# Patient Record
Sex: FEMALE | Race: OTHER | Employment: UNEMPLOYED | URBAN - METROPOLITAN AREA
[De-identification: names, ages, dates, MRNs, and addresses within clinical notes are randomized per-mention and may not be internally consistent; named-entity substitution may affect disease eponyms.]

---

## 2017-01-19 ENCOUNTER — HOSPITAL ENCOUNTER (EMERGENCY)
Facility: HOSPITAL | Age: 52
Discharge: HOME | End: 2017-01-19
Attending: EMERGENCY MEDICINE
Payer: MEDICAID

## 2017-01-19 ENCOUNTER — APPOINTMENT (OUTPATIENT)
Dept: CT IMAGING | Facility: HOSPITAL | Age: 52
End: 2017-01-19
Attending: EMERGENCY MEDICINE
Payer: MEDICAID

## 2017-01-19 VITALS
HEART RATE: 85 BPM | TEMPERATURE: 98 F | RESPIRATION RATE: 16 BRPM | WEIGHT: 196.19 LBS | SYSTOLIC BLOOD PRESSURE: 155 MMHG | OXYGEN SATURATION: 95 % | DIASTOLIC BLOOD PRESSURE: 82 MMHG

## 2017-01-19 DIAGNOSIS — N10 ACUTE PYELONEPHRITIS: ICD-10-CM

## 2017-01-19 DIAGNOSIS — R11.2 NON-INTRACTABLE VOMITING WITH NAUSEA, UNSPECIFIED VOMITING TYPE: Primary | ICD-10-CM

## 2017-01-19 LAB
ALBUMIN SERPL BCP-MCNC: 4 G/DL (ref 3.5–4.8)
ALP SERPL-CCNC: 78 U/L (ref 32–100)
ALT SERPL-CCNC: 35 U/L (ref 14–54)
ANION GAP SERPL CALC-SCNC: 13 MMOL/L (ref 0–18)
AST SERPL-CCNC: 32 U/L (ref 15–41)
B-HCG UR QL: NEGATIVE
BASOPHILS # BLD: 0.1 K/UL (ref 0–0.2)
BASOPHILS NFR BLD: 1 %
BILIRUB DIRECT SERPL-MCNC: 0.1 MG/DL (ref 0–0.2)
BILIRUB SERPL-MCNC: 0.8 MG/DL (ref 0.3–1.2)
BILIRUB UR QL: NEGATIVE
BUN SERPL-MCNC: 13 MG/DL (ref 8–20)
BUN/CREAT SERPL: 14.1 (ref 10–20)
CALCIUM SERPL-MCNC: 9.3 MG/DL (ref 8.5–10.5)
CHLORIDE SERPL-SCNC: 97 MMOL/L (ref 95–110)
CO2 SERPL-SCNC: 23 MMOL/L (ref 22–32)
COLOR UR: YELLOW
CREAT SERPL-MCNC: 0.92 MG/DL (ref 0.5–1.5)
EOSINOPHIL # BLD: 0.2 K/UL (ref 0–0.7)
EOSINOPHIL NFR BLD: 1 %
ERYTHROCYTE [DISTWIDTH] IN BLOOD BY AUTOMATED COUNT: 13.6 % (ref 11–15)
GLUCOSE SERPL-MCNC: 196 MG/DL (ref 70–99)
HCT VFR BLD AUTO: 40.7 % (ref 35–48)
HGB BLD-MCNC: 13.8 G/DL (ref 12–16)
HGB UR QL STRIP.AUTO: NEGATIVE
HYALINE CASTS #/AREA URNS AUTO: 5 /LPF
LIPASE SERPL-CCNC: 28 U/L (ref 22–51)
LYMPHOCYTES # BLD: 2.6 K/UL (ref 1–4)
LYMPHOCYTES NFR BLD: 20 %
MCH RBC QN AUTO: 26.8 PG (ref 27–32)
MCHC RBC AUTO-ENTMCNC: 33.8 G/DL (ref 32–37)
MCV RBC AUTO: 79.4 FL (ref 80–100)
MONOCYTES # BLD: 0.6 K/UL (ref 0–1)
MONOCYTES NFR BLD: 5 %
NEUTROPHILS # BLD AUTO: 9.4 K/UL (ref 1.8–7.7)
NEUTROPHILS NFR BLD: 73 %
NITRITE UR QL STRIP.AUTO: NEGATIVE
OSMOLALITY UR CALC.SUM OF ELEC: 282 MOSM/KG (ref 275–295)
PH UR: 5 [PH] (ref 5–8)
PLATELET # BLD AUTO: 284 K/UL (ref 140–400)
PMV BLD AUTO: 8.4 FL (ref 7.4–10.3)
POTASSIUM SERPL-SCNC: 4.1 MMOL/L (ref 3.3–5.1)
PROT SERPL-MCNC: 7.1 G/DL (ref 5.9–8.4)
PROT UR-MCNC: NEGATIVE MG/DL
RBC # BLD AUTO: 5.13 M/UL (ref 3.7–5.4)
RBC #/AREA URNS AUTO: 2 /HPF
SODIUM SERPL-SCNC: 133 MMOL/L (ref 136–144)
SP GR UR STRIP: 1.02 (ref 1–1.03)
UROBILINOGEN UR STRIP-ACNC: <2
VIT C UR-MCNC: NEGATIVE MG/DL
WBC # BLD AUTO: 12.9 K/UL (ref 4–11)
WBC #/AREA URNS AUTO: 3 /HPF

## 2017-01-19 PROCEDURE — 81025 URINE PREGNANCY TEST: CPT

## 2017-01-19 PROCEDURE — 74177 CT ABD & PELVIS W/CONTRAST: CPT

## 2017-01-19 PROCEDURE — 99285 EMERGENCY DEPT VISIT HI MDM: CPT

## 2017-01-19 PROCEDURE — 80076 HEPATIC FUNCTION PANEL: CPT | Performed by: EMERGENCY MEDICINE

## 2017-01-19 PROCEDURE — 96374 THER/PROPH/DIAG INJ IV PUSH: CPT

## 2017-01-19 PROCEDURE — 93010 ELECTROCARDIOGRAM REPORT: CPT | Performed by: EMERGENCY MEDICINE

## 2017-01-19 PROCEDURE — 85025 COMPLETE CBC W/AUTO DIFF WBC: CPT | Performed by: EMERGENCY MEDICINE

## 2017-01-19 PROCEDURE — 93005 ELECTROCARDIOGRAM TRACING: CPT

## 2017-01-19 PROCEDURE — 80048 BASIC METABOLIC PNL TOTAL CA: CPT | Performed by: EMERGENCY MEDICINE

## 2017-01-19 PROCEDURE — 81001 URINALYSIS AUTO W/SCOPE: CPT | Performed by: EMERGENCY MEDICINE

## 2017-01-19 PROCEDURE — 83690 ASSAY OF LIPASE: CPT | Performed by: EMERGENCY MEDICINE

## 2017-01-19 PROCEDURE — 96361 HYDRATE IV INFUSION ADD-ON: CPT

## 2017-01-19 RX ORDER — CIPROFLOXACIN 500 MG/1
500 TABLET, FILM COATED ORAL 2 TIMES DAILY
Qty: 14 TABLET | Refills: 0 | Status: SHIPPED | OUTPATIENT
Start: 2017-01-19 | End: 2017-01-26

## 2017-01-19 RX ORDER — ONDANSETRON 4 MG/1
4 TABLET, FILM COATED ORAL EVERY 8 HOURS PRN
Qty: 20 TABLET | Refills: 0 | Status: SHIPPED | OUTPATIENT
Start: 2017-01-19 | End: 2020-04-27

## 2017-01-19 RX ORDER — ONDANSETRON 2 MG/ML
4 INJECTION INTRAMUSCULAR; INTRAVENOUS ONCE
Status: COMPLETED | OUTPATIENT
Start: 2017-01-19 | End: 2017-01-19

## 2017-01-19 NOTE — ED NOTES
Pt presents to ER with c/o of epigastric pain and that radiates to her back and left lower abdomen. Left abdomen is tender to palpate. Pt  states she has not had a fever at home. Denies blood in stool or emesis.  Pt has had nausea and vomiting since

## 2017-01-19 NOTE — ED PROVIDER NOTES
Patient Seen in: [facility] Emergency Department    History   Patient presents with:  Abdomen/Flank Pain (GI/)    Stated Complaint: abdominal pain     HPI    49year old female with a history of chronically recurring left upper quadrant abdominal Head is without raccoon's eyes, without right periorbital erythema and without left periorbital erythema. Nose: Nose normal.   Mouth/Throat: Mucous membranes are normal. Mucous membranes are not pale and not cyanotic.    Eyes: Conjunctivae and lids are no WITH DIFFERENTIAL WITH PLATELET    Narrative: The following orders were created for panel order CBC WITH DIFFERENTIAL WITH PLATELET.   Procedure                               Abnormality         Status                     --------- Normal appendix. Sigmoid and     descending colon diverticulosis. Moderate amount of stool in colon. No     obstruction,  bowel wall thickening,or mesenteric mass. ABDOMINAL WALL: Small fat containing umbilical hernia.     URINARY BLADDER: Normal. abdominal aortic aneurysm or dissection    Limitations of history:   unable to obtain history from patient  Factors limiting our ability to obtain a history: Does not speak [place], family translates, does not request hospital     Medical Record pyelonephritis    Disposition:  Discharge    Follow-up:  Jt Mae, [address] [address] [phone]    Schedule an appointment as soon as possible for a visit        Medications Prescribed:  Current Discharge Me

## 2017-03-29 ENCOUNTER — ANESTHESIA (OUTPATIENT)
Dept: ENDOSCOPY | Facility: HOSPITAL | Age: 52
End: 2017-03-29
Payer: MEDICAID

## 2017-03-29 ENCOUNTER — ANESTHESIA EVENT (OUTPATIENT)
Dept: ENDOSCOPY | Facility: HOSPITAL | Age: 52
End: 2017-03-29
Payer: MEDICAID

## 2017-03-29 ENCOUNTER — HOSPITAL ENCOUNTER (OUTPATIENT)
Facility: HOSPITAL | Age: 52
Setting detail: HOSPITAL OUTPATIENT SURGERY
Discharge: HOME | End: 2017-03-29
Attending: INTERNAL MEDICINE | Admitting: INTERNAL MEDICINE
Payer: MEDICAID

## 2017-03-29 ENCOUNTER — SURGERY (OUTPATIENT)
Age: 52
End: 2017-03-29

## 2017-03-29 DIAGNOSIS — Z12.11 SCREENING FOR COLON CANCER: ICD-10-CM

## 2017-03-29 DIAGNOSIS — R10.9 ABDOMINAL PAIN: Primary | ICD-10-CM

## 2017-03-29 LAB
B-HCG UR QL: NEGATIVE
GLUCOSE BLDC GLUCOMTR-MCNC: 210 MG/DL (ref 70–99)

## 2017-03-29 PROCEDURE — 0DB68ZX EXCISION OF STOMACH, VIA NATURAL OR ARTIFICIAL OPENING ENDOSCOPIC, DIAGNOSTIC: ICD-10-PCS | Performed by: INTERNAL MEDICINE

## 2017-03-29 PROCEDURE — 81025 URINE PREGNANCY TEST: CPT

## 2017-03-29 PROCEDURE — 0DBP8ZX EXCISION OF RECTUM, VIA NATURAL OR ARTIFICIAL OPENING ENDOSCOPIC, DIAGNOSTIC: ICD-10-PCS | Performed by: INTERNAL MEDICINE

## 2017-03-29 PROCEDURE — 88312 SPECIAL STAINS GROUP 1: CPT | Performed by: INTERNAL MEDICINE

## 2017-03-29 PROCEDURE — 82962 GLUCOSE BLOOD TEST: CPT

## 2017-03-29 PROCEDURE — 88305 TISSUE EXAM BY PATHOLOGIST: CPT | Performed by: INTERNAL MEDICINE

## 2017-03-29 PROCEDURE — 88342 IMHCHEM/IMCYTCHM 1ST ANTB: CPT | Performed by: INTERNAL MEDICINE

## 2017-03-29 RX ORDER — SODIUM CHLORIDE, SODIUM LACTATE, POTASSIUM CHLORIDE, CALCIUM CHLORIDE 600; 310; 30; 20 MG/100ML; MG/100ML; MG/100ML; MG/100ML
INJECTION, SOLUTION INTRAVENOUS CONTINUOUS
Status: CANCELLED | OUTPATIENT
Start: 2017-03-29

## 2017-03-29 RX ORDER — LIDOCAINE HYDROCHLORIDE 10 MG/ML
INJECTION, SOLUTION EPIDURAL; INFILTRATION; INTRACAUDAL; PERINEURAL AS NEEDED
Status: DISCONTINUED | OUTPATIENT
Start: 2017-03-29 | End: 2017-03-29 | Stop reason: SURG

## 2017-03-29 RX ORDER — SODIUM CHLORIDE, SODIUM LACTATE, POTASSIUM CHLORIDE, CALCIUM CHLORIDE 600; 310; 30; 20 MG/100ML; MG/100ML; MG/100ML; MG/100ML
INJECTION, SOLUTION INTRAVENOUS CONTINUOUS PRN
Status: DISCONTINUED | OUTPATIENT
Start: 2017-03-29 | End: 2017-03-29 | Stop reason: SURG

## 2017-03-29 RX ORDER — NALOXONE HYDROCHLORIDE 0.4 MG/ML
80 INJECTION, SOLUTION INTRAMUSCULAR; INTRAVENOUS; SUBCUTANEOUS AS NEEDED
Status: CANCELLED | OUTPATIENT
Start: 2017-03-29 | End: 2017-03-29

## 2017-03-29 RX ADMIN — LIDOCAINE HYDROCHLORIDE 50 MG: 10 INJECTION, SOLUTION EPIDURAL; INFILTRATION; INTRACAUDAL; PERINEURAL at 10:13:00

## 2017-03-29 RX ADMIN — SODIUM CHLORIDE, SODIUM LACTATE, POTASSIUM CHLORIDE, CALCIUM CHLORIDE: 600; 310; 30; 20 INJECTION, SOLUTION INTRAVENOUS at 10:13:00

## 2017-03-29 NOTE — ANESTHESIA POSTPROCEDURE EVALUATION
Patient: Eliud Landon    Procedure Summary     Date Anesthesia Start Anesthesia Stop Room / Location    03/29/17 1012  300 [facility] ENDOSCOPY 01 / 300 [facility] ENDOSCOPY       Procedure Diagnosis Surgeon Responsible Provider    ESOPHAGOGASTRODUODENOSCOPY (EGD) (N/A

## 2017-03-29 NOTE — ANESTHESIA PREPROCEDURE EVALUATION
Anesthesia PreOp Note    HPI:     Abimbola Meyers is a 49year old female who presents for preoperative consultation requested by: Skyler Doty MD    Date of Surgery: 3/29/2017    Procedure(s):  ESOPHAGOGASTRODUODENOSCOPY (EGD)  COLONOSCOPY  In every 8 (eight) hours as needed for Nausea. Disp: 20 tablet Rfl: 0 Taking       No current Epic-ordered facility-administered medications on file. No current [facility]-ordered outpatient prescriptions on file. No Known Allergies    History reviewed.  No pert GI/Hepatic/Renal - negative ROS   (+) liver disease,     Endo/Other - negative ROS   (+) diabetes mellitus,   Abdominal  - normal exam  (+) obese,              Anesthesia Plan:   ASA:  3  Plan:   MAC  Informed Consent Plan and Risks Discussed With:  Сергей

## 2017-03-29 NOTE — OPERATIVE REPORT
ESOPHAGOGASTRODUODENOSCOPY AND COLONOSCOPY REPORT    Patient Name:  Sosa Linder Record #: [id number]  YOB: 1965  Date of Procedure: 3/29/2017    Referring physician: Mateus Carlson MD     EGD with biopsy  Colonoscopy to cecum There was no evidence of ulcerations, colitis, vascular anomalies, diverticulosis,  or mass lesions. Retroflexed view of the anus revealed small hemorrhoids. Digital rectal exam was normal. 3 mm polyp removed from rectum with cold biopsy.     Plan:   High

## 2017-03-29 NOTE — H&P
PRE-PROCEDURE UPDATE    HPI: Shelli Chao is a 49year old female. 12/25/1965. Patient presents for a colonoscopy and gastroscopy. ALLERGIES: No Known Allergies      No current outpatient prescriptions on file.   Past Medical History   Diagn

## 2017-03-30 VITALS
WEIGHT: 198.44 LBS | BODY MASS INDEX: 36.52 KG/M2 | HEIGHT: 62 IN | SYSTOLIC BLOOD PRESSURE: 128 MMHG | HEART RATE: 78 BPM | OXYGEN SATURATION: 97 % | RESPIRATION RATE: 18 BRPM | DIASTOLIC BLOOD PRESSURE: 73 MMHG

## 2017-04-03 NOTE — PROGRESS NOTES
Quick Note:    4/3/2017  Shelli [address]    Dear Angie Salazar,       Here are the biopsy/pathology findings from your recent Colonoscopy :    a hyperplastic polyp(s) which is a benign non-cancerous growth that was remov

## 2017-04-04 ENCOUNTER — HOSPITAL ENCOUNTER (OUTPATIENT)
Dept: GENERAL RADIOLOGY | Age: 52
Discharge: HOME | End: 2017-04-04
Attending: FAMILY MEDICINE
Payer: MEDICAID

## 2017-04-04 DIAGNOSIS — M54.50 LOWER BACK PAIN: ICD-10-CM

## 2017-04-04 PROCEDURE — 72110 X-RAY EXAM L-2 SPINE 4/>VWS: CPT

## 2017-04-12 ENCOUNTER — HOSPITAL ENCOUNTER (OUTPATIENT)
Dept: ULTRASOUND IMAGING | Age: 52
Discharge: HOME | End: 2017-04-12
Attending: FAMILY MEDICINE
Payer: MEDICAID

## 2017-04-12 DIAGNOSIS — R10.9 ABDOMINAL PAIN, UNSPECIFIED LOCATION: ICD-10-CM

## 2017-04-12 PROCEDURE — 76700 US EXAM ABDOM COMPLETE: CPT

## 2017-04-16 ENCOUNTER — HOSPITAL ENCOUNTER (EMERGENCY)
Facility: HOSPITAL | Age: 52
Discharge: HOME | End: 2017-04-16
Attending: PHYSICIAN ASSISTANT
Payer: MEDICAID

## 2017-04-16 VITALS
TEMPERATURE: 97 F | DIASTOLIC BLOOD PRESSURE: 79 MMHG | SYSTOLIC BLOOD PRESSURE: 127 MMHG | HEART RATE: 84 BPM | BODY MASS INDEX: 35.55 KG/M2 | RESPIRATION RATE: 18 BRPM | WEIGHT: 200.63 LBS | OXYGEN SATURATION: 98 % | HEIGHT: 63 IN

## 2017-04-16 DIAGNOSIS — S09.90XA CLOSED HEAD INJURY WITHOUT LOSS OF CONSCIOUSNESS, INITIAL ENCOUNTER: ICD-10-CM

## 2017-04-16 DIAGNOSIS — S01.81XA LACERATION OF FACE, INITIAL ENCOUNTER: Primary | ICD-10-CM

## 2017-04-16 PROCEDURE — 99283 EMERGENCY DEPT VISIT LOW MDM: CPT

## 2017-04-16 RX ORDER — ACETAMINOPHEN 500 MG
1000 TABLET ORAL ONCE
Status: COMPLETED | OUTPATIENT
Start: 2017-04-16 | End: 2017-04-16

## 2017-04-16 RX ORDER — TRAMADOL HYDROCHLORIDE 50 MG/1
50 TABLET ORAL EVERY 6 HOURS PRN
Qty: 12 TABLET | Refills: 0 | Status: SHIPPED | OUTPATIENT
Start: 2017-04-16 | End: 2017-04-16

## 2017-04-16 NOTE — ED PROVIDER NOTES
Patient Seen in: [facility] AND [facility] Emergency Department    History   Patient presents with:  Laceration Abrasion (integumentary)    Stated Complaint: patient hit herself with a plate    HPI    46-YCIX-ZZV female presents with chief complaint of forehead Tab,  TK 1 T PO D   simvastatin 20 MG Oral Tab,  TAKE 1 TABLET PO DAILY IN THE EVENING   TraZODone HCl 100 MG Oral Tab,  TK 1 T PO D HS PRN   PEG 3350-KCl-NaBcb-NaCl-NaSulf 236 g Oral Recon Soln,  Take as directed   Ondansetron HCl (ZOFRAN) 4 mg tablet,  T Funduscopic exam within normal limits bilaterally. ENT: Oropharynx is clear. Neck: The neck is supple. There is no evidence of JVD. No meningeal signs. Trachea normal. Nontender to palpation. No contusions. No abrasions. No penetrating injury.   Chest: Th appointment as soon as possible for a visit in 1 day  For follow-up      Medications Prescribed:  Discharge Medication List as of 4/16/2017  5:37 PM

## 2017-07-17 ENCOUNTER — HOSPITAL (OUTPATIENT)
Dept: OTHER | Age: 52
End: 2017-07-17
Attending: INTERNAL MEDICINE

## 2017-07-17 LAB
ALBUMIN SERPL-MCNC: 4 GM/DL (ref 3.6–5.1)
ALP SERPL-CCNC: 104 UNIT/L (ref 45–117)
ALT SERPL-CCNC: 45 UNIT/L
AMORPH SED URNS QL MICRO: ABNORMAL
ANALYZER ANC (IANC): ABNORMAL
ANION GAP SERPL CALC-SCNC: 17 MMOL/L (ref 10–20)
APPEARANCE UR: CLEAR
AST SERPL-CCNC: 27 UNIT/L
BACTERIA #/AREA URNS HPF: ABNORMAL /HPF
BASOPHILS # BLD: 0 THOUSAND/MCL (ref 0–0.3)
BASOPHILS NFR BLD: 0 %
BILIRUB CONJ SERPL-MCNC: 0.1 MG/DL (ref 0–0.2)
BILIRUB SERPL-MCNC: 0.7 MG/DL (ref 0.2–1)
BILIRUB UR QL: NEGATIVE
BNP SERPL-MCNC: 13 PG/ML
BUN SERPL-MCNC: 23 MG/DL (ref 6–20)
BUN/CREAT SERPL: 27 (ref 7–25)
CALCIUM SERPL-MCNC: 9.8 MG/DL (ref 8.4–10.2)
CAOX CRY URNS QL MICRO: ABNORMAL
CHLORIDE: 101 MMOL/L (ref 98–107)
CO2 SERPL-SCNC: 24 MMOL/L (ref 21–32)
COLOR UR: ABNORMAL
CREAT SERPL-MCNC: 0.84 MG/DL (ref 0.51–0.95)
D DIMER PPP FEU-MCNC: 0.21 MG/L FEU
DIFFERENTIAL METHOD BLD: ABNORMAL
EOSINOPHIL # BLD: 0.2 THOUSAND/MCL (ref 0.1–0.5)
EOSINOPHIL NFR BLD: 1 %
EPITH CASTS #/AREA URNS LPF: ABNORMAL /[LPF]
ERYTHROCYTE [DISTWIDTH] IN BLOOD: 14.2 % (ref 11–15)
FATTY CASTS #/AREA URNS LPF: ABNORMAL /[LPF]
GLUCOSE SERPL-MCNC: 127 MG/DL (ref 65–99)
GLUCOSE UR-MCNC: NEGATIVE MG/DL
GRAN CASTS #/AREA URNS LPF: ABNORMAL /[LPF]
HEMATOCRIT: 39.7 % (ref 36–46.5)
HGB BLD-MCNC: 13.6 GM/DL (ref 12–15.5)
HGB UR QL: NEGATIVE
HYALINE CASTS #/AREA URNS LPF: ABNORMAL /LPF (ref 0–5)
KETONES UR-MCNC: NEGATIVE MG/DL
LEUKOCYTE ESTERASE UR QL STRIP: ABNORMAL
LIPASE SERPL-CCNC: 143 UNIT/L (ref 73–393)
LYMPHOCYTES # BLD: 3.5 THOUSAND/MCL (ref 1–4.8)
LYMPHOCYTES NFR BLD: 18 %
MAGNESIUM SERPL-MCNC: 1.8 MG/DL (ref 1.7–2.4)
MCH RBC QN AUTO: 26.4 PG (ref 26–34)
MCHC RBC AUTO-ENTMCNC: 34.3 GM/DL (ref 32–36.5)
MCV RBC AUTO: 76.9 FL (ref 78–100)
MIXED CELL CASTS #/AREA URNS LPF: ABNORMAL /[LPF]
MONOCYTES # BLD: 1.5 THOUSAND/MCL (ref 0.3–0.9)
MONOCYTES NFR BLD: 8 %
MUCOUS THREADS URNS QL MICRO: PRESENT
NEUTROPHILS # BLD: 14 THOUSAND/MCL (ref 1.8–7.7)
NEUTROPHILS NFR BLD: 73 %
NEUTS SEG NFR BLD: ABNORMAL %
NITRITE UR QL: NEGATIVE
PERCENT NRBC: ABNORMAL
PH UR: 5 UNIT (ref 5–7)
PLATELET # BLD: 395 THOUSAND/MCL (ref 140–450)
POTASSIUM SERPL-SCNC: 3.9 MMOL/L (ref 3.4–5.1)
PROT SERPL-MCNC: 8.1 GM/DL (ref 6.4–8.2)
PROT UR QL: NEGATIVE MG/DL
RBC # BLD: 5.16 MILLION/MCL (ref 4–5.2)
RBC #/AREA URNS HPF: ABNORMAL /HPF (ref 0–3)
RBC CASTS #/AREA URNS LPF: ABNORMAL /[LPF]
RENAL EPI CELLS #/AREA URNS HPF: ABNORMAL /[HPF]
SODIUM SERPL-SCNC: 138 MMOL/L (ref 135–145)
SP GR UR: 1.03 (ref 1–1.03)
SPECIMEN SOURCE: ABNORMAL
SPERM URNS QL MICRO: ABNORMAL
SQUAMOUS #/AREA URNS HPF: ABNORMAL /HPF (ref 0–5)
T VAGINALIS URNS QL MICRO: ABNORMAL
TRI-PHOS CRY URNS QL MICRO: ABNORMAL
TROPONIN I SERPL HS-MCNC: <0.02 NG/ML
TROPONIN I SERPL HS-MCNC: <0.02 NG/ML
URATE CRY URNS QL MICRO: ABNORMAL
URINE REFLEX: ABNORMAL
URNS CMNT MICRO: ABNORMAL
UROBILINOGEN UR QL: 0.2 MG/DL (ref 0–1)
WAXY CASTS #/AREA URNS LPF: ABNORMAL /[LPF]
WBC # BLD: 19.2 THOUSAND/MCL (ref 4.2–11)
WBC #/AREA URNS HPF: ABNORMAL /HPF (ref 0–5)
WBC CASTS #/AREA URNS LPF: ABNORMAL /[LPF]
YEAST HYPHAE URNS QL MICRO: ABNORMAL
YEAST URNS QL MICRO: ABNORMAL

## 2017-07-18 ENCOUNTER — IMAGING SERVICES (OUTPATIENT)
Dept: OTHER | Age: 52
End: 2017-07-18

## 2017-07-18 ENCOUNTER — DIAGNOSTIC TRANS (OUTPATIENT)
Dept: OTHER | Age: 52
End: 2017-07-18

## 2017-07-18 ENCOUNTER — CHARTING TRANS (OUTPATIENT)
Dept: OTHER | Age: 52
End: 2017-07-18

## 2017-07-18 LAB
ALBUMIN SERPL-MCNC: 3.4 GM/DL (ref 3.6–5.1)
ALBUMIN/GLOB SERPL: 0.9 {RATIO} (ref 1–2.4)
ALP SERPL-CCNC: 87 UNIT/L (ref 45–117)
ALT SERPL-CCNC: 37 UNIT/L
ANALYZER ANC (IANC): ABNORMAL
ANION GAP SERPL CALC-SCNC: 16 MMOL/L (ref 10–20)
AST SERPL-CCNC: 20 UNIT/L
BILIRUB SERPL-MCNC: 0.7 MG/DL (ref 0.2–1)
BUN SERPL-MCNC: 25 MG/DL (ref 6–20)
BUN/CREAT SERPL: 29 (ref 7–25)
CALCIUM SERPL-MCNC: 9.3 MG/DL (ref 8.4–10.2)
CHLORIDE: 101 MMOL/L (ref 98–107)
CHOLEST SERPL-MCNC: 155 MG/DL
CHOLEST/HDLC SERPL: 5.7 {RATIO}
CO2 SERPL-SCNC: 25 MMOL/L (ref 21–32)
CREAT SERPL-MCNC: 0.87 MG/DL (ref 0.51–0.95)
ERYTHROCYTE [DISTWIDTH] IN BLOOD: 14.3 % (ref 11–15)
GLOBULIN SER-MCNC: 3.7 GM/DL (ref 2–4)
GLUCOSE BLDC GLUCOMTR-MCNC: 147 MG/DL (ref 65–99)
GLUCOSE BLDC GLUCOMTR-MCNC: 216 MG/DL (ref 65–99)
GLUCOSE SERPL-MCNC: 216 MG/DL (ref 65–99)
HDLC SERPL-MCNC: 27 MG/DL
HEMATOCRIT: 36.7 % (ref 36–46.5)
HGB BLD-MCNC: 12.5 GM/DL (ref 12–15.5)
LDLC SERPL CALC-MCNC: 86 MG/DL
MCH RBC QN AUTO: 26.3 PG (ref 26–34)
MCHC RBC AUTO-ENTMCNC: 34.1 GM/DL (ref 32–36.5)
MCV RBC AUTO: 77.1 FL (ref 78–100)
NONHDLC SERPL-MCNC: 128 MG/DL
PLATELET # BLD: 322 THOUSAND/MCL (ref 140–450)
POTASSIUM SERPL-SCNC: 3.6 MMOL/L (ref 3.4–5.1)
PROT SERPL-MCNC: 7.1 GM/DL (ref 6.4–8.2)
RBC # BLD: 4.76 MILLION/MCL (ref 4–5.2)
SODIUM SERPL-SCNC: 138 MMOL/L (ref 135–145)
TRIGLYCERIDE (TRIGP): 208 MG/DL
TSH SERPL-ACNC: 0.23 MCUNIT/ML (ref 0.35–5)
WBC # BLD: 9.1 THOUSAND/MCL (ref 4.2–11)

## 2017-07-19 ENCOUNTER — CHARTING TRANS (OUTPATIENT)
Dept: OTHER | Age: 52
End: 2017-07-19

## 2017-11-08 ENCOUNTER — HOSPITAL ENCOUNTER (OUTPATIENT)
Dept: GENERAL RADIOLOGY | Age: 52
Discharge: HOME | End: 2017-11-08
Attending: NURSE PRACTITIONER
Payer: MEDICAID

## 2017-11-08 DIAGNOSIS — R06.02 SOB (SHORTNESS OF BREATH): ICD-10-CM

## 2017-11-08 PROCEDURE — 71020 XR CHEST PA + LAT CHEST (CPT=71020): CPT | Performed by: NURSE PRACTITIONER

## 2017-12-04 ENCOUNTER — HOSPITAL ENCOUNTER (OUTPATIENT)
Facility: HOSPITAL | Age: 52
Setting detail: OBSERVATION
Discharge: HOME | End: 2017-12-08
Attending: EMERGENCY MEDICINE | Admitting: HOSPITALIST
Payer: MEDICAID

## 2017-12-04 ENCOUNTER — APPOINTMENT (OUTPATIENT)
Dept: GENERAL RADIOLOGY | Facility: HOSPITAL | Age: 52
End: 2017-12-04
Attending: EMERGENCY MEDICINE
Payer: MEDICAID

## 2017-12-04 DIAGNOSIS — R07.9 CHEST PAIN, UNSPECIFIED TYPE: Primary | ICD-10-CM

## 2017-12-04 DIAGNOSIS — R06.00 DYSPNEA, UNSPECIFIED TYPE: ICD-10-CM

## 2017-12-04 PROCEDURE — 84484 ASSAY OF TROPONIN QUANT: CPT | Performed by: EMERGENCY MEDICINE

## 2017-12-04 PROCEDURE — 83880 ASSAY OF NATRIURETIC PEPTIDE: CPT | Performed by: EMERGENCY MEDICINE

## 2017-12-04 PROCEDURE — 99285 EMERGENCY DEPT VISIT HI MDM: CPT

## 2017-12-04 PROCEDURE — 71010 XR CHEST AP PORTABLE  (CPT=71010): CPT | Performed by: EMERGENCY MEDICINE

## 2017-12-04 PROCEDURE — 80048 BASIC METABOLIC PNL TOTAL CA: CPT | Performed by: EMERGENCY MEDICINE

## 2017-12-04 PROCEDURE — 85379 FIBRIN DEGRADATION QUANT: CPT | Performed by: EMERGENCY MEDICINE

## 2017-12-04 PROCEDURE — 36415 COLL VENOUS BLD VENIPUNCTURE: CPT

## 2017-12-04 PROCEDURE — 93005 ELECTROCARDIOGRAM TRACING: CPT

## 2017-12-04 PROCEDURE — 85025 COMPLETE CBC W/AUTO DIFF WBC: CPT | Performed by: EMERGENCY MEDICINE

## 2017-12-04 PROCEDURE — 93010 ELECTROCARDIOGRAM REPORT: CPT | Performed by: EMERGENCY MEDICINE

## 2017-12-04 RX ORDER — ACETAMINOPHEN 500 MG
TABLET ORAL
Status: DISPENSED
Start: 2017-12-04 | End: 2017-12-05

## 2017-12-04 RX ORDER — ACETAMINOPHEN 500 MG
1000 TABLET ORAL ONCE
Status: COMPLETED | OUTPATIENT
Start: 2017-12-04 | End: 2017-12-04

## 2017-12-05 ENCOUNTER — APPOINTMENT (OUTPATIENT)
Dept: CV DIAGNOSTICS | Facility: HOSPITAL | Age: 52
End: 2017-12-05
Attending: HOSPITALIST
Payer: MEDICAID

## 2017-12-05 ENCOUNTER — APPOINTMENT (OUTPATIENT)
Dept: NUCLEAR MEDICINE | Facility: HOSPITAL | Age: 52
End: 2017-12-05
Attending: HOSPITALIST
Payer: MEDICAID

## 2017-12-05 ENCOUNTER — APPOINTMENT (OUTPATIENT)
Dept: CT IMAGING | Facility: HOSPITAL | Age: 52
End: 2017-12-05
Attending: INTERNAL MEDICINE
Payer: MEDICAID

## 2017-12-05 PROBLEM — R06.00 DYSPNEA, UNSPECIFIED TYPE: Status: ACTIVE | Noted: 2017-12-05

## 2017-12-05 PROBLEM — R07.9 CHEST PAIN, UNSPECIFIED TYPE: Status: ACTIVE | Noted: 2017-12-05

## 2017-12-05 PROCEDURE — 71260 CT THORAX DX C+: CPT | Performed by: INTERNAL MEDICINE

## 2017-12-05 PROCEDURE — 85060 BLOOD SMEAR INTERPRETATION: CPT | Performed by: HOSPITALIST

## 2017-12-05 PROCEDURE — 78452 HT MUSCLE IMAGE SPECT MULT: CPT | Performed by: HOSPITALIST

## 2017-12-05 PROCEDURE — 82962 GLUCOSE BLOOD TEST: CPT

## 2017-12-05 PROCEDURE — 87116 MYCOBACTERIA CULTURE: CPT | Performed by: INTERNAL MEDICINE

## 2017-12-05 PROCEDURE — 86480 TB TEST CELL IMMUN MEASURE: CPT | Performed by: INTERNAL MEDICINE

## 2017-12-05 PROCEDURE — 87206 SMEAR FLUORESCENT/ACID STAI: CPT | Performed by: INTERNAL MEDICINE

## 2017-12-05 PROCEDURE — 80048 BASIC METABOLIC PNL TOTAL CA: CPT | Performed by: HOSPITALIST

## 2017-12-05 PROCEDURE — 84484 ASSAY OF TROPONIN QUANT: CPT | Performed by: HOSPITALIST

## 2017-12-05 PROCEDURE — 93016 CV STRESS TEST SUPVJ ONLY: CPT | Performed by: HOSPITALIST

## 2017-12-05 PROCEDURE — 80076 HEPATIC FUNCTION PANEL: CPT | Performed by: HOSPITALIST

## 2017-12-05 PROCEDURE — 83036 HEMOGLOBIN GLYCOSYLATED A1C: CPT | Performed by: HOSPITALIST

## 2017-12-05 PROCEDURE — 93017 CV STRESS TEST TRACING ONLY: CPT | Performed by: HOSPITALIST

## 2017-12-05 PROCEDURE — 93018 CV STRESS TEST I&R ONLY: CPT | Performed by: HOSPITALIST

## 2017-12-05 PROCEDURE — 85025 COMPLETE CBC W/AUTO DIFF WBC: CPT | Performed by: HOSPITALIST

## 2017-12-05 RX ORDER — LISINOPRIL AND HYDROCHLOROTHIAZIDE 25; 20 MG/1; MG/1
1 TABLET ORAL DAILY
Status: DISCONTINUED | OUTPATIENT
Start: 2017-12-05 | End: 2017-12-05

## 2017-12-05 RX ORDER — ACETAMINOPHEN 325 MG/1
650 TABLET ORAL EVERY 6 HOURS PRN
Status: DISCONTINUED | OUTPATIENT
Start: 2017-12-05 | End: 2017-12-08

## 2017-12-05 RX ORDER — ALPRAZOLAM 0.5 MG/1
0.5 TABLET ORAL
Status: DISCONTINUED | OUTPATIENT
Start: 2017-12-05 | End: 2017-12-08

## 2017-12-05 RX ORDER — RANITIDINE 300 MG/1
300 CAPSULE ORAL EVERY EVENING
COMMUNITY
End: 2020-04-27

## 2017-12-05 RX ORDER — DOXEPIN HYDROCHLORIDE 50 MG/1
1 CAPSULE ORAL DAILY
Status: DISCONTINUED | OUTPATIENT
Start: 2017-12-05 | End: 2017-12-08

## 2017-12-05 RX ORDER — ASPIRIN 81 MG/1
81 TABLET ORAL DAILY
Status: DISCONTINUED | OUTPATIENT
Start: 2017-12-05 | End: 2017-12-08

## 2017-12-05 RX ORDER — SODIUM CHLORIDE 0.9 % (FLUSH) 0.9 %
3 SYRINGE (ML) INJECTION AS NEEDED
Status: DISCONTINUED | OUTPATIENT
Start: 2017-12-05 | End: 2017-12-08

## 2017-12-05 RX ORDER — DEXTROSE MONOHYDRATE 25 G/50ML
50 INJECTION, SOLUTION INTRAVENOUS AS NEEDED
Status: DISCONTINUED | OUTPATIENT
Start: 2017-12-05 | End: 2017-12-08

## 2017-12-05 RX ORDER — GLIPIZIDE 5 MG/1
5 TABLET, FILM COATED, EXTENDED RELEASE ORAL DAILY
COMMUNITY
End: 2020-04-27

## 2017-12-05 RX ORDER — 0.9 % SODIUM CHLORIDE 0.9 %
VIAL (ML) INJECTION
Status: DISCONTINUED
Start: 2017-12-05 | End: 2017-12-05 | Stop reason: WASHOUT

## 2017-12-05 RX ORDER — ATORVASTATIN CALCIUM 10 MG/1
10 TABLET, FILM COATED ORAL NIGHTLY
Status: DISCONTINUED | OUTPATIENT
Start: 2017-12-05 | End: 2017-12-08

## 2017-12-05 RX ORDER — PANTOPRAZOLE SODIUM 40 MG/1
40 TABLET, DELAYED RELEASE ORAL
Status: DISCONTINUED | OUTPATIENT
Start: 2017-12-05 | End: 2017-12-08

## 2017-12-05 RX ORDER — CITALOPRAM 20 MG/1
20 TABLET ORAL NIGHTLY
Status: DISCONTINUED | OUTPATIENT
Start: 2017-12-05 | End: 2017-12-08

## 2017-12-05 RX ORDER — DEXTROSE MONOHYDRATE 25 G/50ML
50 INJECTION, SOLUTION INTRAVENOUS AS NEEDED
Status: DISCONTINUED | OUTPATIENT
Start: 2017-12-05 | End: 2017-12-05

## 2017-12-05 RX ORDER — ALLOPURINOL 100 MG/1
100 TABLET ORAL NIGHTLY
COMMUNITY
End: 2020-10-06

## 2017-12-05 RX ORDER — ONDANSETRON 2 MG/ML
4 INJECTION INTRAMUSCULAR; INTRAVENOUS EVERY 6 HOURS PRN
Status: DISCONTINUED | OUTPATIENT
Start: 2017-12-05 | End: 2017-12-08

## 2017-12-05 RX ORDER — MONTELUKAST SODIUM 10 MG/1
10 TABLET ORAL NIGHTLY
COMMUNITY
End: 2020-04-27

## 2017-12-05 RX ORDER — LEVOTHYROXINE SODIUM 112 UG/1
112 TABLET ORAL
Status: DISCONTINUED | OUTPATIENT
Start: 2017-12-05 | End: 2017-12-08

## 2017-12-05 RX ORDER — METOPROLOL SUCCINATE 100 MG/1
100 TABLET, EXTENDED RELEASE ORAL
Status: DISCONTINUED | OUTPATIENT
Start: 2017-12-05 | End: 2017-12-08

## 2017-12-05 RX ORDER — CITALOPRAM 20 MG/1
20 TABLET ORAL NIGHTLY
COMMUNITY
End: 2020-04-28

## 2017-12-05 RX ORDER — ALPRAZOLAM 0.5 MG/1
0.5 TABLET ORAL
COMMUNITY
End: 2020-10-23

## 2017-12-05 RX ORDER — ALLOPURINOL 100 MG/1
100 TABLET ORAL NIGHTLY
Status: DISCONTINUED | OUTPATIENT
Start: 2017-12-05 | End: 2017-12-08

## 2017-12-05 RX ORDER — METOPROLOL SUCCINATE 100 MG/1
100 TABLET, EXTENDED RELEASE ORAL 2 TIMES DAILY
COMMUNITY
End: 2020-10-06

## 2017-12-05 RX ORDER — 0.9 % SODIUM CHLORIDE 0.9 %
VIAL (ML) INJECTION
Status: COMPLETED
Start: 2017-12-05 | End: 2017-12-05

## 2017-12-05 RX ORDER — LEVOTHYROXINE SODIUM 112 UG/1
112 TABLET ORAL
COMMUNITY
End: 2020-04-27

## 2017-12-05 RX ORDER — TRAZODONE HYDROCHLORIDE 100 MG/1
100 TABLET ORAL NIGHTLY
Status: ON HOLD | COMMUNITY
End: 2017-12-05

## 2017-12-05 RX ORDER — HEPARIN SODIUM 5000 [USP'U]/ML
5000 INJECTION, SOLUTION INTRAVENOUS; SUBCUTANEOUS EVERY 12 HOURS SCHEDULED
Status: DISCONTINUED | OUTPATIENT
Start: 2017-12-05 | End: 2017-12-06

## 2017-12-05 RX ORDER — MONTELUKAST SODIUM 10 MG/1
10 TABLET ORAL NIGHTLY
Status: DISCONTINUED | OUTPATIENT
Start: 2017-12-05 | End: 2017-12-08

## 2017-12-05 RX ORDER — TRAZODONE HYDROCHLORIDE 100 MG/1
100 TABLET ORAL NIGHTLY PRN
Status: DISCONTINUED | OUTPATIENT
Start: 2017-12-05 | End: 2017-12-08

## 2017-12-05 RX ORDER — MULTIVITAMIN
1 TABLET ORAL DAILY
COMMUNITY
End: 2020-10-23

## 2017-12-05 RX ORDER — CHOLECALCIFEROL (VITAMIN D3) 125 MCG
1 CAPSULE ORAL EVERY MORNING
COMMUNITY

## 2017-12-05 RX ORDER — ASPIRIN 81 MG/1
81 TABLET ORAL DAILY
COMMUNITY

## 2017-12-05 NOTE — PAYOR COMM NOTE
OBSV STATUS    --------------  ADMISSION REVIEW     Payor: [address] [place] [address]  Subscriber #:  [id number]  Authorization Number: N/A    Admit date: N/A  Admit time: N/A       Admitting Physician: Romina Whitfield MD  Attending Physi Location: [facility] ENDOSCOPY        Smoking status: Never Smoker                                                              Smokeless tobacco: Never Used                      Alcohol use: No[DB. 2]                Review of Systems    Positive for stated compla 4.9 (*)     All other components within normal limits   TROPONIN I, 0 HOUR - Normal   BNP (B TYPE NATRIUERTIC PEPTIDE) - Normal   D-DIMER - Normal   CBC WITH DIFFERENTIAL WITH PLATELET    Narrative:      The following orders were created for panel order CBC PM                    H&P Note     No notes of this type exist for this encounter.           MEDICATIONS ADMINISTERED IN LAST 1 DAY:  acetaminophen (TYLENOL EXTRA STRENGTH) tab 1,000 mg     Date Action Dose Route User    12/4/2017 9659 Given 1000 mg Oral Ba

## 2017-12-05 NOTE — H&P
[facility] Hospitalist H&P     CC: Patient presents with:  Chest Pain Angina (cardiovascular)       PCP: LETA MONIQUE      Assessment and Plan     Autumn Barnard is a 49year old female with PMH sig for DM on oral meds, HTN, HL, hypothyroidism, fatty liver who p had several rounds of abx without improvement. Currently no Cp. No fevers but does reports fatigue and nightsweats. Denies TB contact.       Review of Systems  Discussed per family translating, 12 point systems reviewed, see above for pertinent positives TK 1 T PO BID WITH MEALS.  Disp:  Rfl: 1   Omeprazole 40 MG Oral Capsule Delayed Release TK ONE C PO  BID 30 MIN BEFORE MEALS Disp:  Rfl: 2   simvastatin 20 MG Oral Tab TAKE 1 TABLET PO DAILY IN THE EVENING Disp:  Rfl: 2   TraZODone HCl 100 MG Oral Tab TK 1 15:04. [facility], XR LUMBAR SPINE (MIN 4 VIEWS) (CPT=72110), 4/04/2017, 17:22. [facility], XR CHEST AP PORTABLE (CPT=71010), 12/08/2016, 11:17.   INDICATIONS: Productive cough, shortness of breath, sore th bronchovascular structures.

## 2017-12-05 NOTE — ED NOTES
Patient here for substernal chest pain that radiates to the left arm. Patient states she has been short of breath for the last two months, but has been using inhalers with no relief. Patient was given 4 baby ASA and nitro spray, which gave her some relief.

## 2017-12-05 NOTE — CONSULTS
Pulmonary H&P/Consult     NAME: Nettie Bush - ROOM: [address] [place] [address] - MRN: [id number] - Age: 49year old - :  1965    Date of Admission: 2017  7:41 PM  Admission Diagnosis: Dyspnea, unspecified type [R06.00]  Chest pain, unspecified type [R07. 9 Kaitlynn Peraza MD;  Location: [address] [place] [address] ENDOSCOPYHistory reviewed. No pertinent family history.    Smoking status: Never Smoker    Smokeless tobacco: Never Used    Alcohol use No     Medications:  • Heparin Sodium (Porcine)  5,000 Units Subcutaneous 2 times per day 9.6   NA  133*  136   K  3.6  4.5   CL  100  100   CO2  22  27     No results for input(s): BNP in the last 72 hours.   Recent Labs   Lab  12/04/17 2001 12/04/17 2206 12/05/17   0537   TROP  0.00  0.00  0.00     Imaging: I independently visualized all

## 2017-12-05 NOTE — ED PROVIDER NOTES
Patient Seen in: [facility] AND [facility] Emergency Department    History   Patient presents with:  Chest Pain Angina (cardiovascular)    Stated Complaint: Chest pain    HPI    41-year-old female with history of hypertension, diabetes, hypercholesterolemia, and Physical Exam    General Appearance: alert, no distress, coughing throughout examination  Eyes: pupils equal and round no pallor or injection  ENT, Mouth: mucous membranes moist  Respiratory: there are no retractions, lungs are clear to auscultatio RAINBOW DRAW LIGHT GREEN   RAINBOW DRAW GOLD   RAINBOW DRAW LAVENDER TALL (BNP)     EKG    Rate, intervals and axes as noted on EKG Report.   Rate: 80  Rhythm: Sinus Rhythm  Axis: Normal  Reading: Nonspecific EKG           ED Course as of Dec 04 2154  ---

## 2017-12-05 NOTE — ED INITIAL ASSESSMENT (HPI)
Patient complaining of substernal chest pain that began at 8 this morning that radiates down her left arm. States she has been short of breath as well, and tried her inhaler several times today with no relief.  Patient points more to the epigastric region,

## 2017-12-06 ENCOUNTER — APPOINTMENT (OUTPATIENT)
Dept: CV DIAGNOSTICS | Facility: HOSPITAL | Age: 52
End: 2017-12-06
Attending: HOSPITALIST
Payer: MEDICAID

## 2017-12-06 PROCEDURE — 87798 DETECT AGENT NOS DNA AMP: CPT | Performed by: INTERNAL MEDICINE

## 2017-12-06 PROCEDURE — 80048 BASIC METABOLIC PNL TOTAL CA: CPT | Performed by: HOSPITALIST

## 2017-12-06 PROCEDURE — 93306 TTE W/DOPPLER COMPLETE: CPT | Performed by: HOSPITALIST

## 2017-12-06 PROCEDURE — 87116 MYCOBACTERIA CULTURE: CPT | Performed by: INTERNAL MEDICINE

## 2017-12-06 PROCEDURE — 87206 SMEAR FLUORESCENT/ACID STAI: CPT | Performed by: INTERNAL MEDICINE

## 2017-12-06 PROCEDURE — 85025 COMPLETE CBC W/AUTO DIFF WBC: CPT | Performed by: HOSPITALIST

## 2017-12-06 PROCEDURE — 82962 GLUCOSE BLOOD TEST: CPT

## 2017-12-06 PROCEDURE — 87556 M.TUBERCULO DNA AMP PROBE: CPT | Performed by: INTERNAL MEDICINE

## 2017-12-06 RX ORDER — FUROSEMIDE 10 MG/ML
20 INJECTION INTRAMUSCULAR; INTRAVENOUS ONCE
Status: COMPLETED | OUTPATIENT
Start: 2017-12-06 | End: 2017-12-06

## 2017-12-06 NOTE — PLAN OF CARE
CARDIOVASCULAR - ADULT    • Maintains optimal cardiac output and hemodynamic stability Not Progressing    • Absence of cardiac arrhythmias or at baseline Not Progressing        Patient/Family Goals    • Patient/Family Long Term Goal Not Progressing    • Pa

## 2017-12-06 NOTE — PROGRESS NOTES
[facility] Hospitalist Progress Note     PCP: LETA MONIQUE    CC: Follow up       Assessment/Plan:     Gina Benoit is a 49year old female with PMH sig for DM on oral meds, HTN, HL, hypothyroidism, fatty liver who presents with 2 days of chest pain and 2 jackeline 147/93    Intake/Output:    Intake/Output Summary (Last 24 hours) at 12/06/17 1737  Last data filed at 12/06/17 1514   Gross per 24 hour   Intake              427 ml   Output              700 ml   Net             -273 ml       Last 3 Weights  04/16/17 1522 112*  152*  182*       Recent Labs   Lab  12/05/17   0537   ALT  33   AST  32   ALB  4.0       Recent Labs   Lab  12/05/17   1759  12/05/17 2010 12/06/17   0831  12/06/17   1217  12/06/17   1655   PGLU  132*  179*  181*  242*  304*       Recent Labs   L None.   HEART/VASC: Cardiomediastinal silhouette is mildly enlarged. Mildly prominent interstitial markings. MAXIME/MEDIAST:   No visible mass or adenopathy. LUNGS: Hypoinflated. Patchy perihilar opacities. PLEURA: No effusion. No pneumothorax.  BONES: No vi interventricular septum to suggest right ventricular strain. THORACIC AORTA: Unremarkable configuration without aneurysm or dissection. Trace atherosclerosis is noted about the arch. LUNGS/PLEURA: There is dependent subsegmental atelectasis bilaterally.  Pa

## 2017-12-06 NOTE — PROGRESS NOTES
Pulmonary Progress Note     Assessment / Plan:  1. Dyspnea, chest pain - CTA chest with no PE. Lexiscan with no e/o ischemia   - TTE pending  - lasix x1  2. Hemoptysis - TB risk factor of emigration and recent visit to [place].  Suspect this is bland hemop

## 2017-12-07 ENCOUNTER — APPOINTMENT (OUTPATIENT)
Dept: ULTRASOUND IMAGING | Facility: HOSPITAL | Age: 52
End: 2017-12-07
Attending: HOSPITALIST
Payer: MEDICAID

## 2017-12-07 PROCEDURE — 76705 ECHO EXAM OF ABDOMEN: CPT | Performed by: HOSPITALIST

## 2017-12-07 PROCEDURE — 87116 MYCOBACTERIA CULTURE: CPT | Performed by: INTERNAL MEDICINE

## 2017-12-07 PROCEDURE — 87206 SMEAR FLUORESCENT/ACID STAI: CPT | Performed by: INTERNAL MEDICINE

## 2017-12-07 PROCEDURE — 82962 GLUCOSE BLOOD TEST: CPT

## 2017-12-07 PROCEDURE — 84145 PROCALCITONIN (PCT): CPT | Performed by: INTERNAL MEDICINE

## 2017-12-07 RX ORDER — MAGNESIUM HYDROXIDE/ALUMINUM HYDROXICE/SIMETHICONE 120; 1200; 1200 MG/30ML; MG/30ML; MG/30ML
30 SUSPENSION ORAL 4 TIMES DAILY PRN
Status: DISCONTINUED | OUTPATIENT
Start: 2017-12-07 | End: 2017-12-08

## 2017-12-07 RX ORDER — SODIUM CHLORIDE 9 MG/ML
INJECTION, SOLUTION INTRAVENOUS
Status: COMPLETED
Start: 2017-12-07 | End: 2017-12-07

## 2017-12-07 NOTE — CONSULTS
[facility] CL[facility]  [facility] Infectious Disease Consult    Sharadelizabeth Sheridan Patient Status:  Observation    1965 MRN [id number]   Location [facility] Attending Penelope Santoyo MD   Hosp Day # 0 PCP LETA MONIQUE     Reason for Consultation:   To h drugs.     Allergies:  No Known Allergies    Medications:    Current Facility-Administered Medications:   •  Normal Saline Flush 0.9 % injection 3 mL, 3 mL, Intravenous, PRN  •  acetaminophen (TYLENOL) tab 650 mg, 650 mg, Oral, Q6H PRN  •  ondansetron HCl ( stridor. Cardiovascular: Negative for chest pain, palpitations, irregular heart beats, syncope, fatigue, orthopnea, paroxysmal nocturnal dyspnea, lower extremity edema.   Gastrointestinal: Negative for dysphagia, odynophagia, reflux symptoms, nausea, vomit rashes or lesions.    Neurological: Alert, interactive, no focal deficits    Labs:    Lab Results  Component Value Date   WBC 10.2 12/06/2017   HGB 13.5 12/06/2017   HCT 39.5 12/06/2017    12/06/2017   CREATSERUM 0.92 12/06/2017   BUN 15 12/06/2017

## 2017-12-07 NOTE — PROGRESS NOTES
[facility] AND [facility] Infectious Disease Progress Note    Franklyn Manuel Patient Status:  Observation    1965 MRN [id number]   Location [address] [place] [address] Attending Steward Kussmaul, MD   Hosp Day # 0 PCP LETA MONIQUE     Subjective:  Pt denies S No apparent distress. Vital Signs:  Blood pressure 102/63, pulse 91, temperature 98.4 °F (36.9 °C), temperature source Oral, resp. rate 16, SpO2 92 %.    Temp (24hrs), Av.5 °F (36.9 °C), Min:98.4 °F (36.9 °C), Max:98.6 °F (37 °C)      HEENT: Exam is un

## 2017-12-07 NOTE — PLAN OF CARE
Spoke with patient's daughter today regarding compliance with family and visitors wearing the surgical mask while visiting the patient. Daughter explained, Katrina Valadez have been exposed, we have been with her. \"  Verbalized to patient and family present the impo

## 2017-12-07 NOTE — PROGRESS NOTES
Pulmonary Progress Note      NAME:  Ards - ROOM: 502/502-A - MRN: [id number] - Age: 49year old - : 1965    Assessment/Plan:  1. Dyspnea, chest pain - CTA chest with no PE.  [facility] with no e/o ischemia   - TTE with diastolic dyfunction wall: No tenderness or deformity. Heart: Regular rate and rhythm, normal S1S2, no murmur. Abdomen: soft, non-tender, non-distended, positive BS. Extremity: no edema    Recent Labs   Lab  12/06/17   0540   RBC  5.12   HGB  13.5   HCT  39.5   MCV  77. 3

## 2017-12-07 NOTE — PROGRESS NOTES
12/07/17 0535   Vital Signs   Temp 98.6 °F (37 °C)   Temp src Oral   Pulse 88   Heart Rate Source Monitor   Resp 14   Respiratory Quality Normal   BP 94/57   BP Location Right arm   BP Method Automatic   Patient Position Lying   Oxygen Therapy   SpO2 94

## 2017-12-07 NOTE — PROGRESS NOTES
[facility] Hospitalist Progress Note     PCP: LETA MONIQUE    CC: Follow up       Assessment/Plan:     Paty Bess is a 49year old female with PMH sig for DM on oral meds, HTN, HL, hypothyroidism, fatty liver who presents with 2 days of chest pain and 2 jackeline filed at 12/07/17 1504   Gross per 24 hour   Intake              496 ml   Output              200 ml   Net              296 ml       Last 3 Weights  04/16/17 1522 : 200 lb 9.9 oz (91 kg)  03/29/17 0935 : 198 lb 6.6 oz (90 kg)  03/15/17 1619 : 201 lb (91.2 Recent Labs   Lab  12/05/17   0537   ALT  33   AST  32   ALB  4.0       Recent Labs   Lab  12/05/17 2010 12/06/17   0831  12/06/17   1217  12/06/17   1655  12/06/17   2042   PGLU  179*  181*  242*  304*  143*       Recent Labs   Lab  12/04/17   20 HEART/VASC: Cardiomediastinal silhouette is mildly enlarged. Mildly prominent interstitial markings. MAXIME/MEDIAST:   No visible mass or adenopathy. LUNGS: Hypoinflated. Patchy perihilar opacities. PLEURA: No effusion. No pneumothorax.  BONES: No visible acu interventricular septum to suggest right ventricular strain. THORACIC AORTA: Unremarkable configuration without aneurysm or dissection. Trace atherosclerosis is noted about the arch. LUNGS/PLEURA: There is dependent subsegmental atelectasis bilaterally.  Pa

## 2017-12-08 VITALS
SYSTOLIC BLOOD PRESSURE: 109 MMHG | TEMPERATURE: 98 F | WEIGHT: 189.31 LBS | HEART RATE: 92 BPM | OXYGEN SATURATION: 92 % | DIASTOLIC BLOOD PRESSURE: 64 MMHG | RESPIRATION RATE: 12 BRPM | BODY MASS INDEX: 34 KG/M2

## 2017-12-08 PROCEDURE — 82962 GLUCOSE BLOOD TEST: CPT

## 2017-12-08 RX ORDER — FLUTICASONE PROPIONATE 50 MCG
2 SPRAY, SUSPENSION (ML) NASAL DAILY
Qty: 1 BOTTLE | Refills: 0 | Status: SHIPPED | OUTPATIENT
Start: 2017-12-08 | End: 2020-10-23

## 2017-12-08 RX ORDER — AZITHROMYCIN 500 MG/1
500 TABLET, FILM COATED ORAL DAILY
Qty: 3 TABLET | Refills: 0 | Status: SHIPPED | OUTPATIENT
Start: 2017-12-08 | End: 2017-12-18

## 2017-12-08 NOTE — DISCHARGE SUMMARY
[facility] Internal Medicine Discharge Summary   Patient ID:  Vincenzo Luciano  [id number]  49year old  12/25/1965    Admit date: 12/4/2017    Discharge date and time:  12/8/17    Attending Physician: Nicole Sinclair MD     Primary Care Physician: Gloria sen Caps  Commonly known as:  ZANTAC     simvastatin 20 MG Tabs  Commonly known as:  ZOCOR     TraZODone HCl 100 MG Tabs  Commonly known as:  DESYREL     Vitamin D3 2000 units Tabs           Where to Get Your Medications      These medications were sent to [facility] will take 6 weeks  -no cavitary lesion on CT  -d/w pulm, No hemoptysis noted here, afebrile.    -DDIMER neg, CT chest neg for PE  -consider acei but cough is productive and should not cause hemoptysis  -on PPI  -trial of flonase  -per ID treat with azithro visualized focal mass/lesion. Nodular hepatic contour, nonspecific but findings are suggestive of possible cirrhosis. Correlate clinically. 2. No visualized focal hepatic mass or intrahepatic ductal dilatation. 3. Unremarkable gallbladder.  Pancreas not wel for dose reduction was employed. Multiplanar reformats and maximum intensity projection images were created. FINDINGS: COMMENT: Overall examination quality is substantially compromised by patient motion artifact.  VASCULATURE: There is adequate opacificat Gagan Durán MD on 12/05/2017 at 23:06          CONCLUSION:  1. No evidence of acute pulmonary embolism to the level of the first order subsegmental pulmonary artery branches. 2. Scattered atelectasis without further acute intrathoracic process.   3. Profound

## 2017-12-08 NOTE — PROGRESS NOTES
Pulmonary Progress Note      NAME: Franklyn Manuel - ROOM: 502/502-A - MRN: [id number] - Age: 49year old - : 1965    Assessment/Plan:  1. Dyspnea, chest pain - CTA chest with no PE.  Manjula Ndiaye with no e/o ischemia   - TTE with diastolic dyfunction 92%   BMI 33.53 kg/m²   Physical Exam:   General: alert, cooperative, no respiratory distress. HEENT: Normocephalic atraumatic. Lips, mucosa, and tongue normal.  No thrush noted.    Lungs: Clear to auscultation bilaterally, no focal wheezes or crackles

## 2017-12-08 NOTE — PROGRESS NOTES
[facility] AND [facility] Infectious Disease Progress Note    Ness Mcclain Patient Status:  Observation    1965 MRN [id number]   Location [address] [place] [address] Attending Ofe Forte MD   Hosp Day # 0 PCP LETA MONIQUE     Subjective:  Patient see (NOVOLOG) 100 UNIT/ML flexpen 1-5 Units, 1-5 Units, Subcutaneous, TID CC    Physical Exam:  General: Alert, orientated x3. Cooperative. No apparent distress.   Vital Signs:  Blood pressure 109/64, pulse 92, temperature 97.7 °F (36.5 °C), temperature sourc is depressed which can cause weight loss,   - No abd tenderness or lack of absorption of food,      2. DM:  Per PCP,      3.   Leukocytosis: resolved,      Clinically doing well, will treat with Azithromycin 500mg PO QD for five days for Bronchitis.  Can

## 2017-12-09 ENCOUNTER — TELEPHONE (OUTPATIENT)
Dept: MEDSURG UNIT | Facility: HOSPITAL | Age: 52
End: 2017-12-09

## 2018-07-26 ENCOUNTER — HOSPITAL ENCOUNTER (OUTPATIENT)
Age: 53
Discharge: HOME | End: 2018-07-26
Attending: FAMILY MEDICINE
Payer: MEDICAID

## 2018-07-26 VITALS
DIASTOLIC BLOOD PRESSURE: 78 MMHG | TEMPERATURE: 98 F | SYSTOLIC BLOOD PRESSURE: 112 MMHG | RESPIRATION RATE: 20 BRPM | HEIGHT: 64 IN | OXYGEN SATURATION: 98 % | BODY MASS INDEX: 32.74 KG/M2 | WEIGHT: 191.81 LBS | HEART RATE: 88 BPM

## 2018-07-26 DIAGNOSIS — R68.89 FLU-LIKE SYMPTOMS: Primary | ICD-10-CM

## 2018-07-26 LAB
GLUCOSE BLDC GLUCOMTR-MCNC: 234 MG/DL (ref 70–99)
S PYO AG THROAT QL: NEGATIVE

## 2018-07-26 PROCEDURE — 99214 OFFICE O/P EST MOD 30 MIN: CPT

## 2018-07-26 PROCEDURE — 87430 STREP A AG IA: CPT

## 2018-07-26 PROCEDURE — 99213 OFFICE O/P EST LOW 20 MIN: CPT

## 2018-07-26 PROCEDURE — 82962 GLUCOSE BLOOD TEST: CPT

## 2018-07-26 RX ORDER — AZITHROMYCIN 250 MG/1
TABLET, FILM COATED ORAL
Qty: 1 PACKAGE | Refills: 0 | Status: SHIPPED | OUTPATIENT
Start: 2018-07-26 | End: 2018-07-31

## 2018-07-26 RX ORDER — OSELTAMIVIR PHOSPHATE 75 MG/1
75 CAPSULE ORAL 2 TIMES DAILY
Qty: 10 CAPSULE | Refills: 0 | Status: SHIPPED | OUTPATIENT
Start: 2018-07-26 | End: 2018-07-31

## 2018-07-26 NOTE — ED PROVIDER NOTES
Patient presents with:  Cough/URI    HPI:     Matty Powell is a 47year old female who presents with for chief complaint of fevers, chills, chest congestion, cough, headaches and body aches.   Onset of symptoms was 1 day  The patient denies complaints of Cholecalciferol (VITAMIN D3) 2000 units Oral Tab,  Take 1 tablet by mouth Noon. Metoprolol Succinate  MG Oral Tablet 24 Hr,  Take 100 mg by mouth 2 (two) times daily. Multiple Vitamin (DAILY CURT) Oral Tab,  Take 1 tablet by mouth daily.    aspiri age and cooperative  Head: Normocephalic, without obvious abnormality, atraumatic  Eyes: conjunctivae/corneas clear. PERRL, EOM's intact. Ears: normal TM's and external ear canals both ears  Nose: Nares normal. Septum midline.  Mucosa normal. No drainage Elizabeth  [address] [place] [address]  [phone]    Schedule an appointment as soon as possible for a visit

## 2018-08-05 ENCOUNTER — HOSPITAL ENCOUNTER (OUTPATIENT)
Facility: HOSPITAL | Age: 53
Setting detail: OBSERVATION
Discharge: HOME | End: 2018-08-06
Attending: EMERGENCY MEDICINE | Admitting: INTERNAL MEDICINE
Payer: MEDICAID

## 2018-08-05 ENCOUNTER — APPOINTMENT (OUTPATIENT)
Dept: CT IMAGING | Facility: HOSPITAL | Age: 53
End: 2018-08-05
Attending: EMERGENCY MEDICINE
Payer: MEDICAID

## 2018-08-05 ENCOUNTER — APPOINTMENT (OUTPATIENT)
Dept: ULTRASOUND IMAGING | Facility: HOSPITAL | Age: 53
End: 2018-08-05
Attending: EMERGENCY MEDICINE
Payer: MEDICAID

## 2018-08-05 DIAGNOSIS — K52.9 GASTROENTERITIS: ICD-10-CM

## 2018-08-05 DIAGNOSIS — R10.13 DYSPEPSIA: Primary | ICD-10-CM

## 2018-08-05 LAB
ADENOVIRUS F 40/41 PCR: NEGATIVE
ALBUMIN SERPL BCP-MCNC: 3.9 G/DL (ref 3.5–4.8)
ALBUMIN/GLOB SERPL: 1.1 {RATIO} (ref 1–2)
ALP SERPL-CCNC: 85 U/L (ref 32–100)
ALT SERPL-CCNC: 54 U/L (ref 14–54)
ANION GAP SERPL CALC-SCNC: 12 MMOL/L (ref 0–18)
AST SERPL-CCNC: 67 U/L (ref 15–41)
ASTROVIRUS PCR: NEGATIVE
BASOPHILS # BLD: 0.2 K/UL (ref 0–0.2)
BASOPHILS NFR BLD: 1 %
BILIRUB SERPL-MCNC: 0.5 MG/DL (ref 0.3–1.2)
BILIRUB UR QL: NEGATIVE
BUN SERPL-MCNC: 20 MG/DL (ref 8–20)
BUN/CREAT SERPL: 15.7 (ref 10–20)
C CAYETANENSIS DNA SPEC QL NAA+PROBE: NEGATIVE
C. DIFFICILE TOXIN A/B PCR: NEGATIVE
CALCIUM SERPL-MCNC: 9.6 MG/DL (ref 8.5–10.5)
CAMPY SP DNA.DIARRHEA STL QL NAA+PROBE: NEGATIVE
CHLORIDE SERPL-SCNC: 98 MMOL/L (ref 95–110)
CO2 SERPL-SCNC: 23 MMOL/L (ref 22–32)
COLOR UR: YELLOW
CREAT SERPL-MCNC: 1.27 MG/DL (ref 0.5–1.5)
CRYPTOSP DNA SPEC QL NAA+PROBE: NEGATIVE
EAEC PAA PLAS AGGR+AATA ST NAA+NON-PRB: NEGATIVE
EC STX1+STX2 + H7 FLIC SPEC NAA+PROBE: NEGATIVE
ENTAMOEBA HISTOLYTICA PCR: NEGATIVE
EOSINOPHIL # BLD: 0.2 K/UL (ref 0–0.7)
EOSINOPHIL NFR BLD: 1 %
EPEC EAE GENE STL QL NAA+NON-PROBE: NEGATIVE
ERYTHROCYTE [DISTWIDTH] IN BLOOD BY AUTOMATED COUNT: 14.7 % (ref 11–15)
ETEC LTA+ST1A+ST1B TOX ST NAA+NON-PROBE: NEGATIVE
GIARDIA LAMBLIA PCR: NEGATIVE
GLOBULIN PLAS-MCNC: 3.5 G/DL (ref 2.5–3.7)
GLUCOSE BLDC GLUCOMTR-MCNC: 170 MG/DL (ref 70–99)
GLUCOSE BLDC GLUCOMTR-MCNC: 209 MG/DL (ref 70–99)
GLUCOSE SERPL-MCNC: 209 MG/DL (ref 70–99)
GLUCOSE UR-MCNC: NEGATIVE MG/DL
HCT VFR BLD AUTO: 40.2 % (ref 35–48)
HGB BLD-MCNC: 13.5 G/DL (ref 12–16)
HGB UR QL STRIP.AUTO: NEGATIVE
HYALINE CASTS #/AREA URNS AUTO: 104 /LPF
KETONES UR-MCNC: NEGATIVE MG/DL
LIPASE SERPL-CCNC: 36 U/L (ref 22–51)
LYMPHOCYTES # BLD: 2.7 K/UL (ref 1–4)
LYMPHOCYTES NFR BLD: 16 %
MCH RBC QN AUTO: 25.2 PG (ref 27–32)
MCHC RBC AUTO-ENTMCNC: 33.4 G/DL (ref 32–37)
MCV RBC AUTO: 75.4 FL (ref 80–100)
MONOCYTES # BLD: 0.4 K/UL (ref 0–1)
MONOCYTES NFR BLD: 3 %
NEUTROPHILS # BLD AUTO: 13.8 K/UL (ref 1.8–7.7)
NEUTROPHILS NFR BLD: 80 %
NITRITE UR QL STRIP.AUTO: NEGATIVE
NOROVIRUS GI/GII PCR: POSITIVE
OSMOLALITY UR CALC.SUM OF ELEC: 285 MOSM/KG (ref 275–295)
P SHIGELLOIDES DNA STL QL NAA+PROBE: NEGATIVE
PH UR: 5 [PH] (ref 5–8)
PLATELET # BLD AUTO: 452 K/UL (ref 140–400)
PMV BLD AUTO: 8.1 FL (ref 7.4–10.3)
POTASSIUM SERPL-SCNC: 3.7 MMOL/L (ref 3.3–5.1)
PROT SERPL-MCNC: 7.4 G/DL (ref 5.9–8.4)
PROT UR-MCNC: 30 MG/DL
RBC # BLD AUTO: 5.34 M/UL (ref 3.7–5.4)
RBC #/AREA URNS AUTO: 1 /HPF
ROTAVIRUS A PCR: NEGATIVE
SALMONELLA DNA SPEC QL NAA+PROBE: NEGATIVE
SAPOVIRUS PCR: NEGATIVE
SHIGELLA SP+EIEC IPAH ST NAA+NON-PROBE: NEGATIVE
SODIUM SERPL-SCNC: 133 MMOL/L (ref 136–144)
SP GR UR STRIP: 1.02 (ref 1–1.03)
TROPONIN I SERPL-MCNC: 0 NG/ML (ref ?–0.03)
UROBILINOGEN UR STRIP-ACNC: <2
V CHOLERAE DNA SPEC QL NAA+PROBE: NEGATIVE
VIBRIO DNA SPEC NAA+PROBE: NEGATIVE
VIT C UR-MCNC: NEGATIVE MG/DL
WBC # BLD AUTO: 17.4 K/UL (ref 4–11)
WBC #/AREA URNS AUTO: 6 /HPF
YERSINIA DNA SPEC NAA+PROBE: NEGATIVE

## 2018-08-05 PROCEDURE — 82962 GLUCOSE BLOOD TEST: CPT

## 2018-08-05 PROCEDURE — 84484 ASSAY OF TROPONIN QUANT: CPT | Performed by: EMERGENCY MEDICINE

## 2018-08-05 PROCEDURE — 96361 HYDRATE IV INFUSION ADD-ON: CPT

## 2018-08-05 PROCEDURE — 87086 URINE CULTURE/COLONY COUNT: CPT | Performed by: EMERGENCY MEDICINE

## 2018-08-05 PROCEDURE — 83036 HEMOGLOBIN GLYCOSYLATED A1C: CPT | Performed by: INTERNAL MEDICINE

## 2018-08-05 PROCEDURE — 96375 TX/PRO/DX INJ NEW DRUG ADDON: CPT

## 2018-08-05 PROCEDURE — 96374 THER/PROPH/DIAG INJ IV PUSH: CPT

## 2018-08-05 PROCEDURE — 99285 EMERGENCY DEPT VISIT HI MDM: CPT

## 2018-08-05 PROCEDURE — 76705 ECHO EXAM OF ABDOMEN: CPT | Performed by: EMERGENCY MEDICINE

## 2018-08-05 PROCEDURE — 81001 URINALYSIS AUTO W/SCOPE: CPT | Performed by: EMERGENCY MEDICINE

## 2018-08-05 PROCEDURE — 93010 ELECTROCARDIOGRAM REPORT: CPT | Performed by: EMERGENCY MEDICINE

## 2018-08-05 PROCEDURE — 83690 ASSAY OF LIPASE: CPT | Performed by: EMERGENCY MEDICINE

## 2018-08-05 PROCEDURE — A4216 STERILE WATER/SALINE, 10 ML: HCPCS | Performed by: EMERGENCY MEDICINE

## 2018-08-05 PROCEDURE — C9113 INJ PANTOPRAZOLE SODIUM, VIA: HCPCS | Performed by: EMERGENCY MEDICINE

## 2018-08-05 PROCEDURE — 85025 COMPLETE CBC W/AUTO DIFF WBC: CPT | Performed by: EMERGENCY MEDICINE

## 2018-08-05 PROCEDURE — 74177 CT ABD & PELVIS W/CONTRAST: CPT | Performed by: EMERGENCY MEDICINE

## 2018-08-05 PROCEDURE — 80053 COMPREHEN METABOLIC PANEL: CPT | Performed by: EMERGENCY MEDICINE

## 2018-08-05 PROCEDURE — 87507 IADNA-DNA/RNA PROBE TQ 12-25: CPT | Performed by: EMERGENCY MEDICINE

## 2018-08-05 PROCEDURE — 93005 ELECTROCARDIOGRAM TRACING: CPT

## 2018-08-05 RX ORDER — TRAZODONE HYDROCHLORIDE 100 MG/1
100 TABLET ORAL NIGHTLY
Status: DISCONTINUED | OUTPATIENT
Start: 2018-08-05 | End: 2018-08-06

## 2018-08-05 RX ORDER — MAGNESIUM HYDROXIDE/ALUMINUM HYDROXICE/SIMETHICONE 120; 1200; 1200 MG/30ML; MG/30ML; MG/30ML
30 SUSPENSION ORAL ONCE
Status: COMPLETED | OUTPATIENT
Start: 2018-08-05 | End: 2018-08-05

## 2018-08-05 RX ORDER — LEVOTHYROXINE SODIUM 0.1 MG/1
100 TABLET ORAL
Status: DISCONTINUED | OUTPATIENT
Start: 2018-08-05 | End: 2018-08-06

## 2018-08-05 RX ORDER — LISINOPRIL AND HYDROCHLOROTHIAZIDE 25; 20 MG/1; MG/1
1 TABLET ORAL 2 TIMES DAILY
COMMUNITY
End: 2020-10-23

## 2018-08-05 RX ORDER — GLIPIZIDE 5 MG/1
5 TABLET ORAL
COMMUNITY
End: 2020-04-27

## 2018-08-05 RX ORDER — MONTELUKAST SODIUM 10 MG/1
10 TABLET ORAL NIGHTLY
Status: DISCONTINUED | OUTPATIENT
Start: 2018-08-05 | End: 2018-08-06

## 2018-08-05 RX ORDER — PANTOPRAZOLE SODIUM 40 MG/1
40 TABLET, DELAYED RELEASE ORAL EVERY MORNING
Status: DISCONTINUED | OUTPATIENT
Start: 2018-08-05 | End: 2018-08-06

## 2018-08-05 RX ORDER — ALOGLIPTIN 6.25 MG/1
1 TABLET, FILM COATED ORAL NIGHTLY
COMMUNITY
End: 2020-04-27

## 2018-08-05 RX ORDER — ALLOPURINOL 100 MG/1
100 TABLET ORAL DAILY
COMMUNITY
End: 2019-03-16

## 2018-08-05 RX ORDER — 0.9 % SODIUM CHLORIDE 0.9 %
3 VIAL (ML) INJECTION AS NEEDED
Status: DISCONTINUED | OUTPATIENT
Start: 2018-08-05 | End: 2018-08-06

## 2018-08-05 RX ORDER — POTASSIUM CHLORIDE 20 MEQ/1
40 TABLET, EXTENDED RELEASE ORAL ONCE
Status: COMPLETED | OUTPATIENT
Start: 2018-08-05 | End: 2018-08-05

## 2018-08-05 RX ORDER — GABAPENTIN 300 MG/1
300 CAPSULE ORAL 3 TIMES DAILY
Status: DISCONTINUED | OUTPATIENT
Start: 2018-08-05 | End: 2018-08-06

## 2018-08-05 RX ORDER — SODIUM CHLORIDE, SODIUM LACTATE, POTASSIUM CHLORIDE, CALCIUM CHLORIDE 600; 310; 30; 20 MG/100ML; MG/100ML; MG/100ML; MG/100ML
INJECTION, SOLUTION INTRAVENOUS ONCE
Status: COMPLETED | OUTPATIENT
Start: 2018-08-05 | End: 2018-08-05

## 2018-08-05 RX ORDER — MONTELUKAST SODIUM 10 MG/1
10 TABLET ORAL NIGHTLY
COMMUNITY
End: 2020-10-23

## 2018-08-05 RX ORDER — LEVOTHYROXINE SODIUM 0.1 MG/1
100 TABLET ORAL
COMMUNITY
End: 2020-04-27

## 2018-08-05 RX ORDER — METOPROLOL SUCCINATE 200 MG/1
200 TABLET, EXTENDED RELEASE ORAL DAILY
COMMUNITY
End: 2020-04-27

## 2018-08-05 RX ORDER — RANITIDINE 300 MG/1
300 TABLET ORAL NIGHTLY
COMMUNITY
End: 2020-04-27

## 2018-08-05 RX ORDER — OMEPRAZOLE 20 MG/1
20 CAPSULE, DELAYED RELEASE ORAL EVERY MORNING
COMMUNITY

## 2018-08-05 RX ORDER — ENOXAPARIN SODIUM 100 MG/ML
40 INJECTION SUBCUTANEOUS DAILY
Status: DISCONTINUED | OUTPATIENT
Start: 2018-08-05 | End: 2018-08-06

## 2018-08-05 RX ORDER — TRAZODONE HYDROCHLORIDE 100 MG/1
100 TABLET ORAL NIGHTLY
COMMUNITY
End: 2020-06-03

## 2018-08-05 RX ORDER — GABAPENTIN 300 MG/1
300 CAPSULE ORAL 3 TIMES DAILY
COMMUNITY
End: 2020-10-23

## 2018-08-05 RX ORDER — DEXTROSE MONOHYDRATE 25 G/50ML
50 INJECTION, SOLUTION INTRAVENOUS AS NEEDED
Status: DISCONTINUED | OUTPATIENT
Start: 2018-08-05 | End: 2018-08-06

## 2018-08-05 RX ORDER — SODIUM CHLORIDE 9 MG/ML
INJECTION, SOLUTION INTRAVENOUS CONTINUOUS
Status: DISCONTINUED | OUTPATIENT
Start: 2018-08-05 | End: 2018-08-06

## 2018-08-05 RX ORDER — ACETAMINOPHEN 325 MG/1
650 TABLET ORAL EVERY 4 HOURS PRN
Status: DISCONTINUED | OUTPATIENT
Start: 2018-08-05 | End: 2018-08-06

## 2018-08-05 RX ORDER — ALLOPURINOL 100 MG/1
100 TABLET ORAL DAILY
Status: DISCONTINUED | OUTPATIENT
Start: 2018-08-05 | End: 2018-08-06

## 2018-08-05 RX ORDER — SIMVASTATIN 20 MG
20 TABLET ORAL NIGHTLY
COMMUNITY
End: 2021-01-07

## 2018-08-05 NOTE — ED INITIAL ASSESSMENT (HPI)
Pt came in with c/o luq pain via ems. Pt does not speak english but is audibly moaning in pain. EMS provided medications. 22Lhand started in field.

## 2018-08-05 NOTE — ED PROVIDER NOTES
Patient Seen in: [facility] AND [facility] Emergency Department    History   Patient presents with:  Abdomen/Flank Pain (GI/)    Stated Complaint: n/v/abd pain    HPI    46 y/o female with no abd surgeries who has had 1 wk of upper abd pain now w/ vomiting las reviewed. Differential diagnosis includes dyspepsia, gastritis, PUD, pancreatitis, biliary colic.       ED Course     Labs Reviewed   URINALYSIS WITH CULTURE REFLEX - Abnormal; Notable for the following:        Result Value    Clarity Urine Hazy (*) (YFG=03844)  COMPARISON: None. INDICATIONS: Generalized abdominal pain with vomiting. Diabetes, leukocytosis and urinary tract infection  TECHNIQUE:   The gallbladder was evaluated with grayscale ultrasound. FINDINGS:  GALLBLADDER:   Normal size.   No ca enlargement or focal lesion. STOMACH: Partially fluid-filled and not abnormally distended. No gross gastric mass, obstruction or focal abnormality. Duodenum unremarkable. PANCREAS: No lesion, fluid collection, ductal dilatation, or atrophy.   ADRENALS: No uncomplicated diverticulosis. Appendix normal. Bowel findings suggests possible gastroenterocolitis and/or proximal small bowel ileus. No mass or obstruction. No diverticulitis. 3. Unremarkable kidneys.  No renal calcifications, hydronephrosis or apparent p

## 2018-08-05 NOTE — PLAN OF CARE
Problem: Patient Centered Care  Goal: Patient preferences are identified and integrated in the patient's plan of care  Interventions:  - What would you like us to know as we care for you?  \"I live with my family\"  - Provide timely, complete, and accurate

## 2018-08-06 VITALS
WEIGHT: 192 LBS | OXYGEN SATURATION: 94 % | TEMPERATURE: 98 F | RESPIRATION RATE: 18 BRPM | SYSTOLIC BLOOD PRESSURE: 144 MMHG | DIASTOLIC BLOOD PRESSURE: 87 MMHG | HEART RATE: 73 BPM | BODY MASS INDEX: 32.78 KG/M2 | HEIGHT: 64 IN

## 2018-08-06 LAB
ANION GAP SERPL CALC-SCNC: 8 MMOL/L (ref 0–18)
BASOPHILS # BLD: 0 K/UL (ref 0–0.2)
BASOPHILS NFR BLD: 1 %
BUN SERPL-MCNC: 11 MG/DL (ref 8–20)
BUN/CREAT SERPL: 12.8 (ref 10–20)
CALCIUM SERPL-MCNC: 8.7 MG/DL (ref 8.5–10.5)
CHLORIDE SERPL-SCNC: 103 MMOL/L (ref 95–110)
CO2 SERPL-SCNC: 24 MMOL/L (ref 22–32)
CREAT SERPL-MCNC: 0.86 MG/DL (ref 0.5–1.5)
EOSINOPHIL # BLD: 0.2 K/UL (ref 0–0.7)
EOSINOPHIL NFR BLD: 2 %
ERYTHROCYTE [DISTWIDTH] IN BLOOD BY AUTOMATED COUNT: 15 % (ref 11–15)
GLUCOSE BLDC GLUCOMTR-MCNC: 177 MG/DL (ref 70–99)
GLUCOSE BLDC GLUCOMTR-MCNC: 209 MG/DL (ref 70–99)
GLUCOSE SERPL-MCNC: 176 MG/DL (ref 70–99)
HBA1C MFR BLD: 9.6 % (ref 4–6)
HCT VFR BLD AUTO: 35.1 % (ref 35–48)
HGB BLD-MCNC: 11.9 G/DL (ref 12–16)
LYMPHOCYTES # BLD: 3 K/UL (ref 1–4)
LYMPHOCYTES NFR BLD: 42 %
MCH RBC QN AUTO: 25.7 PG (ref 27–32)
MCHC RBC AUTO-ENTMCNC: 33.9 G/DL (ref 32–37)
MCV RBC AUTO: 75.8 FL (ref 80–100)
MONOCYTES # BLD: 0.6 K/UL (ref 0–1)
MONOCYTES NFR BLD: 8 %
NEUTROPHILS # BLD AUTO: 3.4 K/UL (ref 1.8–7.7)
NEUTROPHILS NFR BLD: 47 %
OSMOLALITY UR CALC.SUM OF ELEC: 284 MOSM/KG (ref 275–295)
PLATELET # BLD AUTO: 350 K/UL (ref 140–400)
PMV BLD AUTO: 8.2 FL (ref 7.4–10.3)
POTASSIUM SERPL-SCNC: 4 MMOL/L (ref 3.3–5.1)
POTASSIUM SERPL-SCNC: 4 MMOL/L (ref 3.3–5.1)
RBC # BLD AUTO: 4.63 M/UL (ref 3.7–5.4)
SODIUM SERPL-SCNC: 135 MMOL/L (ref 136–144)
WBC # BLD AUTO: 7.2 K/UL (ref 4–11)

## 2018-08-06 PROCEDURE — 82962 GLUCOSE BLOOD TEST: CPT

## 2018-08-06 PROCEDURE — 84132 ASSAY OF SERUM POTASSIUM: CPT | Performed by: INTERNAL MEDICINE

## 2018-08-06 PROCEDURE — 85025 COMPLETE CBC W/AUTO DIFF WBC: CPT | Performed by: INTERNAL MEDICINE

## 2018-08-06 PROCEDURE — 80048 BASIC METABOLIC PNL TOTAL CA: CPT | Performed by: INTERNAL MEDICINE

## 2018-08-06 NOTE — PLAN OF CARE
Diabetes/Glucose Control    • Glucose maintained within prescribed range Adequate for Discharge        Patient Centered Care    • Patient preferences are identified and integrated in the patient's plan of care Adequate for Discharge        Patient/Family G

## 2018-08-06 NOTE — H&P
[address] Patient Status:  Observation    1965 MRN [id number]   Location [facility] 5SW/SE Attending Jason Harper, *   Hosp Day # 0 PCP No primary care provider on file. MCG Oral Tab Take 100 mcg by mouth before breakfast.   allopurinol 100 MG Oral Tab Take 100 mg by mouth daily. TraZODone HCl 100 MG Oral Tab Take 100 mg by mouth nightly. simvastatin 20 MG Oral Tab Take 20 mg by mouth nightly.    Lisinopril-Hydrochlorot (H) 08/05/2018   ALT 54 08/05/2018   LIP 36 08/05/2018   TROP 0.00 08/05/2018       Us Gallbladder (cpt=76705)    Result Date: 8/5/2018  CONCLUSION:  Negative gallbladder ultrasound     Dictated by (CST): Dot Bro MD on 8/05/2018 at 9:35     Approv Improving    DM 2  Hypertension  Gout  Dyslipidemia  GERD  Hypothyroidism    DVT prophylaxis  Tomi Johnson MD  8/6/2018

## 2018-08-06 NOTE — DISCHARGE SUMMARY
[facility] - [facility]    Discharge Summary    Primo Walter Patient Status:  Observation    1965 MRN [id number]   Location [facility] 5SW/SE Attending No att. providers found   Hosp Day # 0 PCP LETA MONIQUE     Date of Admission:  2  Hypertension  Gout  Dyslipidemia  GERD  Hypothyroidism     DVT prophylaxis  –Lovenox     Consultations: None    Procedures: None    Complications: None    Discharge Condition: Stable         Discharge Medications:      Discharge Medications      CONTINU

## 2019-02-19 ENCOUNTER — HOSPITAL ENCOUNTER (OUTPATIENT)
Dept: GENERAL RADIOLOGY | Age: 54
Discharge: HOME | End: 2019-02-19
Attending: INTERNAL MEDICINE
Payer: MEDICAID

## 2019-02-19 DIAGNOSIS — M54.50 LUMBAGO: ICD-10-CM

## 2019-02-19 PROCEDURE — 72110 X-RAY EXAM L-2 SPINE 4/>VWS: CPT | Performed by: INTERNAL MEDICINE

## 2019-03-16 ENCOUNTER — HOSPITAL ENCOUNTER (OUTPATIENT)
Age: 54
Discharge: HOME | End: 2019-03-16
Payer: MEDICAID

## 2019-03-16 VITALS
HEIGHT: 64 IN | BODY MASS INDEX: 33.12 KG/M2 | WEIGHT: 194 LBS | HEART RATE: 88 BPM | SYSTOLIC BLOOD PRESSURE: 143 MMHG | DIASTOLIC BLOOD PRESSURE: 82 MMHG | TEMPERATURE: 98 F | OXYGEN SATURATION: 97 % | RESPIRATION RATE: 18 BRPM

## 2019-03-16 DIAGNOSIS — J02.0 STREPTOCOCCAL SORE THROAT: Primary | ICD-10-CM

## 2019-03-16 LAB — S PYO AG THROAT QL: POSITIVE

## 2019-03-16 PROCEDURE — 99214 OFFICE O/P EST MOD 30 MIN: CPT

## 2019-03-16 PROCEDURE — 99213 OFFICE O/P EST LOW 20 MIN: CPT

## 2019-03-16 PROCEDURE — 87430 STREP A AG IA: CPT

## 2019-03-16 RX ORDER — AMOXICILLIN 875 MG/1
875 TABLET, COATED ORAL 2 TIMES DAILY
Qty: 20 TABLET | Refills: 0 | Status: SHIPPED | OUTPATIENT
Start: 2019-03-16 | End: 2019-03-26

## 2019-03-16 NOTE — ED PROVIDER NOTES
Patient presents with:  Sore Throat      HPI:     Nettie Bush is a 47year old female who presents for evaluation of a chief complaint of sore throat, body aches, and chills for the past 3 days. No difficulty swallowing. Speech is clear.   She does ha current or ex partner: Not on file        Emotionally abused: Not on file        Physically abused: Not on file        Forced sexual activity: Not on file    Other Topics      Concerns:        Not on file    Social History Narrative      ** Merged History patient. See AVS for detailed discharge instructions for your condition today. Follow Up with:   Cleo Rush  [facility] [employer]  [phone]    Schedule an appointment as soon as possible for a visit in 2 days

## 2019-03-16 NOTE — ED INITIAL ASSESSMENT (HPI)
PATIENT ARRIVED AMBULATORY TO ROOM WITH FAMILY C/O SYMPTOMS X3 DAYS. +SORE THROAT. +NON PRODUCTIVE COUGH. +NASAL CONGESTION. PATIENT DENIES TAKING TEMPERATURES. NO N/V/D. EASY NON LABORED RESPIRATIONS.

## 2019-04-03 ENCOUNTER — APPOINTMENT (OUTPATIENT)
Dept: CT IMAGING | Facility: HOSPITAL | Age: 54
End: 2019-04-03
Attending: EMERGENCY MEDICINE
Payer: MEDICAID

## 2019-04-03 ENCOUNTER — HOSPITAL ENCOUNTER (EMERGENCY)
Facility: HOSPITAL | Age: 54
Discharge: HOME | End: 2019-04-03
Attending: EMERGENCY MEDICINE
Payer: MEDICAID

## 2019-04-03 ENCOUNTER — APPOINTMENT (OUTPATIENT)
Dept: ULTRASOUND IMAGING | Facility: HOSPITAL | Age: 54
End: 2019-04-03
Attending: EMERGENCY MEDICINE
Payer: MEDICAID

## 2019-04-03 VITALS
BODY MASS INDEX: 33.8 KG/M2 | SYSTOLIC BLOOD PRESSURE: 122 MMHG | OXYGEN SATURATION: 97 % | HEIGHT: 64 IN | DIASTOLIC BLOOD PRESSURE: 74 MMHG | TEMPERATURE: 98 F | HEART RATE: 93 BPM | WEIGHT: 198 LBS | RESPIRATION RATE: 18 BRPM

## 2019-04-03 DIAGNOSIS — R07.89 CHEST WALL PAIN: Primary | ICD-10-CM

## 2019-04-03 PROCEDURE — 93010 ELECTROCARDIOGRAM REPORT: CPT | Performed by: EMERGENCY MEDICINE

## 2019-04-03 PROCEDURE — 84443 ASSAY THYROID STIM HORMONE: CPT | Performed by: EMERGENCY MEDICINE

## 2019-04-03 PROCEDURE — 93971 EXTREMITY STUDY: CPT | Performed by: EMERGENCY MEDICINE

## 2019-04-03 PROCEDURE — 93005 ELECTROCARDIOGRAM TRACING: CPT

## 2019-04-03 PROCEDURE — 82550 ASSAY OF CK (CPK): CPT | Performed by: EMERGENCY MEDICINE

## 2019-04-03 PROCEDURE — 96374 THER/PROPH/DIAG INJ IV PUSH: CPT

## 2019-04-03 PROCEDURE — 85025 COMPLETE CBC W/AUTO DIFF WBC: CPT | Performed by: EMERGENCY MEDICINE

## 2019-04-03 PROCEDURE — 84484 ASSAY OF TROPONIN QUANT: CPT | Performed by: EMERGENCY MEDICINE

## 2019-04-03 PROCEDURE — 99285 EMERGENCY DEPT VISIT HI MDM: CPT

## 2019-04-03 PROCEDURE — 80048 BASIC METABOLIC PNL TOTAL CA: CPT | Performed by: EMERGENCY MEDICINE

## 2019-04-03 PROCEDURE — 71260 CT THORAX DX C+: CPT | Performed by: EMERGENCY MEDICINE

## 2019-04-03 RX ORDER — ORPHENADRINE CITRATE 30 MG/ML
60 INJECTION INTRAMUSCULAR; INTRAVENOUS ONCE
Status: COMPLETED | OUTPATIENT
Start: 2019-04-03 | End: 2019-04-03

## 2019-04-03 RX ORDER — ORPHENADRINE CITRATE 100 MG/1
100 TABLET, EXTENDED RELEASE ORAL 2 TIMES DAILY
Qty: 20 TABLET | Refills: 0 | Status: SHIPPED | OUTPATIENT
Start: 2019-04-03 | End: 2019-04-13

## 2019-04-03 NOTE — ED INITIAL ASSESSMENT (HPI)
Pt arrived with chest pain for 10 days, pain is described as sharp. Pt pain travels to left arm, shoulder and leg, no relief. States \"feels better up lift left breast.\" States SOB, deep breath hurts.

## 2019-04-03 NOTE — ED PROVIDER NOTES
Patient Seen in: [facility] AND [facility] Emergency Department    History   Patient presents with:  Chest Pain Angina (cardiovascular)    Stated Complaint: chest pain     HPI    48 yo F with PMH asthma, DM, HTN, HL, hypothyroid presenting for evaluation of ten da mouth every morning before breakfast.   gabapentin 300 MG Oral Cap,  Take 300 mg by mouth 3 (three) times daily. Metoprolol Succinate  MG Oral Tablet 24 Hr,  Take 200 mg by mouth daily.    Alogliptin Benzoate 6.25 MG Oral Tab,  Take 1 tablet by mout PO D   MetFORMIN HCl 1000 MG Oral Tab,  TK 1 T PO BID WITH MEALS.    Omeprazole 40 MG Oral Capsule Delayed Release,  TK ONE C PO  BID 30 MIN BEFORE MEALS   simvastatin 20 MG Oral Tab,  TAKE 1 TABLET PO DAILY IN THE EVENING   TraZODone HCl 100 MG Oral Tab, Nontender. Musculoskeletal: No gross deformity. BLE without calf tenderness/edema. LUE with pain to left chest wall and upper back/rhomboids on movement of same. Neurological: Alert. BUE/BLE proximally and distally with 5/5 strength. Skin: Skin is warm. performed in the usual manner. FINDINGS: The femoral and popliteal veins appear normal.  Normal flow was demonstrated with color and pulsed Doppler.   Visualized portions of the great and small saphenous, posterior tibial, and peroneal veins appear normal. dissection. 3.  Hepatic steatosis. Dictated by (CST): Freddy Oakley MD on 4/03/2019 at 19:47     Approved by (CST): Freddy Oakley MD on 4/03/2019 at 19:49         EKG (2hr)    Rate, intervals and axes as noted on EKG Report.   Rate: 99   Rhythm: NSR   Readin

## 2019-04-03 NOTE — ED NOTES
Pt arrived c/o increasing chest pain toward the left side that radiates to left arm and left leg. States was at PCP and they gave a stress test and ekg. At bedside pt is burping and appears to have very little relief with that. MD menjivar bedside.

## 2019-10-15 ENCOUNTER — APPOINTMENT (OUTPATIENT)
Dept: CT IMAGING | Facility: HOSPITAL | Age: 54
End: 2019-10-15
Attending: EMERGENCY MEDICINE
Payer: MEDICAID

## 2019-10-15 ENCOUNTER — HOSPITAL ENCOUNTER (EMERGENCY)
Facility: HOSPITAL | Age: 54
Discharge: HOME | End: 2019-10-16
Attending: EMERGENCY MEDICINE
Payer: MEDICAID

## 2019-10-15 DIAGNOSIS — R52 PAIN OF LEFT SIDE OF BODY: Primary | ICD-10-CM

## 2019-10-15 PROCEDURE — 96375 TX/PRO/DX INJ NEW DRUG ADDON: CPT

## 2019-10-15 PROCEDURE — 85025 COMPLETE CBC W/AUTO DIFF WBC: CPT | Performed by: EMERGENCY MEDICINE

## 2019-10-15 PROCEDURE — 80048 BASIC METABOLIC PNL TOTAL CA: CPT | Performed by: EMERGENCY MEDICINE

## 2019-10-15 PROCEDURE — 70450 CT HEAD/BRAIN W/O DYE: CPT | Performed by: EMERGENCY MEDICINE

## 2019-10-15 PROCEDURE — 96374 THER/PROPH/DIAG INJ IV PUSH: CPT

## 2019-10-15 PROCEDURE — 99285 EMERGENCY DEPT VISIT HI MDM: CPT

## 2019-10-15 PROCEDURE — 93010 ELECTROCARDIOGRAM REPORT: CPT | Performed by: EMERGENCY MEDICINE

## 2019-10-15 PROCEDURE — 93005 ELECTROCARDIOGRAM TRACING: CPT

## 2019-10-15 RX ORDER — LORAZEPAM 2 MG/ML
0.5 INJECTION INTRAMUSCULAR ONCE
Status: COMPLETED | OUTPATIENT
Start: 2019-10-15 | End: 2019-10-15

## 2019-10-15 RX ORDER — KETOROLAC TROMETHAMINE 30 MG/ML
30 INJECTION, SOLUTION INTRAMUSCULAR; INTRAVENOUS ONCE
Status: COMPLETED | OUTPATIENT
Start: 2019-10-15 | End: 2019-10-15

## 2019-10-16 VITALS
DIASTOLIC BLOOD PRESSURE: 70 MMHG | HEIGHT: 63 IN | TEMPERATURE: 98 F | BODY MASS INDEX: 33.2 KG/M2 | OXYGEN SATURATION: 93 % | HEART RATE: 82 BPM | SYSTOLIC BLOOD PRESSURE: 116 MMHG | WEIGHT: 187.38 LBS | RESPIRATION RATE: 14 BRPM

## 2019-10-16 RX ORDER — DIAZEPAM 2 MG/1
2 TABLET ORAL EVERY 12 HOURS PRN
Qty: 14 TABLET | Refills: 0 | Status: SHIPPED | OUTPATIENT
Start: 2019-10-16 | End: 2019-10-23

## 2019-10-16 NOTE — ED INITIAL ASSESSMENT (HPI)
Patient arrives by EMS for complaints of L arm and L leg pain. Patient just came back from [facility] today.

## 2019-10-16 NOTE — ED PROVIDER NOTES
Patient Seen in: [facility] AND [facility] Emergency Department    History   Patient presents with:  Pain (neurologic)    Stated Complaint: L arm and L leg    HPI    Patient presents with ongoing pain with her entire left side of her body.   It extends from the e Oral Tab,  Take 100 mg by mouth nightly. simvastatin 20 MG Oral Tab,  Take 20 mg by mouth nightly. Lisinopril-Hydrochlorothiazide 20-25 MG Oral Tab,  Take 1 tablet by mouth daily.    omeprazole 20 MG Oral Capsule Delayed Release,  Take 40 mg by mouth ev Take 81 mg by mouth daily. allopurinol 100 MG Oral Tab,  Take 100 mg by mouth nightly. Montelukast Sodium 10 MG Oral Tab,  Take 10 mg by mouth nightly. GlipiZIDE ER 5 MG Oral Tablet 24 Hr,  Take 5 mg by mouth daily.    ALPRAZolam 0.5 MG Oral Tab,  Hattie Roche hepato-splenomegaly. Musculoskeletal:  Good muscle tone. She has normal strength no redness no swelling there is no abnormalities noted of the legs and arms  Skin:  Warm, dry, well perfused. Good skin turgor. No rashes seen.   Neurology:  Moving all e ---------                               -----------         ------                     CBC W/ DIFFERENTIAL[[id number]]          Abnormal            Final result                 Please view results for these tests on the individual orders.    RAINBOW DRAW B

## 2019-10-16 NOTE — ED NOTES
Patient states that she has been having left arm and left leg pain x 1 month, stating that she also feels \"cold\" and experiences sweating. Patient has not followed up with pcp for symptoms. Patient states she took [employer] today, unknown name.  Lizzy

## 2019-11-02 ENCOUNTER — HOSPITAL ENCOUNTER (OUTPATIENT)
Dept: ULTRASOUND IMAGING | Age: 54
Discharge: HOME | End: 2019-11-02
Attending: INTERNAL MEDICINE
Payer: MEDICAID

## 2019-11-02 DIAGNOSIS — R10.13 EPIGASTRIC PAIN: ICD-10-CM

## 2019-11-02 PROCEDURE — 76700 US EXAM ABDOM COMPLETE: CPT | Performed by: INTERNAL MEDICINE

## 2020-02-03 ENCOUNTER — HOSPITAL ENCOUNTER (OUTPATIENT)
Age: 55
Discharge: HOME | End: 2020-02-03
Attending: EMERGENCY MEDICINE
Payer: MEDICAID

## 2020-02-03 VITALS
DIASTOLIC BLOOD PRESSURE: 73 MMHG | WEIGHT: 187.38 LBS | RESPIRATION RATE: 20 BRPM | HEART RATE: 96 BPM | SYSTOLIC BLOOD PRESSURE: 135 MMHG | TEMPERATURE: 98 F | OXYGEN SATURATION: 95 % | HEIGHT: 64 IN | BODY MASS INDEX: 31.99 KG/M2

## 2020-02-03 DIAGNOSIS — J02.0 STREP PHARYNGITIS: Primary | ICD-10-CM

## 2020-02-03 LAB — S PYO AG THROAT QL: POSITIVE

## 2020-02-03 PROCEDURE — 99214 OFFICE O/P EST MOD 30 MIN: CPT

## 2020-02-03 PROCEDURE — 87430 STREP A AG IA: CPT

## 2020-02-03 PROCEDURE — 99213 OFFICE O/P EST LOW 20 MIN: CPT

## 2020-02-03 RX ORDER — AMOXICILLIN 875 MG/1
875 TABLET, COATED ORAL 2 TIMES DAILY
Qty: 20 TABLET | Refills: 0 | Status: SHIPPED | OUTPATIENT
Start: 2020-02-03 | End: 2020-02-13

## 2020-02-03 NOTE — ED INITIAL ASSESSMENT (HPI)
PATIENT ARRIVED AMBULATORY TO ROOM C/O SYMPTOMS THAT STARTED YESTERDAY. +BODY ACHES. +CHILLS. PT DENIES TAKING TEMPERATURES AT HOME. +SORE THROAT. SLIGHT COUGH. SLIGHT NASAL CONGESTION. NO N/V/D. EASY NON LABORED RESPIRATIONS.

## 2020-02-03 NOTE — ED PROVIDER NOTES
Patient Seen in: [facility]      History   Patient presents with:  Sore Throat    Stated Complaint: sore throat/fever/body pain    HPI    Patient complains of fever and cough for the last 2 days.   Patient denies cough vom midline  Neck positive tenderness on palpation of the submandibular area at the angle of the mandibles bilaterally  Lungs are clear to auscultation  Neurologic there are no gross cranial nerve deficits patient moves all 4 extremities without impairment

## 2020-02-28 ENCOUNTER — APPOINTMENT (OUTPATIENT)
Dept: GENERAL RADIOLOGY | Age: 55
End: 2020-02-28
Attending: NURSE PRACTITIONER
Payer: MEDICAID

## 2020-02-28 ENCOUNTER — HOSPITAL ENCOUNTER (OUTPATIENT)
Age: 55
Discharge: HOME | End: 2020-02-28
Payer: MEDICAID

## 2020-02-28 VITALS
TEMPERATURE: 98 F | BODY MASS INDEX: 31.92 KG/M2 | DIASTOLIC BLOOD PRESSURE: 83 MMHG | OXYGEN SATURATION: 98 % | WEIGHT: 187 LBS | HEIGHT: 64 IN | RESPIRATION RATE: 20 BRPM | SYSTOLIC BLOOD PRESSURE: 134 MMHG | HEART RATE: 86 BPM

## 2020-02-28 DIAGNOSIS — M79.642 PAIN OF LEFT HAND: Primary | ICD-10-CM

## 2020-02-28 PROCEDURE — 99213 OFFICE O/P EST LOW 20 MIN: CPT

## 2020-02-28 PROCEDURE — 73130 X-RAY EXAM OF HAND: CPT | Performed by: NURSE PRACTITIONER

## 2020-02-28 NOTE — ED PROVIDER NOTES
Patient presents with:  Hand Pain      HPI:     Nettie Rachelle is a 49year old female with history of thyroid disease, hypertension, hyperlipidemia, type 2 diabetes, asthma presents with a chief complaint of atraumatic left fourth finger pain over the co on 2/28/2020 at 15:43              Orders Placed This Encounter      XR HAND (MIN 3 VIEWS), LEFT (CPT=73130) Once          Order Specific Question: What is the Relevant Clinical Indication / Reason for Exam?          Answer: Left hand pain       Labs perfo

## 2020-04-14 ENCOUNTER — NURSE TRIAGE (OUTPATIENT)
Dept: OTHER | Age: 55
End: 2020-04-14

## 2020-04-14 NOTE — TELEPHONE ENCOUNTER
Action Requested: Summary for Provider     []  Critical Lab, Recommendations Needed  [] Need Additional Advice  []   FYI    []   Need Orders  [] Need Medications Sent to Pharmacy  []  Other     SUMMARY: Dr Kranthi Mike, please advise.   Patient has chosen you as

## 2020-04-14 NOTE — TELEPHONE ENCOUNTER
I do not do video visit with a new patient she needs to be seen in person, she can see any provider available in the office this week.   I will be available on Monday to see her, I come to the office every Monday for next 2- 3 weeks, please call patient

## 2020-04-27 ENCOUNTER — HOSPITAL ENCOUNTER (OUTPATIENT)
Dept: GENERAL RADIOLOGY | Age: 55
Discharge: HOME | End: 2020-04-27
Attending: INTERNAL MEDICINE
Payer: MEDICAID

## 2020-04-27 ENCOUNTER — APPOINTMENT (OUTPATIENT)
Dept: GENERAL RADIOLOGY | Age: 55
End: 2020-04-27
Attending: INTERNAL MEDICINE
Payer: MEDICAID

## 2020-04-27 ENCOUNTER — OFFICE VISIT (OUTPATIENT)
Dept: INTERNAL MEDICINE CLINIC | Facility: CLINIC | Age: 55
End: 2020-04-27
Payer: MEDICAID

## 2020-04-27 VITALS
WEIGHT: 193 LBS | BODY MASS INDEX: 32.95 KG/M2 | DIASTOLIC BLOOD PRESSURE: 76 MMHG | RESPIRATION RATE: 22 BRPM | HEIGHT: 64 IN | HEART RATE: 87 BPM | SYSTOLIC BLOOD PRESSURE: 122 MMHG

## 2020-04-27 DIAGNOSIS — M54.2 PAIN, NECK: ICD-10-CM

## 2020-04-27 DIAGNOSIS — E03.8 OTHER SPECIFIED HYPOTHYROIDISM: ICD-10-CM

## 2020-04-27 DIAGNOSIS — E11.9 TYPE 2 DIABETES MELLITUS WITHOUT COMPLICATION, WITHOUT LONG-TERM CURRENT USE OF INSULIN (HCC): Primary | ICD-10-CM

## 2020-04-27 DIAGNOSIS — M25.50 ARTHRALGIA, UNSPECIFIED JOINT: ICD-10-CM

## 2020-04-27 DIAGNOSIS — R05.9 COUGH: ICD-10-CM

## 2020-04-27 DIAGNOSIS — R22.1 MASS OF RIGHT SIDE OF NECK: ICD-10-CM

## 2020-04-27 PROCEDURE — 71046 X-RAY EXAM CHEST 2 VIEWS: CPT | Performed by: INTERNAL MEDICINE

## 2020-04-27 PROCEDURE — 99204 OFFICE O/P NEW MOD 45 MIN: CPT | Performed by: INTERNAL MEDICINE

## 2020-04-27 PROCEDURE — 73130 X-RAY EXAM OF HAND: CPT | Performed by: INTERNAL MEDICINE

## 2020-04-27 RX ORDER — SERTRALINE HYDROCHLORIDE 100 MG/1
100 TABLET, FILM COATED ORAL DAILY
COMMUNITY
Start: 2020-02-19 | End: 2020-10-23

## 2020-04-27 RX ORDER — AMLODIPINE BESYLATE 5 MG/1
5 TABLET ORAL DAILY
COMMUNITY
Start: 2020-04-07 | End: 2020-10-23

## 2020-04-27 RX ORDER — GLIPIZIDE 10 MG/1
10 TABLET, FILM COATED, EXTENDED RELEASE ORAL DAILY
COMMUNITY
End: 2020-10-06

## 2020-04-27 RX ORDER — LEVOTHYROXINE SODIUM 0.12 MG/1
125 TABLET ORAL EVERY MORNING
COMMUNITY
Start: 2019-12-22 | End: 2020-10-23

## 2020-04-27 NOTE — TELEPHONE ENCOUNTER
This is 3rd call back, two by the Call Center. LMTCB transfer to triage. Please send the no-response phone letter that is in the chart, mail to patient.

## 2020-04-28 ENCOUNTER — LAB ENCOUNTER (OUTPATIENT)
Dept: LAB | Age: 55
End: 2020-04-28
Attending: INTERNAL MEDICINE
Payer: MEDICAID

## 2020-04-28 ENCOUNTER — TELEPHONE (OUTPATIENT)
Dept: INTERNAL MEDICINE CLINIC | Facility: CLINIC | Age: 55
End: 2020-04-28

## 2020-04-28 DIAGNOSIS — E11.9 TYPE 2 DIABETES MELLITUS WITHOUT COMPLICATION, WITHOUT LONG-TERM CURRENT USE OF INSULIN (HCC): ICD-10-CM

## 2020-04-28 DIAGNOSIS — Z79.4 TYPE 2 DIABETES MELLITUS WITH HYPERGLYCEMIA, WITH LONG-TERM CURRENT USE OF INSULIN (HCC): ICD-10-CM

## 2020-04-28 DIAGNOSIS — E11.65 TYPE 2 DIABETES MELLITUS WITH HYPERGLYCEMIA, WITH LONG-TERM CURRENT USE OF INSULIN (HCC): ICD-10-CM

## 2020-04-28 DIAGNOSIS — M25.50 ARTHRALGIA, UNSPECIFIED JOINT: ICD-10-CM

## 2020-04-28 DIAGNOSIS — E03.8 OTHER SPECIFIED HYPOTHYROIDISM: ICD-10-CM

## 2020-04-28 PROBLEM — I10 ESSENTIAL HYPERTENSION: Status: ACTIVE | Noted: 2020-04-28

## 2020-04-28 PROBLEM — R07.9 CHEST PAIN: Status: RESOLVED | Noted: 2017-12-04 | Resolved: 2020-04-28

## 2020-04-28 PROBLEM — E11.40 TYPE 2 DIABETES MELLITUS WITH DIABETIC NEUROPATHY, WITH LONG-TERM CURRENT USE OF INSULIN (HCC): Status: ACTIVE | Noted: 2020-04-28

## 2020-04-28 PROBLEM — M1A.09X0 CHRONIC GOUT OF MULTIPLE SITES: Status: ACTIVE | Noted: 2020-04-28

## 2020-04-28 PROCEDURE — 85025 COMPLETE CBC W/AUTO DIFF WBC: CPT

## 2020-04-28 PROCEDURE — 80061 LIPID PANEL: CPT

## 2020-04-28 PROCEDURE — 86038 ANTINUCLEAR ANTIBODIES: CPT

## 2020-04-28 PROCEDURE — 82306 VITAMIN D 25 HYDROXY: CPT

## 2020-04-28 PROCEDURE — 86140 C-REACTIVE PROTEIN: CPT

## 2020-04-28 PROCEDURE — 80053 COMPREHEN METABOLIC PANEL: CPT

## 2020-04-28 PROCEDURE — 36415 COLL VENOUS BLD VENIPUNCTURE: CPT

## 2020-04-28 PROCEDURE — 85060 BLOOD SMEAR INTERPRETATION: CPT

## 2020-04-28 PROCEDURE — 82550 ASSAY OF CK (CPK): CPT

## 2020-04-28 PROCEDURE — 86431 RHEUMATOID FACTOR QUANT: CPT

## 2020-04-28 PROCEDURE — 85652 RBC SED RATE AUTOMATED: CPT

## 2020-04-28 PROCEDURE — 84443 ASSAY THYROID STIM HORMONE: CPT

## 2020-04-28 PROCEDURE — 84439 ASSAY OF FREE THYROXINE: CPT

## 2020-04-28 PROCEDURE — 86200 CCP ANTIBODY: CPT

## 2020-04-28 PROCEDURE — 84481 FREE ASSAY (FT-3): CPT

## 2020-04-28 PROCEDURE — 83036 HEMOGLOBIN GLYCOSYLATED A1C: CPT

## 2020-04-28 PROCEDURE — 84550 ASSAY OF BLOOD/URIC ACID: CPT

## 2020-04-28 PROCEDURE — 86225 DNA ANTIBODY NATIVE: CPT

## 2020-04-29 NOTE — PROGRESS NOTES
HPI:    Patient ID: Nagi Porter is a 49year old female. Presents as a new patient with multiple concerns.     HPI  Pain problem with patient has is bilateral hand pain and swelling, pain starting from elbows down to the fingertips, fingers feel numb, rash  Musculoskeletal: Positive for bone/joint symptoms  Neurological:  Negative for gait disturbance, paresthesias  Psychiatric:  Negative for inappropriate interaction and psychiatric symptoms              Current Outpatient Medications   Medication Sig taking: Reported on 2/28/2020 ) 60 g 0   • simvastatin 20 MG Oral Tab Take 20 mg by mouth nightly. • omeprazole 20 MG Oral Capsule Delayed Release Take 40 mg by mouth every morning. • Montelukast Sodium 10 MG Oral Tab Take 10 mg by mouth nightly. abnormalities noted  Musculoskeletal: full ROM all extremities good strength  no deformities, edema of the PIP joints, tender on palpation over wrist bilaterally, finger joints, no tenderness on palpation over elbow joints, tenderness on palpation over anil This Visit:  Requested Prescriptions      No prescriptions requested or ordered in this encounter       Imaging & Referrals:  RHEUMATOLOGY - INTERNAL  ENT - INTERNAL  US THYROID (ZIY=[id number])         ZN#[id number]

## 2020-05-02 ENCOUNTER — TELEPHONE (OUTPATIENT)
Dept: INTERNAL MEDICINE CLINIC | Facility: CLINIC | Age: 55
End: 2020-05-02

## 2020-05-02 NOTE — TELEPHONE ENCOUNTER
Left message for the patient on home phone number, daughters cell phone number does not accept new messages.   Asked patient to call back to discuss recent test results, please get best number for patient to call today on Saturday or Monday

## 2020-05-07 ENCOUNTER — TELEPHONE (OUTPATIENT)
Dept: INTERNAL MEDICINE CLINIC | Facility: CLINIC | Age: 55
End: 2020-05-07

## 2020-05-09 ENCOUNTER — TELEPHONE (OUTPATIENT)
Dept: INTERNAL MEDICINE CLINIC | Facility: CLINIC | Age: 55
End: 2020-05-09

## 2020-05-09 NOTE — TELEPHONE ENCOUNTER
No phone response letter mailed to the patient, need to speak to her or her daughter regarding test results

## 2020-05-24 ENCOUNTER — TELEPHONE (OUTPATIENT)
Dept: INTERNAL MEDICINE CLINIC | Facility: CLINIC | Age: 55
End: 2020-05-24

## 2020-05-24 NOTE — TELEPHONE ENCOUNTER
Spoke to daughter Dipesh Muller related information about patient test results that patient has abnormal thyroid test, diabetes out-of-control anemia, inflammatory markers elevated, advised to that patient needs to come for follow-up with me and see rheumatologist,

## 2020-06-03 ENCOUNTER — OFFICE VISIT (OUTPATIENT)
Dept: RHEUMATOLOGY | Facility: CLINIC | Age: 55
End: 2020-06-03
Payer: MEDICAID

## 2020-06-03 VITALS
WEIGHT: 194 LBS | HEART RATE: 109 BPM | DIASTOLIC BLOOD PRESSURE: 77 MMHG | HEIGHT: 64 IN | SYSTOLIC BLOOD PRESSURE: 117 MMHG | RESPIRATION RATE: 16 BRPM | BODY MASS INDEX: 33.12 KG/M2

## 2020-06-03 DIAGNOSIS — M79.642 BILATERAL HAND PAIN: Primary | ICD-10-CM

## 2020-06-03 DIAGNOSIS — M25.50 POLYARTHRALGIA: ICD-10-CM

## 2020-06-03 DIAGNOSIS — M79.641 BILATERAL HAND PAIN: Primary | ICD-10-CM

## 2020-06-03 PROCEDURE — 99204 OFFICE O/P NEW MOD 45 MIN: CPT | Performed by: INTERNAL MEDICINE

## 2020-06-03 RX ORDER — MELOXICAM 7.5 MG/1
7.5 TABLET ORAL 2 TIMES DAILY
Qty: 60 TABLET | Refills: 0 | Status: SHIPPED | OUTPATIENT
Start: 2020-06-03 | End: 2020-06-29

## 2020-06-03 RX ORDER — BLOOD SUGAR DIAGNOSTIC
STRIP MISCELLANEOUS
COMMUNITY
Start: 2020-05-26 | End: 2021-02-11 | Stop reason: CLARIF

## 2020-06-03 RX ORDER — DULOXETIN HYDROCHLORIDE 30 MG/1
30 CAPSULE, DELAYED RELEASE ORAL DAILY
COMMUNITY
Start: 2020-05-04 | End: 2020-10-23

## 2020-06-03 RX ORDER — LANCETS 30 GAUGE
EACH MISCELLANEOUS
COMMUNITY
Start: 2020-05-22 | End: 2021-02-11 | Stop reason: CLARIF

## 2020-06-03 RX ORDER — CITALOPRAM 10 MG/1
10 TABLET ORAL DAILY
COMMUNITY
Start: 2020-05-08 | End: 2020-07-21

## 2020-06-03 NOTE — PROGRESS NOTES
Lawyer Lea is a 49year old female who presents for Patient presents with:  Consult: Arthralgia, unspecified joint onset x6 months  Hand Pain: and  fingers B/L   Joint Pain  Back Pain: Upper and lower  .    HPI:   CC: hand and back pain  Consult: refer mouth daily. • Montelukast Sodium 10 MG Oral Tab Take 10 mg by mouth nightly. • gabapentin 300 MG Oral Cap Take 300 mg by mouth 3 (three) times daily. • MetFORMIN HCl 1000 MG Oral Tab Take 1,000 mg by mouth 2 (two) times daily with meals.      • 2/28/2020 ) 120 capsule 0      Past Medical History:   Diagnosis Date   • Asthma    • Diabetes ([facility] [address]    • Diabetes type 2, controlled ([facility] [address]    • Disorder of thyroid    • Diverticulitis 5/15   • Esophageal reflux    • Essential hypertension    • Gout    • H GEN: AAOx3, NAD  HEENT: EOMI, PERRLA, no injection or icterus, oral mucosa moist, no oral lesions. No lymphadenopathy. No facial rash  CVS: RRR, no murmurs rubs or gallops. Equal 2+ distal pulses.    LUNGS: CTAB, no increased work of breathing  ABDOMEN: making a fist in the morning  - She also reports a lot of myalgias and TTP throughout her body, symptoms appear to be more consistent with myofascial pain syndrome  - Plan to order ultrasound of the R hand to evaluate for any active synovitis to suggest in

## 2020-06-03 NOTE — PATIENT INSTRUCTIONS
You were seen today for joint pain and muscle pain  Lets see what the US of the R hand is going to show  Take mobic 7.5 mg twice a day, do not combine with other NSAID, take with food   Follow up after US

## 2020-06-19 ENCOUNTER — HOSPITAL ENCOUNTER (OUTPATIENT)
Dept: ULTRASOUND IMAGING | Age: 55
Discharge: HOME | End: 2020-06-19
Attending: INTERNAL MEDICINE
Payer: MEDICAID

## 2020-06-19 DIAGNOSIS — R22.1 MASS OF RIGHT SIDE OF NECK: ICD-10-CM

## 2020-06-19 DIAGNOSIS — M54.2 PAIN, NECK: ICD-10-CM

## 2020-06-19 PROCEDURE — 76536 US EXAM OF HEAD AND NECK: CPT | Performed by: INTERNAL MEDICINE

## 2020-06-29 RX ORDER — MELOXICAM 7.5 MG/1
7.5 TABLET ORAL 2 TIMES DAILY
Qty: 60 TABLET | Refills: 0 | Status: SHIPPED | OUTPATIENT
Start: 2020-06-29 | End: 2020-07-28

## 2020-06-29 NOTE — TELEPHONE ENCOUNTER
LOV: 6/3/20  Last Refilled:#60, 0rfs  6/3/20    Future Appointments   Date Time Provider Alysia Winters   7/2/2020  2:00  [facility] [address]   7/21/2020  2:00 PM Amy Banks MD [facility] [place]       Please advise.

## 2020-07-02 ENCOUNTER — HOSPITAL ENCOUNTER (OUTPATIENT)
Dept: ULTRASOUND IMAGING | Facility: HOSPITAL | Age: 55
Discharge: HOME | End: 2020-07-02
Attending: INTERNAL MEDICINE
Payer: MEDICAID

## 2020-07-02 DIAGNOSIS — M79.642 BILATERAL HAND PAIN: ICD-10-CM

## 2020-07-02 DIAGNOSIS — M79.641 BILATERAL HAND PAIN: ICD-10-CM

## 2020-07-02 PROCEDURE — 76881 US COMPL JOINT R-T W/IMG: CPT | Performed by: INTERNAL MEDICINE

## 2020-07-10 ENCOUNTER — OFFICE VISIT (OUTPATIENT)
Dept: RHEUMATOLOGY | Facility: CLINIC | Age: 55
End: 2020-07-10
Payer: MEDICAID

## 2020-07-10 ENCOUNTER — TELEPHONE (OUTPATIENT)
Dept: RHEUMATOLOGY | Facility: CLINIC | Age: 55
End: 2020-07-10

## 2020-07-10 VITALS
SYSTOLIC BLOOD PRESSURE: 139 MMHG | HEART RATE: 111 BPM | BODY MASS INDEX: 33.63 KG/M2 | HEIGHT: 64 IN | WEIGHT: 197 LBS | DIASTOLIC BLOOD PRESSURE: 74 MMHG

## 2020-07-10 DIAGNOSIS — M79.641 BILATERAL HAND PAIN: Primary | ICD-10-CM

## 2020-07-10 DIAGNOSIS — G56.03 BILATERAL CARPAL TUNNEL SYNDROME: ICD-10-CM

## 2020-07-10 DIAGNOSIS — M79.642 BILATERAL HAND PAIN: Primary | ICD-10-CM

## 2020-07-10 DIAGNOSIS — R42 DIZZY: ICD-10-CM

## 2020-07-10 PROCEDURE — 20600 DRAIN/INJ JOINT/BURSA W/O US: CPT | Performed by: INTERNAL MEDICINE

## 2020-07-10 PROCEDURE — 99214 OFFICE O/P EST MOD 30 MIN: CPT | Performed by: INTERNAL MEDICINE

## 2020-07-10 PROCEDURE — A4570 SPLINT: HCPCS | Performed by: INTERNAL MEDICINE

## 2020-07-10 RX ORDER — TRIAMCINOLONE ACETONIDE 40 MG/ML
20 INJECTION, SUSPENSION INTRA-ARTICULAR; INTRAMUSCULAR ONCE
Status: COMPLETED | OUTPATIENT
Start: 2020-07-10 | End: 2020-07-10

## 2020-07-10 RX ADMIN — TRIAMCINOLONE ACETONIDE 20 MG: 40 INJECTION, SUSPENSION INTRA-ARTICULAR; INTRAMUSCULAR at 15:40:00

## 2020-07-10 NOTE — TELEPHONE ENCOUNTER
Pt became dizzy in the hallway after appt (received steroid injections). Pt was seated and provided with orange juice. Pt was accompanied by daughter. Shortly after, pt reported improvement in symptoms. She left office before accucheck was completed.  Pt de

## 2020-07-10 NOTE — PROCEDURES
With paitent's consent, I injected pt's R wrist with 0.5 ml lidocaine 1 % and 0.5 ml kenalog 40. It was done under sterile technique using iodine and alcohol swabs and ethyl chloride was used as an anaesthetic spray. Pt.  tolerated it well.

## 2020-07-10 NOTE — PROGRESS NOTES
Jay Smith is a 49year old female. HPI:   Patient presents with: Follow - Up  Hand Pain: Bilateral numbness and pain, some swelling      I had the pleasure of seeing Jay Smith on 7/10/2020 for follow up.      Current Medications:    Blood wo Hyperlipidemia    • Thyroid disease       Social Hx Reviewed   Family Hx Reviewed     Medications (Active prior to today's visit):  Current Outpatient Medications   Medication Sig Dispense Refill   • Meloxicam 7.5 MG Oral Tab Take 1 tablet (7.5 mg total) b Activated Inhale 1 puff into the lungs daily. 1 each 0   • Fluticasone Propionate (FLONASE) 50 MCG/ACT Nasal Suspension 2 sprays by Each Nare route daily. 1 Bottle 0   • Cholecalciferol (VITAMIN D3) 2000 units Oral Tab Take 1 tablet by mouth Noon.      • Me RATE      0 - 30 mm/Hr 16   C-REACTIVE PROTEIN      <0.30 mg/dL 1.11 (H)   URIC ACID      2.6 - 6.0 mg/dL 4.7   MILADYS SCREEN      Negative Negative   Anti Double Strand DNA      <10 <10   RHEUMATOID FACTOR      <15 IU/mL <10   C-Citrullinated Peptide IgG AB

## 2020-07-10 NOTE — PATIENT INSTRUCTIONS
You were seen for numbness in the hands likely due to carpal tunnel symptoms  We injected your right wrist with cortisone, hopefully that will help some of your symptoms,  Please use the splints at night to help your symptoms  If the symptoms continue you inflammation, unless another medicine was prescribed. Anti-inflammatory pain medicines, such as ibuprofen or naproxen may be more effective than acetaminophen, which treats pain, but not inflammation. If you have chronic liver or kidney disease or ever had Learn how to change the way you use your hands. Below are tips for at home and on the job. Also follow the hand and wrist safety policies at your workplace. Keep your wrist in a straight (neutral) position when exercising.       Keep your wrist in neut

## 2020-07-21 ENCOUNTER — OFFICE VISIT (OUTPATIENT)
Dept: INTERNAL MEDICINE CLINIC | Facility: CLINIC | Age: 55
End: 2020-07-21
Payer: MEDICAID

## 2020-07-21 VITALS
HEART RATE: 105 BPM | DIASTOLIC BLOOD PRESSURE: 86 MMHG | BODY MASS INDEX: 33 KG/M2 | RESPIRATION RATE: 25 BRPM | SYSTOLIC BLOOD PRESSURE: 136 MMHG | WEIGHT: 191 LBS

## 2020-07-21 DIAGNOSIS — E11.49 TYPE 2 DIABETES MELLITUS WITH OTHER NEUROLOGIC COMPLICATION, WITH LONG-TERM CURRENT USE OF INSULIN (HCC): ICD-10-CM

## 2020-07-21 DIAGNOSIS — M79.18 MYOFASCIAL PAIN SYNDROME, CERVICAL: ICD-10-CM

## 2020-07-21 DIAGNOSIS — Z79.4 TYPE 2 DIABETES MELLITUS WITH OTHER NEUROLOGIC COMPLICATION, WITH LONG-TERM CURRENT USE OF INSULIN (HCC): ICD-10-CM

## 2020-07-21 DIAGNOSIS — Z12.31 VISIT FOR SCREENING MAMMOGRAM: ICD-10-CM

## 2020-07-21 DIAGNOSIS — E03.8 OTHER SPECIFIED HYPOTHYROIDISM: Primary | ICD-10-CM

## 2020-07-21 DIAGNOSIS — M54.2 NECK PAIN ON RIGHT SIDE: ICD-10-CM

## 2020-07-21 DIAGNOSIS — D50.8 OTHER IRON DEFICIENCY ANEMIA: ICD-10-CM

## 2020-07-21 PROBLEM — K52.9 GASTROENTERITIS: Status: RESOLVED | Noted: 2018-08-05 | Resolved: 2020-07-21

## 2020-07-21 PROBLEM — M79.7 FIBROMYALGIA: Status: ACTIVE | Noted: 2020-07-21

## 2020-07-21 PROBLEM — F32.9 CURRENT EPISODE OF MAJOR DEPRESSIVE DISORDER WITHOUT PRIOR EPISODE: Status: ACTIVE | Noted: 2020-07-21

## 2020-07-21 PROBLEM — R10.13 DYSPEPSIA: Status: RESOLVED | Noted: 2018-08-05 | Resolved: 2020-07-21

## 2020-07-21 PROBLEM — D50.0 IRON DEFICIENCY ANEMIA DUE TO CHRONIC BLOOD LOSS: Status: ACTIVE | Noted: 2020-07-21

## 2020-07-21 PROCEDURE — 3079F DIAST BP 80-89 MM HG: CPT | Performed by: INTERNAL MEDICINE

## 2020-07-21 PROCEDURE — 99214 OFFICE O/P EST MOD 30 MIN: CPT | Performed by: INTERNAL MEDICINE

## 2020-07-21 PROCEDURE — 3075F SYST BP GE 130 - 139MM HG: CPT | Performed by: INTERNAL MEDICINE

## 2020-07-22 NOTE — PROGRESS NOTES
HPI:    Patient ID: Jay Smith is a 49year old female. Presents for follow-up on multiple medical conditions  HPI  Patient is here accompanied by her daughter who is a .   Daughter states that last 3 years since death of her  patient Eyes: Negative for photophobia and visual disturbance. Respiratory: Negative for cough, choking and shortness of breath. Cardiovascular: Negative for chest pain, palpitations and leg swelling.    Gastrointestinal: Negative for nausea, vomiting and ab Sulfate  (90 Base) MCG/ACT Inhalation Aero Soln Inhale 2 puffs into the lungs every 6 (six) hours as needed.  1 Inhaler 3   • Fluticasone Furoate-Vilanterol (BREO ELLIPTA) 100-25 MCG/INH Inhalation Aerosol Powder, Breath Activated Inhale 1 puff into There is no tenderness. There is no rebound and no guarding. Lymphadenopathy:     She has no cervical adenopathy. Neurological: She is alert and oriented to person, place, and time. No cranial nerve deficit or motor deficit.    Psychiatric: She has a no INTERNAL  PHYSICAL THERAPY - INTERNAL  ADAIR SCREENING BILAT (CPT=77036)         WQ#[id number]

## 2020-07-24 ENCOUNTER — LAB ENCOUNTER (OUTPATIENT)
Dept: LAB | Age: 55
End: 2020-07-24
Attending: INTERNAL MEDICINE
Payer: MEDICAID

## 2020-07-24 ENCOUNTER — TELEPHONE (OUTPATIENT)
Dept: INTERNAL MEDICINE CLINIC | Facility: CLINIC | Age: 55
End: 2020-07-24

## 2020-07-24 DIAGNOSIS — E03.8 OTHER SPECIFIED HYPOTHYROIDISM: ICD-10-CM

## 2020-07-24 DIAGNOSIS — D50.8 OTHER IRON DEFICIENCY ANEMIA: ICD-10-CM

## 2020-07-24 DIAGNOSIS — R42 DIZZY: Primary | ICD-10-CM

## 2020-07-24 LAB
ALBUMIN SERPL-MCNC: 3.9 G/DL (ref 3.4–5)
ALBUMIN/GLOB SERPL: 1 {RATIO} (ref 1–2)
ALP LIVER SERPL-CCNC: 93 U/L (ref 41–108)
ALT SERPL-CCNC: 42 U/L (ref 13–56)
ANION GAP SERPL CALC-SCNC: 5 MMOL/L (ref 0–18)
AST SERPL-CCNC: 24 U/L (ref 15–37)
BILIRUB SERPL-MCNC: 0.4 MG/DL (ref 0.1–2)
BUN BLD-MCNC: 31 MG/DL (ref 7–18)
BUN/CREAT SERPL: 26.7 (ref 10–20)
CALCIUM BLD-MCNC: 9.2 MG/DL (ref 8.5–10.1)
CHLORIDE SERPL-SCNC: 101 MMOL/L (ref 98–112)
CO2 SERPL-SCNC: 29 MMOL/L (ref 21–32)
CREAT BLD-MCNC: 1.16 MG/DL (ref 0.55–1.02)
DEPRECATED HBV CORE AB SER IA-ACNC: 5 NG/ML (ref 18–340)
EST. AVERAGE GLUCOSE BLD GHB EST-MCNC: 240 MG/DL (ref 68–126)
GLOBULIN PLAS-MCNC: 4 G/DL (ref 2.8–4.4)
GLUCOSE BLD-MCNC: 182 MG/DL (ref 70–99)
HBA1C MFR BLD HPLC: 10 % (ref ?–5.7)
IRON SATURATION: 5 % (ref 15–50)
IRON SERPL-MCNC: 28 UG/DL (ref 50–170)
M PROTEIN MFR SERPL ELPH: 7.9 G/DL (ref 6.4–8.2)
OSMOLALITY SERPL CALC.SUM OF ELEC: 291 MOSM/KG (ref 275–295)
PATIENT FASTING Y/N/NP: YES
POTASSIUM SERPL-SCNC: 4.5 MMOL/L (ref 3.5–5.1)
SODIUM SERPL-SCNC: 135 MMOL/L (ref 136–145)
T3FREE SERPL-MCNC: 1.97 PG/ML (ref 2.4–4.2)
T4 FREE SERPL-MCNC: 1.1 NG/DL (ref 0.8–1.7)
TOTAL IRON BINDING CAPACITY: 574 UG/DL (ref 240–450)
TRANSFERRIN SERPL-MCNC: 385 MG/DL (ref 200–360)
TSI SER-ACNC: 2.92 MIU/ML (ref 0.36–3.74)

## 2020-07-24 PROCEDURE — 84481 FREE ASSAY (FT-3): CPT

## 2020-07-24 PROCEDURE — 82728 ASSAY OF FERRITIN: CPT

## 2020-07-24 PROCEDURE — 36415 COLL VENOUS BLD VENIPUNCTURE: CPT

## 2020-07-24 PROCEDURE — 84466 ASSAY OF TRANSFERRIN: CPT

## 2020-07-24 PROCEDURE — 80053 COMPREHEN METABOLIC PANEL: CPT

## 2020-07-24 PROCEDURE — 85060 BLOOD SMEAR INTERPRETATION: CPT

## 2020-07-24 PROCEDURE — 85025 COMPLETE CBC W/AUTO DIFF WBC: CPT

## 2020-07-24 PROCEDURE — 83540 ASSAY OF IRON: CPT

## 2020-07-24 PROCEDURE — 83036 HEMOGLOBIN GLYCOSYLATED A1C: CPT

## 2020-07-24 PROCEDURE — 84439 ASSAY OF FREE THYROXINE: CPT

## 2020-07-24 PROCEDURE — 84443 ASSAY THYROID STIM HORMONE: CPT

## 2020-07-24 NOTE — TELEPHONE ENCOUNTER
Please see patient's message below and advise    Right-sided neck pain discussed at 7/21/2020 office visit

## 2020-07-24 NOTE — TELEPHONE ENCOUNTER
Patient called and advised that there should be a referral for an Ultrasound for the right side of her neck for pain. Please Advised once this is in the system so she can schedule this.

## 2020-07-25 ENCOUNTER — TELEPHONE (OUTPATIENT)
Dept: INTERNAL MEDICINE CLINIC | Facility: CLINIC | Age: 55
End: 2020-07-25

## 2020-07-25 LAB
BASOPHILS # BLD AUTO: 0.08 X10(3) UL (ref 0–0.2)
BASOPHILS NFR BLD AUTO: 0.7 %
DEPRECATED RDW RBC AUTO: 40.8 FL (ref 35.1–46.3)
EOSINOPHIL # BLD AUTO: 0.39 X10(3) UL (ref 0–0.7)
EOSINOPHIL NFR BLD AUTO: 3.5 %
ERYTHROCYTE [DISTWIDTH] IN BLOOD BY AUTOMATED COUNT: 19.6 % (ref 11–15)
HCT VFR BLD AUTO: 35.2 % (ref 35–48)
HGB BLD-MCNC: 10.5 G/DL (ref 12–16)
IMM GRANULOCYTES # BLD AUTO: 0.04 X10(3) UL (ref 0–1)
IMM GRANULOCYTES NFR BLD: 0.4 %
LYMPHOCYTES # BLD AUTO: 3.58 X10(3) UL (ref 1–4)
LYMPHOCYTES NFR BLD AUTO: 32.3 %
MCH RBC QN AUTO: 18.6 PG (ref 26–34)
MCHC RBC AUTO-ENTMCNC: 29.8 G/DL (ref 31–37)
MCV RBC AUTO: 62.3 FL (ref 80–100)
MONOCYTES # BLD AUTO: 0.61 X10(3) UL (ref 0.1–1)
MONOCYTES NFR BLD AUTO: 5.5 %
NEUTROPHILS # BLD AUTO: 6.37 X10 (3) UL (ref 1.5–7.7)
NEUTROPHILS # BLD AUTO: 6.37 X10(3) UL (ref 1.5–7.7)
NEUTROPHILS NFR BLD AUTO: 57.6 %
PLATELET # BLD AUTO: 559 10(3)UL (ref 150–450)
PLATELET MORPHOLOGY: NORMAL
RBC # BLD AUTO: 5.65 X10(6)UL (ref 3.8–5.3)
WBC # BLD AUTO: 11.1 X10(3) UL (ref 4–11)

## 2020-07-27 ENCOUNTER — TELEPHONE (OUTPATIENT)
Dept: INTERNAL MEDICINE CLINIC | Facility: CLINIC | Age: 55
End: 2020-07-27

## 2020-07-28 RX ORDER — MELOXICAM 7.5 MG/1
7.5 TABLET ORAL 2 TIMES DAILY
Qty: 60 TABLET | Refills: 0 | Status: SHIPPED | OUTPATIENT
Start: 2020-07-28 | End: 2020-08-31

## 2020-07-28 NOTE — TELEPHONE ENCOUNTER
LOV: 07/10/2020    No future appointments.   Labs:   Component      Latest Ref Rng & Units 7/24/2020   Glucose      70 - 99 mg/dL 182 (H)   Sodium      136 - 145 mmol/L 135 (L)   Potassium      3.5 - 5.1 mmol/L 4.5   Chloride      98 - 112 mmol/L 101   Carb

## 2020-07-28 NOTE — TELEPHONE ENCOUNTER
Current Outpatient Medications   Medication Sig Dispense Refill   • Meloxicam 7.5 MG Oral Tab Take 1 tablet (7.5 mg total) by mouth 2 (two) times a day.  60 tablet 0

## 2020-08-12 ENCOUNTER — OFFICE VISIT (OUTPATIENT)
Dept: OTOLARYNGOLOGY | Facility: CLINIC | Age: 55
End: 2020-08-12
Payer: MEDICAID

## 2020-08-12 VITALS
HEIGHT: 64 IN | SYSTOLIC BLOOD PRESSURE: 147 MMHG | WEIGHT: 191 LBS | TEMPERATURE: 98 F | DIASTOLIC BLOOD PRESSURE: 83 MMHG | BODY MASS INDEX: 32.61 KG/M2

## 2020-08-12 DIAGNOSIS — M54.2 CERVICALGIA: Primary | ICD-10-CM

## 2020-08-12 DIAGNOSIS — R13.10 DYSPHAGIA, UNSPECIFIED TYPE: ICD-10-CM

## 2020-08-12 PROCEDURE — 3079F DIAST BP 80-89 MM HG: CPT | Performed by: OTOLARYNGOLOGY

## 2020-08-12 PROCEDURE — 99243 OFF/OP CNSLTJ NEW/EST LOW 30: CPT | Performed by: OTOLARYNGOLOGY

## 2020-08-12 PROCEDURE — 3077F SYST BP >= 140 MM HG: CPT | Performed by: OTOLARYNGOLOGY

## 2020-08-12 PROCEDURE — 3008F BODY MASS INDEX DOCD: CPT | Performed by: OTOLARYNGOLOGY

## 2020-08-12 RX ORDER — CYCLOBENZAPRINE HCL 5 MG
5 TABLET ORAL 3 TIMES DAILY PRN
Qty: 20 TABLET | Refills: 1 | Status: SHIPPED | OUTPATIENT
Start: 2020-08-12 | End: 2020-09-25

## 2020-08-12 NOTE — PROGRESS NOTES
Mu Parent is a 49year old female. Patient presents with:  Neck Pain: c/o neck pain for 1 year      HISTORY OF PRESENT ILLNESS  8/12/2020  Patient presents for her daughter who translates.   She has had pain in her neck that hurts so bad that sometim ENDOSCOPY   • ESOPHAGOGASTRODUODENOSCOPY (EGD) N/A 3/29/2017    Performed by Meagan Salvador MD at [address] [place] [address],[address] Neg/Pos Details   Constitutional Negative Fatigue, fever and weight loss. ENMT Negative Drooling.    Ey Normal, Left: Normal.       Current Outpatient Medications:   •  cyclobenzaprine 5 MG Oral Tab, Take 1 tablet (5 mg total) by mouth 3 (three) times daily as needed for Muscle spasms. , Disp: 20 tablet, Rfl: 1  •  Meloxicam 7.5 MG Oral Tab, Take 1 tablet (7. Inhalation Aero Soln, Inhale 2 puffs into the lungs every 6 (six) hours as needed. , Disp: 1 Inhaler, Rfl: 3  •  Fluticasone Furoate-Vilanterol (BREO ELLIPTA) 100-25 MCG/INH Inhalation Aerosol Powder, Breath Activated, Inhale 1 puff into the lungs daily. , D

## 2020-08-20 ENCOUNTER — TELEPHONE (OUTPATIENT)
Dept: OTOLARYNGOLOGY | Facility: CLINIC | Age: 55
End: 2020-08-20

## 2020-08-20 NOTE — TELEPHONE ENCOUNTER
Patients insurance has denied the auth request for CT of the neck. Please contact the patient with the next plan of treatment. Below is the denial information that was also e-mailed.

## 2020-08-28 NOTE — TELEPHONE ENCOUNTER
MD Cyndee Guerrero, RN   Caller: Unspecified (1 week ago)             Since imaging was denied I do recommend that she continue the Flexeril and schedule an appointment with physical medicine and  rehabilitation or neurology as I do think t

## 2020-08-31 RX ORDER — MELOXICAM 7.5 MG/1
7.5 TABLET ORAL 2 TIMES DAILY
Qty: 60 TABLET | Refills: 0 | Status: SHIPPED | OUTPATIENT
Start: 2020-08-31 | End: 2020-10-07

## 2020-08-31 NOTE — TELEPHONE ENCOUNTER
LOV: 7/10/2020  Please advise.    Future Appointments   Date Time Provider Alysia Winters   9/23/2020  2:30 PM Hoda Ramirez MD [facility]   10/2/2020  2:00 PM Anitra Cheadle, MD [address] [place] [address]     Labs:   Component      Latest Ref Rng

## 2020-09-21 ENCOUNTER — TELEPHONE (OUTPATIENT)
Dept: INTERNAL MEDICINE CLINIC | Facility: CLINIC | Age: 55
End: 2020-09-21

## 2020-09-21 NOTE — TELEPHONE ENCOUNTER
Can see this patient this coming Friday can usesame day sick slots, or week of October 5 going forward I will be back in office

## 2020-09-21 NOTE — TELEPHONE ENCOUNTER
Patient called in stating that she is completely out of medication and needs a follow up appointment with Dr. Neli Banegas to renew medications. She is requesting to be placed on schedule sooner than (10/20 Dr. Jaylon Stone first available follow up appointment). Please advise if she can be added.

## 2020-09-25 RX ORDER — CYCLOBENZAPRINE HCL 5 MG
TABLET ORAL
Qty: 20 TABLET | Refills: 3 | Status: SHIPPED | OUTPATIENT
Start: 2020-09-25 | End: 2021-02-11

## 2020-10-06 ENCOUNTER — TELEPHONE (OUTPATIENT)
Dept: INTERNAL MEDICINE CLINIC | Facility: CLINIC | Age: 55
End: 2020-10-06

## 2020-10-06 ENCOUNTER — TELEPHONE (OUTPATIENT)
Dept: RHEUMATOLOGY | Facility: CLINIC | Age: 55
End: 2020-10-06

## 2020-10-06 RX ORDER — GLIPIZIDE 10 MG/1
10 TABLET, FILM COATED, EXTENDED RELEASE ORAL DAILY
Qty: 30 TABLET | Refills: 1 | Status: SHIPPED | OUTPATIENT
Start: 2020-10-06 | End: 2020-10-23

## 2020-10-06 RX ORDER — METOPROLOL SUCCINATE 100 MG/1
100 TABLET, EXTENDED RELEASE ORAL 2 TIMES DAILY
Qty: 60 TABLET | Refills: 1 | Status: SHIPPED | OUTPATIENT
Start: 2020-10-06 | End: 2020-10-23

## 2020-10-06 RX ORDER — ALLOPURINOL 100 MG/1
100 TABLET ORAL NIGHTLY
Qty: 30 TABLET | Refills: 1 | Status: SHIPPED | OUTPATIENT
Start: 2020-10-06 | End: 2020-10-23

## 2020-10-06 NOTE — TELEPHONE ENCOUNTER
Patient requesting refill on medication, states she is out and needs new prescription per pharmacy. Meloxicam 7.5 MG Oral Tab 60 tablet 0 8/31/2020    Sig:   Take 1 tablet (7.5 mg total) by mouth 2 (two) times a day.      Route:   Oral     Order #:   [id number]

## 2020-10-06 NOTE — TELEPHONE ENCOUNTER
Dr Chelsea Mcghee, patient requesting rx not previously prescribed by [facility]

## 2020-10-06 NOTE — TELEPHONE ENCOUNTER
LOV: 7/10/2020  Please advise on refill request for meloxicam  Future Appointments   Date Time Provider Alysia Vianey   10/20/2020  1:00 PM Yg Mahmood MD [facility] [place]     Labs:   Component      Latest Ref Rng & Units 7/24/2020   Glucose

## 2020-10-07 RX ORDER — MELOXICAM 7.5 MG/1
7.5 TABLET ORAL 2 TIMES DAILY
Qty: 60 TABLET | Refills: 0 | Status: SHIPPED | OUTPATIENT
Start: 2020-10-07 | End: 2020-11-04

## 2020-10-07 NOTE — TELEPHONE ENCOUNTER
Spoke to daughterAlba who answered the phone, she is on patient's home now, patient states that she take Basaglar insulin 40 units at bedtime, she is out of metformin for a while and sugar is high and she decided to double and take this medication twice a day sugar still high, she states that patient does not feel good at this moment, feels strange in her head, sugar was 308 checked recently. Sounds like patient is noncompliant medication and family dysfunction, not picking up and refilling her medication on time, different family members involved in her care, patient does not speak [place]. I advised daughter that she should take patient to emergency room for evaluation if she does not feel well, patient is out of many medications.   I reiterated the importance of taking patient to emergency room to avoid stroke or other complications

## 2020-10-23 ENCOUNTER — OFFICE VISIT (OUTPATIENT)
Dept: INTERNAL MEDICINE CLINIC | Facility: CLINIC | Age: 55
End: 2020-10-23
Payer: MEDICAID

## 2020-10-23 VITALS
RESPIRATION RATE: 18 BRPM | BODY MASS INDEX: 34 KG/M2 | SYSTOLIC BLOOD PRESSURE: 145 MMHG | WEIGHT: 198 LBS | DIASTOLIC BLOOD PRESSURE: 75 MMHG | HEART RATE: 75 BPM

## 2020-10-23 DIAGNOSIS — D50.8 OTHER IRON DEFICIENCY ANEMIA: ICD-10-CM

## 2020-10-23 DIAGNOSIS — M54.2 NECK PAIN: ICD-10-CM

## 2020-10-23 DIAGNOSIS — E11.49 TYPE 2 DIABETES MELLITUS WITH OTHER NEUROLOGIC COMPLICATION, WITH LONG-TERM CURRENT USE OF INSULIN (HCC): Primary | ICD-10-CM

## 2020-10-23 DIAGNOSIS — M25.50 ARTHRALGIA, UNSPECIFIED JOINT: ICD-10-CM

## 2020-10-23 DIAGNOSIS — I10 ESSENTIAL HYPERTENSION: ICD-10-CM

## 2020-10-23 DIAGNOSIS — Z79.4 TYPE 2 DIABETES MELLITUS WITH OTHER NEUROLOGIC COMPLICATION, WITH LONG-TERM CURRENT USE OF INSULIN (HCC): Primary | ICD-10-CM

## 2020-10-23 DIAGNOSIS — M1A.49X0 OTHER SECONDARY CHRONIC GOUT OF MULTIPLE SITES WITHOUT TOPHUS: ICD-10-CM

## 2020-10-23 PROBLEM — R06.00 DYSPNEA, UNSPECIFIED TYPE: Status: RESOLVED | Noted: 2017-12-05 | Resolved: 2020-10-23

## 2020-10-23 PROCEDURE — 3078F DIAST BP <80 MM HG: CPT | Performed by: INTERNAL MEDICINE

## 2020-10-23 PROCEDURE — 99214 OFFICE O/P EST MOD 30 MIN: CPT | Performed by: INTERNAL MEDICINE

## 2020-10-23 PROCEDURE — 3077F SYST BP >= 140 MM HG: CPT | Performed by: INTERNAL MEDICINE

## 2020-10-23 RX ORDER — PEN NEEDLE, DIABETIC 29 G X1/2"
NEEDLE, DISPOSABLE MISCELLANEOUS
Qty: 180 EACH | Refills: 3 | Status: SHIPPED | OUTPATIENT
Start: 2020-10-23 | End: 2021-02-11 | Stop reason: CLARIF

## 2020-10-23 RX ORDER — GABAPENTIN 400 MG/1
1 CAPSULE ORAL DAILY
COMMUNITY
Start: 2020-09-17 | End: 2021-01-07

## 2020-10-23 RX ORDER — PREGABALIN 165 MG/1
1 TABLET, FILM COATED, EXTENDED RELEASE ORAL AS NEEDED
COMMUNITY
End: 2020-10-23

## 2020-10-23 RX ORDER — ONDANSETRON 4 MG/1
4 TABLET, ORALLY DISINTEGRATING ORAL EVERY 6 HOURS PRN
COMMUNITY
End: 2021-02-11

## 2020-10-24 NOTE — PROGRESS NOTES
HPI:    Patient ID: Keke Barker is a 49year old female presents for follow-up of multiple medical conditions    HPI  Patient is here accompanied by her daughter who is helping to translate to the patient, patient seems understands [place] but does not anemia. Very low ferritin, hemoglobin 10.1.     Review of Systems       Constitutional:  Negative for decreased activity, fever, irritability and lethargy  Cardiovascular:  Negative for chest pain and irregular heartbeat/palpitations  Respiratory:  Negativ ELLIPTA) 100-25 MCG/INH Inhalation Aerosol Powder, Breath Activated Inhale 1 puff into the lungs daily. 1 each 0   • Cholecalciferol (VITAMIN D3) 2000 units Oral Tab Take 1 tablet by mouth Noon. • aspirin 81 MG Oral Tab EC Take 81 mg by mouth daily. normal mood and affect.  Her behavior is normal. Judgment normal.     Spent about 45 minutes reviewing all patient problems         ASSESSMENT/PLAN:   Type 2 diabetes mellitus with other neurologic complication, with long-term current use of insulin (hcc) ANNA INSULIN PEN NEEDLES) 29G X 12MM Does not apply Misc 180 each 3     Sig: Use  insulin pen needle twice a day       Imaging & Referrals:  ENDOCRINOLOGY - INTERNAL  OPHTHALMOLOGY - INTERNAL  ENT - INTERNAL  GASTRO - INTERNAL         WN#[id number]

## 2020-10-26 ENCOUNTER — LAB ENCOUNTER (OUTPATIENT)
Dept: LAB | Age: 55
End: 2020-10-26
Attending: INTERNAL MEDICINE
Payer: MEDICAID

## 2020-10-26 DIAGNOSIS — E11.49 TYPE 2 DIABETES MELLITUS WITH OTHER NEUROLOGIC COMPLICATION, WITH LONG-TERM CURRENT USE OF INSULIN (HCC): ICD-10-CM

## 2020-10-26 DIAGNOSIS — I10 ESSENTIAL HYPERTENSION: ICD-10-CM

## 2020-10-26 DIAGNOSIS — M25.50 ARTHRALGIA, UNSPECIFIED JOINT: ICD-10-CM

## 2020-10-26 DIAGNOSIS — D50.8 OTHER IRON DEFICIENCY ANEMIA: ICD-10-CM

## 2020-10-26 DIAGNOSIS — Z79.4 TYPE 2 DIABETES MELLITUS WITH OTHER NEUROLOGIC COMPLICATION, WITH LONG-TERM CURRENT USE OF INSULIN (HCC): ICD-10-CM

## 2020-10-26 PROCEDURE — 83540 ASSAY OF IRON: CPT

## 2020-10-26 PROCEDURE — 84443 ASSAY THYROID STIM HORMONE: CPT

## 2020-10-26 PROCEDURE — 82550 ASSAY OF CK (CPK): CPT

## 2020-10-26 PROCEDURE — 36415 COLL VENOUS BLD VENIPUNCTURE: CPT

## 2020-10-26 PROCEDURE — 84466 ASSAY OF TRANSFERRIN: CPT

## 2020-10-26 PROCEDURE — 85025 COMPLETE CBC W/AUTO DIFF WBC: CPT

## 2020-10-26 PROCEDURE — 85652 RBC SED RATE AUTOMATED: CPT

## 2020-10-26 PROCEDURE — 86140 C-REACTIVE PROTEIN: CPT

## 2020-10-26 PROCEDURE — 80061 LIPID PANEL: CPT

## 2020-10-26 PROCEDURE — 82728 ASSAY OF FERRITIN: CPT

## 2020-10-26 PROCEDURE — 83036 HEMOGLOBIN GLYCOSYLATED A1C: CPT

## 2020-10-26 PROCEDURE — 84550 ASSAY OF BLOOD/URIC ACID: CPT

## 2020-10-28 ENCOUNTER — OFFICE VISIT (OUTPATIENT)
Dept: OTOLARYNGOLOGY | Facility: CLINIC | Age: 55
End: 2020-10-28
Payer: MEDICAID

## 2020-10-28 VITALS
HEIGHT: 64 IN | WEIGHT: 198 LBS | SYSTOLIC BLOOD PRESSURE: 138 MMHG | DIASTOLIC BLOOD PRESSURE: 68 MMHG | TEMPERATURE: 100 F | BODY MASS INDEX: 33.8 KG/M2

## 2020-10-28 DIAGNOSIS — M54.2 CERVICALGIA: Primary | ICD-10-CM

## 2020-10-28 DIAGNOSIS — R13.10 DYSPHAGIA, UNSPECIFIED TYPE: ICD-10-CM

## 2020-10-28 PROCEDURE — 3075F SYST BP GE 130 - 139MM HG: CPT | Performed by: OTOLARYNGOLOGY

## 2020-10-28 PROCEDURE — 3008F BODY MASS INDEX DOCD: CPT | Performed by: OTOLARYNGOLOGY

## 2020-10-28 PROCEDURE — 99214 OFFICE O/P EST MOD 30 MIN: CPT | Performed by: OTOLARYNGOLOGY

## 2020-10-28 PROCEDURE — 3078F DIAST BP <80 MM HG: CPT | Performed by: OTOLARYNGOLOGY

## 2020-10-28 NOTE — PROGRESS NOTES
Keke Barker is a 49year old female.   Patient presents with:  Throat Problem: Patient Presents with: Constant cough, throat feels tight, lost of smell, taste comes and goes for 1 year       HISTORY OF PRESENT ILLNESS  8/12/2020  Patient presents for he Diverticulitis 5/15   • Esophageal reflux    • Essential hypertension    • Gout    • Hepatic steatosis    • Hepatomegaly    • High blood pressure    • High cholesterol    • Hyperlipidemia    • Thyroid disease        Past Surgical History:   Procedure Later Left: Normal. TM - Right: Normal, Left: Normal.   Skin Normal Inspection - Normal.        Lymph Detail Normal Submental. Submandibular. Anterior cervical. Posterior cervical. Supraclavicular.         Nose/Mouth/Throat Normal External nose - Normal. Lips/jared 20 mg by mouth nightly., Disp: , Rfl:   •  omeprazole 20 MG Oral Capsule Delayed Release, Take 40 mg by mouth every morning., Disp: , Rfl:   •  Albuterol Sulfate  (90 Base) MCG/ACT Inhalation Aero Soln, Inhale 2 puffs into the lungs every 6 (six) ho

## 2020-10-29 ENCOUNTER — TELEPHONE (OUTPATIENT)
Dept: OTOLARYNGOLOGY | Facility: CLINIC | Age: 55
End: 2020-10-29

## 2020-11-02 ENCOUNTER — OFFICE VISIT (OUTPATIENT)
Dept: RHEUMATOLOGY | Facility: CLINIC | Age: 55
End: 2020-11-02
Payer: MEDICAID

## 2020-11-02 VITALS
SYSTOLIC BLOOD PRESSURE: 138 MMHG | WEIGHT: 198 LBS | HEIGHT: 64 IN | HEART RATE: 102 BPM | DIASTOLIC BLOOD PRESSURE: 75 MMHG | BODY MASS INDEX: 33.8 KG/M2

## 2020-11-02 DIAGNOSIS — G56.02 CARPAL TUNNEL SYNDROME OF LEFT WRIST: Primary | ICD-10-CM

## 2020-11-02 PROCEDURE — 20526 THER INJECTION CARP TUNNEL: CPT | Performed by: INTERNAL MEDICINE

## 2020-11-02 PROCEDURE — 3008F BODY MASS INDEX DOCD: CPT | Performed by: INTERNAL MEDICINE

## 2020-11-02 PROCEDURE — 3075F SYST BP GE 130 - 139MM HG: CPT | Performed by: INTERNAL MEDICINE

## 2020-11-02 PROCEDURE — 3078F DIAST BP <80 MM HG: CPT | Performed by: INTERNAL MEDICINE

## 2020-11-02 PROCEDURE — 99213 OFFICE O/P EST LOW 20 MIN: CPT | Performed by: INTERNAL MEDICINE

## 2020-11-02 RX ORDER — METHYLPREDNISOLONE ACETATE 80 MG/ML
40 INJECTION, SUSPENSION INTRA-ARTICULAR; INTRALESIONAL; INTRAMUSCULAR; SOFT TISSUE ONCE
Status: COMPLETED | OUTPATIENT
Start: 2020-11-02 | End: 2020-11-02

## 2020-11-02 RX ADMIN — METHYLPREDNISOLONE ACETATE 40 MG: 80 INJECTION, SUSPENSION INTRA-ARTICULAR; INTRALESIONAL; INTRAMUSCULAR; SOFT TISSUE at 15:20:00

## 2020-11-02 NOTE — PROCEDURES
With  consent, I injected the patient's  L carpal tunnel with 0.5ml lidocaine  1% and 0.5ml Depomedrol 80. It was under sterile technique using iodine and alcohol swabs and ethyl chloride was used as an anaesthetic spray. Pt.  tolerated it well.

## 2020-11-02 NOTE — PATIENT INSTRUCTIONS
You were seen for pain in your left wrist due to carpal tunnel symptoms  We injected your left wrist with cortisone, hopefully that will help  Would recommend to use the wrist splint at night,  Ttake meloxicam as needed

## 2020-11-02 NOTE — PROGRESS NOTES
Kendrick Rodríguez is a 49year old female. HPI:   Patient presents with: Follow - Up  Hand Pain: Left hand pain      I had the pleasure of seeing Kendrick Rodríguez on 7/10/2020 for follow up R hand n/t likely from CTS.      Current Medications:  Meloxicam 7 Hepatomegaly    • High blood pressure    • High cholesterol    • Hyperlipidemia    • Thyroid disease       Social Hx Reviewed   Family Hx Reviewed     Medications (Active prior to today's visit):  Current Outpatient Medications   Medication Sig Dispense Re daily. 1 each 0   • Cholecalciferol (VITAMIN D3) 2000 units Oral Tab Take 1 tablet by mouth Noon. • aspirin 81 MG Oral Tab EC Take 81 mg by mouth daily.      • TraZODone HCl 100 MG Oral Tab TK 1 T PO D HS PRN  0     .cmed  Allergies:  No Known Allergies synovitis. 2. Otherwise no evidence of an inflammatory arthritis involving the right hand or wrist.  3. Triangular fibrocartilage complex is slightly heterogeneous in echotexture, which could indicate underlying chronic degeneration.   4. Median nerve appe

## 2020-11-04 RX ORDER — MELOXICAM 7.5 MG/1
7.5 TABLET ORAL 2 TIMES DAILY
Qty: 60 TABLET | Refills: 0 | Status: SHIPPED | OUTPATIENT
Start: 2020-11-04 | End: 2020-12-07

## 2020-11-09 ENCOUNTER — TELEPHONE (OUTPATIENT)
Dept: OTOLARYNGOLOGY | Facility: CLINIC | Age: 55
End: 2020-11-09

## 2020-11-09 NOTE — TELEPHONE ENCOUNTER
Dr. Les Munoz, please advise what you'd like to order. CT sinus was denied in August 2020, patient's daughter thought you had ordered it again. Per OV note, Dr. Les Munoz recommending laryngoscopy in office and also requires PA.      OV note 10/28/20,   1.  Nile Banegas
Per Dr. Denise Romero,    If laryngoscopy was approved schedule office visit for laryngoscopy with covid test 3 days before     **ENT staff, please start PA for laryngoscopy.
Please see encounter on 11/9/20.
Pts joellen states that prior Donnie Pin is needed from [facility], for pt. to get Ultrasound test done of the neck.
no

## 2020-11-11 NOTE — TELEPHONE ENCOUNTER
LMTCB to inform patient prior auth for scope in the office is approved [id number] valid from 11/9/20 thru 5/8/2021,please advise pt need for COVID test 3 days prior to appt date. ,please test need to be order when pt is scheduled.

## 2020-11-18 ENCOUNTER — OFFICE VISIT (OUTPATIENT)
Dept: ENDOCRINOLOGY CLINIC | Facility: CLINIC | Age: 55
End: 2020-11-18
Payer: MEDICAID

## 2020-11-18 VITALS
SYSTOLIC BLOOD PRESSURE: 125 MMHG | HEIGHT: 64 IN | HEART RATE: 109 BPM | DIASTOLIC BLOOD PRESSURE: 70 MMHG | WEIGHT: 193.38 LBS | BODY MASS INDEX: 33.02 KG/M2

## 2020-11-18 DIAGNOSIS — E11.40 TYPE 2 DIABETES MELLITUS WITH DIABETIC NEUROPATHY, WITH LONG-TERM CURRENT USE OF INSULIN (HCC): Primary | ICD-10-CM

## 2020-11-18 DIAGNOSIS — Z79.4 TYPE 2 DIABETES MELLITUS WITH DIABETIC NEUROPATHY, WITH LONG-TERM CURRENT USE OF INSULIN (HCC): Primary | ICD-10-CM

## 2020-11-18 PROCEDURE — 36416 COLLJ CAPILLARY BLOOD SPEC: CPT | Performed by: INTERNAL MEDICINE

## 2020-11-18 PROCEDURE — 3078F DIAST BP <80 MM HG: CPT | Performed by: INTERNAL MEDICINE

## 2020-11-18 PROCEDURE — 3008F BODY MASS INDEX DOCD: CPT | Performed by: INTERNAL MEDICINE

## 2020-11-18 PROCEDURE — 82947 ASSAY GLUCOSE BLOOD QUANT: CPT | Performed by: INTERNAL MEDICINE

## 2020-11-18 PROCEDURE — 3074F SYST BP LT 130 MM HG: CPT | Performed by: INTERNAL MEDICINE

## 2020-11-18 PROCEDURE — 99243 OFF/OP CNSLTJ NEW/EST LOW 30: CPT | Performed by: INTERNAL MEDICINE

## 2020-11-18 RX ORDER — INSULIN GLARGINE 100 [IU]/ML
40 INJECTION, SOLUTION SUBCUTANEOUS EVERY MORNING
Qty: 36 ML | Refills: 1 | Status: SHIPPED | OUTPATIENT
Start: 2020-11-18 | End: 2021-03-15

## 2020-11-18 RX ORDER — PEN NEEDLE, DIABETIC 30 GX3/16"
100 NEEDLE, DISPOSABLE MISCELLANEOUS EVERY MORNING
Qty: 100 EACH | Refills: 1 | Status: SHIPPED | OUTPATIENT
Start: 2020-11-18 | End: 2021-02-11 | Stop reason: CLARIF

## 2020-11-18 RX ORDER — DULAGLUTIDE 0.75 MG/.5ML
0.75 INJECTION, SOLUTION SUBCUTANEOUS WEEKLY
Qty: 2 ML | Refills: 0 | Status: SHIPPED | OUTPATIENT
Start: 2020-11-18 | End: 2020-11-19

## 2020-11-18 RX ORDER — CANAGLIFLOZIN 100 MG/1
100 TABLET, FILM COATED ORAL
Qty: 30 TABLET | Refills: 0 | Status: SHIPPED | OUTPATIENT
Start: 2020-11-18 | End: 2020-12-15

## 2020-11-18 NOTE — H&P
Name: Ryan Pate  Date: 11/18/2020    Referring Physician: Lary Velasquez    HISTORY OF PRESENT ILLNESS   Ryan Pate is a 49year old female who presents for evaluation and management of uncontrolled T2D.  She was diagnosed about 4 years ago, based 5  •  allopurinol 100 MG Oral Tab, Take 1 tablet (100 mg total) by mouth nightly., Disp: 30 tablet, Rfl: 5  •  Levothyroxine Sodium 125 MCG Oral Tab, Take 1 tablet (125 mcg total) by mouth every morning., Disp: 90 tablet, Rfl: 3  •  Sertraline HCl 100 MG O thyroid    • Diverticulitis 5/15   • Esophageal reflux    • Essential hypertension    • Gout    • Hepatic steatosis    • Hepatomegaly    • High blood pressure    • High cholesterol    • Hyperlipidemia    • Thyroid disease        Surgical history:   Past Khan  (L) 07/24/2020    K 4.5 07/24/2020     07/24/2020    CO2 29.0 07/24/2020     Lab Results   Component Value Date    CHOLEST 198 10/26/2020    TRIG 235 (H) 10/26/2020    HDL 29 (L) 10/26/2020     (H) 10/26/2020    VLDL 47 (H) 10/26/202

## 2020-11-19 ENCOUNTER — TELEPHONE (OUTPATIENT)
Dept: ENDOCRINOLOGY CLINIC | Facility: CLINIC | Age: 55
End: 2020-11-19

## 2020-11-19 RX ORDER — LIRAGLUTIDE 6 MG/ML
INJECTION SUBCUTANEOUS
Qty: 3 PEN | Refills: 1 | Status: SHIPPED | OUTPATIENT
Start: 2020-11-19 | End: 2021-01-02

## 2020-11-19 RX ORDER — PEN NEEDLE, DIABETIC 30 GX3/16"
1 NEEDLE, DISPOSABLE MISCELLANEOUS DAILY
Qty: 90 EACH | Refills: 0 | Status: SHIPPED | OUTPATIENT
Start: 2020-11-19 | End: 2021-02-10

## 2020-11-19 NOTE — TELEPHONE ENCOUNTER
•  Dulaglutide (TRULICITY) 1.70 LUKASZ/4.3KM Subcutaneous Solution Pen-injector, Inject 0.75 mg into the skin once a week., Disp: 2 mL, Rfl: 0      Key: [id number]

## 2020-11-19 NOTE — TELEPHONE ENCOUNTER
Dr. Amber Wylie,     Patient has Medicaid replacement. This insurance require patients to try and fail on both of their preferred drugs for at least 2 weeks. They prefer victoza and byetta. Please advise if ok to switch to victoza.  Chart does not show sh

## 2020-11-20 ENCOUNTER — TELEPHONE (OUTPATIENT)
Dept: ENDOCRINOLOGY CLINIC | Facility: CLINIC | Age: 55
End: 2020-11-20

## 2020-11-23 RX ORDER — BLOOD-GLUCOSE METER
EACH MISCELLANEOUS
Qty: 1 KIT | Refills: 0 | Status: SHIPPED | OUTPATIENT
Start: 2020-11-23 | End: 2021-02-11 | Stop reason: CLARIF

## 2020-11-23 NOTE — TELEPHONE ENCOUNTER
Spoke with Brennan Fuentes (daughter in law on [facility] scanned under media tab) and states they only need the glucometer as they have the lancets and strips. She confirmed patient has onetouch ultra. RN sent script for new meter.

## 2020-11-24 ENCOUNTER — HOSPITAL ENCOUNTER (EMERGENCY)
Facility: HOSPITAL | Age: 55
Discharge: HOME | End: 2020-11-24
Attending: EMERGENCY MEDICINE
Payer: MEDICAID

## 2020-11-24 ENCOUNTER — NURSE TRIAGE (OUTPATIENT)
Dept: INTERNAL MEDICINE CLINIC | Facility: CLINIC | Age: 55
End: 2020-11-24

## 2020-11-24 ENCOUNTER — APPOINTMENT (OUTPATIENT)
Dept: CT IMAGING | Facility: HOSPITAL | Age: 55
End: 2020-11-24
Attending: EMERGENCY MEDICINE
Payer: MEDICAID

## 2020-11-24 VITALS
OXYGEN SATURATION: 95 % | BODY MASS INDEX: 32.37 KG/M2 | SYSTOLIC BLOOD PRESSURE: 114 MMHG | HEIGHT: 64 IN | RESPIRATION RATE: 15 BRPM | TEMPERATURE: 98 F | WEIGHT: 189.63 LBS | HEART RATE: 98 BPM | DIASTOLIC BLOOD PRESSURE: 79 MMHG

## 2020-11-24 DIAGNOSIS — R11.2 NON-INTRACTABLE VOMITING WITH NAUSEA, UNSPECIFIED VOMITING TYPE: ICD-10-CM

## 2020-11-24 DIAGNOSIS — R10.13 EPIGASTRIC PAIN: ICD-10-CM

## 2020-11-24 DIAGNOSIS — N17.9 AKI (ACUTE KIDNEY INJURY) (HCC): Primary | ICD-10-CM

## 2020-11-24 PROCEDURE — 99284 EMERGENCY DEPT VISIT MOD MDM: CPT

## 2020-11-24 PROCEDURE — 93010 ELECTROCARDIOGRAM REPORT: CPT | Performed by: EMERGENCY MEDICINE

## 2020-11-24 PROCEDURE — 85025 COMPLETE CBC W/AUTO DIFF WBC: CPT | Performed by: EMERGENCY MEDICINE

## 2020-11-24 PROCEDURE — 83690 ASSAY OF LIPASE: CPT | Performed by: EMERGENCY MEDICINE

## 2020-11-24 PROCEDURE — 74176 CT ABD & PELVIS W/O CONTRAST: CPT | Performed by: EMERGENCY MEDICINE

## 2020-11-24 PROCEDURE — 96375 TX/PRO/DX INJ NEW DRUG ADDON: CPT

## 2020-11-24 PROCEDURE — 84484 ASSAY OF TROPONIN QUANT: CPT | Performed by: EMERGENCY MEDICINE

## 2020-11-24 PROCEDURE — 96361 HYDRATE IV INFUSION ADD-ON: CPT

## 2020-11-24 PROCEDURE — 80048 BASIC METABOLIC PNL TOTAL CA: CPT | Performed by: EMERGENCY MEDICINE

## 2020-11-24 PROCEDURE — 96374 THER/PROPH/DIAG INJ IV PUSH: CPT

## 2020-11-24 PROCEDURE — 93005 ELECTROCARDIOGRAM TRACING: CPT

## 2020-11-24 PROCEDURE — 80076 HEPATIC FUNCTION PANEL: CPT | Performed by: EMERGENCY MEDICINE

## 2020-11-24 RX ORDER — PANTOPRAZOLE SODIUM 20 MG/1
20 TABLET, DELAYED RELEASE ORAL DAILY
Qty: 30 TABLET | Refills: 0 | Status: SHIPPED | OUTPATIENT
Start: 2020-11-24 | End: 2020-12-24

## 2020-11-24 RX ORDER — MORPHINE SULFATE 4 MG/ML
2 INJECTION, SOLUTION INTRAMUSCULAR; INTRAVENOUS ONCE
Status: COMPLETED | OUTPATIENT
Start: 2020-11-24 | End: 2020-11-24

## 2020-11-24 RX ORDER — ONDANSETRON 4 MG/1
4 TABLET, ORALLY DISINTEGRATING ORAL EVERY 4 HOURS PRN
Qty: 10 TABLET | Refills: 0 | Status: SHIPPED | OUTPATIENT
Start: 2020-11-24 | End: 2020-12-01

## 2020-11-24 RX ORDER — ONDANSETRON 2 MG/ML
4 INJECTION INTRAMUSCULAR; INTRAVENOUS ONCE
Status: COMPLETED | OUTPATIENT
Start: 2020-11-24 | End: 2020-11-24

## 2020-11-24 NOTE — ED PROVIDER NOTES
Patient Seen in: [facility] AND [facility] Emergency Department      History   Patient presents with:  Abdomen/Flank Pain    Stated Complaint: abd pain    HPI    History is provided by patient's daughter.     52-year-old female with history of diabetes, hypertens negative. Positive for stated complaint: abd pain  Other systems are as noted in HPI. Constitutional and vital signs reviewed. All other systems reviewed and negative except as noted above.     Physical Exam     ED Triage Vitals [11/24/20 (041) [id number] 11/24/20  4:46 PM   Result Value Ref Range    Hold Lavender Auto Resulted    RAINBOW DRAW LIGHT GREEN    Collection Time: 11/24/20  4:46 PM   Result Value Ref Range    Hold Lt Green Auto Resulted    RAINBOW DRAW GOLD    Collection Time: 11/24/20  4:46 PM Lymphocyte Absolute 4.53 (H) 1.00 - 4.00 x10(3) uL    Monocyte Absolute 0.78 0.10 - 1.00 x10(3) uL    Eosinophil Absolute 0.18 0.00 - 0.70 x10(3) uL    Basophil Absolute 0.06 0.00 - 0.20 x10(3) uL    Immature Granulocyte Absolute 0.03 0.00 - 1.00 x10(3) uL evaluation.     - pt  comfortable with d/c at this time, will d/c pt home now with Rx for zofran, protonix, pt to f/u with  in 2 days or return to ED sooner if symptoms worsen including fevers, chills, vomiting, pt expresses understanding and agree

## 2020-11-24 NOTE — ED INITIAL ASSESSMENT (HPI)
Brought by [address] [facility] for epigastric pain x 4 days with vomiting PTA. Per pt's daughter she hasn't had CP, diarrhea, fevers, or SOB.

## 2020-11-24 NOTE — TELEPHONE ENCOUNTER
Dr Geno Valverde, please advise. Per protocol, advised ER; Daughter asked to consult you. 911 was called last night, they checked her, she did not go to ER last night. Please call jose Grimm at [phone], patient is there with her.     Jose Grimm,

## 2020-11-25 NOTE — ED NOTES
discharge instructions given to pt and daughter, pt verbalized understanding. all questions answered.

## 2020-11-25 NOTE — TELEPHONE ENCOUNTER
Disposition       Discharge        ED/IC AVS (Printed 11/24/2020)        Follow-Ups: Schedule an appointment with Christian Ayala MD (Internal Medicine) in 2 days (11/26/2020);  As needed

## 2020-11-27 NOTE — TELEPHONE ENCOUNTER
Vee Dietrich  Em [facility] 32 minutes ago (6:51 AM)     FYI:patient has an appointment already.     Message text

## 2020-12-02 ENCOUNTER — TELEMEDICINE (OUTPATIENT)
Dept: INTERNAL MEDICINE CLINIC | Facility: CLINIC | Age: 55
End: 2020-12-02
Payer: MEDICAID

## 2020-12-02 ENCOUNTER — HOSPITAL ENCOUNTER (OUTPATIENT)
Dept: MAMMOGRAPHY | Age: 55
Discharge: HOME | End: 2020-12-02
Attending: INTERNAL MEDICINE
Payer: MEDICAID

## 2020-12-02 ENCOUNTER — HOSPITAL ENCOUNTER (OUTPATIENT)
Age: 55
Discharge: HOME | End: 2020-12-02
Payer: MEDICAID

## 2020-12-02 VITALS
SYSTOLIC BLOOD PRESSURE: 129 MMHG | TEMPERATURE: 98 F | OXYGEN SATURATION: 96 % | HEART RATE: 97 BPM | RESPIRATION RATE: 18 BRPM | DIASTOLIC BLOOD PRESSURE: 83 MMHG

## 2020-12-02 DIAGNOSIS — Z79.4 TYPE 2 DIABETES MELLITUS WITH OTHER NEUROLOGIC COMPLICATION, WITH LONG-TERM CURRENT USE OF INSULIN (HCC): ICD-10-CM

## 2020-12-02 DIAGNOSIS — N18.32 STAGE 3B CHRONIC KIDNEY DISEASE (HCC): Primary | ICD-10-CM

## 2020-12-02 DIAGNOSIS — Z12.31 VISIT FOR SCREENING MAMMOGRAM: ICD-10-CM

## 2020-12-02 DIAGNOSIS — I10 ESSENTIAL HYPERTENSION: ICD-10-CM

## 2020-12-02 DIAGNOSIS — E11.49 TYPE 2 DIABETES MELLITUS WITH OTHER NEUROLOGIC COMPLICATION, WITH LONG-TERM CURRENT USE OF INSULIN (HCC): ICD-10-CM

## 2020-12-02 DIAGNOSIS — D50.0 IRON DEFICIENCY ANEMIA DUE TO CHRONIC BLOOD LOSS: ICD-10-CM

## 2020-12-02 DIAGNOSIS — Z20.822 ENCOUNTER FOR LABORATORY TESTING FOR COVID-19 VIRUS: Primary | ICD-10-CM

## 2020-12-02 PROCEDURE — 77063 BREAST TOMOSYNTHESIS BI: CPT | Performed by: INTERNAL MEDICINE

## 2020-12-02 PROCEDURE — 99212 OFFICE O/P EST SF 10 MIN: CPT

## 2020-12-02 PROCEDURE — 77067 SCR MAMMO BI INCL CAD: CPT | Performed by: INTERNAL MEDICINE

## 2020-12-02 PROCEDURE — 99214 OFFICE O/P EST MOD 30 MIN: CPT | Performed by: INTERNAL MEDICINE

## 2020-12-02 PROCEDURE — 99213 OFFICE O/P EST LOW 20 MIN: CPT

## 2020-12-02 NOTE — ED INITIAL ASSESSMENT (HPI)
PATIENT ARRIVED AMBULATORY TO ROOM FOR COVID TESTING. PATIENT DENIES SYMPTOMS. PATIENT ALERT AND ORIENTED X3. EASY NON LABORED RESPIRATIONS.  NO DISTRESS

## 2020-12-02 NOTE — ED PROVIDER NOTES
Patient Seen in: [facility]      History   Patient presents with:  Testing    Stated Complaint: covid test    Kari Jain is a 49year-old female presenting for COVID testing.  Patient was at the clinic already for a mammogram and stopped covid test  Other systems are as noted in HPI. Constitutional and vital signs reviewed. All other systems reviewed and negative except as noted above.     Physical Exam     ED Triage Vitals [12/02/20 1554]   /83   Pulse 97   Resp 18   Temp 98.3 BY PCR(RUST)             [facility]   49year-old female presenting for COVID testing; here at the clinic for mammogram and wants testing. No complaints. Patient is asymptomatic. Unsure of positive exposure. COVID PCR obtained. Stable for discharge.

## 2020-12-04 NOTE — PROGRESS NOTES
HPI:    Patient ID: Geosusi Landis is a 49year old female.   Presents for follow-up after recent evaluation in the emergency room    HPI  Patient suffered abdominal pain which was severe, was seen in the emergency room, she had several episodes of vomitin rash  Neurological:  Negative for gait disturbance, paresthesias.    Psychiatric:  Negative for inappropriate interaction and psychiatric symptoms       Current Outpatient Medications   Medication Sig Dispense Refill   • Pantoprazole Sodium 20 MG Oral Tab E MG Oral Tab Take 1 tablet (100 mg total) by mouth daily.  90 tablet 1   • Lisinopril-hydroCHLOROthiazide 20-25 MG Oral Tab Take 1 tablet p.o. twice a day 60 tablet 5   • insulin glargine 100 UNIT/ML Subcutaneous Solution Pen-injector 40 units subcutaneously Essential hypertension controlled today  Plan: *Morning monitor blood pressure and report if stays consistently elevated will need medications adjustment  Follow-up in 1 month    (E11.49,  Z79.4) Type 2 diabetes mellitus with other neurologic complication,

## 2020-12-07 RX ORDER — MELOXICAM 7.5 MG/1
7.5 TABLET ORAL 2 TIMES DAILY
Qty: 60 TABLET | Refills: 0 | Status: SHIPPED | OUTPATIENT
Start: 2020-12-07 | End: 2021-01-11

## 2020-12-07 NOTE — TELEPHONE ENCOUNTER
Requested Prescriptions     Pending Prescriptions Disp Refills   • Meloxicam 7.5 MG Oral Tab 60 tablet 0     Sig: Take 1 tablet (7.5 mg total) by mouth 2 (two) times a day.      LF: 11/4/20 #60 TAB W/ 0 RF  LOV: 11/2/20   Future Appointments   Date Time Pro AMERICAN      >=60 44 (L)       ASSESSMENT/PLAN:      B/L hand pain with n/t likely secondary to CTS  - She reports numbness and tingling in her hands especially in the morning. On examination there is no evidence of synovitis.   She was found to have a sl

## 2020-12-15 DIAGNOSIS — E11.40 TYPE 2 DIABETES MELLITUS WITH DIABETIC NEUROPATHY, WITH LONG-TERM CURRENT USE OF INSULIN (HCC): ICD-10-CM

## 2020-12-15 DIAGNOSIS — Z79.4 TYPE 2 DIABETES MELLITUS WITH DIABETIC NEUROPATHY, WITH LONG-TERM CURRENT USE OF INSULIN (HCC): ICD-10-CM

## 2020-12-15 RX ORDER — CANAGLIFLOZIN 100 MG/1
TABLET, FILM COATED ORAL
Qty: 90 TABLET | Refills: 0 | Status: SHIPPED | OUTPATIENT
Start: 2020-12-15 | End: 2021-02-11

## 2020-12-15 NOTE — TELEPHONE ENCOUNTER
LOV 11/18/20 RTC 4 weeks  No F/U     Pended refill for provider    [facility] reminder sent

## 2020-12-30 ENCOUNTER — OFFICE VISIT (OUTPATIENT)
Dept: GASTROENTEROLOGY | Facility: CLINIC | Age: 55
End: 2020-12-30
Payer: MEDICAID

## 2020-12-30 ENCOUNTER — TELEPHONE (OUTPATIENT)
Dept: GASTROENTEROLOGY | Facility: CLINIC | Age: 55
End: 2020-12-30

## 2020-12-30 VITALS
BODY MASS INDEX: 32.44 KG/M2 | WEIGHT: 190 LBS | DIASTOLIC BLOOD PRESSURE: 80 MMHG | HEIGHT: 64 IN | SYSTOLIC BLOOD PRESSURE: 128 MMHG

## 2020-12-30 DIAGNOSIS — K59.00 CONSTIPATION, UNSPECIFIED CONSTIPATION TYPE: ICD-10-CM

## 2020-12-30 DIAGNOSIS — R10.10 PAIN OF UPPER ABDOMEN: ICD-10-CM

## 2020-12-30 DIAGNOSIS — D50.0 ANEMIA DUE TO CHRONIC BLOOD LOSS: Primary | ICD-10-CM

## 2020-12-30 DIAGNOSIS — D50.0 IRON DEFICIENCY ANEMIA DUE TO CHRONIC BLOOD LOSS: ICD-10-CM

## 2020-12-30 DIAGNOSIS — R63.4 WEIGHT LOSS: Primary | ICD-10-CM

## 2020-12-30 DIAGNOSIS — R63.4 WEIGHT LOSS: ICD-10-CM

## 2020-12-30 DIAGNOSIS — R10.10 UPPER ABDOMINAL PAIN: ICD-10-CM

## 2020-12-30 PROCEDURE — 3008F BODY MASS INDEX DOCD: CPT | Performed by: INTERNAL MEDICINE

## 2020-12-30 PROCEDURE — 99244 OFF/OP CNSLTJ NEW/EST MOD 40: CPT | Performed by: INTERNAL MEDICINE

## 2020-12-30 PROCEDURE — 3074F SYST BP LT 130 MM HG: CPT | Performed by: INTERNAL MEDICINE

## 2020-12-30 PROCEDURE — 3079F DIAST BP 80-89 MM HG: CPT | Performed by: INTERNAL MEDICINE

## 2020-12-30 NOTE — TELEPHONE ENCOUNTER
Dr. Veto Warren-    Please advise on all diabetic (oral & insulin) adjustment orders based on the following diet modifications prior to procedure(s):    Day before the procedure(s), patient will be on clear liquid diet only after breakfast.    Patient is schedu

## 2020-12-30 NOTE — TELEPHONE ENCOUNTER
Please send request about insulin management to endocrinologist Dr. Cristel Monae she is currently managing patient diabetic medications

## 2020-12-30 NOTE — PATIENT INSTRUCTIONS
+ anemia  + weight loss  + constipation  + upper abdominal pain    Recommend:  - Omeprazole 20 mg daily  - colace daily   - stop meloxicam  - EGD/colonoscopy for above symptoms with MAC sedation  -Prep: Split dose Colyte or equivalent  -Anti-platelets and

## 2020-12-30 NOTE — TELEPHONE ENCOUNTER
Dr. Isauro High-     Please advise on all diabetic (oral & insulin) adjustment orders based on the following diet modifications prior to procedure(s):     Day before the procedure(s), patient will be on clear liquid diet only after breakfast.     Patient is

## 2020-12-30 NOTE — H&P
[address] [facility] [address] Gastroenterology                                                                                                  Clinic History and Physical     Pa COLONOSCOPY N/A 3/29/2017    Performed by Mio Lawrence MD at [facility] ENDOSCOPY   • ESOPHAGOGASTRODUODENOSCOPY (EGD) N/A 3/29/2017    Performed by Mio Lawrence MD at [facility] ENDOSCOPY      Family Hx:   Family History   Problem Relation Age of Onset   • Tablet 24 Hr Take 1 tablet (100 mg total) by mouth 2 (two) times daily. 60 tablet 5   • allopurinol 100 MG Oral Tab Take 1 tablet (100 mg total) by mouth nightly.  30 tablet 5   • Levothyroxine Sodium 125 MCG Oral Tab Take 1 tablet (125 mcg total) by mouth and heat intolerance  MUSCULOSKELETAL:  negative for joint effusion/severe erythema  BEHAVIOR/PSYCH:  negative for psychotic behavior      PHYSICAL EXAM:   Blood pressure 128/80, height 5' 4\" (1.626 m), weight 190 lb (86.2 kg), not currently breastfeeding meloxicam  - EGD/colonoscopy for above symptoms with MAC sedation  -Prep: Split dose Colyte or equivalent  -Anti-platelets and anti-coagulants: ok to continue ASA 81 mg  -Diabetes meds: Hold metformin day of procedure and day before procedure.  If patient i

## 2020-12-30 NOTE — TELEPHONE ENCOUNTER
DARRYL RN's    Patient is scheduled next Friday, 1/8/2021 for procedures. Can you please obtain diabetic medication orders from Dr. Yaniv Jasso. Thank you!

## 2020-12-30 NOTE — H&P (VIEW-ONLY)
[address] [facility] [address] Gastroenterology                                                                                                  Clinic History and Physical     Pa COLONOSCOPY N/A 3/29/2017    Performed by Skyler Doty MD at [facility] ENDOSCOPY   • ESOPHAGOGASTRODUODENOSCOPY (EGD) N/A 3/29/2017    Performed by Skyler Doty MD at [facility] ENDOSCOPY      Family Hx:   Family History   Problem Relation Age of Onset   • Tablet 24 Hr Take 1 tablet (100 mg total) by mouth 2 (two) times daily. 60 tablet 5   • allopurinol 100 MG Oral Tab Take 1 tablet (100 mg total) by mouth nightly.  30 tablet 5   • Levothyroxine Sodium 125 MCG Oral Tab Take 1 tablet (125 mcg total) by mouth and heat intolerance  MUSCULOSKELETAL:  negative for joint effusion/severe erythema  BEHAVIOR/PSYCH:  negative for psychotic behavior      PHYSICAL EXAM:   Blood pressure 128/80, height 5' 4\" (1.626 m), weight 190 lb (86.2 kg), not currently breastfeeding meloxicam  - EGD/colonoscopy for above symptoms with MAC sedation  -Prep: Split dose Colyte or equivalent  -Anti-platelets and anti-coagulants: ok to continue ASA 81 mg  -Diabetes meds: Hold metformin day of procedure and day before procedure.  If patient i

## 2020-12-30 NOTE — TELEPHONE ENCOUNTER
Scheduled for:  Colonoscopy [id number] / [phone]  Provider Name:  Dr. Nick Montana  Date:  1/8/2021  Location:  [facility]  Sedation:  MAC  Time:  2:00 pm, arrival 1:00 pm  Prep:  Suprep/Trilyte  Meds/Allergies Reconciled?:  Physician reviewed  Diagnosis with codes:  Anem

## 2020-12-31 NOTE — TELEPHONE ENCOUNTER
Dr. Garcia Body,  Patient having colonoscopy/EGD:  Please advise on all diabetic (oral & insulin) adjustment orders based on the following diet modifications prior to procedure(s):     Day before the procedure(s), patient will be on clear liquid diet only a

## 2020-12-31 NOTE — TELEPHONE ENCOUNTER
Hi!  Please ask her to send us her blood sugars for a few days, fasting and 2 hours after any meal. Thank you.

## 2021-01-01 RX ORDER — METOPROLOL SUCCINATE 100 MG/1
100 TABLET, EXTENDED RELEASE ORAL 2 TIMES DAILY
Qty: 60 TABLET | Refills: 5 | Status: SHIPPED | OUTPATIENT
Start: 2021-01-01 | End: 2021-01-01

## 2021-01-02 RX ORDER — METOPROLOL SUCCINATE 100 MG/1
100 TABLET, EXTENDED RELEASE ORAL 2 TIMES DAILY
Qty: 60 TABLET | Refills: 5 | Status: SHIPPED | OUTPATIENT
Start: 2021-01-02 | End: 2021-02-02

## 2021-01-04 ENCOUNTER — TELEPHONE (OUTPATIENT)
Dept: GASTROENTEROLOGY | Facility: CLINIC | Age: 56
End: 2021-01-04

## 2021-01-04 ENCOUNTER — TELEPHONE (OUTPATIENT)
Dept: INTERNAL MEDICINE CLINIC | Facility: CLINIC | Age: 56
End: 2021-01-04

## 2021-01-04 RX ORDER — SODIUM, POTASSIUM,MAG SULFATES 17.5-3.13G
SOLUTION, RECONSTITUTED, ORAL ORAL
Qty: 1 BOTTLE | Refills: 0 | Status: SHIPPED | OUTPATIENT
Start: 2021-01-04 | End: 2021-01-07

## 2021-01-04 NOTE — PAT NURSING NOTE
Spoke with pt's daughter Brennan Fuentes with pt's permission about upcoming procedure with Dr. Ricardo Mesa on 1/8. Confirmed location of procedure would be at [address] [place] [address] location and provided address to pt's daughter to which she confirmed.  Also confirmed arrival time was at 15

## 2021-01-04 NOTE — TELEPHONE ENCOUNTER
pts daugh wants to know if the pt is able to start taking her regular medication again? [address] states that she has questions about the pts diabetic medications. Pt. States that pt. Had her proc done this past Friday.

## 2021-01-04 NOTE — TELEPHONE ENCOUNTER
Spoke with patient's daughter and reviewed/confirmed medication regimen for day before and of procedure. Scheduled FU appt 1/13/21.

## 2021-01-04 NOTE — TELEPHONE ENCOUNTER
Hi!  I had asked for her blood sugars for the past few days, fasting and two-hours after meals so that I could make my decision. Thank you.

## 2021-01-04 NOTE — TELEPHONE ENCOUNTER
Daughter, states that the patient would like an order for an internal ultra sound of the throat. Per the daughter, Dr. Diana Alfred is requesting them to get this from the pcp. Please, call daughter with any questions.

## 2021-01-04 NOTE — TELEPHONE ENCOUNTER
Dr. Thiago Rogers-    Patient is scheduled for procedure 01/08/2021. Please advise on DM medications below. If orders advised, please contact pt with these orders. Thank you!

## 2021-01-04 NOTE — TELEPHONE ENCOUNTER
Spoke with patient's daughter. Reports the following BG from the past week. Unable to give clear dates and times.      Fastin, after meal: 253  Fastin  Before lunch today: 216  Yesterday: morning   : after meal 231    Confirms med r

## 2021-01-05 ENCOUNTER — TELEPHONE (OUTPATIENT)
Dept: OTOLARYNGOLOGY | Facility: CLINIC | Age: 56
End: 2021-01-05

## 2021-01-05 ENCOUNTER — LAB ENCOUNTER (OUTPATIENT)
Dept: LAB | Age: 56
End: 2021-01-05
Attending: INTERNAL MEDICINE
Payer: MEDICAID

## 2021-01-05 DIAGNOSIS — Z01.818 PRE-OP TESTING: ICD-10-CM

## 2021-01-05 NOTE — TELEPHONE ENCOUNTER
Per pt daughter, calling to schedule in office scope. Please see TE from November , prior auth approved.

## 2021-01-05 NOTE — TELEPHONE ENCOUNTER
Daughter was informed of message.      November 11, 2020    9:29 AM    Martinez Newsome RN contacted [place], [place] 9:29 AM  Note     LMTCB to inform patient prior Read Carley for scope in the office is approved [id number] valid from 6

## 2021-01-06 ENCOUNTER — OFFICE VISIT (OUTPATIENT)
Dept: OTOLARYNGOLOGY | Facility: CLINIC | Age: 56
End: 2021-01-06
Payer: MEDICAID

## 2021-01-06 VITALS
WEIGHT: 190 LBS | SYSTOLIC BLOOD PRESSURE: 127 MMHG | BODY MASS INDEX: 33 KG/M2 | DIASTOLIC BLOOD PRESSURE: 87 MMHG | TEMPERATURE: 98 F | HEART RATE: 116 BPM

## 2021-01-06 DIAGNOSIS — R43.8 SMELL, IMPAIRED: ICD-10-CM

## 2021-01-06 DIAGNOSIS — R13.10 ODYNOPHAGIA: Primary | ICD-10-CM

## 2021-01-06 LAB — SARS-COV-2 RNA RESP QL NAA+PROBE: NOT DETECTED

## 2021-01-06 PROCEDURE — 3074F SYST BP LT 130 MM HG: CPT | Performed by: OTOLARYNGOLOGY

## 2021-01-06 PROCEDURE — 3079F DIAST BP 80-89 MM HG: CPT | Performed by: OTOLARYNGOLOGY

## 2021-01-06 PROCEDURE — 31575 DIAGNOSTIC LARYNGOSCOPY: CPT | Performed by: OTOLARYNGOLOGY

## 2021-01-06 PROCEDURE — 99214 OFFICE O/P EST MOD 30 MIN: CPT | Performed by: OTOLARYNGOLOGY

## 2021-01-06 NOTE — PROGRESS NOTES
Katrina Lloyd is a 48year old female. Patient presents with:  Throat Problem: Patient is here for scope. One week ago patient developed recurrent dysphagia and painful swallowing.        HISTORY OF PRESENT ILLNESS  8/12/2020  Patient presents for her da sometimes throws up. She denies heartburn but does have some gastrointestinal issues which I could not clearly did not delineate per our conversation    Social History    Socioeconomic History      Marital status:        Spouse name: Not on file and easy bruising.            PHYSICAL EXAM    /87 (BP Location: Right arm)   Pulse 116   Temp 97.6 °F (36.4 °C) (Tympanic)   Wt 190 lb (86.2 kg)   BMI 32.61 kg/m²        Constitutional Normal Overall appearance - Normal.   Psychiatric Normal Orientat The turbinates were  non enlarged and No intranasal polyps were noted. Nasopharynx was free of masses and mario were patent. Oropharynx was then examined and the patient has a normal tongue base and lingual tonsils.  Epiglottis was  true and false cords were insulin pen needle twice a day, Disp: 180 each, Rfl: 3  •  allopurinol 100 MG Oral Tab, Take 1 tablet (100 mg total) by mouth nightly., Disp: 30 tablet, Rfl: 5  •  Levothyroxine Sodium 125 MCG Oral Tab, Take 1 tablet (125 mcg total) by mouth every morning. side and the CAT scan was denied so I do recommend a direct laryngoscopy to rule out deeper pathology. Discussed this with the patient via her daughter who translated.   We will proceed with this pending medical clearance  2.sensitivity to smells   Unclear

## 2021-01-07 ENCOUNTER — TELEPHONE (OUTPATIENT)
Dept: GASTROENTEROLOGY | Facility: CLINIC | Age: 56
End: 2021-01-07

## 2021-01-07 DIAGNOSIS — R10.30 LOWER ABDOMINAL PAIN: ICD-10-CM

## 2021-01-07 DIAGNOSIS — R19.4 CHANGE IN BOWEL HABITS: ICD-10-CM

## 2021-01-07 DIAGNOSIS — Z12.11 SCREENING FOR COLON CANCER: ICD-10-CM

## 2021-01-07 DIAGNOSIS — Z80.0 FAMILY HISTORY OF COLON CANCER: ICD-10-CM

## 2021-01-07 RX ORDER — GABAPENTIN 400 MG/1
400 CAPSULE ORAL DAILY
Qty: 30 CAPSULE | Refills: 0 | Status: SHIPPED | OUTPATIENT
Start: 2021-01-07 | End: 2021-03-15

## 2021-01-07 RX ORDER — SERTRALINE HYDROCHLORIDE 100 MG/1
100 TABLET, FILM COATED ORAL DAILY
Qty: 90 TABLET | Refills: 1 | Status: SHIPPED | OUTPATIENT
Start: 2021-01-07 | End: 2021-03-15

## 2021-01-07 RX ORDER — SIMVASTATIN 20 MG
20 TABLET ORAL NIGHTLY
Qty: 90 TABLET | Refills: 0 | Status: SHIPPED | OUTPATIENT
Start: 2021-01-07 | End: 2021-03-15

## 2021-01-07 RX ORDER — TRAZODONE HYDROCHLORIDE 100 MG/1
TABLET ORAL
Qty: 10 TABLET | Refills: 0 | Status: SHIPPED | OUTPATIENT
Start: 2021-01-07 | End: 2021-01-21

## 2021-01-07 RX ORDER — POLYETHYLENE GLYCOL 3350, SODIUM CHLORIDE, SODIUM BICARBONATE, POTASSIUM CHLORIDE 420; 11.2; 5.72; 1.48 G/4L; G/4L; G/4L; G/4L
POWDER, FOR SOLUTION ORAL
Qty: 1 BOTTLE | Refills: 0 | Status: ON HOLD | OUTPATIENT
Start: 2021-01-07 | End: 2021-01-08

## 2021-01-07 NOTE — TELEPHONE ENCOUNTER
Dr. Carl Montana-    Pt's pharmacy is out of [facility]. Sendy Carnes is available at [address] for pt to  today. CLN/EGD scheduled for tomorrow. Please review and sign pended Trilyte orders if applicable.      Thank you

## 2021-01-07 NOTE — TELEPHONE ENCOUNTER
Patient states she is out of medication. Pharmacy advised her to contact pcp for further refills.     gabapentin 400 MG Oral Cap    TraZODone HCl 100 MG Oral Tab    simvastatin 20 MG Oral Tab    Sertraline HCl 100 MG Oral Tab

## 2021-01-07 NOTE — TELEPHONE ENCOUNTER
Noted she was seen by her PCP 3 months back and she was on these medications-she also has an upcoming appointment  We will refill

## 2021-01-07 NOTE — TELEPHONE ENCOUNTER
Pt states the pharmacy has no script for her prep. Na Sulfate-K Sulfate-Mg Sulf (SUPREP BOWEL PREP KIT) 17.5-3.13-1.6 GM/177ML Oral Solution 1 Bottle 0 1/4/2021     Sig: As directed.     Sent to pharmacy as: Suprep Bowel Prep Kit 17.5-3.13-1.6 GM/177ML Or

## 2021-01-07 NOTE — TELEPHONE ENCOUNTER
Dr. Gino CHANEY    Verified with Yana bangura at [facility] that prep was received. Verified pt's information. Pharmacy will call pt once ready. RN reached out to pt regarding change in pharmacy for prep. Verified .  Reviewed prep instruc

## 2021-01-08 ENCOUNTER — ANESTHESIA EVENT (OUTPATIENT)
Dept: ENDOSCOPY | Age: 56
End: 2021-01-08
Payer: MEDICAID

## 2021-01-08 ENCOUNTER — ANESTHESIA (OUTPATIENT)
Dept: ENDOSCOPY | Age: 56
End: 2021-01-08
Payer: MEDICAID

## 2021-01-08 ENCOUNTER — HOSPITAL ENCOUNTER (OUTPATIENT)
Age: 56
Setting detail: HOSPITAL OUTPATIENT SURGERY
Discharge: HOME | End: 2021-01-08
Attending: INTERNAL MEDICINE | Admitting: INTERNAL MEDICINE
Payer: MEDICAID

## 2021-01-08 DIAGNOSIS — R10.10 UPPER ABDOMINAL PAIN: ICD-10-CM

## 2021-01-08 DIAGNOSIS — Z01.818 PRE-OP TESTING: Primary | ICD-10-CM

## 2021-01-08 DIAGNOSIS — D50.0 ANEMIA DUE TO CHRONIC BLOOD LOSS: ICD-10-CM

## 2021-01-08 DIAGNOSIS — R63.4 WEIGHT LOSS: ICD-10-CM

## 2021-01-08 DIAGNOSIS — K59.00 CONSTIPATION, UNSPECIFIED CONSTIPATION TYPE: ICD-10-CM

## 2021-01-08 LAB — GLUCOSE BLDC GLUCOMTR-MCNC: 143 MG/DL (ref 70–99)

## 2021-01-08 PROCEDURE — 43239 EGD BIOPSY SINGLE/MULTIPLE: CPT | Performed by: INTERNAL MEDICINE

## 2021-01-08 PROCEDURE — 45378 DIAGNOSTIC COLONOSCOPY: CPT | Performed by: INTERNAL MEDICINE

## 2021-01-08 PROCEDURE — 99070 SPECIAL SUPPLIES PHYS/QHP: CPT | Performed by: INTERNAL MEDICINE

## 2021-01-08 RX ORDER — LIDOCAINE HYDROCHLORIDE 10 MG/ML
INJECTION, SOLUTION EPIDURAL; INFILTRATION; INTRACAUDAL; PERINEURAL AS NEEDED
Status: DISCONTINUED | OUTPATIENT
Start: 2021-01-08 | End: 2021-01-08 | Stop reason: SURG

## 2021-01-08 RX ORDER — SODIUM CHLORIDE, SODIUM LACTATE, POTASSIUM CHLORIDE, CALCIUM CHLORIDE 600; 310; 30; 20 MG/100ML; MG/100ML; MG/100ML; MG/100ML
INJECTION, SOLUTION INTRAVENOUS CONTINUOUS
Status: DISCONTINUED | OUTPATIENT
Start: 2021-01-08 | End: 2021-01-08

## 2021-01-08 RX ADMIN — SODIUM CHLORIDE, SODIUM LACTATE, POTASSIUM CHLORIDE, CALCIUM CHLORIDE: 600; 310; 30; 20 INJECTION, SOLUTION INTRAVENOUS at 15:05:00

## 2021-01-08 RX ADMIN — LIDOCAINE HYDROCHLORIDE 25 MG: 10 INJECTION, SOLUTION EPIDURAL; INFILTRATION; INTRACAUDAL; PERINEURAL at 14:24:00

## 2021-01-08 RX ADMIN — SODIUM CHLORIDE, SODIUM LACTATE, POTASSIUM CHLORIDE, CALCIUM CHLORIDE: 600; 310; 30; 20 INJECTION, SOLUTION INTRAVENOUS at 14:25:00

## 2021-01-08 NOTE — ANESTHESIA PREPROCEDURE EVALUATION
Anesthesia PreOp Note    HPI:     Dub Him is a 48year old female who presents for preoperative consultation requested by: Kraig Queen MD    Date of Surgery: 1/8/2021    Procedure(s):  COLONOSCOPY  ESOPHAGOGASTRODUODENOSCOPY (EGD)  Indication:  An Tablet 24 Hr, Take 1 tablet (100 mg total) by mouth 2 (two) times daily. , Disp: 60 tablet, Rfl: 5, 1/7/2021    •  INVOKANA 100 MG Oral Tab, TAKE 1 TABLET BY MOUTH EVERY MORNING BEFORE BREAKFAST, Disp: 90 tablet, Rfl: 0, 1/6/2021    •  metFORMIN HCl 500 MG Disp: 30 capsule, Rfl: 0, 1/7/2021    •  traZODone HCl 100 MG Oral Tab, TK 1 T PO D HS PRN, Disp: 10 tablet, Rfl: 0, 1/6/2021    •  simvastatin 20 MG Oral Tab, Take 1 tablet (20 mg total) by mouth nightly., Disp: 90 tablet, Rfl: 0, 1/7/2021    •  Sertralin Years of education: Not on file      Highest education level: Not on file    Occupational History      Not on file    Social Needs      Financial resource strain: Not on file      Food insecurity        Worry: Not on file        Inability: Not on file weight is 86.2 kg (190 lb). Her temperature is 98.1 °F (36.7 °C). Her blood pressure is 158/103 (abnormal) and her pulse is 113. Her respiration is 19 and oxygen saturation is 96%.     01/04/21  1231 01/08/21  1407 01/08/21  1410   BP:   (!) 158/103   Pulse

## 2021-01-08 NOTE — INTERVAL H&P NOTE
Pre-op Diagnosis: Anemia due to chronic blood loss, Weight loss, Constipation, unspecified constipation type, Upper abdominal pain    The above referenced H&P was reviewed by Kameron Araiza MD on 1/8/2021, the patient was examined and no significant changes

## 2021-01-08 NOTE — ANESTHESIA POSTPROCEDURE EVALUATION
Patient: Kevin Mora    Procedure Summary     Date: 01/08/21 Room / Location: [facility] ENDOSCOPY 01 / [facility] ENDO    Anesthesia Start: 8808 Anesthesia Stop: 8830    Procedures:       COLONOSCOPY (N/A )      ESOPHAGOGASTRODUODENOSCOPY (EGD) (N/A ) Diagnosis

## 2021-01-08 NOTE — OPERATIVE REPORT
ESOPHAGOGASTRODUODENOSCOPY (EGD) & COLONOSCOPY REPORT    Helene Lockett     1965 Age 48year old   PCP Varun Morataya MD Endoscopist Ya Grace MD     Date of procedure: 21    Procedure: EGD w/biopsies & Colonoscopy    Pre-operative diagno the base of the cecum and rectum as well as areas in the colon not visualized and scope was getting clogged \  I then carefully withdrew the instrument from the patient who tolerated the procedure well. Complications: None    EGD findings:      1.  Esop

## 2021-01-09 VITALS
TEMPERATURE: 98 F | BODY MASS INDEX: 32.44 KG/M2 | WEIGHT: 190 LBS | SYSTOLIC BLOOD PRESSURE: 144 MMHG | DIASTOLIC BLOOD PRESSURE: 97 MMHG | HEIGHT: 64 IN | RESPIRATION RATE: 19 BRPM | OXYGEN SATURATION: 100 % | HEART RATE: 93 BPM

## 2021-01-11 RX ORDER — MELOXICAM 7.5 MG/1
7.5 TABLET ORAL 2 TIMES DAILY
Qty: 60 TABLET | Refills: 0 | Status: SHIPPED | OUTPATIENT
Start: 2021-01-11 | End: 2021-02-02 | Stop reason: ALTCHOICE

## 2021-01-11 NOTE — TELEPHONE ENCOUNTER
•  Meloxicam 7.5 MG Oral Tab, Take 1 tablet (7.5 mg total) by mouth 2 (two) times a day., Disp: 60 tablet, Rfl: 0

## 2021-01-11 NOTE — TELEPHONE ENCOUNTER
Last filled: 12/7/2020 #60 tab with 0 refills   LOV: 11/2/20  Future Appointments   Date Time Provider Alysia Winters   1/12/2021  2:20 PM Zach Díaz MD [facility] Sean ARNOLD   1/13/2021  6:00 PM Nghia Joseph MD [facility]   1/20/20

## 2021-01-12 ENCOUNTER — OFFICE VISIT (OUTPATIENT)
Dept: NEPHROLOGY | Facility: CLINIC | Age: 56
End: 2021-01-12
Payer: MEDICAID

## 2021-01-12 ENCOUNTER — TELEPHONE (OUTPATIENT)
Dept: NEPHROLOGY | Facility: CLINIC | Age: 56
End: 2021-01-12

## 2021-01-12 VITALS
HEART RATE: 107 BPM | SYSTOLIC BLOOD PRESSURE: 140 MMHG | WEIGHT: 194 LBS | HEIGHT: 64 IN | BODY MASS INDEX: 33.12 KG/M2 | DIASTOLIC BLOOD PRESSURE: 75 MMHG

## 2021-01-12 DIAGNOSIS — N18.32 STAGE 3B CHRONIC KIDNEY DISEASE (HCC): Primary | ICD-10-CM

## 2021-01-12 DIAGNOSIS — E55.9 VITAMIN D DEFICIENCY: ICD-10-CM

## 2021-01-12 PROBLEM — N18.30 CKD (CHRONIC KIDNEY DISEASE), STAGE III (HCC): Status: ACTIVE | Noted: 2021-01-12

## 2021-01-12 PROCEDURE — 3008F BODY MASS INDEX DOCD: CPT | Performed by: INTERNAL MEDICINE

## 2021-01-12 PROCEDURE — 3078F DIAST BP <80 MM HG: CPT | Performed by: INTERNAL MEDICINE

## 2021-01-12 PROCEDURE — 99245 OFF/OP CONSLTJ NEW/EST HI 55: CPT | Performed by: INTERNAL MEDICINE

## 2021-01-12 PROCEDURE — 3077F SYST BP >= 140 MM HG: CPT | Performed by: INTERNAL MEDICINE

## 2021-01-12 NOTE — PROGRESS NOTES
Consult Requested By: Dr. Marc Dudley    Reason for Consult: CKD stage III     HPI:     Patient is a 48 yrs old female with pmh of DM II - poorly controlled x 5 yrs, HTN x >5 yrs, HL, hepatic steatosis, hypothyroidism who presented with work up and ev • ESOPHAGOGASTRODUODENOSCOPY (EGD) N/A 1/8/2021    Performed by Ruben Gutierrez MD at [facility]   • ESOPHAGOGASTRODUODENOSCOPY (EGD) N/A 3/29/2017    Performed by Rosy Gonzalez MD at [address] [place] [address] ENDOSCOPY      Family History   Problem Relation Age of Onset insulin glargine 100 UNIT/ML Subcutaneous Solution Pen-injector 40 units subcutaneously at bedtime 15 mL 1   • CYCLOBENZAPRINE 5 MG Oral Tab TAKE 1 TABLET(5 MG) BY MOUTH THREE TIMES DAILY AS NEEDED FOR MUSCLE SPASMS 20 tablet 3   • omeprazole 20 MG Oral Ca patterns, increased activity  Hema/Lymph:  Negative for easy bleeding and easy bruising  Integumentary:  Negative for pruritus and rash  Musculoskeletal:  Negative for bone/joint symptoms  Neurological:  Negative for gait disturbance  Psychiatric:  Quinn Krueger next visit   - goal to increase lisinopril at next visit pending kidney function and will consider adding amlodipine as well     3.  Hypercalcemia  - isolated reading at next visit  - increase water intake  - will consider stopping hydrochlorothiazide   - c

## 2021-01-12 NOTE — PATIENT INSTRUCTIONS
Follow up in 3 weeks   Bring home blood pressure readings and monitor at next visit   Lab test as ordered - no need to fast   Take daily iron tablet - over the counter   Stop meloxicam   Increase fluid intake to 60-65 ounces a day

## 2021-01-13 ENCOUNTER — OFFICE VISIT (OUTPATIENT)
Dept: ENDOCRINOLOGY CLINIC | Facility: CLINIC | Age: 56
End: 2021-01-13
Payer: MEDICAID

## 2021-01-13 VITALS
HEIGHT: 64 IN | HEART RATE: 115 BPM | DIASTOLIC BLOOD PRESSURE: 92 MMHG | BODY MASS INDEX: 32.27 KG/M2 | SYSTOLIC BLOOD PRESSURE: 155 MMHG | WEIGHT: 189 LBS | RESPIRATION RATE: 18 BRPM

## 2021-01-13 DIAGNOSIS — E11.40 TYPE 2 DIABETES MELLITUS WITH DIABETIC NEUROPATHY, WITH LONG-TERM CURRENT USE OF INSULIN (HCC): Primary | ICD-10-CM

## 2021-01-13 DIAGNOSIS — Z79.4 TYPE 2 DIABETES MELLITUS WITH DIABETIC NEUROPATHY, WITH LONG-TERM CURRENT USE OF INSULIN (HCC): Primary | ICD-10-CM

## 2021-01-13 DIAGNOSIS — E03.9 HYPOTHYROIDISM, UNSPECIFIED TYPE: ICD-10-CM

## 2021-01-13 LAB
CARTRIDGE LOT#: ABNORMAL NUMERIC
GLUCOSE BLOOD: 172
HEMOGLOBIN A1C: 7.7 % (ref 4.3–5.6)
TEST STRIP LOT #: NORMAL NUMERIC

## 2021-01-13 PROCEDURE — 3077F SYST BP >= 140 MM HG: CPT | Performed by: INTERNAL MEDICINE

## 2021-01-13 PROCEDURE — 3051F HG A1C>EQUAL 7.0%<8.0%: CPT | Performed by: INTERNAL MEDICINE

## 2021-01-13 PROCEDURE — 83036 HEMOGLOBIN GLYCOSYLATED A1C: CPT | Performed by: INTERNAL MEDICINE

## 2021-01-13 PROCEDURE — 99213 OFFICE O/P EST LOW 20 MIN: CPT | Performed by: INTERNAL MEDICINE

## 2021-01-13 PROCEDURE — 3080F DIAST BP >= 90 MM HG: CPT | Performed by: INTERNAL MEDICINE

## 2021-01-13 PROCEDURE — 3008F BODY MASS INDEX DOCD: CPT | Performed by: INTERNAL MEDICINE

## 2021-01-13 PROCEDURE — 36416 COLLJ CAPILLARY BLOOD SPEC: CPT | Performed by: INTERNAL MEDICINE

## 2021-01-13 PROCEDURE — 82947 ASSAY GLUCOSE BLOOD QUANT: CPT | Performed by: INTERNAL MEDICINE

## 2021-01-13 RX ORDER — GLIPIZIDE 10 MG/1
10 TABLET ORAL
Qty: 180 TABLET | Refills: 1 | Status: SHIPPED | OUTPATIENT
Start: 2021-01-13 | End: 2021-08-10

## 2021-01-13 NOTE — PROGRESS NOTES
Name: Ortiz Dorantes  Date: 11/18/2020    Referring Physician: No ref. provider found    HISTORY OF PRESENT ILLNESS   Ortiz Dorantes is a 48year old female who presents for follow-up of management of uncontrolled T2D.  She was diagnosed about 4 years ago Tablet 24 Hr, Take 1 tablet (100 mg total) by mouth 2 (two) times daily. , Disp: 60 tablet, Rfl: 5  •  INVOKANA 100 MG Oral Tab, TAKE 1 TABLET BY MOUTH EVERY MORNING BEFORE BREAKFAST, Disp: 90 tablet, Rfl: 0  •  Blood Glucose Monitoring Suppl (Phillip Record Capsule Delayed Release, Take 40 mg by mouth every morning., Disp: , Rfl:   •  Albuterol Sulfate  (90 Base) MCG/ACT Inhalation Aero Soln, Inhale 2 puffs into the lungs every 6 (six) hours as needed. , Disp: 1 Inhaler, Rfl: 3  •  Fluticasone Furoate-V normal conjunctivae, sclera. , normal sclera and normal pupils  Ears/Nose/Mouth/Throat/Neck:  no palpable thyroid nodules or cervical lymphadenopathy  Neck: Trachea midline: Normal  Back: no kyphosis or back tenderness  Psychiatric:  oriented to time, self, neuropathy, nephropathy and cardiovascular disease.   - She needs flu vaccine next visit  - Has appt for eye exam  - Will follow Dr. Honorio Mansfield recommendations about kidney function    3.) Lifestyle Management for Diabetes- We discussed importance of a low

## 2021-01-14 NOTE — PATIENT INSTRUCTIONS
Please decrease metformin to 500mg orally twice a day  Please start taking Glipizide 10mg orally twice a day  Please continue the Victoza injections once a day    Please STOP Invokana    Please continue the Basaglar    Please check blood sugars fasting and

## 2021-01-16 ENCOUNTER — LAB ENCOUNTER (OUTPATIENT)
Dept: LAB | Age: 56
End: 2021-01-16
Attending: INTERNAL MEDICINE
Payer: MEDICAID

## 2021-01-16 DIAGNOSIS — N18.32 STAGE 3B CHRONIC KIDNEY DISEASE (HCC): ICD-10-CM

## 2021-01-16 DIAGNOSIS — E03.9 HYPOTHYROIDISM, UNSPECIFIED TYPE: ICD-10-CM

## 2021-01-16 LAB
ANION GAP SERPL CALC-SCNC: 3 MMOL/L (ref 0–18)
BACTERIA UR QL AUTO: NEGATIVE /HPF
BILIRUB UR QL: NEGATIVE
BUN BLD-MCNC: 15 MG/DL (ref 7–18)
BUN/CREAT SERPL: 15.5 (ref 10–20)
CALCIUM BLD-MCNC: 9.6 MG/DL (ref 8.5–10.1)
CHLORIDE SERPL-SCNC: 101 MMOL/L (ref 98–112)
CO2 SERPL-SCNC: 31 MMOL/L (ref 21–32)
COLOR UR: YELLOW
CREAT BLD-MCNC: 0.97 MG/DL
CREAT UR-SCNC: 87.7 MG/DL
GLUCOSE BLD-MCNC: 108 MG/DL (ref 70–99)
GLUCOSE UR-MCNC: NEGATIVE MG/DL
HGB UR QL STRIP.AUTO: NEGATIVE
HYALINE CASTS #/AREA URNS AUTO: 7 /LPF
KETONES UR-MCNC: NEGATIVE MG/DL
MICROALBUMIN UR-MCNC: 0.85 MG/DL
MICROALBUMIN/CREAT 24H UR-RTO: 9.7 UG/MG (ref ?–30)
NITRITE UR QL STRIP.AUTO: NEGATIVE
OSMOLALITY SERPL CALC.SUM OF ELEC: 281 MOSM/KG (ref 275–295)
PATIENT FASTING Y/N/NP: YES
PH UR: 5 [PH] (ref 5–8)
POTASSIUM SERPL-SCNC: 3.9 MMOL/L (ref 3.5–5.1)
PROT UR-MCNC: 9.9 MG/DL
PROT UR-MCNC: NEGATIVE MG/DL
PTH-INTACT SERPL-MCNC: 54 PG/ML (ref 18.5–88)
RBC #/AREA URNS AUTO: 1 /HPF
SODIUM SERPL-SCNC: 135 MMOL/L (ref 136–145)
SP GR UR STRIP: 1.01 (ref 1–1.03)
T4 FREE SERPL-MCNC: 1.5 NG/DL (ref 0.8–1.7)
TSI SER-ACNC: 0.77 MIU/ML (ref 0.36–3.74)
UROBILINOGEN UR STRIP-ACNC: <2
WBC #/AREA URNS AUTO: 6 /HPF

## 2021-01-16 PROCEDURE — 81001 URINALYSIS AUTO W/SCOPE: CPT

## 2021-01-16 PROCEDURE — 3061F NEG MICROALBUMINURIA REV: CPT | Performed by: INTERNAL MEDICINE

## 2021-01-16 PROCEDURE — 84156 ASSAY OF PROTEIN URINE: CPT

## 2021-01-16 PROCEDURE — 84439 ASSAY OF FREE THYROXINE: CPT

## 2021-01-16 PROCEDURE — 83970 ASSAY OF PARATHORMONE: CPT

## 2021-01-16 PROCEDURE — 82570 ASSAY OF URINE CREATININE: CPT

## 2021-01-16 PROCEDURE — 82306 VITAMIN D 25 HYDROXY: CPT | Performed by: INTERNAL MEDICINE

## 2021-01-16 PROCEDURE — 36415 COLL VENOUS BLD VENIPUNCTURE: CPT

## 2021-01-16 PROCEDURE — 84443 ASSAY THYROID STIM HORMONE: CPT

## 2021-01-16 PROCEDURE — 80048 BASIC METABOLIC PNL TOTAL CA: CPT

## 2021-01-16 PROCEDURE — 82043 UR ALBUMIN QUANTITATIVE: CPT

## 2021-01-18 LAB — 25(OH)D3 SERPL-MCNC: 56.3 NG/ML (ref 30–100)

## 2021-01-20 ENCOUNTER — OFFICE VISIT (OUTPATIENT)
Dept: INTERNAL MEDICINE CLINIC | Facility: CLINIC | Age: 56
End: 2021-01-20
Payer: MEDICAID

## 2021-01-20 ENCOUNTER — HOSPITAL ENCOUNTER (OUTPATIENT)
Dept: GENERAL RADIOLOGY | Age: 56
Discharge: HOME | End: 2021-01-20
Attending: INTERNAL MEDICINE
Payer: MEDICAID

## 2021-01-20 VITALS
RESPIRATION RATE: 18 BRPM | WEIGHT: 190 LBS | BODY MASS INDEX: 32.44 KG/M2 | DIASTOLIC BLOOD PRESSURE: 78 MMHG | HEART RATE: 106 BPM | TEMPERATURE: 98 F | HEIGHT: 64 IN | SYSTOLIC BLOOD PRESSURE: 138 MMHG

## 2021-01-20 DIAGNOSIS — M25.512 CHRONIC LEFT SHOULDER PAIN: ICD-10-CM

## 2021-01-20 DIAGNOSIS — E61.1 IRON DEFICIENCY: ICD-10-CM

## 2021-01-20 DIAGNOSIS — G89.29 CHRONIC LEFT SHOULDER PAIN: ICD-10-CM

## 2021-01-20 DIAGNOSIS — E03.8 OTHER SPECIFIED HYPOTHYROIDISM: ICD-10-CM

## 2021-01-20 DIAGNOSIS — R00.2 PALPITATIONS: Primary | ICD-10-CM

## 2021-01-20 DIAGNOSIS — M54.12 CERVICAL RADICULOPATHY: ICD-10-CM

## 2021-01-20 DIAGNOSIS — M77.8 TENDINITIS OF LEFT SHOULDER: ICD-10-CM

## 2021-01-20 PROCEDURE — 3008F BODY MASS INDEX DOCD: CPT | Performed by: INTERNAL MEDICINE

## 2021-01-20 PROCEDURE — 3078F DIAST BP <80 MM HG: CPT | Performed by: INTERNAL MEDICINE

## 2021-01-20 PROCEDURE — 73030 X-RAY EXAM OF SHOULDER: CPT | Performed by: INTERNAL MEDICINE

## 2021-01-20 PROCEDURE — 99214 OFFICE O/P EST MOD 30 MIN: CPT | Performed by: INTERNAL MEDICINE

## 2021-01-20 PROCEDURE — 72040 X-RAY EXAM NECK SPINE 2-3 VW: CPT | Performed by: INTERNAL MEDICINE

## 2021-01-20 PROCEDURE — 3075F SYST BP GE 130 - 139MM HG: CPT | Performed by: INTERNAL MEDICINE

## 2021-01-20 RX ORDER — HYDROCORTISONE 25 MG/G
CREAM TOPICAL
Qty: 28 G | Refills: 0 | Status: SHIPPED | OUTPATIENT
Start: 2021-01-20 | End: 2021-02-11

## 2021-01-20 RX ORDER — POLYETHYLENE GLYCOL 3350 17 G/17G
17 POWDER, FOR SOLUTION ORAL DAILY
Qty: 100 PACKET | Refills: 1 | Status: SHIPPED | OUTPATIENT
Start: 2021-01-20 | End: 2021-02-11

## 2021-01-20 RX ORDER — MIRTAZAPINE 15 MG/1
15 TABLET, FILM COATED ORAL NIGHTLY
Qty: 30 TABLET | Refills: 1 | Status: SHIPPED | OUTPATIENT
Start: 2021-01-20 | End: 2021-02-02 | Stop reason: ALTCHOICE

## 2021-01-20 RX ORDER — LEVOTHYROXINE SODIUM 112 UG/1
112 TABLET ORAL
Qty: 90 TABLET | Refills: 1 | Status: SHIPPED | OUTPATIENT
Start: 2021-01-20 | End: 2021-03-15

## 2021-01-20 RX ORDER — ESCITALOPRAM OXALATE 10 MG/1
10 TABLET ORAL DAILY
Qty: 90 TABLET | Refills: 1 | Status: SHIPPED | OUTPATIENT
Start: 2021-01-20 | End: 2021-02-10

## 2021-01-21 ENCOUNTER — OFFICE VISIT (OUTPATIENT)
Dept: OPHTHALMOLOGY | Facility: CLINIC | Age: 56
End: 2021-01-21
Payer: MEDICAID

## 2021-01-21 DIAGNOSIS — H02.886 MEIBOMIAN GLAND DYSFUNCTION (MGD) OF BOTH EYES: ICD-10-CM

## 2021-01-21 DIAGNOSIS — E11.9 TYPE 2 DIABETES MELLITUS WITHOUT RETINOPATHY (HCC): Primary | ICD-10-CM

## 2021-01-21 DIAGNOSIS — H52.4 PRESBYOPIA OF BOTH EYES: ICD-10-CM

## 2021-01-21 DIAGNOSIS — H25.13 AGE-RELATED NUCLEAR CATARACT OF BOTH EYES: ICD-10-CM

## 2021-01-21 DIAGNOSIS — H02.883 MEIBOMIAN GLAND DYSFUNCTION (MGD) OF BOTH EYES: ICD-10-CM

## 2021-01-21 PROCEDURE — 99243 OFF/OP CNSLTJ NEW/EST LOW 30: CPT | Performed by: OPHTHALMOLOGY

## 2021-01-21 NOTE — PATIENT INSTRUCTIONS
Type 2 diabetes mellitus without retinopathy ([facility] [address]  Diabetes type II: no background of retinopathy, no signs of neovascularization noted. Discussed ocular and systemic benefits of blood sugar control.   Diagnosis and treatment discussed in detail with clement

## 2021-01-21 NOTE — ASSESSMENT & PLAN NOTE
Discussed with patient and her daughter that patient does not need any glasses for distance and to continue with over the counter glasses for reading.

## 2021-01-21 NOTE — PROGRESS NOTES
Rosendo Calderon is a 48year old female.     HPI:     HPI     Consult      Additional comments: Per Dr Geno Valverde               Diabetic Eye Exam      Additional comments: Pt has been a diabetic for 9  years       Pt's diabetes is currently controlled by insuli Oral Tab Take 1 tablet (10 mg total) by mouth daily.  90 tablet 1   • Levothyroxine Sodium 112 MCG Oral Tab Take 1 tablet (112 mcg total) by mouth before breakfast. 90 tablet 1   • Hydrocortisone (PROCTOZONE-HC) 2.5 % External Cream Apply  Small amount To by mouth every 6 (six) hours as needed. • Insulin Pen Needle (ULTRA ANNA INSULIN PEN NEEDLES) 29G X 12MM Does not apply Misc Use  insulin pen needle twice a day 180 each 3   • allopurinol 100 MG Oral Tab Take 1 tablet (100 mg total) by mouth nightly.  27 Pupils    Right PERRL    Left PERRL          Visual Fields       Left Right     Full     Restrictions  Total superior temporal, inferior temporal, superior nasal, inferior nasal deficiencies   Very difficult to test, unsure of reliability due to langu vision and are not surgical at this time. Meibomian gland dysfunction (MGD) of both eyes  Patient was instructed to use warm compresses to the eyelids twice a day everyday.     Instructions for warm compress use:   Patient should place wash compresse

## 2021-01-22 NOTE — PROGRESS NOTES
HPI:    Patient ID: Chris Pelaez is a 48year old female. Presents for follow-up of multiple concerns. HPI  Patient is seen in the presence of her daughter who at times translates, patient seems understands enough [place].   She has seen endocrinolog (10 mg total) by mouth daily.  90 tablet 1   • Levothyroxine Sodium 112 MCG Oral Tab Take 1 tablet (112 mcg total) by mouth before breakfast. 90 tablet 1   • Hydrocortisone (PROCTOZONE-HC) 2.5 % External Cream Apply  Small amount To  Rectal area  Twice a da (100 mg total) by mouth nightly. 30 tablet 5   • Levothyroxine Sodium 125 MCG Oral Tab Take 1 tablet (125 mcg total) by mouth every morning.  90 tablet 3   • Lisinopril-hydroCHLOROthiazide 20-25 MG Oral Tab Take 1 tablet p.o. twice a day 60 tablet 5   • ins wheezes. Musculoskeletal:      Left shoulder: She exhibits decreased range of motion and tenderness.       Comments: Needed abduction above shoulder level due to pain, tender over coracoacromial joint, muscle strength is equal in both upper extremities, sean (PROCTOZONE-HC) 2.5 % External Cream 28 g 0     Sig: Apply  Small amount To  Rectal area  Twice a day for  7-10 days   • Polyethylene Glycol 3350 (MIRALAX) 17 g Oral Powd Pack 100 packet 1     Sig: Take 17 g by mouth daily.    • mirtazapine (REMERON) 15 MG

## 2021-01-25 ENCOUNTER — HOSPITAL ENCOUNTER (OUTPATIENT)
Dept: CV DIAGNOSTICS | Facility: HOSPITAL | Age: 56
Discharge: HOME | End: 2021-01-25
Attending: INTERNAL MEDICINE
Payer: MEDICAID

## 2021-01-25 DIAGNOSIS — R00.2 PALPITATIONS: ICD-10-CM

## 2021-01-25 PROCEDURE — 93306 TTE W/DOPPLER COMPLETE: CPT | Performed by: INTERNAL MEDICINE

## 2021-01-28 ENCOUNTER — TELEPHONE (OUTPATIENT)
Dept: OTOLARYNGOLOGY | Facility: CLINIC | Age: 56
End: 2021-01-28

## 2021-01-28 NOTE — TELEPHONE ENCOUNTER
Contacted patient to inform still waiting on medical clearance from pcp. Per patient will contact pcp to obtain.

## 2021-02-02 ENCOUNTER — TELEMEDICINE (OUTPATIENT)
Dept: NEPHROLOGY | Facility: CLINIC | Age: 56
End: 2021-02-02

## 2021-02-02 DIAGNOSIS — N17.9 AKI (ACUTE KIDNEY INJURY) (HCC): Primary | ICD-10-CM

## 2021-02-02 DIAGNOSIS — I10 HYPERTENSION, UNCONTROLLED: ICD-10-CM

## 2021-02-02 PROCEDURE — 99215 OFFICE O/P EST HI 40 MIN: CPT | Performed by: INTERNAL MEDICINE

## 2021-02-02 RX ORDER — LISINOPRIL 20 MG/1
20 TABLET ORAL 2 TIMES DAILY
Qty: 180 TABLET | Refills: 0 | Status: SHIPPED | OUTPATIENT
Start: 2021-02-02 | End: 2021-03-18

## 2021-02-02 RX ORDER — METOPROLOL SUCCINATE 50 MG/1
50 TABLET, EXTENDED RELEASE ORAL DAILY
Qty: 30 TABLET | Refills: 1 | Status: SHIPPED | OUTPATIENT
Start: 2021-02-02 | End: 2021-03-15

## 2021-02-02 NOTE — PROGRESS NOTES
HPI:     Patient is a 48 yrs old female with pmh of DM II - poorly controlled x 5 yrs, HTN x >5 yrs, HL, hepatic steatosis, hypothyroidism who presented for follow up via video visit     Lab test showed BUN/Cr 48/1.54 mg/dl with an eGFR 38 ml/min.  Crea ESOPHAGOGASTRODUODENOSCOPY (EGD) N/A 1/8/2021    Performed by Kraig Queen MD at [address] [place] [address]   • ESOPHAGOGASTRODUODENOSCOPY (EGD) N/A 3/29/2017    Performed by Meagan Salvador MD at [facility] ENDOSCOPY      Family History   Problem Relation Age of Onset   • Ca tablet (10 mg total) by mouth daily.  (Patient not taking: Reported on 2/2/2021 ) 90 tablet 1   • Hydrocortisone (PROCTOZONE-HC) 2.5 % External Cream Apply  Small amount To  Rectal area  Twice a day for  7-10 days 28 g 0   • Polyethylene Glycol 3350 (Alvin Krishnan activity, fever, irritability and lethargy  ENMT:  Negative for ear drainage, hearing loss and nasal drainage  Eyes:  Negative for eye discharge and vision loss  Cardiovascular:  Negative for chest pain, sobs  Respiratory:  Negative for cough, dyspnea and stranding is appreciated.   - UA, urine albumin and protein - no significant protein   - repeat renal function panel  - avoid nephrotoxins - no meloxicam    - fluid intake - water to 60-65 ounces a day   - invokana was stopped for possible ESTEBAN - cr normal.

## 2021-02-02 NOTE — PATIENT INSTRUCTIONS
Stop lisinopril/hydrochlorothiazide   Start on lisinopril 20 mg twice a day   Start on metoprolol 50 mg daily dose   Follow up in 3 months   Call in 2 weeks with home blood pressure readings

## 2021-02-04 ENCOUNTER — TELEPHONE (OUTPATIENT)
Dept: INTERNAL MEDICINE CLINIC | Facility: CLINIC | Age: 56
End: 2021-02-04

## 2021-02-04 NOTE — TELEPHONE ENCOUNTER
Per notes from PCP  , no medical clearance on file and pt has not had pre op visit. Pt requesting pre op apt today , request sent to [phone] [facility] office.

## 2021-02-04 NOTE — TELEPHONE ENCOUNTER
Pt is having surgery on 2/13 with  office and would like an appt for medical clearance asap.  Please advise

## 2021-02-04 NOTE — TELEPHONE ENCOUNTER
Pt has scheduled surgery with Dr. Dwight Morales on 2/12/21.   Ok to use reserve 24hr slots for pre op exam?

## 2021-02-07 NOTE — H&P
Ryan Pate is a 48year old female. No chief complaint on file. HISTORY OF PRESENT ILLNESS  8/12/2020  Patient presents for her daughter who translates. She has had pain in her neck that hurts so bad that sometimes she cries.   She gets a sharp delineate per our conversation    Social History    Socioeconomic History      Marital status:        Spouse name: Not on file      Number of children: Not on file      Years of education: Not on file      Highest education level: Not on file    Felix Integumentary Negative Frequent skin infections, pigment change and rash. Hema/Lymph Negative Easy bleeding and easy bruising. PHYSICAL EXAM    There were no vitals taken for this visit.        Constitutional Normal Overall appearance - Normal septum was noted to be nl. The turbinates were  non enlarged and No intranasal polyps were noted. Nasopharynx was free of masses and mario were patent. Oropharynx was then examined and the patient has a normal tongue base and lingual tonsils.  Epiglottis was apply Kit, Use to check blood sugar daily, Disp: 1 kit, Rfl: 0  •  Insulin Pen Needle (PEN NEEDLES) 32G X 4 MM Does not apply Misc, 1 each by Does not apply route daily. , Disp: 90 each, Rfl: 0  •  metFORMIN HCl 500 MG Oral Tab, Take 2 tablets (1,000 mg tot 2000 units Oral Tab, Take 1 tablet by mouth Noon., Disp: , Rfl:   •  aspirin 81 MG Oral Tab EC, Take 81 mg by mouth daily. , Disp: , Rfl:   ASSESSMENT AND PLAN    1.  Persistent throat pain  Laryngoscopy was done today and I do not see any abnormalities and

## 2021-02-10 ENCOUNTER — TELEPHONE (OUTPATIENT)
Dept: INTERNAL MEDICINE CLINIC | Facility: CLINIC | Age: 56
End: 2021-02-10

## 2021-02-10 ENCOUNTER — OFFICE VISIT (OUTPATIENT)
Dept: INTERNAL MEDICINE CLINIC | Facility: CLINIC | Age: 56
End: 2021-02-10
Payer: MEDICAID

## 2021-02-10 ENCOUNTER — LAB ENCOUNTER (OUTPATIENT)
Dept: LAB | Age: 56
End: 2021-02-10
Attending: OTOLARYNGOLOGY
Payer: MEDICAID

## 2021-02-10 VITALS
SYSTOLIC BLOOD PRESSURE: 134 MMHG | HEIGHT: 64 IN | DIASTOLIC BLOOD PRESSURE: 82 MMHG | BODY MASS INDEX: 32.65 KG/M2 | WEIGHT: 191.25 LBS | RESPIRATION RATE: 18 BRPM | HEART RATE: 97 BPM | TEMPERATURE: 98 F

## 2021-02-10 DIAGNOSIS — I10 ESSENTIAL HYPERTENSION: ICD-10-CM

## 2021-02-10 DIAGNOSIS — Z01.818 PREOP TESTING: ICD-10-CM

## 2021-02-10 DIAGNOSIS — R00.2 PALPITATIONS: ICD-10-CM

## 2021-02-10 DIAGNOSIS — M54.2 NECK PAIN: ICD-10-CM

## 2021-02-10 DIAGNOSIS — E03.8 OTHER SPECIFIED HYPOTHYROIDISM: ICD-10-CM

## 2021-02-10 DIAGNOSIS — E61.1 IRON DEFICIENCY: ICD-10-CM

## 2021-02-10 DIAGNOSIS — Z01.818 PREOPERATIVE EXAMINATION: Primary | ICD-10-CM

## 2021-02-10 DIAGNOSIS — E11.9 TYPE 2 DIABETES MELLITUS WITHOUT RETINOPATHY (HCC): ICD-10-CM

## 2021-02-10 DIAGNOSIS — Z01.818 PREOPERATIVE EXAMINATION: ICD-10-CM

## 2021-02-10 DIAGNOSIS — N18.32 STAGE 3B CHRONIC KIDNEY DISEASE (HCC): ICD-10-CM

## 2021-02-10 LAB
ALBUMIN SERPL-MCNC: 3.7 G/DL (ref 3.4–5)
ALBUMIN/GLOB SERPL: 1 {RATIO} (ref 1–2)
ALP LIVER SERPL-CCNC: 93 U/L
ALT SERPL-CCNC: 44 U/L
ANION GAP SERPL CALC-SCNC: 5 MMOL/L (ref 0–18)
AST SERPL-CCNC: 22 U/L (ref 15–37)
BASOPHILS # BLD AUTO: 0.08 X10(3) UL (ref 0–0.2)
BASOPHILS NFR BLD AUTO: 0.8 %
BILIRUB SERPL-MCNC: 0.3 MG/DL (ref 0.1–2)
BUN BLD-MCNC: 22 MG/DL (ref 7–18)
BUN/CREAT SERPL: 24.2 (ref 10–20)
CALCIUM BLD-MCNC: 9.8 MG/DL (ref 8.5–10.1)
CHLORIDE SERPL-SCNC: 105 MMOL/L (ref 98–112)
CO2 SERPL-SCNC: 28 MMOL/L (ref 21–32)
CREAT BLD-MCNC: 0.91 MG/DL
DEPRECATED HBV CORE AB SER IA-ACNC: 10 NG/ML
DEPRECATED RDW RBC AUTO: 56.8 FL (ref 35.1–46.3)
EOSINOPHIL # BLD AUTO: 0.2 X10(3) UL (ref 0–0.7)
EOSINOPHIL NFR BLD AUTO: 2.1 %
ERYTHROCYTE [DISTWIDTH] IN BLOOD BY AUTOMATED COUNT: 24.6 % (ref 11–15)
GLOBULIN PLAS-MCNC: 3.6 G/DL (ref 2.8–4.4)
GLUCOSE BLD-MCNC: 150 MG/DL (ref 70–99)
HCT VFR BLD AUTO: 34.6 %
HGB BLD-MCNC: 10.7 G/DL
IMM GRANULOCYTES # BLD AUTO: 0.03 X10(3) UL (ref 0–1)
IMM GRANULOCYTES NFR BLD: 0.3 %
IRON SATURATION: 5 %
IRON SERPL-MCNC: 25 UG/DL
LYMPHOCYTES # BLD AUTO: 2.96 X10(3) UL (ref 1–4)
LYMPHOCYTES NFR BLD AUTO: 30.9 %
M PROTEIN MFR SERPL ELPH: 7.3 G/DL (ref 6.4–8.2)
MCH RBC QN AUTO: 20.7 PG (ref 26–34)
MCHC RBC AUTO-ENTMCNC: 30.9 G/DL (ref 31–37)
MCV RBC AUTO: 66.9 FL
MONOCYTES # BLD AUTO: 0.71 X10(3) UL (ref 0.1–1)
MONOCYTES NFR BLD AUTO: 7.4 %
NEUTROPHILS # BLD AUTO: 5.59 X10 (3) UL (ref 1.5–7.7)
NEUTROPHILS # BLD AUTO: 5.59 X10(3) UL (ref 1.5–7.7)
NEUTROPHILS NFR BLD AUTO: 58.5 %
OSMOLALITY SERPL CALC.SUM OF ELEC: 292 MOSM/KG (ref 275–295)
PATIENT FASTING Y/N/NP: YES
PLATELET # BLD AUTO: 438 10(3)UL (ref 150–450)
PLATELET MORPHOLOGY: NORMAL
POTASSIUM SERPL-SCNC: 4.3 MMOL/L (ref 3.5–5.1)
RBC # BLD AUTO: 5.17 X10(6)UL
SODIUM SERPL-SCNC: 138 MMOL/L (ref 136–145)
TOTAL IRON BINDING CAPACITY: 510 UG/DL (ref 240–450)
TRANSFERRIN SERPL-MCNC: 342 MG/DL (ref 200–360)
TSI SER-ACNC: 0.43 MIU/ML (ref 0.36–3.74)
WBC # BLD AUTO: 9.6 X10(3) UL (ref 4–11)

## 2021-02-10 PROCEDURE — 84466 ASSAY OF TRANSFERRIN: CPT

## 2021-02-10 PROCEDURE — 3008F BODY MASS INDEX DOCD: CPT | Performed by: INTERNAL MEDICINE

## 2021-02-10 PROCEDURE — 36415 COLL VENOUS BLD VENIPUNCTURE: CPT

## 2021-02-10 PROCEDURE — 3079F DIAST BP 80-89 MM HG: CPT | Performed by: INTERNAL MEDICINE

## 2021-02-10 PROCEDURE — 80053 COMPREHEN METABOLIC PANEL: CPT

## 2021-02-10 PROCEDURE — 82728 ASSAY OF FERRITIN: CPT

## 2021-02-10 PROCEDURE — 83540 ASSAY OF IRON: CPT

## 2021-02-10 PROCEDURE — 3075F SYST BP GE 130 - 139MM HG: CPT | Performed by: INTERNAL MEDICINE

## 2021-02-10 PROCEDURE — 84443 ASSAY THYROID STIM HORMONE: CPT

## 2021-02-10 PROCEDURE — 99214 OFFICE O/P EST MOD 30 MIN: CPT | Performed by: INTERNAL MEDICINE

## 2021-02-10 PROCEDURE — 85025 COMPLETE CBC W/AUTO DIFF WBC: CPT

## 2021-02-10 RX ORDER — GABAPENTIN 300 MG/1
300 CAPSULE ORAL 3 TIMES DAILY
Status: ON HOLD | COMMUNITY
Start: 2021-01-14 | End: 2021-02-12

## 2021-02-10 RX ORDER — TRAZODONE HYDROCHLORIDE 100 MG/1
100 TABLET ORAL NIGHTLY
Qty: 90 TABLET | Refills: 1 | Status: SHIPPED | OUTPATIENT
Start: 2021-02-10 | End: 2021-03-15

## 2021-02-10 RX ORDER — PEN NEEDLE, DIABETIC 30 GX3/16"
1 NEEDLE, DISPOSABLE MISCELLANEOUS DAILY
Qty: 100 EACH | Refills: 3 | Status: SHIPPED | OUTPATIENT
Start: 2021-02-10 | End: 2021-02-11 | Stop reason: CLARIF

## 2021-02-10 NOTE — TELEPHONE ENCOUNTER
I saw pt  Today  Told her to stop ASA  In case biopsy required , this is only 2 days  Prior to procedure, preop department  Will be  Speaking to her  Daughter with instructions?  Pt is diabetic  Advised her not to take  Diabetic  meds in am on the  Day of p

## 2021-02-11 LAB — SARS-COV-2 RNA RESP QL NAA+PROBE: NOT DETECTED

## 2021-02-11 RX ORDER — METOCLOPRAMIDE 10 MG/1
10 TABLET ORAL ONCE
Status: DISCONTINUED | OUTPATIENT
Start: 2021-02-11 | End: 2021-02-11

## 2021-02-11 RX ORDER — PNV NO.95/FERROUS FUM/FOLIC AC 28MG-0.8MG
TABLET ORAL DAILY
COMMUNITY

## 2021-02-12 ENCOUNTER — ANESTHESIA EVENT (OUTPATIENT)
Dept: SURGERY | Facility: HOSPITAL | Age: 56
End: 2021-02-12
Payer: MEDICAID

## 2021-02-12 ENCOUNTER — HOSPITAL ENCOUNTER (OUTPATIENT)
Facility: HOSPITAL | Age: 56
Setting detail: HOSPITAL OUTPATIENT SURGERY
Discharge: HOME | End: 2021-02-12
Attending: OTOLARYNGOLOGY | Admitting: OTOLARYNGOLOGY
Payer: MEDICAID

## 2021-02-12 ENCOUNTER — ANESTHESIA (OUTPATIENT)
Dept: SURGERY | Facility: HOSPITAL | Age: 56
End: 2021-02-12
Payer: MEDICAID

## 2021-02-12 VITALS
TEMPERATURE: 98 F | SYSTOLIC BLOOD PRESSURE: 140 MMHG | HEART RATE: 81 BPM | BODY MASS INDEX: 32.1 KG/M2 | DIASTOLIC BLOOD PRESSURE: 86 MMHG | HEIGHT: 64 IN | WEIGHT: 188 LBS | RESPIRATION RATE: 18 BRPM | OXYGEN SATURATION: 96 %

## 2021-02-12 DIAGNOSIS — R13.10 ODYNOPHAGIA: ICD-10-CM

## 2021-02-12 DIAGNOSIS — Z01.818 PREOP TESTING: Primary | ICD-10-CM

## 2021-02-12 LAB — GLUCOSE BLDC GLUCOMTR-MCNC: 170 MG/DL (ref 70–99)

## 2021-02-12 PROCEDURE — 31525 DX LARYNGOSCOPY EXCL NB: CPT | Performed by: OTOLARYNGOLOGY

## 2021-02-12 PROCEDURE — 0CJS8ZZ INSPECTION OF LARYNX, VIA NATURAL OR ARTIFICIAL OPENING ENDOSCOPIC: ICD-10-PCS | Performed by: OTOLARYNGOLOGY

## 2021-02-12 RX ORDER — LIDOCAINE HYDROCHLORIDE 10 MG/ML
INJECTION, SOLUTION EPIDURAL; INFILTRATION; INTRACAUDAL; PERINEURAL AS NEEDED
Status: DISCONTINUED | OUTPATIENT
Start: 2021-02-12 | End: 2021-02-12 | Stop reason: SURG

## 2021-02-12 RX ORDER — HYDROMORPHONE HYDROCHLORIDE 1 MG/ML
0.2 INJECTION, SOLUTION INTRAMUSCULAR; INTRAVENOUS; SUBCUTANEOUS EVERY 5 MIN PRN
Status: DISCONTINUED | OUTPATIENT
Start: 2021-02-12 | End: 2021-02-12

## 2021-02-12 RX ORDER — MORPHINE SULFATE 4 MG/ML
4 INJECTION, SOLUTION INTRAMUSCULAR; INTRAVENOUS EVERY 10 MIN PRN
Status: DISCONTINUED | OUTPATIENT
Start: 2021-02-12 | End: 2021-02-12

## 2021-02-12 RX ORDER — MORPHINE SULFATE 10 MG/ML
6 INJECTION, SOLUTION INTRAMUSCULAR; INTRAVENOUS EVERY 10 MIN PRN
Status: DISCONTINUED | OUTPATIENT
Start: 2021-02-12 | End: 2021-02-12

## 2021-02-12 RX ORDER — ONDANSETRON 2 MG/ML
4 INJECTION INTRAMUSCULAR; INTRAVENOUS ONCE AS NEEDED
Status: DISCONTINUED | OUTPATIENT
Start: 2021-02-12 | End: 2021-02-12

## 2021-02-12 RX ORDER — HYDROCODONE BITARTRATE AND ACETAMINOPHEN 5; 325 MG/1; MG/1
1 TABLET ORAL AS NEEDED
Status: COMPLETED | OUTPATIENT
Start: 2021-02-12 | End: 2021-02-12

## 2021-02-12 RX ORDER — ACETAMINOPHEN 500 MG
1000 TABLET ORAL ONCE
Status: COMPLETED | OUTPATIENT
Start: 2021-02-12 | End: 2021-02-12

## 2021-02-12 RX ORDER — MORPHINE SULFATE 4 MG/ML
2 INJECTION, SOLUTION INTRAMUSCULAR; INTRAVENOUS EVERY 10 MIN PRN
Status: DISCONTINUED | OUTPATIENT
Start: 2021-02-12 | End: 2021-02-12

## 2021-02-12 RX ORDER — SODIUM CHLORIDE, SODIUM LACTATE, POTASSIUM CHLORIDE, CALCIUM CHLORIDE 600; 310; 30; 20 MG/100ML; MG/100ML; MG/100ML; MG/100ML
INJECTION, SOLUTION INTRAVENOUS CONTINUOUS
Status: DISCONTINUED | OUTPATIENT
Start: 2021-02-12 | End: 2021-02-12

## 2021-02-12 RX ORDER — DEXAMETHASONE SODIUM PHOSPHATE 4 MG/ML
VIAL (ML) INJECTION AS NEEDED
Status: DISCONTINUED | OUTPATIENT
Start: 2021-02-12 | End: 2021-02-12 | Stop reason: SURG

## 2021-02-12 RX ORDER — FAMOTIDINE 20 MG/1
20 TABLET ORAL ONCE
Status: COMPLETED | OUTPATIENT
Start: 2021-02-12 | End: 2021-02-12

## 2021-02-12 RX ORDER — NALOXONE HYDROCHLORIDE 0.4 MG/ML
80 INJECTION, SOLUTION INTRAMUSCULAR; INTRAVENOUS; SUBCUTANEOUS AS NEEDED
Status: DISCONTINUED | OUTPATIENT
Start: 2021-02-12 | End: 2021-02-12

## 2021-02-12 RX ORDER — DEXTROSE MONOHYDRATE 25 G/50ML
50 INJECTION, SOLUTION INTRAVENOUS
Status: DISCONTINUED | OUTPATIENT
Start: 2021-02-12 | End: 2021-02-12

## 2021-02-12 RX ORDER — METOPROLOL TARTRATE 5 MG/5ML
2.5 INJECTION INTRAVENOUS ONCE
Status: DISCONTINUED | OUTPATIENT
Start: 2021-02-12 | End: 2021-02-12

## 2021-02-12 RX ORDER — ROCURONIUM BROMIDE 10 MG/ML
INJECTION, SOLUTION INTRAVENOUS AS NEEDED
Status: DISCONTINUED | OUTPATIENT
Start: 2021-02-12 | End: 2021-02-12 | Stop reason: SURG

## 2021-02-12 RX ORDER — HYDROCODONE BITARTRATE AND ACETAMINOPHEN 5; 325 MG/1; MG/1
2 TABLET ORAL AS NEEDED
Status: COMPLETED | OUTPATIENT
Start: 2021-02-12 | End: 2021-02-12

## 2021-02-12 RX ORDER — HYDROMORPHONE HYDROCHLORIDE 1 MG/ML
0.4 INJECTION, SOLUTION INTRAMUSCULAR; INTRAVENOUS; SUBCUTANEOUS EVERY 5 MIN PRN
Status: DISCONTINUED | OUTPATIENT
Start: 2021-02-12 | End: 2021-02-12

## 2021-02-12 RX ORDER — HYDROMORPHONE HYDROCHLORIDE 1 MG/ML
0.6 INJECTION, SOLUTION INTRAMUSCULAR; INTRAVENOUS; SUBCUTANEOUS EVERY 5 MIN PRN
Status: DISCONTINUED | OUTPATIENT
Start: 2021-02-12 | End: 2021-02-12

## 2021-02-12 RX ORDER — HYDROCODONE BITARTRATE AND ACETAMINOPHEN 7.5; 325 MG/1; MG/1
1-2 TABLET ORAL EVERY 4 HOURS PRN
Qty: 15 TABLET | Refills: 0 | Status: SHIPPED | OUTPATIENT
Start: 2021-02-12

## 2021-02-12 RX ORDER — PROCHLORPERAZINE EDISYLATE 5 MG/ML
5 INJECTION INTRAMUSCULAR; INTRAVENOUS ONCE AS NEEDED
Status: DISCONTINUED | OUTPATIENT
Start: 2021-02-12 | End: 2021-02-12

## 2021-02-12 RX ORDER — ONDANSETRON 2 MG/ML
INJECTION INTRAMUSCULAR; INTRAVENOUS AS NEEDED
Status: DISCONTINUED | OUTPATIENT
Start: 2021-02-12 | End: 2021-02-12 | Stop reason: SURG

## 2021-02-12 RX ORDER — GLYCOPYRROLATE 0.2 MG/ML
INJECTION, SOLUTION INTRAMUSCULAR; INTRAVENOUS AS NEEDED
Status: DISCONTINUED | OUTPATIENT
Start: 2021-02-12 | End: 2021-02-12 | Stop reason: SURG

## 2021-02-12 RX ADMIN — GLYCOPYRROLATE 0.2 MG: 0.2 INJECTION, SOLUTION INTRAMUSCULAR; INTRAVENOUS at 08:31:00

## 2021-02-12 RX ADMIN — SODIUM CHLORIDE, SODIUM LACTATE, POTASSIUM CHLORIDE, CALCIUM CHLORIDE: 600; 310; 30; 20 INJECTION, SOLUTION INTRAVENOUS at 08:53:00

## 2021-02-12 RX ADMIN — LIDOCAINE HYDROCHLORIDE 50 MG: 10 INJECTION, SOLUTION EPIDURAL; INFILTRATION; INTRACAUDAL; PERINEURAL at 08:30:00

## 2021-02-12 RX ADMIN — ROCURONIUM BROMIDE 5 MG: 10 INJECTION, SOLUTION INTRAVENOUS at 08:30:00

## 2021-02-12 RX ADMIN — SODIUM CHLORIDE, SODIUM LACTATE, POTASSIUM CHLORIDE, CALCIUM CHLORIDE: 600; 310; 30; 20 INJECTION, SOLUTION INTRAVENOUS at 08:22:00

## 2021-02-12 RX ADMIN — ONDANSETRON 4 MG: 2 INJECTION INTRAMUSCULAR; INTRAVENOUS at 08:31:00

## 2021-02-12 RX ADMIN — DEXAMETHASONE SODIUM PHOSPHATE 4 MG: 4 MG/ML VIAL (ML) INJECTION at 08:31:00

## 2021-02-12 NOTE — ANESTHESIA PROCEDURE NOTES
Airway  Urgency: Elective      General Information and Staff    Patient location during procedure: OR  Anesthesiologist: Kaye iJmenez MD  Resident/CRNA: Karma Smith CRNA  Performed: CRNA     Indications and Patient Condition  Indications for airway

## 2021-02-12 NOTE — ANESTHESIA POSTPROCEDURE EVALUATION
Patient: Rosendo Calderon    Procedure Summary     Date: 02/12/21 Room / Location: [facility] OR 02 / [facility] OR    Anesthesia Start: 0820 Anesthesia Stop: 2042    Procedure: LARYNGOSCOPY DIRECT (N/A ) Diagnosis:       Odynophagia      (Odynophagia [R13.10])

## 2021-02-12 NOTE — OPERATIVE REPORT
2/12/2021  Randi Lobo  PREOPERATIVE DIAGNOSIS: persistant left throat and neck pain  POSTOPERATIVE DIAGNOSIS: Same. OPERATIVE PROCEDURE:   Direct microlaryngoscopy with biopsy  ATTENDING SURGEON:   Paul Cramer M.D.     ANESTHESIA:  GENERAL  ARIELA

## 2021-02-12 NOTE — PROGRESS NOTES
HPI:    Patient ID: Ira Born is a 48year old female.   Patient presents for preop visit    HPI  Patient scheduled for direct laryngoscopy under general anesthesia, she has chronic pain on the right side of the neck, she was seen by ENT specialist an (112 mcg total) by mouth before breakfast. 90 tablet 1   • glipiZIDE 10 MG Oral Tab Take 1 tablet (10 mg total) by mouth 2 (two) times daily before meals. 180 tablet 1   • simvastatin 20 MG Oral Tab Take 1 tablet (20 mg total) by mouth nightly.  90 tablet 0 Eyes: Pupils are equal, round, and reactive to light. Conjunctivae and EOM are normal. No scleral icterus. Neck: Normal range of motion. Neck supple. No JVD present. Cardiovascular: Normal rate, regular rhythm and normal heart sounds.     No murmur he

## 2021-02-12 NOTE — INTERVAL H&P NOTE
Pre-op Diagnosis: Odynophagia [R13.10]    The above referenced H&P was reviewed by Phillip Roque MD on 2/12/2021, the patient was examined and no significant changes have occurred in the patient's condition since the H&P was performed.   I discussed with th

## 2021-02-13 ENCOUNTER — TELEPHONE (OUTPATIENT)
Dept: OTOLARYNGOLOGY | Facility: CLINIC | Age: 56
End: 2021-02-13

## 2021-02-13 NOTE — TELEPHONE ENCOUNTER
Post op day 1 Direct Laryngoscopy with possible Biopsy,pt stated she is doing fine no bleeding ,no fever,eating and drinking ok,reviewed postop medication,instructed not use voice for 24 hours ,avoid excessive use of voice x 1 week,to call our office for a

## 2021-02-18 ENCOUNTER — OFFICE VISIT (OUTPATIENT)
Dept: PHYSICAL THERAPY | Age: 56
End: 2021-02-18
Attending: INTERNAL MEDICINE
Payer: MEDICAID

## 2021-02-18 DIAGNOSIS — M54.12 CERVICAL RADICULOPATHY: ICD-10-CM

## 2021-02-18 DIAGNOSIS — M77.8 TENDINITIS OF LEFT SHOULDER: ICD-10-CM

## 2021-02-18 PROCEDURE — 97162 PT EVAL MOD COMPLEX 30 MIN: CPT | Performed by: PHYSICAL THERAPIST

## 2021-02-18 PROCEDURE — 97014 ELECTRIC STIMULATION THERAPY: CPT | Performed by: PHYSICAL THERAPIST

## 2021-02-18 PROCEDURE — 97110 THERAPEUTIC EXERCISES: CPT | Performed by: PHYSICAL THERAPIST

## 2021-02-18 NOTE — PROGRESS NOTES
SHOULDER EVALUATION:   Referring Physician: Dr. Yaniv Jasso  Diagnosis: L shoulder tendonitis   Date of Service: 2/18/2021     PATIENT SUMMARY   Franklyn Manuel is a 48year old female who presents to therapy today with complaints of L shoulder starting about sub occipital region L > R  Sensation: intact  Cervical Screen: ROT R 55 pain L UT, , ROT L 65, SB R 35 pain L UT, SBL 45, Ext 35, Flex 45 pain L UT    AROM: (* denotes performed with pain)PROM L shoulder full motion, pain end range, Abd full motion pain e Treatment will include: Manual Therapy, Therapeutic Exercise, Home Exercise Program instruction and Modalities to include: Electrical stimulation (unattended) and Ultrasound    Education or treatment limitation: Communication  Rehab Potential:good    FOTO:

## 2021-02-23 ENCOUNTER — APPOINTMENT (OUTPATIENT)
Dept: PHYSICAL THERAPY | Age: 56
End: 2021-02-23
Attending: INTERNAL MEDICINE
Payer: MEDICAID

## 2021-02-25 ENCOUNTER — APPOINTMENT (OUTPATIENT)
Dept: PHYSICAL THERAPY | Age: 56
End: 2021-02-25
Attending: INTERNAL MEDICINE
Payer: MEDICAID

## 2021-03-01 ENCOUNTER — OFFICE VISIT (OUTPATIENT)
Dept: PHYSICAL THERAPY | Age: 56
End: 2021-03-01
Attending: INTERNAL MEDICINE
Payer: MEDICAID

## 2021-03-01 PROCEDURE — 97035 APP MDLTY 1+ULTRASOUND EA 15: CPT | Performed by: PHYSICAL THERAPIST

## 2021-03-01 PROCEDURE — 97140 MANUAL THERAPY 1/> REGIONS: CPT | Performed by: PHYSICAL THERAPIST

## 2021-03-01 PROCEDURE — 97110 THERAPEUTIC EXERCISES: CPT | Performed by: PHYSICAL THERAPIST

## 2021-03-01 NOTE — PROGRESS NOTES
Dx:L shoulder tendonitis         Insurance (Authorized # of Visits):  2/8           Authorizing Physician: Dr. Rubia Smith  Next MD visit: none scheduled  Fall Risk: standard         Precautions: n/a             Subjective: Daughter present for interpretation. 10  Posture re - education again   Standing wall slides                               HEP: FOcus on improved posture during day, prone scapular retractions, seated cervical retractions.      Charges: ultrasound, MM, Ex        Total Timed Treatment: 40 min

## 2021-03-04 ENCOUNTER — OFFICE VISIT (OUTPATIENT)
Dept: PHYSICAL THERAPY | Age: 56
End: 2021-03-04
Attending: INTERNAL MEDICINE
Payer: MEDICAID

## 2021-03-04 PROCEDURE — 97110 THERAPEUTIC EXERCISES: CPT | Performed by: PHYSICAL THERAPIST

## 2021-03-04 PROCEDURE — 97035 APP MDLTY 1+ULTRASOUND EA 15: CPT | Performed by: PHYSICAL THERAPIST

## 2021-03-04 PROCEDURE — 97140 MANUAL THERAPY 1/> REGIONS: CPT | Performed by: PHYSICAL THERAPIST

## 2021-03-04 NOTE — PROGRESS NOTES
Dx:L shoulder tendonitis         Insurance (Authorized # of Visits):  3/8           Authorizing Physician: Dr. Frandy Felton  Next MD visit: none scheduled  Fall Risk: standard         Precautions: n/a             Subjective: Daughter present for interpretation. retractions with YTB  x 10  Seated ER with YTB x 10  Standing wall slides                               HEP: YTB rows, ER seated.     Charges: ultrasound, MM, Ex        Total Timed Treatment: 40 min  Total Treatment Time: 40 min

## 2021-03-08 ENCOUNTER — APPOINTMENT (OUTPATIENT)
Dept: PHYSICAL THERAPY | Age: 56
End: 2021-03-08
Attending: INTERNAL MEDICINE
Payer: MEDICAID

## 2021-03-11 ENCOUNTER — APPOINTMENT (OUTPATIENT)
Dept: PHYSICAL THERAPY | Age: 56
End: 2021-03-11
Attending: INTERNAL MEDICINE
Payer: MEDICAID

## 2021-03-15 ENCOUNTER — TELEPHONE (OUTPATIENT)
Dept: INTERNAL MEDICINE CLINIC | Facility: CLINIC | Age: 56
End: 2021-03-15

## 2021-03-15 ENCOUNTER — APPOINTMENT (OUTPATIENT)
Dept: PHYSICAL THERAPY | Age: 56
End: 2021-03-15
Attending: INTERNAL MEDICINE
Payer: MEDICAID

## 2021-03-15 RX ORDER — ALLOPURINOL 100 MG/1
100 TABLET ORAL NIGHTLY
Qty: 30 TABLET | Refills: 5 | Status: SHIPPED | OUTPATIENT
Start: 2021-03-15 | End: 2021-05-09

## 2021-03-15 RX ORDER — GABAPENTIN 400 MG/1
400 CAPSULE ORAL DAILY
Qty: 30 CAPSULE | Refills: 0 | Status: CANCELLED | OUTPATIENT
Start: 2021-03-15

## 2021-03-15 RX ORDER — SIMVASTATIN 20 MG
20 TABLET ORAL NIGHTLY
Qty: 30 TABLET | Refills: 0 | Status: CANCELLED | OUTPATIENT
Start: 2021-03-15

## 2021-03-15 RX ORDER — ALLOPURINOL 100 MG/1
100 TABLET ORAL NIGHTLY
Qty: 30 TABLET | Refills: 0 | Status: CANCELLED | OUTPATIENT
Start: 2021-03-15

## 2021-03-15 RX ORDER — LIRAGLUTIDE 6 MG/ML
1.8 INJECTION SUBCUTANEOUS DAILY
Qty: 9 ML | Refills: 0 | Status: CANCELLED | OUTPATIENT
Start: 2021-03-15

## 2021-03-15 RX ORDER — NAPROXEN 500 MG/1
TABLET ORAL
Qty: 30 TABLET | Refills: 0 | Status: SHIPPED | OUTPATIENT
Start: 2021-03-15 | End: 2021-08-10

## 2021-03-15 NOTE — TELEPHONE ENCOUNTER
Patient called and advised she was trying to get in to see Dr. Ulises Ashby either today, Tuesday or Wednesday. She was told that the injections for her arms should last 6 months. Once pain return she should come back form more injections. Patient advised the pain has returned and it is really bad pain. Patient wanted to know if there is any way we can squeeze her in to Dr. Luis Angel Lawler schedule this week? Please Advise.

## 2021-03-15 NOTE — TELEPHONE ENCOUNTER
Patient is leaving for [place] [facility] [employer] on Thursday 3/18 and is requesting we send a 30 day supply of all of her medication to [facility] [place] [address] [facility] on [address] [facility] [address]    allopurinol 100 MG Oral Tab    gabapentin 400 MG Oral Cap    insulin glargine 100 UNIT/ML Subcutaneous Solution Pen-injector    Levothyroxine Sodium 112 MCG Oral Tab    Sertraline HCl 100 MG Oral Tab    simvastatin 20 MG Oral Tab    traZODone HCl 100 MG Oral Tab    Liraglutide (VICTOZA SC)

## 2021-03-15 NOTE — TELEPHONE ENCOUNTER
Spoke to daughter, advised that patient can see rheumatologist who gave her injections last time several months ago, hopefully Dr. Kenia Bautista rheumatologist can see her within next 3 days. I will refill all medications as requested, advised that insurance may not pay for supply sooner than it is permissible, they can talk to pharmacist about that. Daughter for rehab states that patient slept well when she took naproxen 500 mg, and she wants prescription, advised that patient should not be taking this medication every day as it can upset her stomach if she is going to use this medication daily, and medication interacts with sertraline if used on daily basis can increase risk of developing stomach ulcer.

## 2021-03-17 ENCOUNTER — TELEPHONE (OUTPATIENT)
Dept: INTERNAL MEDICINE CLINIC | Facility: CLINIC | Age: 56
End: 2021-03-17

## 2021-03-17 ENCOUNTER — OFFICE VISIT (OUTPATIENT)
Dept: RHEUMATOLOGY | Facility: CLINIC | Age: 56
End: 2021-03-17
Payer: MEDICAID

## 2021-03-17 VITALS
HEIGHT: 64 IN | BODY MASS INDEX: 32.44 KG/M2 | DIASTOLIC BLOOD PRESSURE: 101 MMHG | SYSTOLIC BLOOD PRESSURE: 171 MMHG | WEIGHT: 190 LBS

## 2021-03-17 DIAGNOSIS — R20.0 NUMBNESS AND TINGLING OF HAND: ICD-10-CM

## 2021-03-17 DIAGNOSIS — Z79.4 TYPE 2 DIABETES MELLITUS WITH DIABETIC NEUROPATHY, WITH LONG-TERM CURRENT USE OF INSULIN (HCC): ICD-10-CM

## 2021-03-17 DIAGNOSIS — R20.2 NUMBNESS AND TINGLING OF HAND: ICD-10-CM

## 2021-03-17 DIAGNOSIS — E11.40 TYPE 2 DIABETES MELLITUS WITH DIABETIC NEUROPATHY, WITH LONG-TERM CURRENT USE OF INSULIN (HCC): ICD-10-CM

## 2021-03-17 DIAGNOSIS — M06.09 RHEUMATOID ARTHRITIS OF MULTIPLE SITES WITH NEGATIVE RHEUMATOID FACTOR (HCC): Primary | ICD-10-CM

## 2021-03-17 PROCEDURE — 3008F BODY MASS INDEX DOCD: CPT | Performed by: INTERNAL MEDICINE

## 2021-03-17 PROCEDURE — 3080F DIAST BP >= 90 MM HG: CPT | Performed by: INTERNAL MEDICINE

## 2021-03-17 PROCEDURE — 99214 OFFICE O/P EST MOD 30 MIN: CPT | Performed by: INTERNAL MEDICINE

## 2021-03-17 PROCEDURE — 3077F SYST BP >= 140 MM HG: CPT | Performed by: INTERNAL MEDICINE

## 2021-03-17 RX ORDER — HYDROXYCHLOROQUINE SULFATE 200 MG/1
400 TABLET, FILM COATED ORAL DAILY
Qty: 180 TABLET | Refills: 0 | Status: SHIPPED | OUTPATIENT
Start: 2021-03-17 | End: 2021-08-06

## 2021-03-17 RX ORDER — PREDNISONE 10 MG/1
TABLET ORAL
Qty: 30 TABLET | Refills: 0 | Status: SHIPPED | OUTPATIENT
Start: 2021-03-17 | End: 2021-06-09 | Stop reason: ALTCHOICE

## 2021-03-17 RX ORDER — INSULIN GLARGINE 100 [IU]/ML
40 INJECTION, SOLUTION SUBCUTANEOUS EVERY MORNING
Qty: 12 PEN | Refills: 3 | Status: SHIPPED | OUTPATIENT
Start: 2021-03-17 | End: 2022-01-28

## 2021-03-17 RX ORDER — LIRAGLUTIDE 6 MG/ML
INJECTION SUBCUTANEOUS
Qty: 1 PEN | Refills: 5 | Status: CANCELLED | OUTPATIENT
Start: 2021-03-17

## 2021-03-17 NOTE — TELEPHONE ENCOUNTER
Last seen 2/2/21 (Virtual) Medication is noted as taking. Return to clinic in 3 months (5/21) Refill pended and routed to Dr. Celeste Martinez.

## 2021-03-17 NOTE — TELEPHONE ENCOUNTER
FAX RECEIVED. REFILL REQUEST:    •  lisinopril 20 MG Oral Tab, Take 1 tablet (20 mg total) by mouth 2 (two) times daily. , Disp: 180 tablet, Rfl: 0

## 2021-03-17 NOTE — PROGRESS NOTES
Ryan Pate is a 48year old female. HPI:   Patient presents with:   Follow - Up  Wrist Pain: B/L wrist pain      I had the pleasure of seeing Ryan Pate on 3/17/2021 for follow up polyarthralgia's partically b/l hand, from CTS vs possible RA help  Pain in both shoulders and mid back    HISTORY:  Past Medical History:   Diagnosis Date   • Asthma    • Depression    • Diabetes ([facility] [address]    • Diabetes type 2, controlled ([facility] [address]    • Disorder of thyroid    • Diverticulitis 5/15   • Esophageal reflux    • MG Oral Tab Take 1 tablet (20 mg total) by mouth 2 (two) times daily. 180 tablet 0   • glipiZIDE 10 MG Oral Tab Take 1 tablet (10 mg total) by mouth 2 (two) times daily before meals.  180 tablet 1   • metFORMIN HCl 500 MG Oral Tab Take 2 tablets (1,000 mg t 0 - 30 mm/Hr 16   C-REACTIVE PROTEIN      <0.30 mg/dL 1.11 (H)   URIC ACID      2.6 - 6.0 mg/dL 4.7   MILADYS SCREEN      Negative Negative   Anti Double Strand DNA      <10 <10   RHEUMATOID FACTOR      <15 IU/mL <10   C-Citrullinated Peptide IgG AB      0.0 - include rash, changes in skin pigment (such as darkening or dark spots), hair changes, muscle weakness and retinal toxicity. It is recommended that you have an eye exam within the first year of use, then repeat yearly.   - Advised patient that she will need

## 2021-03-17 NOTE — PATIENT INSTRUCTIONS
You were seen today for possible rheumatoid arthritis  Plan to start prednisone 40 mg daily x3 days then 30 mg daily x3 days then 20 mg daily x3 days then 10 mg daily x3 days then stop  Plan to also start plaquinel 400 mg daily   Will need to get EMG/nerve

## 2021-03-18 ENCOUNTER — APPOINTMENT (OUTPATIENT)
Dept: PHYSICAL THERAPY | Age: 56
End: 2021-03-18
Attending: INTERNAL MEDICINE
Payer: MEDICAID

## 2021-03-18 RX ORDER — LISINOPRIL 20 MG/1
20 TABLET ORAL 2 TIMES DAILY
Qty: 180 TABLET | Refills: 0 | Status: SHIPPED | OUTPATIENT
Start: 2021-03-18 | End: 2021-05-09

## 2021-03-25 RX ORDER — LISINOPRIL 20 MG/1
TABLET ORAL
Qty: 180 TABLET | Refills: 0 | OUTPATIENT
Start: 2021-03-25

## 2021-04-01 NOTE — TELEPHONE ENCOUNTER
4/1/21 Faxed prescription for breast pump to [facility].  Breast pump will be delivered to pt's home./NG   Prior auth for Scope to be done in the office still in process thru [id number] KENYON,LUKAS#[id number] and clinicals were faxed to #[phone] confirmation.

## 2021-04-30 ENCOUNTER — HOSPITAL ENCOUNTER (OUTPATIENT)
Age: 56
Discharge: HOME | End: 2021-04-30
Payer: MEDICAID

## 2021-04-30 VITALS
TEMPERATURE: 97 F | OXYGEN SATURATION: 96 % | RESPIRATION RATE: 18 BRPM | SYSTOLIC BLOOD PRESSURE: 180 MMHG | DIASTOLIC BLOOD PRESSURE: 95 MMHG | HEART RATE: 78 BPM

## 2021-04-30 DIAGNOSIS — Z20.822 ENCOUNTER FOR LABORATORY TESTING FOR COVID-19 VIRUS: Primary | ICD-10-CM

## 2021-04-30 PROCEDURE — 99212 OFFICE O/P EST SF 10 MIN: CPT

## 2021-04-30 NOTE — ED PROVIDER NOTES
Patient Seen in: [facility]      History   Patient presents with:  Testing    Stated Complaint: Covid test    HPI/Subjective:   Favio Hamilton is a 48year old female here for Covid testing for travel.   Medical history includes but not limit ear normal.      Left Ear: Tympanic membrane, ear canal and external ear normal.      Nose: No congestion. Mouth/Throat:      Mouth: Mucous membranes are moist.      Pharynx: No oropharyngeal exudate or posterior oropharyngeal erythema.    Eyes:      C cold medications such as Dayquil and Theraflu have acetaminophen (Tylenol).  Over-the-counter antihistamines Xyzal as long as there is no contraindication such as acute angle glaucoma, glaucoma, urinary retention, allergy, etc.     Medical Record Review/rajesh

## 2021-05-09 RX ORDER — LEVOTHYROXINE SODIUM 112 UG/1
112 TABLET ORAL
Qty: 30 TABLET | Refills: 0 | Status: SHIPPED | OUTPATIENT
Start: 2021-05-09 | End: 2022-09-29

## 2021-05-09 RX ORDER — METOPROLOL SUCCINATE 50 MG/1
50 TABLET, EXTENDED RELEASE ORAL DAILY
Qty: 30 TABLET | Refills: 0 | Status: SHIPPED | OUTPATIENT
Start: 2021-05-09 | End: 2021-06-06

## 2021-05-09 RX ORDER — SERTRALINE HYDROCHLORIDE 100 MG/1
100 TABLET, FILM COATED ORAL DAILY
Qty: 30 TABLET | Refills: 0 | Status: SHIPPED | OUTPATIENT
Start: 2021-05-09 | End: 2021-06-07

## 2021-05-09 RX ORDER — TRAZODONE HYDROCHLORIDE 100 MG/1
100 TABLET ORAL NIGHTLY
Qty: 30 TABLET | Refills: 0 | Status: SHIPPED | OUTPATIENT
Start: 2021-05-09 | End: 2021-05-24

## 2021-05-09 RX ORDER — ALLOPURINOL 100 MG/1
100 TABLET ORAL NIGHTLY
Qty: 30 TABLET | Refills: 0 | Status: SHIPPED | OUTPATIENT
Start: 2021-05-09 | End: 2021-06-07

## 2021-05-10 RX ORDER — LISINOPRIL 20 MG/1
20 TABLET ORAL 2 TIMES DAILY
Qty: 60 TABLET | Refills: 0 | Status: SHIPPED | OUTPATIENT
Start: 2021-05-10 | End: 2021-06-06

## 2021-05-24 RX ORDER — TRAZODONE HYDROCHLORIDE 100 MG/1
100 TABLET ORAL NIGHTLY
Qty: 30 TABLET | Refills: 0 | Status: SHIPPED | OUTPATIENT
Start: 2021-05-24 | End: 2021-12-25

## 2021-06-06 ENCOUNTER — HOSPITAL ENCOUNTER (EMERGENCY)
Facility: HOSPITAL | Age: 56
Discharge: HOME | End: 2021-06-06
Attending: EMERGENCY MEDICINE
Payer: MEDICAID

## 2021-06-06 VITALS
OXYGEN SATURATION: 95 % | SYSTOLIC BLOOD PRESSURE: 183 MMHG | BODY MASS INDEX: 33 KG/M2 | DIASTOLIC BLOOD PRESSURE: 96 MMHG | WEIGHT: 189.63 LBS | HEART RATE: 82 BPM | RESPIRATION RATE: 20 BRPM | TEMPERATURE: 98 F

## 2021-06-06 DIAGNOSIS — R55 SYNCOPE, VASOVAGAL: Primary | ICD-10-CM

## 2021-06-06 DIAGNOSIS — I10 HYPERTENSION, UNSPECIFIED TYPE: ICD-10-CM

## 2021-06-06 PROCEDURE — 96361 HYDRATE IV INFUSION ADD-ON: CPT

## 2021-06-06 PROCEDURE — 82962 GLUCOSE BLOOD TEST: CPT

## 2021-06-06 PROCEDURE — 93010 ELECTROCARDIOGRAM REPORT: CPT | Performed by: EMERGENCY MEDICINE

## 2021-06-06 PROCEDURE — 99284 EMERGENCY DEPT VISIT MOD MDM: CPT

## 2021-06-06 PROCEDURE — 85025 COMPLETE CBC W/AUTO DIFF WBC: CPT | Performed by: EMERGENCY MEDICINE

## 2021-06-06 PROCEDURE — 96375 TX/PRO/DX INJ NEW DRUG ADDON: CPT

## 2021-06-06 PROCEDURE — 85379 FIBRIN DEGRADATION QUANT: CPT | Performed by: EMERGENCY MEDICINE

## 2021-06-06 PROCEDURE — 96374 THER/PROPH/DIAG INJ IV PUSH: CPT

## 2021-06-06 PROCEDURE — 93005 ELECTROCARDIOGRAM TRACING: CPT

## 2021-06-06 PROCEDURE — 84484 ASSAY OF TROPONIN QUANT: CPT | Performed by: EMERGENCY MEDICINE

## 2021-06-06 PROCEDURE — 96360 HYDRATION IV INFUSION INIT: CPT

## 2021-06-06 PROCEDURE — 80048 BASIC METABOLIC PNL TOTAL CA: CPT | Performed by: EMERGENCY MEDICINE

## 2021-06-06 RX ORDER — ENALAPRILAT 2.5 MG/2ML
1.25 INJECTION INTRAVENOUS ONCE
Status: COMPLETED | OUTPATIENT
Start: 2021-06-06 | End: 2021-06-06

## 2021-06-06 RX ORDER — LORAZEPAM 2 MG/ML
0.5 INJECTION INTRAMUSCULAR ONCE
Status: COMPLETED | OUTPATIENT
Start: 2021-06-06 | End: 2021-06-06

## 2021-06-06 RX ORDER — LISINOPRIL 20 MG/1
20 TABLET ORAL 2 TIMES DAILY
Qty: 60 TABLET | Refills: 0 | Status: SHIPPED | OUTPATIENT
Start: 2021-06-06 | End: 2021-06-16

## 2021-06-06 RX ORDER — METOPROLOL SUCCINATE 50 MG/1
50 TABLET, EXTENDED RELEASE ORAL DAILY
Qty: 30 TABLET | Refills: 0 | Status: SHIPPED | OUTPATIENT
Start: 2021-06-06 | End: 2021-08-06

## 2021-06-06 NOTE — ED INITIAL ASSESSMENT (HPI)
Patient presents to ER via EMS with c/o syncopal episode while sitting in chair. Family reports LOC. Patient only speaks [place].

## 2021-06-06 NOTE — ED PROVIDER NOTES
Patient Seen in: [facility] AND [facility] Emergency Department      History   Patient presents with:  Syncope    Stated Complaint: syncope    HPI/Subjective:   HPI  History is provided by patient's son.      56-year-old female with history of diabetes, hypertens Neurological: Positive for syncope. Negative for dizziness. Psychiatric/Behavioral: Negative for suicidal ideas. All other systems reviewed and are negative. Positive for stated complaint: syncope  Other systems are as noted in HPI.   Constitutio room air is 96%, indicating adequate oxygenation.     PROCEDURES:  none    DIAGNOSTICS:   Labs:  Recent Results (from the past 24 hour(s))   POCT Glucose    Collection Time: 06/06/21  2:10 PM   Result Value Ref Range    POC Glucose  284 (H) 70 - 99 mg/dL condition was fair during Emergency Department evaluation.      55yoF with syncope  - I personally reviewed and interpreted all the ED vitals  - afebrile, hemodynamically stable  - I ordered and personally reviewed the labs and imaging and found no leukocyt file for this visit. There is no disposition time on file for this visit.     Follow-up:  Charmaien Ybarra MD  [address] [place] [address] [id number] [phone]    Schedule an appointment as soon as possible for a visit in 2 days  As needed          [address] [place] [address]

## 2021-06-07 ENCOUNTER — TELEPHONE (OUTPATIENT)
Dept: INTERNAL MEDICINE CLINIC | Facility: CLINIC | Age: 56
End: 2021-06-07

## 2021-06-07 RX ORDER — ALLOPURINOL 100 MG/1
100 TABLET ORAL NIGHTLY
Qty: 30 TABLET | Refills: 0 | Status: SHIPPED | OUTPATIENT
Start: 2021-06-07 | End: 2021-08-06

## 2021-06-07 RX ORDER — SERTRALINE HYDROCHLORIDE 100 MG/1
100 TABLET, FILM COATED ORAL DAILY
Qty: 30 TABLET | Refills: 0 | Status: SHIPPED | OUTPATIENT
Start: 2021-06-07 | End: 2021-08-06

## 2021-06-07 NOTE — TELEPHONE ENCOUNTER
Patient's Daughter called and advised patient is out of the medications. Patient has enough Trazodone but takes it with the Escitalopram & Sertraline to help her sleep. Please Advise.     Please Use Pharmacy: [facility] [address]

## 2021-06-07 NOTE — TELEPHONE ENCOUNTER
Patient is Currently scheduled for an ER Follow Up on 6/30. She was in the ER yesterday 6/06 for High Blood Pressure & Blood Sugar, She also felt faint and was breathing heavily. Also, patient is scheduled for her yearly physical on 6/16. They wanted to know if there was anything sooner? Please Advise.

## 2021-06-09 ENCOUNTER — OFFICE VISIT (OUTPATIENT)
Dept: INTERNAL MEDICINE CLINIC | Facility: CLINIC | Age: 56
End: 2021-06-09
Payer: MEDICAID

## 2021-06-09 VITALS
DIASTOLIC BLOOD PRESSURE: 94 MMHG | HEIGHT: 64 IN | RESPIRATION RATE: 18 BRPM | BODY MASS INDEX: 32.44 KG/M2 | HEART RATE: 96 BPM | WEIGHT: 190 LBS | SYSTOLIC BLOOD PRESSURE: 160 MMHG

## 2021-06-09 DIAGNOSIS — I10 ESSENTIAL HYPERTENSION: ICD-10-CM

## 2021-06-09 DIAGNOSIS — E11.40 TYPE 2 DIABETES MELLITUS WITH DIABETIC NEUROPATHY, WITH LONG-TERM CURRENT USE OF INSULIN (HCC): Primary | ICD-10-CM

## 2021-06-09 DIAGNOSIS — Z79.4 TYPE 2 DIABETES MELLITUS WITH DIABETIC NEUROPATHY, WITH LONG-TERM CURRENT USE OF INSULIN (HCC): Primary | ICD-10-CM

## 2021-06-09 DIAGNOSIS — M25.59 PAIN IN OTHER JOINT: ICD-10-CM

## 2021-06-09 DIAGNOSIS — G45.9 TIA (TRANSIENT ISCHEMIC ATTACK): ICD-10-CM

## 2021-06-09 PROCEDURE — 3008F BODY MASS INDEX DOCD: CPT | Performed by: INTERNAL MEDICINE

## 2021-06-09 PROCEDURE — 3080F DIAST BP >= 90 MM HG: CPT | Performed by: INTERNAL MEDICINE

## 2021-06-09 PROCEDURE — 82962 GLUCOSE BLOOD TEST: CPT | Performed by: INTERNAL MEDICINE

## 2021-06-09 PROCEDURE — 3077F SYST BP >= 140 MM HG: CPT | Performed by: INTERNAL MEDICINE

## 2021-06-09 PROCEDURE — 99214 OFFICE O/P EST MOD 30 MIN: CPT | Performed by: INTERNAL MEDICINE

## 2021-06-09 RX ORDER — GABAPENTIN 300 MG/1
300 CAPSULE ORAL 2 TIMES DAILY
COMMUNITY
Start: 2021-02-20 | End: 2021-06-09

## 2021-06-09 RX ORDER — PEN NEEDLE, DIABETIC 32GX 5/32"
1 NEEDLE, DISPOSABLE MISCELLANEOUS DAILY
COMMUNITY
Start: 2021-02-11

## 2021-06-09 RX ORDER — LOSARTAN POTASSIUM 100 MG/1
100 TABLET ORAL DAILY
Qty: 90 TABLET | Refills: 1 | Status: SHIPPED | OUTPATIENT
Start: 2021-06-09 | End: 2021-09-03

## 2021-06-09 RX ORDER — LEVOTHYROXINE SODIUM 0.12 MG/1
TABLET ORAL
COMMUNITY
Start: 2021-03-17 | End: 2021-06-09 | Stop reason: DRUGHIGH

## 2021-06-14 NOTE — PROGRESS NOTES
HPI/Subjective:   Patient ID: Wava Rachelle is a 48year old female.   Presents for follow-up on hypertension, diabetes mellitus, joint pain    HPI  Patient was seen in emergency room recently because of the headache elevated blood pressure,  Patient had Oral Tab Take 1 tablet (20 mg total) by mouth 2 (two) times daily. 60 tablet 0   • Metoprolol Succinate ER 50 MG Oral Tablet 24 Hr Take 1 tablet (50 mg total) by mouth daily.  30 tablet 0   • traZODone HCl 100 MG Oral Tab Take 1 tablet (100 mg total) by chen Tab Take 1 tablet by mouth every morning. • aspirin 81 MG Oral Tab EC Take 81 mg by mouth daily.        Allergies:No Known Allergies  BP (!) 160/94   Pulse 96   Resp 18   Ht 5' 4\" (1.626 m)   Wt 190 lb (86.2 kg)   BMI 32.61 kg/m²   Physical Exam  Con will add losartan 100 mg daily to current regimen discontinue lisinopril, continue metoprolol, patient used to have diuretic which was discontinued because of dehydration may need to restart  Pain in other joint see rheumatology for reevaluation    Orders

## 2021-06-15 ENCOUNTER — LAB ENCOUNTER (OUTPATIENT)
Dept: LAB | Age: 56
End: 2021-06-15
Attending: INTERNAL MEDICINE
Payer: MEDICAID

## 2021-06-15 DIAGNOSIS — E11.40 TYPE 2 DIABETES MELLITUS WITH DIABETIC NEUROPATHY, WITH LONG-TERM CURRENT USE OF INSULIN (HCC): ICD-10-CM

## 2021-06-15 DIAGNOSIS — I10 ESSENTIAL HYPERTENSION: ICD-10-CM

## 2021-06-15 DIAGNOSIS — Z79.4 TYPE 2 DIABETES MELLITUS WITH DIABETIC NEUROPATHY, WITH LONG-TERM CURRENT USE OF INSULIN (HCC): ICD-10-CM

## 2021-06-15 PROCEDURE — 3060F POS MICROALBUMINURIA REV: CPT | Performed by: INTERNAL MEDICINE

## 2021-06-15 PROCEDURE — 80061 LIPID PANEL: CPT

## 2021-06-15 PROCEDURE — 80053 COMPREHEN METABOLIC PANEL: CPT

## 2021-06-15 PROCEDURE — 3046F HEMOGLOBIN A1C LEVEL >9.0%: CPT | Performed by: INTERNAL MEDICINE

## 2021-06-15 PROCEDURE — 36415 COLL VENOUS BLD VENIPUNCTURE: CPT

## 2021-06-15 PROCEDURE — 82043 UR ALBUMIN QUANTITATIVE: CPT

## 2021-06-15 PROCEDURE — 82570 ASSAY OF URINE CREATININE: CPT

## 2021-06-15 PROCEDURE — 83036 HEMOGLOBIN GLYCOSYLATED A1C: CPT

## 2021-06-16 ENCOUNTER — OFFICE VISIT (OUTPATIENT)
Dept: INTERNAL MEDICINE CLINIC | Facility: CLINIC | Age: 56
End: 2021-06-16
Payer: MEDICAID

## 2021-06-16 ENCOUNTER — LAB ENCOUNTER (OUTPATIENT)
Dept: LAB | Age: 56
End: 2021-06-16
Attending: INTERNAL MEDICINE
Payer: MEDICAID

## 2021-06-16 VITALS
BODY MASS INDEX: 32.61 KG/M2 | OXYGEN SATURATION: 95 % | HEIGHT: 64 IN | DIASTOLIC BLOOD PRESSURE: 98 MMHG | WEIGHT: 191 LBS | HEART RATE: 98 BPM | SYSTOLIC BLOOD PRESSURE: 150 MMHG

## 2021-06-16 DIAGNOSIS — N39.0 URINARY TRACT INFECTION WITHOUT HEMATURIA, SITE UNSPECIFIED: ICD-10-CM

## 2021-06-16 DIAGNOSIS — Z00.00 PHYSICAL EXAM, ANNUAL: ICD-10-CM

## 2021-06-16 DIAGNOSIS — E11.40 TYPE 2 DIABETES MELLITUS WITH DIABETIC NEUROPATHY, WITH LONG-TERM CURRENT USE OF INSULIN (HCC): ICD-10-CM

## 2021-06-16 DIAGNOSIS — M79.18 MYOFASCIAL PAIN SYNDROME, CERVICAL: ICD-10-CM

## 2021-06-16 DIAGNOSIS — Z79.4 TYPE 2 DIABETES MELLITUS WITH DIABETIC NEUROPATHY, WITH LONG-TERM CURRENT USE OF INSULIN (HCC): ICD-10-CM

## 2021-06-16 DIAGNOSIS — M77.8 TENDINITIS OF LEFT SHOULDER: Primary | ICD-10-CM

## 2021-06-16 DIAGNOSIS — M54.12 CERVICAL RADICULOPATHY: ICD-10-CM

## 2021-06-16 DIAGNOSIS — F32.1 CURRENT MODERATE EPISODE OF MAJOR DEPRESSIVE DISORDER WITHOUT PRIOR EPISODE (HCC): ICD-10-CM

## 2021-06-16 DIAGNOSIS — I10 ESSENTIAL HYPERTENSION: ICD-10-CM

## 2021-06-16 PROCEDURE — 3008F BODY MASS INDEX DOCD: CPT | Performed by: INTERNAL MEDICINE

## 2021-06-16 PROCEDURE — 99396 PREV VISIT EST AGE 40-64: CPT | Performed by: INTERNAL MEDICINE

## 2021-06-16 PROCEDURE — 3080F DIAST BP >= 90 MM HG: CPT | Performed by: INTERNAL MEDICINE

## 2021-06-16 PROCEDURE — 87086 URINE CULTURE/COLONY COUNT: CPT

## 2021-06-16 PROCEDURE — 81001 URINALYSIS AUTO W/SCOPE: CPT

## 2021-06-16 PROCEDURE — 3077F SYST BP >= 140 MM HG: CPT | Performed by: INTERNAL MEDICINE

## 2021-06-16 RX ORDER — AMITRIPTYLINE HYDROCHLORIDE 25 MG/1
TABLET, FILM COATED ORAL
Qty: 60 TABLET | Refills: 1 | Status: SHIPPED | OUTPATIENT
Start: 2021-06-16 | End: 2021-08-06

## 2021-06-16 RX ORDER — HYDROCHLOROTHIAZIDE 25 MG/1
25 TABLET ORAL DAILY
Qty: 30 TABLET | Refills: 1 | Status: SHIPPED | OUTPATIENT
Start: 2021-06-16 | End: 2021-08-06

## 2021-06-18 NOTE — PROGRESS NOTES
HPI/Subjective:   Patient ID: Christine Perry is a 48year old female.   Presents for physical exam accompanied by her daughter    HPI  Patient reports that since last visit blood pressure still elevated she has been taking losartan 100 mg daily she reports hydrochlorothiazide 25 MG Oral Tab Take 1 tablet (25 mg total) by mouth daily. 30 tablet 1   • TRUEPLUS PEN NEEDLES 32G X 4 MM Does not apply Misc Apply 1 Application topically daily.      • losartan 100 MG Oral Tab Take 1 tablet (100 mg total) by mouth garrison • insulin glargine 100 UNIT/ML Subcutaneous Solution Pen-injector 40 units subcutaneously at bedtime 15 mL 1   • omeprazole 20 MG Oral Capsule Delayed Release Take 20 mg by mouth every morning.        • Cholecalciferol (VITAMIN D3) 2000 units Oral Tab [place] Neurological:      Mental Status: She is alert.    Psychiatric:         Mood and Affect: Mood normal.         Behavior: Behavior normal.         Assessment & Plan:     Physical exam, annual  Cervical radiculopathy patient will start physical therapy did n

## 2021-06-21 ENCOUNTER — TELEPHONE (OUTPATIENT)
Dept: INTERNAL MEDICINE CLINIC | Facility: CLINIC | Age: 56
End: 2021-06-21

## 2021-06-21 NOTE — TELEPHONE ENCOUNTER
----- Message from Luis Gonzalez sent at 6/21/2021  6:17 AM CDT -----  Regarding: Test Results Question  Contact: [phone]  Give me a antibiotics

## 2021-06-25 ENCOUNTER — LAB ENCOUNTER (OUTPATIENT)
Dept: LAB | Facility: HOSPITAL | Age: 56
End: 2021-06-25
Attending: INTERNAL MEDICINE
Payer: MEDICAID

## 2021-06-25 ENCOUNTER — OFFICE VISIT (OUTPATIENT)
Dept: RHEUMATOLOGY | Facility: CLINIC | Age: 56
End: 2021-06-25
Payer: MEDICAID

## 2021-06-25 VITALS
HEIGHT: 64 IN | SYSTOLIC BLOOD PRESSURE: 131 MMHG | RESPIRATION RATE: 16 BRPM | WEIGHT: 191 LBS | DIASTOLIC BLOOD PRESSURE: 83 MMHG | HEART RATE: 111 BPM | BODY MASS INDEX: 32.61 KG/M2

## 2021-06-25 DIAGNOSIS — M25.512 CHRONIC LEFT SHOULDER PAIN: ICD-10-CM

## 2021-06-25 DIAGNOSIS — G89.29 CHRONIC LEFT SHOULDER PAIN: ICD-10-CM

## 2021-06-25 DIAGNOSIS — M79.641 BILATERAL HAND PAIN: Primary | ICD-10-CM

## 2021-06-25 DIAGNOSIS — M79.642 BILATERAL HAND PAIN: Primary | ICD-10-CM

## 2021-06-25 PROCEDURE — 99214 OFFICE O/P EST MOD 30 MIN: CPT | Performed by: INTERNAL MEDICINE

## 2021-06-25 PROCEDURE — 86200 CCP ANTIBODY: CPT | Performed by: INTERNAL MEDICINE

## 2021-06-25 PROCEDURE — 3075F SYST BP GE 130 - 139MM HG: CPT | Performed by: INTERNAL MEDICINE

## 2021-06-25 PROCEDURE — 3079F DIAST BP 80-89 MM HG: CPT | Performed by: INTERNAL MEDICINE

## 2021-06-25 PROCEDURE — 36415 COLL VENOUS BLD VENIPUNCTURE: CPT | Performed by: INTERNAL MEDICINE

## 2021-06-25 PROCEDURE — 85652 RBC SED RATE AUTOMATED: CPT | Performed by: INTERNAL MEDICINE

## 2021-06-25 PROCEDURE — 3008F BODY MASS INDEX DOCD: CPT | Performed by: INTERNAL MEDICINE

## 2021-06-25 PROCEDURE — 86431 RHEUMATOID FACTOR QUANT: CPT | Performed by: INTERNAL MEDICINE

## 2021-06-25 PROCEDURE — 86140 C-REACTIVE PROTEIN: CPT | Performed by: INTERNAL MEDICINE

## 2021-06-25 RX ORDER — PREDNISONE 10 MG/1
TABLET ORAL
Qty: 18 TABLET | Refills: 0 | Status: SHIPPED | OUTPATIENT
Start: 2021-06-25 | End: 2021-09-20

## 2021-06-25 NOTE — PROGRESS NOTES
Sushma Queen is a 48year old female.     HPI:   Patient presents with:  Rheumatoid Arthritis  Medication Follow-Up      I had the pleasure of seeing Sushma Queen on 6/25/2021 for follow up polyarthralgia's partically b/l hand, from CTS vs possible RA on Plaquenil but stopped taking it after 1 week. Unsure how she felt on Plaquenil  She was supposed to get an EMG done of both hands to evaluate the severity of the carpal tunnel.   She has not had this done  Continues to report pain in both hands, numbnes times daily. 90 days supply if allowed ) 12 pen 3   • Hydroxychloroquine Sulfate 200 MG Oral Tab Take 2 tablets (400 mg total) by mouth daily. 180 tablet 0   • gabapentin 400 MG Oral Cap Take 1 capsule (400 mg total) by mouth 2 (two) times daily.  60 capsul FROM  Hands: TTP in mcps, pips, mild swelling in mcps  Wrist: no swelling, L wrist TTP  Elbow: FROM, TTP in b/l elbows  Shoulders: L shoulder pain with abduction, has FROM  Hip: normal log roll, no lateral hip pain, KIMBERLEY test negative b/l  Knees: FROM, no have a slightly elevated CCP of 10.4 but again no evidence of synovitis.   Also ultrasound showed no evidence of hyperemia, it did show that the median nerve was slightly swollen, more consistent with carpal tunnel but has received cortisone injections for

## 2021-06-25 NOTE — PATIENT INSTRUCTIONS
You were seen again for bilateral hand pain  We need to get the nerve test done, please call  [phone] to schedule your procedure.    Plan to get repeat blood work  We will start you on prednisone again 30 mg daily x3 days then 20 mg daily x3 days then
Detail Level: Zone
Other (Free Text): Discussed Efudex to the face. Pt declined this at this time
Note Text (......Xxx Chief Complaint.): This diagnosis correlates with the
Other (Free Text): Patient commutes to and from [place], so his PCP referred him to [facility] in [place]. He is getting another opinion there and will be seen next week.

## 2021-07-13 ENCOUNTER — OFFICE VISIT (OUTPATIENT)
Dept: PHYSICAL THERAPY | Age: 56
End: 2021-07-13
Attending: INTERNAL MEDICINE
Payer: MEDICAID

## 2021-07-13 DIAGNOSIS — M54.12 CERVICAL RADICULOPATHY: ICD-10-CM

## 2021-07-13 DIAGNOSIS — M77.8 TENDINITIS OF LEFT SHOULDER: ICD-10-CM

## 2021-07-13 DIAGNOSIS — M79.18 MYOFASCIAL PAIN SYNDROME, CERVICAL: ICD-10-CM

## 2021-07-13 PROCEDURE — 97110 THERAPEUTIC EXERCISES: CPT

## 2021-07-13 PROCEDURE — 97163 PT EVAL HIGH COMPLEX 45 MIN: CPT

## 2021-07-13 NOTE — PROGRESS NOTES
CERVICAL SPINE EVALUATION:   Referring Physician: Ann Purcell MD    Date of Onset: 1 year ago  Date of Service: 7/13/21   Diagnosis: Tendinitis of left shoulder (M77.8)  Cervical radiculopathy (M54.12)  Myofascial pain syndrome, cervical (M79.18)   LUCIANO allow for return to prior level of function.      Precautions: None       OBJECTIVE:   Observation/Posture: forward head, thoracic kyphosis, B scapular protraction    Cervical AROM:  Pain (+/-)   Flexion No loss    Extension Mod/severe loss - 75% +   R Side to be able to turn their head and look up without difficulty  3. Patient will report decrease in symptoms by 50% or better in terms of frequency and intensity to be able to sleep for 6 hrs without disruption from pain.   4. Patient will demo B scap strength

## 2021-07-15 ENCOUNTER — APPOINTMENT (OUTPATIENT)
Dept: PHYSICAL THERAPY | Age: 56
End: 2021-07-15
Attending: INTERNAL MEDICINE
Payer: MEDICAID

## 2021-07-20 ENCOUNTER — APPOINTMENT (OUTPATIENT)
Dept: PHYSICAL THERAPY | Age: 56
End: 2021-07-20
Attending: INTERNAL MEDICINE
Payer: MEDICAID

## 2021-07-20 ENCOUNTER — TELEPHONE (OUTPATIENT)
Dept: PHYSICAL THERAPY | Age: 56
End: 2021-07-20

## 2021-07-20 NOTE — TELEPHONE ENCOUNTER
Called patient regarding missed appointment today at 4:15. Patient states she forgot about appointment. Reminded patient of next scheduled appointment on 7/22 at 4:15. Patient confirmed that she will be at next appointment.

## 2021-07-22 ENCOUNTER — OFFICE VISIT (OUTPATIENT)
Dept: PHYSICAL THERAPY | Age: 56
End: 2021-07-22
Attending: INTERNAL MEDICINE
Payer: MEDICAID

## 2021-07-22 PROCEDURE — 97140 MANUAL THERAPY 1/> REGIONS: CPT

## 2021-07-22 PROCEDURE — 97110 THERAPEUTIC EXERCISES: CPT

## 2021-07-22 NOTE — PROGRESS NOTES
Dx:  Tendinitis of left shoulder (M77.8)  Cervical radiculopathy (M54.12)  Myofascial pain syndrome, cervical (M79.18)        Authorized # of Visits:  10 [facility]         Next MD visit: none scheduled  Fall Risk: standard         Precautions:     asthm without disruption from pain. 4. Patient will demo B scap strength at least 4-/5 to be able to perform lifting and reaching with proper joint mechanics.    5. Patient will demo B shoulder strength at least 4/5 to be able to lift a pot to cook with pain <3/

## 2021-07-27 ENCOUNTER — APPOINTMENT (OUTPATIENT)
Dept: PHYSICAL THERAPY | Age: 56
End: 2021-07-27
Attending: INTERNAL MEDICINE
Payer: MEDICAID

## 2021-07-29 ENCOUNTER — OFFICE VISIT (OUTPATIENT)
Dept: PHYSICAL THERAPY | Age: 56
End: 2021-07-29
Attending: INTERNAL MEDICINE
Payer: MEDICAID

## 2021-07-29 PROCEDURE — 97140 MANUAL THERAPY 1/> REGIONS: CPT

## 2021-07-29 PROCEDURE — 97110 THERAPEUTIC EXERCISES: CPT

## 2021-07-29 NOTE — PROGRESS NOTES
Dx:  Tendinitis of left shoulder (M77.8)  Cervical radiculopathy (M54.12)  Myofascial pain syndrome, cervical (M79.18)        Authorized # of Visits:  10 [facility]         Next MD visit: 8/6/21 (Dr. Rola Sorto)  Fall Risk: standard         Precautions: loss or better in all limited planes to be able to turn their head and look up without difficulty  3. Patient will report decrease in symptoms by 50% or better in terms of frequency and intensity to be able to sleep for 6 hrs without disruption from pain.

## 2021-08-03 ENCOUNTER — OFFICE VISIT (OUTPATIENT)
Dept: PHYSICAL THERAPY | Age: 56
End: 2021-08-03
Attending: INTERNAL MEDICINE
Payer: MEDICAID

## 2021-08-03 PROCEDURE — 97012 MECHANICAL TRACTION THERAPY: CPT

## 2021-08-03 PROCEDURE — 97140 MANUAL THERAPY 1/> REGIONS: CPT

## 2021-08-03 PROCEDURE — 97110 THERAPEUTIC EXERCISES: CPT

## 2021-08-05 ENCOUNTER — PROCEDURE VISIT (OUTPATIENT)
Dept: NEUROLOGY | Facility: CLINIC | Age: 56
End: 2021-08-05
Payer: MEDICAID

## 2021-08-05 ENCOUNTER — OFFICE VISIT (OUTPATIENT)
Dept: PHYSICAL THERAPY | Age: 56
End: 2021-08-05
Attending: INTERNAL MEDICINE
Payer: MEDICAID

## 2021-08-05 DIAGNOSIS — G56.03 BILATERAL CARPAL TUNNEL SYNDROME: Primary | ICD-10-CM

## 2021-08-05 PROCEDURE — 95910 NRV CNDJ TEST 7-8 STUDIES: CPT | Performed by: PHYSICAL MEDICINE & REHABILITATION

## 2021-08-05 PROCEDURE — 97140 MANUAL THERAPY 1/> REGIONS: CPT

## 2021-08-05 PROCEDURE — 97012 MECHANICAL TRACTION THERAPY: CPT

## 2021-08-05 PROCEDURE — 95886 MUSC TEST DONE W/N TEST COMP: CPT | Performed by: PHYSICAL MEDICINE & REHABILITATION

## 2021-08-05 NOTE — PROGRESS NOTES
[address]  Electromyography Consultation      History of Present Illness:    Dear Dr. PAYAL ACEVES,  Thank you for the opportunity to see Dub Him for electrodiagnostic consultation today.  As you know the pa strip 0   • Blood Glucose Monitoring Suppl (ONETOUCH VERIO) w/Device Does not apply Kit 1 kit by Does not apply route As Directed.  1 kit 0   • OneTouch Delica Lancets 48D Does not apply Misc Check blood sugar twice daily (fasting and 2 hours after a meal) morning. 90 days supply if allowed (Patient taking differently: Inject 40 Units into the skin 2 (two) times daily.  90 days supply if allowed ) 12 pen 3   • HYDROcodone-acetaminophen 7.5-325 MG Oral Tab Take 1-2 tablets by mouth every 4 (four) hours as need Nerve / Sites Rec.  Site Lat Amp Seq Amp Segments Dist Velocity     ms mV %  cm m/s   R MEDIAN - APB      Wrist APB 8.60 3.5 100 Wrist - APB 8       Elbow APB 13.85 2.9 83.2 Elbow - Wrist 20 38.1   L MEDIAN - APB      Wrist APB 6.50 4.8 100 Wrist - APB 8 muscles tested were normal including the right biceps, triceps, flexor carpi radialis and first dorsal interosseous. The left biceps, triceps, flexor carpi radialis, first dorsal interosseous and abductor pollicis brevis were normal.  Impression:  1.   Abn

## 2021-08-05 NOTE — PROGRESS NOTES
Dx:  Tendinitis of left shoulder (M77.8)  Cervical radiculopathy (M54.12)  Myofascial pain syndrome, cervical (M79.18)        Authorized # of Visits:  10 [facility]         Next MD visit: 8/6/21 (Dr. Aysah Kaufman)  Fall Risk: standard         Precautions: for ongoing pain relief and provided patient with purchasing information. Goals (to be achieved in 10 visits):  1. Patient will be independent with HEP, it's progression, and self-management of their symptoms  2.  Patient will demo cervical AROM min los

## 2021-08-06 ENCOUNTER — TELEPHONE (OUTPATIENT)
Dept: INTERNAL MEDICINE CLINIC | Facility: CLINIC | Age: 56
End: 2021-08-06

## 2021-08-06 ENCOUNTER — OFFICE VISIT (OUTPATIENT)
Dept: INTERNAL MEDICINE CLINIC | Facility: CLINIC | Age: 56
End: 2021-08-06
Payer: MEDICAID

## 2021-08-06 ENCOUNTER — LAB ENCOUNTER (OUTPATIENT)
Dept: LAB | Age: 56
End: 2021-08-06
Attending: INTERNAL MEDICINE
Payer: MEDICAID

## 2021-08-06 VITALS
HEIGHT: 64 IN | DIASTOLIC BLOOD PRESSURE: 84 MMHG | SYSTOLIC BLOOD PRESSURE: 151 MMHG | HEART RATE: 97 BPM | BODY MASS INDEX: 32.98 KG/M2 | WEIGHT: 193.19 LBS

## 2021-08-06 DIAGNOSIS — I10 ESSENTIAL HYPERTENSION: Primary | ICD-10-CM

## 2021-08-06 DIAGNOSIS — Z79.4 TYPE 2 DIABETES MELLITUS WITH DIABETIC NEUROPATHY, WITH LONG-TERM CURRENT USE OF INSULIN (HCC): ICD-10-CM

## 2021-08-06 DIAGNOSIS — E11.40 TYPE 2 DIABETES MELLITUS WITH DIABETIC NEUROPATHY, WITH LONG-TERM CURRENT USE OF INSULIN (HCC): ICD-10-CM

## 2021-08-06 DIAGNOSIS — M1A.09X0 IDIOPATHIC CHRONIC GOUT OF MULTIPLE SITES WITHOUT TOPHUS: ICD-10-CM

## 2021-08-06 DIAGNOSIS — I10 ESSENTIAL HYPERTENSION: ICD-10-CM

## 2021-08-06 DIAGNOSIS — F32.1 CURRENT MODERATE EPISODE OF MAJOR DEPRESSIVE DISORDER WITHOUT PRIOR EPISODE (HCC): ICD-10-CM

## 2021-08-06 LAB
ANION GAP SERPL CALC-SCNC: 6 MMOL/L (ref 0–18)
BUN BLD-MCNC: 18 MG/DL (ref 7–18)
BUN/CREAT SERPL: 21.4 (ref 10–20)
CALCIUM BLD-MCNC: 9.2 MG/DL (ref 8.5–10.1)
CHLORIDE SERPL-SCNC: 102 MMOL/L (ref 98–112)
CO2 SERPL-SCNC: 29 MMOL/L (ref 21–32)
CREAT BLD-MCNC: 0.84 MG/DL
GLUCOSE BLD-MCNC: 243 MG/DL (ref 70–99)
OSMOLALITY SERPL CALC.SUM OF ELEC: 294 MOSM/KG (ref 275–295)
PATIENT FASTING Y/N/NP: NO
POTASSIUM SERPL-SCNC: 4.1 MMOL/L (ref 3.5–5.1)
SODIUM SERPL-SCNC: 137 MMOL/L (ref 136–145)
URATE SERPL-MCNC: 4.9 MG/DL

## 2021-08-06 PROCEDURE — 80048 BASIC METABOLIC PNL TOTAL CA: CPT

## 2021-08-06 PROCEDURE — 3079F DIAST BP 80-89 MM HG: CPT | Performed by: INTERNAL MEDICINE

## 2021-08-06 PROCEDURE — 36415 COLL VENOUS BLD VENIPUNCTURE: CPT

## 2021-08-06 PROCEDURE — 84550 ASSAY OF BLOOD/URIC ACID: CPT

## 2021-08-06 PROCEDURE — 3077F SYST BP >= 140 MM HG: CPT | Performed by: INTERNAL MEDICINE

## 2021-08-06 PROCEDURE — 99214 OFFICE O/P EST MOD 30 MIN: CPT | Performed by: INTERNAL MEDICINE

## 2021-08-06 PROCEDURE — 3008F BODY MASS INDEX DOCD: CPT | Performed by: INTERNAL MEDICINE

## 2021-08-06 RX ORDER — KETOCONAZOLE 20 MG/ML
SHAMPOO TOPICAL
Qty: 120 ML | Refills: 2 | Status: SHIPPED | OUTPATIENT
Start: 2021-08-06

## 2021-08-06 RX ORDER — HYDROCHLOROTHIAZIDE 25 MG/1
TABLET ORAL
Qty: 180 TABLET | Refills: 1 | Status: SHIPPED | OUTPATIENT
Start: 2021-08-06 | End: 2021-11-08

## 2021-08-06 RX ORDER — CARVEDILOL 6.25 MG/1
6.25 TABLET ORAL 2 TIMES DAILY WITH MEALS
Qty: 180 TABLET | Refills: 1 | Status: SHIPPED | OUTPATIENT
Start: 2021-08-06 | End: 2021-10-01

## 2021-08-06 RX ORDER — LIRAGLUTIDE 6 MG/ML
INJECTION SUBCUTANEOUS
Qty: 3 EACH | Refills: 3 | Status: SHIPPED | OUTPATIENT
Start: 2021-08-06 | End: 2021-08-10

## 2021-08-06 RX ORDER — SERTRALINE HYDROCHLORIDE 100 MG/1
100 TABLET, FILM COATED ORAL DAILY
Qty: 30 TABLET | Refills: 5 | Status: SHIPPED | OUTPATIENT
Start: 2021-08-06 | End: 2021-09-03

## 2021-08-06 RX ORDER — ALLOPURINOL 100 MG/1
100 TABLET ORAL NIGHTLY
Qty: 90 TABLET | Refills: 3 | Status: SHIPPED | OUTPATIENT
Start: 2021-08-06 | End: 2022-01-28

## 2021-08-06 RX ORDER — GABAPENTIN 400 MG/1
400 CAPSULE ORAL 2 TIMES DAILY
Qty: 60 CAPSULE | Refills: 5 | Status: SHIPPED | OUTPATIENT
Start: 2021-08-06 | End: 2021-11-15

## 2021-08-06 RX ORDER — LIRAGLUTIDE 6 MG/ML
INJECTION SUBCUTANEOUS
Qty: 1.5 ML | Refills: 11 | Status: SHIPPED | OUTPATIENT
Start: 2021-08-06 | End: 2021-08-06

## 2021-08-06 NOTE — TELEPHONE ENCOUNTER
Please clarify quantity for:      Current Outpatient Medications:   •  Liraglutide (VICTOZA) 18 MG/3ML Subcutaneous Solution Pen-injector, Inject 1.8 mg daily subcutaneously, Disp: 1.5 mL, Rfl: 11

## 2021-08-07 NOTE — PROGRESS NOTES
HPI/Subjective:   Patient ID: Franklyn Manuel is a 48year old female. Presents for follow-up on hypertension, diabetes, depression    HPI  Patient is seen in the office today with one of her daughters who is translating.   Patient reports that blood press Oral Tab 30 mg daily x3 days then 20 mg daily x3 days then 10 mg daily x3 days then stop 18 tablet 0   • TRUEPLUS PEN NEEDLES 32G X 4 MM Does not apply Misc Apply 1 Application topically daily.      • losartan 100 MG Oral Tab Take 1 tablet (100 mg total) by (1.626 m)   Wt 193 lb 3.2 oz (87.6 kg)   BMI 33.16 kg/m²   Physical Exam  Is awake alert oriented x3 no acute distress . Patient looks the best in a long time engaged in conversation with help of her daughter. Moves freely her arms and neck.   Heart S1-S2 Follow-up in 2 months    Imaging & Referrals:  DME DIABETIC TEST STRIPS AND SUPPLIES

## 2021-08-10 ENCOUNTER — TELEPHONE (OUTPATIENT)
Dept: INTERNAL MEDICINE CLINIC | Facility: CLINIC | Age: 56
End: 2021-08-10

## 2021-08-10 ENCOUNTER — OFFICE VISIT (OUTPATIENT)
Dept: ENDOCRINOLOGY CLINIC | Facility: CLINIC | Age: 56
End: 2021-08-10
Payer: MEDICAID

## 2021-08-10 ENCOUNTER — OFFICE VISIT (OUTPATIENT)
Dept: RHEUMATOLOGY | Facility: CLINIC | Age: 56
End: 2021-08-10
Payer: MEDICAID

## 2021-08-10 VITALS
HEIGHT: 64 IN | BODY MASS INDEX: 32.95 KG/M2 | DIASTOLIC BLOOD PRESSURE: 86 MMHG | RESPIRATION RATE: 18 BRPM | SYSTOLIC BLOOD PRESSURE: 140 MMHG | HEART RATE: 85 BPM | WEIGHT: 193 LBS

## 2021-08-10 VITALS
SYSTOLIC BLOOD PRESSURE: 145 MMHG | DIASTOLIC BLOOD PRESSURE: 83 MMHG | BODY MASS INDEX: 32.61 KG/M2 | HEART RATE: 85 BPM | HEIGHT: 64 IN | WEIGHT: 191 LBS

## 2021-08-10 DIAGNOSIS — M25.512 CHRONIC LEFT SHOULDER PAIN: Primary | ICD-10-CM

## 2021-08-10 DIAGNOSIS — E11.65 TYPE 2 DIABETES MELLITUS WITH HYPERGLYCEMIA, WITH LONG-TERM CURRENT USE OF INSULIN (HCC): Primary | ICD-10-CM

## 2021-08-10 DIAGNOSIS — M25.50 POLYARTHRALGIA: ICD-10-CM

## 2021-08-10 DIAGNOSIS — I10 ESSENTIAL HYPERTENSION: ICD-10-CM

## 2021-08-10 DIAGNOSIS — M79.641 BILATERAL HAND PAIN: ICD-10-CM

## 2021-08-10 DIAGNOSIS — Z79.4 TYPE 2 DIABETES MELLITUS WITH HYPERGLYCEMIA, WITH LONG-TERM CURRENT USE OF INSULIN (HCC): Primary | ICD-10-CM

## 2021-08-10 DIAGNOSIS — E03.9 HYPOTHYROIDISM, UNSPECIFIED TYPE: ICD-10-CM

## 2021-08-10 DIAGNOSIS — M79.642 BILATERAL HAND PAIN: ICD-10-CM

## 2021-08-10 DIAGNOSIS — G89.29 CHRONIC LEFT SHOULDER PAIN: Primary | ICD-10-CM

## 2021-08-10 DIAGNOSIS — M79.10 MYALGIA: ICD-10-CM

## 2021-08-10 DIAGNOSIS — E78.5 DYSLIPIDEMIA: ICD-10-CM

## 2021-08-10 LAB
CARTRIDGE LOT#: ABNORMAL NUMERIC
GLUCOSE BLOOD: 274
HEMOGLOBIN A1C: 8.2 % (ref 4.3–5.6)
TEST STRIP LOT #: NORMAL NUMERIC

## 2021-08-10 PROCEDURE — 3077F SYST BP >= 140 MM HG: CPT | Performed by: INTERNAL MEDICINE

## 2021-08-10 PROCEDURE — 83036 HEMOGLOBIN GLYCOSYLATED A1C: CPT | Performed by: INTERNAL MEDICINE

## 2021-08-10 PROCEDURE — 3008F BODY MASS INDEX DOCD: CPT | Performed by: INTERNAL MEDICINE

## 2021-08-10 PROCEDURE — 82947 ASSAY GLUCOSE BLOOD QUANT: CPT | Performed by: INTERNAL MEDICINE

## 2021-08-10 PROCEDURE — 3052F HG A1C>EQUAL 8.0%<EQUAL 9.0%: CPT | Performed by: INTERNAL MEDICINE

## 2021-08-10 PROCEDURE — 99214 OFFICE O/P EST MOD 30 MIN: CPT | Performed by: INTERNAL MEDICINE

## 2021-08-10 PROCEDURE — 20610 DRAIN/INJ JOINT/BURSA W/O US: CPT | Performed by: INTERNAL MEDICINE

## 2021-08-10 PROCEDURE — 36416 COLLJ CAPILLARY BLOOD SPEC: CPT | Performed by: INTERNAL MEDICINE

## 2021-08-10 PROCEDURE — 3079F DIAST BP 80-89 MM HG: CPT | Performed by: INTERNAL MEDICINE

## 2021-08-10 RX ORDER — SIMVASTATIN 80 MG
80 TABLET ORAL NIGHTLY
Qty: 90 TABLET | Refills: 0 | Status: SHIPPED | OUTPATIENT
Start: 2021-08-10 | End: 2021-11-08

## 2021-08-10 RX ORDER — LIRAGLUTIDE 6 MG/ML
1.8 INJECTION SUBCUTANEOUS EVERY MORNING
Qty: 9 ML | Refills: 0 | Status: SHIPPED | OUTPATIENT
Start: 2021-08-10 | End: 2021-10-31

## 2021-08-10 RX ORDER — NAPROXEN 500 MG/1
TABLET ORAL
Qty: 30 TABLET | Refills: 1 | Status: SHIPPED | OUTPATIENT
Start: 2021-08-10

## 2021-08-10 RX ORDER — GLIPIZIDE 10 MG/1
10 TABLET ORAL
Qty: 180 TABLET | Refills: 0 | Status: SHIPPED | OUTPATIENT
Start: 2021-08-10 | End: 2021-10-31

## 2021-08-10 RX ORDER — TRIAMCINOLONE ACETONIDE 40 MG/ML
40 INJECTION, SUSPENSION INTRA-ARTICULAR; INTRAMUSCULAR ONCE
Status: COMPLETED | OUTPATIENT
Start: 2021-08-10 | End: 2021-08-10

## 2021-08-10 RX ADMIN — TRIAMCINOLONE ACETONIDE 40 MG: 40 INJECTION, SUSPENSION INTRA-ARTICULAR; INTRAMUSCULAR at 15:15:00

## 2021-08-10 NOTE — TELEPHONE ENCOUNTER
----- Message from Farzaneh Mobley MD sent at 8/10/2021  8:02 AM CDT -----  Sent via [employer], please call patient's daughter patient does not speak English, the potassium is normal kidney function test is normal, patient will be taking hydrochlorothiazide 25

## 2021-08-10 NOTE — TELEPHONE ENCOUNTER
Dr. Yinka Barba,     RN pended scripts for the preferred glucometer. Please clarify testing frequency. Check twice a day, fasting and 2 hours after a meal or 2 hours after each meal, which would be 4x/day. Please change script as necessary. Thank you.

## 2021-08-10 NOTE — PROGRESS NOTES
Rachael Yusuf is a 48year old female.     HPI:   Patient presents with:  Joint Pain: hand pain primary, but all joints hurt  Rheumatoid Arthritis      I had the pleasure of seeing Rachael Yusuf on 8/10/2021 for follow up polyarthralgia's partically b/l on Plaquenil but did not help  Reports worsening left shoulder pain, abduction is limited to 90 degrees.   She had the EMGs done her hands, it is not resulted yet        HISTORY:  Past Medical History:   Diagnosis Date   • Asthma    • Depression    • Diabet tablet 1   • traZODone HCl 100 MG Oral Tab Take 1 tablet (100 mg total) by mouth nightly.  30 tablet 0   • Levothyroxine Sodium 112 MCG Oral Tab Take 1 tablet (112 mcg total) by mouth before breakfast. 30 tablet 0   • insulin glargine (BASAGLAR KWIKPEN) 100 Ankles: FROM, no pain or swelling or warmth on palpation  Feet: no pain with MTP squeeze, no toe swelling or pain or warmth on palpation with FROM  Spine: TTP in b/l SI joints  NEURO: Cranial nerves II-XII intact grossly.  5/5 strength throughout in both hand pain with stiffness and intermittent swelling  - During her last visit she was placed on prednisone for about 2 weeks, it did not help.   She also placed on Plaquenil, she stopped taking it after a week as a cause some stomach issues  - she had EMG don

## 2021-08-10 NOTE — PATIENT INSTRUCTIONS
You are seen today for joint pain and muscle pain due to chronic pain/fibromyalgia  I refilled your naproxen, you can take it once at night  I injected your left shoulder with cortisone, hopefully that will help  I will get the results of the nerve test an

## 2021-08-10 NOTE — TELEPHONE ENCOUNTER
Hi!  Can we prescribe this patient a new meter? She also needs testing supplies, so that she can check twice daily, fasting an two hours after every meal. Thank you!

## 2021-08-10 NOTE — PROGRESS NOTES
Name: Jalen Bernal  Date: 8/10/21    Referring Physician: Isaiah Jeans    HISTORY OF PRESENT ILLNESS   Jalen Bernal is a 48year old female who presents for follow-up of management of uncontrolled T2D.  She was diagnosed about 4 years ago, based upon tablet, Rfl: 5  •  gabapentin 400 MG Oral Cap, Take 1 capsule (400 mg total) by mouth 2 (two) times daily. , Disp: 60 capsule, Rfl: 5  •  hydrochlorothiazide 25 MG Oral Tab, Take  1 tab po twice a day, Disp: 180 tablet, Rfl: 1  •  metFORMIN HCl 500 MG Oral Take by mouth daily. , Disp: , Rfl:   •  glipiZIDE 10 MG Oral Tab, Take 1 tablet (10 mg total) by mouth 2 (two) times daily before meals. , Disp: 180 tablet, Rfl: 1  •  omeprazole 20 MG Oral Capsule Delayed Release, Take 20 mg by mouth every morning.  , Disp to time, self, and place  Nutritional:  no abnormal weight gain or loss    Lab Data:   Lab Results   Component Value Date     (H) 06/15/2021    A1C 9.3 (H) 06/15/2021     Lab Results   Component Value Date     (H) 08/06/2021    BUN 18 08/06/2 increasing Simvastatin to 80mg PO at bedtime  - CMP- creatinine better now, up to date    3.) Lifestyle Management for Diabetes- We discussed importance of a low CHO diet, and recommend 45gm per meal or 135gm per day.  We discussed the importance of trying

## 2021-08-11 ENCOUNTER — TELEPHONE (OUTPATIENT)
Dept: ENDOCRINOLOGY CLINIC | Facility: CLINIC | Age: 56
End: 2021-08-11

## 2021-08-11 RX ORDER — BLOOD-GLUCOSE METER
1 EACH MISCELLANEOUS AS DIRECTED
Qty: 1 KIT | Refills: 0 | Status: SHIPPED | OUTPATIENT
Start: 2021-08-11

## 2021-08-11 RX ORDER — BLOOD SUGAR DIAGNOSTIC
STRIP MISCELLANEOUS
Qty: 200 STRIP | Refills: 0 | Status: SHIPPED | OUTPATIENT
Start: 2021-08-11 | End: 2021-11-22

## 2021-08-11 RX ORDER — LANCETS 33 GAUGE
EACH MISCELLANEOUS
Qty: 200 EACH | Refills: 0 | Status: SHIPPED | OUTPATIENT
Start: 2021-08-11 | End: 2021-11-22

## 2021-08-11 NOTE — TELEPHONE ENCOUNTER
Called pharmacy to confirm below as this should be covered. Spoke to [place] who states to disregard below as the patient already picked it up.

## 2021-08-11 NOTE — TELEPHONE ENCOUNTER
PA needed for the following. [email or web address]    Key- [id number]    Current Outpatient Medications   Medication Sig Dispense Refill   • Liraglutide (VICTOZA) 18 MG/3ML Subcutaneous Solution Pen-injector Inject 1.8 mg into the skin every morning.  9 mL 0

## 2021-08-13 NOTE — PROCEDURES
With paitent's consent, I injected pt's L shoulder with 1ml lidocaine 1 % and 1 ml kenalog 40. It was done under sterile technique using iodine and alcohol swabs and ethyl chloride was used as an anaesthetic spray. Pt.  tolerated it well.

## 2021-09-03 DIAGNOSIS — E11.40 TYPE 2 DIABETES MELLITUS WITH DIABETIC NEUROPATHY, WITH LONG-TERM CURRENT USE OF INSULIN (HCC): ICD-10-CM

## 2021-09-03 DIAGNOSIS — Z79.4 TYPE 2 DIABETES MELLITUS WITH DIABETIC NEUROPATHY, WITH LONG-TERM CURRENT USE OF INSULIN (HCC): ICD-10-CM

## 2021-09-03 DIAGNOSIS — F32.1 CURRENT MODERATE EPISODE OF MAJOR DEPRESSIVE DISORDER WITHOUT PRIOR EPISODE (HCC): ICD-10-CM

## 2021-09-03 DIAGNOSIS — M1A.09X0 IDIOPATHIC CHRONIC GOUT OF MULTIPLE SITES WITHOUT TOPHUS: ICD-10-CM

## 2021-09-03 DIAGNOSIS — I10 ESSENTIAL HYPERTENSION: ICD-10-CM

## 2021-09-03 RX ORDER — LOSARTAN POTASSIUM 100 MG/1
TABLET ORAL
Qty: 90 TABLET | Refills: 1 | Status: SHIPPED | OUTPATIENT
Start: 2021-09-03 | End: 2022-01-28

## 2021-09-03 RX ORDER — SERTRALINE HYDROCHLORIDE 100 MG/1
100 TABLET, FILM COATED ORAL DAILY
Qty: 30 TABLET | Refills: 5 | Status: SHIPPED | OUTPATIENT
Start: 2021-09-03 | End: 2022-01-28

## 2021-09-03 NOTE — TELEPHONE ENCOUNTER
Refill passed per [employer] protocol.     Requested Prescriptions   Pending Prescriptions Disp Refills    LOSARTAN 100 MG Oral Tab [Pharmacy Med Name: LOSARTAN 100MG TABLETS] 90 tablet 1     Sig: TAKE 1 TABLET(100 MG) BY MOUTH DAILY        Hypertensive Medications Protocol Passed - 9/3/2021 12:34 PM        Passed - CMP or BMP in past 12 months        Passed - Appointment in past 6 or next 3 months        Passed - GFR Non- > 50     Lab Results   Component Value Date    GFRNAA 79 08/06/2021                  SERTRALINE 100 MG Oral Tab [Pharmacy Med Name: SERTRALINE 100MG TABLETS] 30 tablet 5     Sig: TAKE 1 TABLET(100 MG) BY MOUTH DAILY        Psychiatric Non-Scheduled (Anti-Anxiety) Passed - 9/3/2021 12:34 PM        Passed - Appointment in last 6 or next 3 months              Recent Outpatient Visits              3 weeks ago Chronic left shoulder pain    [facility], [address] [place] [address],[address], [facility] Alex Elliott MD    Office Visit    3 weeks ago Type 2 diabetes mellitus with hyperglycemia, with long-term current use of insulin [facility] [employer], [facility]ocr[facility]ology Ronnie Rossi MD    Office Visit    4 weeks ago Essential hypertension    [facility], [address] [place] [address] MD Chastity    Office Visit    4 weeks ago     [facility] in [address] Visit    1 month ago     [facility] [place] [place]    Office Visit            Future Appointments         Provider Department Appt Notes    In 2 weeks Jeannie Salazar Dr./headaches/TIA    In 2 months Charisse Turner MD [place] [employer] [facility] End[facility]inology  f/u

## 2021-09-04 RX ORDER — AMITRIPTYLINE HYDROCHLORIDE 25 MG/1
TABLET, FILM COATED ORAL
Qty: 60 TABLET | Refills: 1 | OUTPATIENT
Start: 2021-09-04

## 2021-09-07 DIAGNOSIS — M25.512 CHRONIC LEFT SHOULDER PAIN: ICD-10-CM

## 2021-09-07 DIAGNOSIS — G89.29 CHRONIC LEFT SHOULDER PAIN: ICD-10-CM

## 2021-09-07 DIAGNOSIS — M79.641 BILATERAL HAND PAIN: ICD-10-CM

## 2021-09-07 DIAGNOSIS — M79.642 BILATERAL HAND PAIN: ICD-10-CM

## 2021-09-07 NOTE — TELEPHONE ENCOUNTER
Last filled: 6/25/21 #18 tab with 0 Refills   LOV: 8/10/21  Future Appointments   Date Time Provider Alysia Winters   9/21/2021  2:00 PM Kieran Vicente [facility]   11/10/2021  3:30 PM Reagan Younger MD [address] [place] [address]     Please

## 2021-09-20 RX ORDER — PREDNISONE 10 MG/1
TABLET ORAL
Qty: 18 TABLET | Refills: 0 | Status: SHIPPED | OUTPATIENT
Start: 2021-09-20 | End: 2022-01-28

## 2021-10-01 ENCOUNTER — OFFICE VISIT (OUTPATIENT)
Dept: INTERNAL MEDICINE CLINIC | Facility: CLINIC | Age: 56
End: 2021-10-01
Payer: MEDICAID

## 2021-10-01 VITALS
WEIGHT: 188 LBS | SYSTOLIC BLOOD PRESSURE: 125 MMHG | DIASTOLIC BLOOD PRESSURE: 84 MMHG | HEIGHT: 64 IN | BODY MASS INDEX: 32.1 KG/M2 | HEART RATE: 97 BPM

## 2021-10-01 DIAGNOSIS — Z79.4 TYPE 2 DIABETES MELLITUS WITH DIABETIC NEUROPATHY, WITH LONG-TERM CURRENT USE OF INSULIN (HCC): ICD-10-CM

## 2021-10-01 DIAGNOSIS — I10 ESSENTIAL HYPERTENSION: ICD-10-CM

## 2021-10-01 DIAGNOSIS — K29.50 OTHER CHRONIC GASTRITIS WITHOUT HEMORRHAGE: Primary | ICD-10-CM

## 2021-10-01 DIAGNOSIS — E11.40 TYPE 2 DIABETES MELLITUS WITH DIABETIC NEUROPATHY, WITH LONG-TERM CURRENT USE OF INSULIN (HCC): ICD-10-CM

## 2021-10-01 PROCEDURE — 3079F DIAST BP 80-89 MM HG: CPT | Performed by: INTERNAL MEDICINE

## 2021-10-01 PROCEDURE — 3008F BODY MASS INDEX DOCD: CPT | Performed by: INTERNAL MEDICINE

## 2021-10-01 PROCEDURE — 99214 OFFICE O/P EST MOD 30 MIN: CPT | Performed by: INTERNAL MEDICINE

## 2021-10-01 PROCEDURE — 3074F SYST BP LT 130 MM HG: CPT | Performed by: INTERNAL MEDICINE

## 2021-10-01 RX ORDER — METOPROLOL SUCCINATE 100 MG/1
TABLET, EXTENDED RELEASE ORAL
COMMUNITY
Start: 2021-07-14 | End: 2022-01-28

## 2021-10-01 RX ORDER — LEVOTHYROXINE SODIUM 0.12 MG/1
TABLET ORAL
COMMUNITY
Start: 2021-09-30 | End: 2022-01-03

## 2021-10-01 RX ORDER — ESCITALOPRAM OXALATE 10 MG/1
TABLET ORAL
COMMUNITY
Start: 2021-06-16 | End: 2021-10-01

## 2021-10-01 RX ORDER — SIMVASTATIN 20 MG
TABLET ORAL
COMMUNITY
Start: 2021-09-04 | End: 2021-10-01

## 2021-10-02 NOTE — PROGRESS NOTES
Subjective:   Patient ID: Nagi Porter is a 48year old female.   Presents for follow-up of multiple medical conditions, abdominal pain    HPI  Patient is seen in the presence of her daughter who is translating for the patient, lately patient graham 33G Does not apply Misc Check blood sugar twice daily (fasting and 2 hours after a meal) 200 each 0   • Liraglutide (VICTOZA) 18 MG/3ML Subcutaneous Solution Pen-injector Inject 1.8 mg into the skin every morning.  9 mL 0   • glipiZIDE 10 MG Oral Tab Take 1 aspirin 81 MG Oral Tab EC Take 81 mg by mouth daily. • Omeprazole 40 MG Oral Capsule Delayed Release Take 1 capsule (40 mg total) by mouth daily.  90 capsule 1   • sucralfate (CARAFATE) 1 g Oral Tab Take 1 tablet (1 g total) by mouth 3 (three) times garrison to the regular labs  Meds This Visit:  Requested Prescriptions      No prescriptions requested or ordered in this encounter       Imaging & Referrals:  None

## 2021-10-19 ENCOUNTER — OFFICE VISIT (OUTPATIENT)
Dept: RHEUMATOLOGY | Facility: CLINIC | Age: 56
End: 2021-10-19
Payer: MEDICAID

## 2021-10-19 VITALS
DIASTOLIC BLOOD PRESSURE: 81 MMHG | HEART RATE: 97 BPM | BODY MASS INDEX: 33 KG/M2 | SYSTOLIC BLOOD PRESSURE: 116 MMHG | WEIGHT: 191 LBS

## 2021-10-19 DIAGNOSIS — G89.29 CHRONIC LEFT SHOULDER PAIN: Primary | ICD-10-CM

## 2021-10-19 DIAGNOSIS — M25.552 LEFT HIP PAIN: ICD-10-CM

## 2021-10-19 DIAGNOSIS — M25.512 CHRONIC LEFT SHOULDER PAIN: Primary | ICD-10-CM

## 2021-10-19 PROCEDURE — 3079F DIAST BP 80-89 MM HG: CPT | Performed by: INTERNAL MEDICINE

## 2021-10-19 PROCEDURE — 3074F SYST BP LT 130 MM HG: CPT | Performed by: INTERNAL MEDICINE

## 2021-10-19 PROCEDURE — 99213 OFFICE O/P EST LOW 20 MIN: CPT | Performed by: INTERNAL MEDICINE

## 2021-10-19 NOTE — PATIENT INSTRUCTIONS
You were seen today for left shoulder pain  Plan to send you to physical therapy  For your left hip pain it seems like it is more tendon pain  Try using Voltaren gel and ice to see if it helps, could consider cortisone injection  If your left shoulder is n

## 2021-10-19 NOTE — PROGRESS NOTES
Vincenzo Luciano is a 48year old female.     HPI:   Patient presents with:  Shoulder Pain  Rheumatoid Arthritis      I had the pleasure of seeing Vincenzo Luciano on 10/19/2021 for follow up polyarthralgia's partically b/l hand, from CTS vs possible RA     C on Plaquenil but did not help  Reports worsening left shoulder pain, abduction is limited to 90 degrees.   She had the EMGs done her hands, it is not resulted yet    Today:   Presents for follow-up of left shoulder pain  She was seen about 2 months ago due Monitoring Suppl (Everlena November) w/Device Does not apply Kit 1 kit by Does not apply route As Directed.  1 kit 0   • OneTouch Delica Lancets 21T Does not apply Misc Check blood sugar twice daily (fasting and 2 hours after a meal) 200 each 0   • Liraglutide Subcutaneous Solution Pen-injector 40 units subcutaneously at bedtime 15 mL 1   • omeprazole 20 MG Oral Capsule Delayed Release Take 20 mg by mouth every morning.        • Cholecalciferol (VITAMIN D3) 2000 units Oral Tab Take 1 tablet by mouth every morning right wrist, which is nonspecific given the absence of hyperemia to indicate active synovitis.   2. Otherwise no evidence of an inflammatory arthritis involving the right hand or wrist.  3. Triangular fibrocartilage complex is slightly heterogeneous in echo anterolisthesis of the L4 and L5    Pt will follow up if she continues to have L shoulder pain, may have to then do an MRI    Bucky Morse MD  10/19/2021  3:30 PM

## 2021-10-21 ENCOUNTER — TELEPHONE (OUTPATIENT)
Dept: RHEUMATOLOGY | Facility: CLINIC | Age: 56
End: 2021-10-21

## 2021-10-21 NOTE — TELEPHONE ENCOUNTER
PA request from [place]. PA required for diclofenac 1% gel. Started on [employer].    Case Id  [id number]  Reference Id  [id number]

## 2021-10-25 NOTE — TELEPHONE ENCOUNTER
Fax received from [facility] for diclofenac gel denied. Patient informed of denial and also informed it can be found OTC. No further action needed.

## 2021-10-31 ENCOUNTER — HOSPITAL ENCOUNTER (OUTPATIENT)
Age: 56
Discharge: HOME | End: 2021-10-31
Payer: MEDICAID

## 2021-10-31 VITALS
TEMPERATURE: 97 F | RESPIRATION RATE: 20 BRPM | HEART RATE: 90 BPM | OXYGEN SATURATION: 96 % | DIASTOLIC BLOOD PRESSURE: 79 MMHG | SYSTOLIC BLOOD PRESSURE: 135 MMHG

## 2021-10-31 DIAGNOSIS — F32.1 CURRENT MODERATE EPISODE OF MAJOR DEPRESSIVE DISORDER WITHOUT PRIOR EPISODE (HCC): ICD-10-CM

## 2021-10-31 DIAGNOSIS — M79.641 BILATERAL HAND PAIN: ICD-10-CM

## 2021-10-31 DIAGNOSIS — E11.40 TYPE 2 DIABETES MELLITUS WITH DIABETIC NEUROPATHY, WITH LONG-TERM CURRENT USE OF INSULIN (HCC): ICD-10-CM

## 2021-10-31 DIAGNOSIS — M79.642 BILATERAL HAND PAIN: ICD-10-CM

## 2021-10-31 DIAGNOSIS — E78.5 DYSLIPIDEMIA: ICD-10-CM

## 2021-10-31 DIAGNOSIS — M25.512 CHRONIC LEFT SHOULDER PAIN: ICD-10-CM

## 2021-10-31 DIAGNOSIS — I10 ESSENTIAL HYPERTENSION: ICD-10-CM

## 2021-10-31 DIAGNOSIS — E11.65 TYPE 2 DIABETES MELLITUS WITH HYPERGLYCEMIA, WITH LONG-TERM CURRENT USE OF INSULIN (HCC): ICD-10-CM

## 2021-10-31 DIAGNOSIS — Z79.4 TYPE 2 DIABETES MELLITUS WITH DIABETIC NEUROPATHY, WITH LONG-TERM CURRENT USE OF INSULIN (HCC): ICD-10-CM

## 2021-10-31 DIAGNOSIS — M1A.09X0 IDIOPATHIC CHRONIC GOUT OF MULTIPLE SITES WITHOUT TOPHUS: ICD-10-CM

## 2021-10-31 DIAGNOSIS — B34.9 VIRAL ILLNESS: ICD-10-CM

## 2021-10-31 DIAGNOSIS — Z79.4 TYPE 2 DIABETES MELLITUS WITH HYPERGLYCEMIA, WITH LONG-TERM CURRENT USE OF INSULIN (HCC): ICD-10-CM

## 2021-10-31 DIAGNOSIS — G89.29 CHRONIC LEFT SHOULDER PAIN: ICD-10-CM

## 2021-10-31 DIAGNOSIS — R73.9 HYPERGLYCEMIA: Primary | ICD-10-CM

## 2021-10-31 PROCEDURE — 99213 OFFICE O/P EST LOW 20 MIN: CPT

## 2021-10-31 PROCEDURE — 82962 GLUCOSE BLOOD TEST: CPT

## 2021-10-31 PROCEDURE — 99212 OFFICE O/P EST SF 10 MIN: CPT

## 2021-10-31 PROCEDURE — 87880 STREP A ASSAY W/OPTIC: CPT

## 2021-10-31 RX ORDER — GABAPENTIN 400 MG/1
400 CAPSULE ORAL 2 TIMES DAILY
Qty: 60 CAPSULE | Refills: 5 | Status: CANCELLED | OUTPATIENT
Start: 2021-10-31

## 2021-10-31 RX ORDER — TRAZODONE HYDROCHLORIDE 100 MG/1
100 TABLET ORAL NIGHTLY
Qty: 30 TABLET | Refills: 0 | Status: CANCELLED | OUTPATIENT
Start: 2021-10-31

## 2021-10-31 RX ORDER — HYDROCHLOROTHIAZIDE 25 MG/1
TABLET ORAL
Qty: 180 TABLET | Refills: 1 | Status: CANCELLED | OUTPATIENT
Start: 2021-10-31

## 2021-10-31 RX ORDER — SERTRALINE HYDROCHLORIDE 100 MG/1
100 TABLET, FILM COATED ORAL DAILY
Qty: 30 TABLET | Refills: 5 | Status: CANCELLED | OUTPATIENT
Start: 2021-10-31

## 2021-10-31 RX ORDER — LOSARTAN POTASSIUM 100 MG/1
100 TABLET ORAL DAILY
Qty: 90 TABLET | Refills: 1 | Status: CANCELLED | OUTPATIENT
Start: 2021-10-31

## 2021-10-31 RX ORDER — INSULIN GLARGINE 100 [IU]/ML
40 INJECTION, SOLUTION SUBCUTANEOUS EVERY MORNING
Refills: 0 | Status: CANCELLED | OUTPATIENT
Start: 2021-10-31

## 2021-10-31 RX ORDER — ALLOPURINOL 100 MG/1
100 TABLET ORAL NIGHTLY
Qty: 90 TABLET | Refills: 3 | Status: CANCELLED | OUTPATIENT
Start: 2021-10-31

## 2021-10-31 RX ORDER — PREDNISONE 10 MG/1
TABLET ORAL
Qty: 18 TABLET | Refills: 0 | Status: CANCELLED | OUTPATIENT
Start: 2021-10-31

## 2021-11-01 ENCOUNTER — TELEPHONE (OUTPATIENT)
Dept: INTERNAL MEDICINE CLINIC | Facility: CLINIC | Age: 56
End: 2021-11-01

## 2021-11-01 RX ORDER — NAPROXEN 500 MG/1
TABLET ORAL
Qty: 30 TABLET | Refills: 1 | Status: CANCELLED | OUTPATIENT
Start: 2021-11-01

## 2021-11-01 NOTE — TELEPHONE ENCOUNTER
Please call patient's daughters they usually communicate to us during last visit I was told that patient does not take insulin regularly, I advised patient to contact endocrinology for guidance how to handle insulin.   I have request to refill insulin

## 2021-11-01 NOTE — TELEPHONE ENCOUNTER
LOV: 10/19/21  No f/u  LABS:  \"You were seen today for left shoulder pain  Plan to send you to physical therapy  For your left hip pain it seems like it is more tendon pain  Try using Voltaren gel and ice to see if it helps, could consider cortisone injection  If your left shoulder is not improved may have to get an MRI\"

## 2021-11-03 NOTE — ED PROVIDER NOTES
atient Seen in: [facility] [place]    History   Patient presents with:   Body ache and/or chills    Stated Complaint: BODY ACHES    HPI    Chris Pelaez is a 48year old female who presents to immediate care requesting COVID-19 test.    Pain scale 3 o Take 1 tablet (80 mg total) by mouth nightly.   gabapentin 400 MG Oral Cap,  Take 1 capsule (400 mg total) by mouth 2 (two) times daily.    hydrochlorothiazide 25 MG Oral Tab,  Take  1 tab po twice a day   metFORMIN HCl 500 MG Oral Tab,  Take  2 tabs po twi External Shampoo,  Use to  Scalp twice a wekk   HYDROcodone-acetaminophen 7.5-325 MG Oral Tab,  Take 1-2 tablets by mouth every 4 (four) hours as needed for Pain.        Family History   Problem Relation Age of Onset   • Cancer Father    • Hypertension Fath to be nontender. There is no distention. No organomegaly is noted. No guarding or peritoneal signs. Genitourinary: Not Examined. Neurological: Moves all 4 extremities. No facial asymmetry. Lymphatic: No gross lymphadenopathy.   Musculoskeletal: Good musc

## 2021-11-04 NOTE — TELEPHONE ENCOUNTER
Spoke with patient and advised she has refills on file and should contact her pharmacy for the refills. Informed patient of Dr. Ruddy Mahajan message below as well.

## 2021-11-05 DIAGNOSIS — E78.5 DYSLIPIDEMIA: ICD-10-CM

## 2021-11-08 DIAGNOSIS — M1A.09X0 IDIOPATHIC CHRONIC GOUT OF MULTIPLE SITES WITHOUT TOPHUS: ICD-10-CM

## 2021-11-08 DIAGNOSIS — Z79.4 TYPE 2 DIABETES MELLITUS WITH DIABETIC NEUROPATHY, WITH LONG-TERM CURRENT USE OF INSULIN (HCC): ICD-10-CM

## 2021-11-08 DIAGNOSIS — E11.40 TYPE 2 DIABETES MELLITUS WITH DIABETIC NEUROPATHY, WITH LONG-TERM CURRENT USE OF INSULIN (HCC): ICD-10-CM

## 2021-11-08 DIAGNOSIS — F32.1 CURRENT MODERATE EPISODE OF MAJOR DEPRESSIVE DISORDER WITHOUT PRIOR EPISODE (HCC): ICD-10-CM

## 2021-11-08 DIAGNOSIS — I10 ESSENTIAL HYPERTENSION: ICD-10-CM

## 2021-11-08 RX ORDER — SIMVASTATIN 80 MG
80 TABLET ORAL NIGHTLY
Qty: 90 TABLET | Refills: 0 | Status: SHIPPED | OUTPATIENT
Start: 2021-11-08 | End: 2022-01-28

## 2021-11-08 RX ORDER — CARVEDILOL 6.25 MG/1
6.25 TABLET ORAL 2 TIMES DAILY WITH MEALS
Qty: 180 TABLET | Refills: 1 | Status: SHIPPED | OUTPATIENT
Start: 2021-11-08 | End: 2022-10-21

## 2021-11-08 RX ORDER — SIMVASTATIN 80 MG
TABLET ORAL
Qty: 90 TABLET | Refills: 0 | Status: SHIPPED | OUTPATIENT
Start: 2021-11-08 | End: 2021-11-08

## 2021-11-08 RX ORDER — LIRAGLUTIDE 6 MG/ML
1.8 INJECTION SUBCUTANEOUS EVERY MORNING
Qty: 9 ML | Refills: 0 | Status: SHIPPED | OUTPATIENT
Start: 2021-11-08 | End: 2021-12-20

## 2021-11-08 RX ORDER — HYDROCHLOROTHIAZIDE 25 MG/1
TABLET ORAL
Qty: 180 TABLET | Refills: 1 | Status: SHIPPED | OUTPATIENT
Start: 2021-11-08 | End: 2022-09-08

## 2021-11-08 RX ORDER — GLIPIZIDE 10 MG/1
10 TABLET ORAL
Qty: 180 TABLET | Refills: 0 | Status: SHIPPED | OUTPATIENT
Start: 2021-11-08 | End: 2022-01-28

## 2021-11-08 NOTE — TELEPHONE ENCOUNTER
Refill passed per [address] [place] protocol.   Requested Prescriptions   Pending Prescriptions Disp Refills    HYDROCHLOROTHIAZIDE 25 MG Oral Tab [Pharmacy Med Name: HYDROCHLOROTHIAZIDE 25MG TABLETS] 180 tablet 1     Sig: TAKE 1 TABLET BY MOUTH TWICE DAILY        Hypertensive Medications Protocol Passed - 11/8/2021  8:09 AM        Passed - CMP or BMP in past 12 months        Passed - Appointment in past 6 or next 3 months        Passed - GFR Non- > 50     Lab Results   Component Value Date    [facility] 79 08/06/2021                    CARVEDILOL 6.25 MG Oral Tab [Pharmacy Med Name: CARVEDILOL 6.25MG TABLETS] 180 tablet 1     Sig: TAKE 1 TABLET(6.25 MG) BY MOUTH TWICE DAILY WITH MEALS        Hypertensive Medications Protocol Passed - 11/8/2021  8:09 AM        Passed - CMP or BMP in past 12 months        Passed - Appointment in past 6 or next 3 months        Passed - GFR Non- > 50     Lab Results   Component Value Date    [facility] 79 08/06/2021                        Recent Outpatient Visits              2 weeks ago Chronic left shoulder pain    [facility], Lyna Glaze, MD    Office Visit    1 month ago Other chronic gastritis without hemorrhage    [facility], [address] [place] [address] Regin[address], MD    Office Visit    3 months ago Chronic left shoulder pain    [facility], Lyna Glaze, MD    Office Visit    3 months ago Type 2 diabetes mellitus with hyperglycemia, with long-term current use of insulin [facility])    [address] [facility] Nadege Weber MD    Office Visit    3 months ago Essential hypertension    [facility],  Regino Braden [address]la Lane, MD    Office Visit            Future Appointments         Provider Department Appt Notes    In 2 days Nadege Weber MD [address] [facility] 3m f/u    In 4 weeks Sarah Mccarty 45 in [place] [employer]    In 1 month Zeyad Belt, PT BUD Post in [place]?     In 1 month Zeyad Belt, PT BUD Post in [facility]     In 1 month Zeyad Belt, [address] in [facility]     In 1 month East Adrianna lopez, [address] in [facility]     In 1 month East Adrianna lopez [address], [address] in [facility]     In 1 month East Adrianna lopez [place], [address] in [facility]     In 1 month Zeyad Belt, [address] in [facility]

## 2021-11-15 ENCOUNTER — HOSPITAL ENCOUNTER (OUTPATIENT)
Age: 56
Discharge: HOME | End: 2021-11-15
Attending: EMERGENCY MEDICINE
Payer: MEDICAID

## 2021-11-15 VITALS
TEMPERATURE: 98 F | RESPIRATION RATE: 18 BRPM | SYSTOLIC BLOOD PRESSURE: 134 MMHG | OXYGEN SATURATION: 95 % | HEART RATE: 96 BPM | DIASTOLIC BLOOD PRESSURE: 73 MMHG

## 2021-11-15 DIAGNOSIS — F32.1 CURRENT MODERATE EPISODE OF MAJOR DEPRESSIVE DISORDER WITHOUT PRIOR EPISODE (HCC): ICD-10-CM

## 2021-11-15 DIAGNOSIS — H60.503 ACUTE OTITIS EXTERNA OF BOTH EARS, UNSPECIFIED TYPE: Primary | ICD-10-CM

## 2021-11-15 DIAGNOSIS — Z79.4 TYPE 2 DIABETES MELLITUS WITH DIABETIC NEUROPATHY, WITH LONG-TERM CURRENT USE OF INSULIN (HCC): ICD-10-CM

## 2021-11-15 DIAGNOSIS — I10 ESSENTIAL HYPERTENSION: ICD-10-CM

## 2021-11-15 DIAGNOSIS — M1A.09X0 IDIOPATHIC CHRONIC GOUT OF MULTIPLE SITES WITHOUT TOPHUS: ICD-10-CM

## 2021-11-15 DIAGNOSIS — E11.40 TYPE 2 DIABETES MELLITUS WITH DIABETIC NEUROPATHY, WITH LONG-TERM CURRENT USE OF INSULIN (HCC): ICD-10-CM

## 2021-11-15 PROCEDURE — 99213 OFFICE O/P EST LOW 20 MIN: CPT

## 2021-11-15 RX ORDER — GABAPENTIN 400 MG/1
400 CAPSULE ORAL 2 TIMES DAILY
Qty: 60 CAPSULE | Refills: 5 | Status: SHIPPED | OUTPATIENT
Start: 2021-11-15 | End: 2022-01-28

## 2021-11-15 NOTE — TELEPHONE ENCOUNTER
Refill passed per [facility] protocol. Requested Prescriptions   Pending Prescriptions Disp Refills    gabapentin 400 MG Oral Cap 60 capsule 5     Sig: Take 1 capsule (400 mg total) by mouth 2 (two) times daily. Neurology Medications Passed - 11/15/2021  1:12 AM        Passed - Appointment in the past 6 or next 3 months              Future Appointments         Provider Department Appt Notes    In 3 weeks OLIVA Yang in [facility]    In 3 weeks Bret Yang [address] in [place]?     In 4 weeks OLIVA Yang in [address]     In 1 month Serena Jones [address] in [address]     In 1 month Serena Jones [address] in [address]     In 1 month [facility] Serena [address]opez [address] in [address]     In 1 month Serena Jones [address] in [address]     In 1 month Bret Yang [address] in [address] [place] [address] Visits              3 weeks ago Chronic left shoulder pain    [facility] [address] [place] [address], [facility]    Office Visit    1 month ago Other chronic gastritis without hemorrhage    [facility] [facility], [address] [place] [address], Chastity Neal MD    Office Visit    3 months ago Chronic left shoulder pain    [facility] Enio Zapata MD    Office Visit    3 months ago Type 2 diabetes mellitus with hyperglycemia, with long-term current use of insulin [facility] [place], [facility] Endocrinology Eryn Cronin MD    Office Visit    3 months ago Essential hypertension    Martín Pan MD    Office Visit

## 2021-11-19 ENCOUNTER — PATIENT MESSAGE (OUTPATIENT)
Dept: INTERNAL MEDICINE CLINIC | Facility: CLINIC | Age: 56
End: 2021-11-19

## 2021-11-19 RX ORDER — HYDROCORTISONE 25 MG/G
CREAM TOPICAL
Qty: 28 G | Refills: 0 | Status: CANCELLED | OUTPATIENT
Start: 2021-11-19

## 2021-11-20 NOTE — ED PROVIDER NOTES
Patient Seen in: [facility] [place]      History   Patient presents with:  Ear Problem Pain    Stated Complaint: pain both ears    Subjective:   HPI    47 yo female with bilateral ear pain and itching. Feels a full sensation and hearing is muffled.  Sydnee Deluca is clear. Eyes:      Conjunctiva/sclera: Conjunctivae normal.      Pupils: Pupils are equal, round, and reactive to light. Cardiovascular:      Rate and Rhythm: Normal rate and regular rhythm.    Pulmonary:      Effort: Pulmonary effort is normal. No re

## 2021-11-20 NOTE — TELEPHONE ENCOUNTER
From: Bipin Vail  To:  Tonya Gonzalez MD  Sent: 11/19/2021 10:51 PM CST  Subject: External use cream    Hello I’m using PROCTOZONE HC 2.5% when ever I use bathroom I have pain in my big intestine I use this cream it’s very helpful for me can you please

## 2021-11-22 ENCOUNTER — TELEPHONE (OUTPATIENT)
Dept: PHYSICAL THERAPY | Facility: HOSPITAL | Age: 56
End: 2021-11-22

## 2021-11-22 RX ORDER — LANCETS 33 GAUGE
EACH MISCELLANEOUS
Qty: 200 EACH | Refills: 0 | Status: SHIPPED | OUTPATIENT
Start: 2021-11-22

## 2021-11-22 RX ORDER — BLOOD SUGAR DIAGNOSTIC
STRIP MISCELLANEOUS
Qty: 200 STRIP | Refills: 0 | Status: SHIPPED | OUTPATIENT
Start: 2021-11-22

## 2021-11-24 RX ORDER — HYDROCORTISONE 25 MG/G
CREAM TOPICAL
Qty: 28 G | Refills: 0 | Status: SHIPPED | OUTPATIENT
Start: 2021-11-24

## 2021-12-01 ENCOUNTER — TELEPHONE (OUTPATIENT)
Dept: PHYSICAL THERAPY | Facility: HOSPITAL | Age: 56
End: 2021-12-01

## 2021-12-06 ENCOUNTER — OFFICE VISIT (OUTPATIENT)
Dept: PHYSICAL THERAPY | Age: 56
End: 2021-12-06
Attending: INTERNAL MEDICINE
Payer: MEDICAID

## 2021-12-06 DIAGNOSIS — G89.29 CHRONIC LEFT SHOULDER PAIN: ICD-10-CM

## 2021-12-06 DIAGNOSIS — M25.512 CHRONIC LEFT SHOULDER PAIN: ICD-10-CM

## 2021-12-06 PROCEDURE — 97162 PT EVAL MOD COMPLEX 30 MIN: CPT | Performed by: PHYSICAL THERAPIST

## 2021-12-06 NOTE — PROGRESS NOTES
SHOULDER EVALUATION:   Referring Physician: Dr. John Tovar  Diagnosis: Chronic left shoulder pain (M25.512,G89.29)    Onset: July 2020   Evaluation Date: 12/6/2021  Visit # 1  Scheduled Visits 8  Insurance Authorized visits 2 [facility]     PATIENT SUMMARY   Noorj on initial appt. PT called and rescheduled pt for a later appt.  Discussed attendance policy and told pt that she missed one already, so if she misses one more she will be discharged             ASSESSMENT:   Fransico Munoz presents to physical therapy evaluatio Timed Treatment: 0 min     Total Treatment Time: 40 min     Based on clinical rationale and outcome measures, this evaluation involved Moderate Complexity decision making due to 3+ personal factors/comorbidities, 4+ body structures involved/activity limita To:3/6/2022

## 2021-12-09 ENCOUNTER — OFFICE VISIT (OUTPATIENT)
Dept: PHYSICAL THERAPY | Age: 56
End: 2021-12-09
Attending: INTERNAL MEDICINE
Payer: MEDICAID

## 2021-12-09 DIAGNOSIS — M25.512 CHRONIC LEFT SHOULDER PAIN: ICD-10-CM

## 2021-12-09 DIAGNOSIS — G89.29 CHRONIC LEFT SHOULDER PAIN: ICD-10-CM

## 2021-12-09 PROCEDURE — 97110 THERAPEUTIC EXERCISES: CPT | Performed by: PHYSICAL THERAPIST

## 2021-12-09 NOTE — PROGRESS NOTES
Dx: Chronic left shoulder pain (M25.512,G89.29)           Insurance (Authorized # of Visits):  2           Authorizing Physician: Dr. Nicolette Alfred  Next MD visit: unknown  Fall Risk: standard         Precautions: n/a         Evaluation Date: 12/6/21  Miguel Kumar

## 2021-12-13 ENCOUNTER — APPOINTMENT (OUTPATIENT)
Dept: PHYSICAL THERAPY | Age: 56
End: 2021-12-13
Attending: INTERNAL MEDICINE
Payer: MEDICAID

## 2021-12-13 ENCOUNTER — TELEPHONE (OUTPATIENT)
Dept: PHYSICAL THERAPY | Facility: HOSPITAL | Age: 56
End: 2021-12-13

## 2021-12-16 ENCOUNTER — APPOINTMENT (OUTPATIENT)
Dept: PHYSICAL THERAPY | Age: 56
End: 2021-12-16
Attending: INTERNAL MEDICINE
Payer: MEDICAID

## 2021-12-19 DIAGNOSIS — Z79.4 TYPE 2 DIABETES MELLITUS WITH HYPERGLYCEMIA, WITH LONG-TERM CURRENT USE OF INSULIN (HCC): ICD-10-CM

## 2021-12-19 DIAGNOSIS — E11.65 TYPE 2 DIABETES MELLITUS WITH HYPERGLYCEMIA, WITH LONG-TERM CURRENT USE OF INSULIN (HCC): ICD-10-CM

## 2021-12-20 ENCOUNTER — APPOINTMENT (OUTPATIENT)
Dept: PHYSICAL THERAPY | Age: 56
End: 2021-12-20
Attending: INTERNAL MEDICINE
Payer: MEDICAID

## 2021-12-20 RX ORDER — LIRAGLUTIDE 6 MG/ML
INJECTION SUBCUTANEOUS
Qty: 9 ML | Refills: 1 | Status: SHIPPED | OUTPATIENT
Start: 2021-12-20

## 2021-12-20 NOTE — TELEPHONE ENCOUNTER
Last seen 8/10, follow up scheduled 1/4/22. A1C of same date 8.2.   Please advise on refill request.

## 2021-12-21 RX ORDER — ESCITALOPRAM OXALATE 10 MG/1
TABLET ORAL
Qty: 90 TABLET | Refills: 1 | OUTPATIENT
Start: 2021-12-21

## 2021-12-21 NOTE — TELEPHONE ENCOUNTER
Spoke with daughter-in-law Eden Parada (MAIDA verified)-confirms patient no longer taking this medication--declines refill.

## 2021-12-23 ENCOUNTER — APPOINTMENT (OUTPATIENT)
Dept: PHYSICAL THERAPY | Age: 56
End: 2021-12-23
Attending: INTERNAL MEDICINE
Payer: MEDICAID

## 2021-12-23 ENCOUNTER — OFFICE VISIT (OUTPATIENT)
Dept: PHYSICAL THERAPY | Age: 56
End: 2021-12-23
Attending: INTERNAL MEDICINE
Payer: MEDICAID

## 2021-12-23 PROCEDURE — 97110 THERAPEUTIC EXERCISES: CPT | Performed by: PHYSICAL THERAPIST

## 2021-12-23 NOTE — PROGRESS NOTES
DischargeSummary  Pt has attended 3 visits in Physical Therapy.    Pt no showed/late cancel 2 visits  Reporting period: 12/6/21 to 12/23/2021      Dx: Chronic left shoulder pain (M25.512,G89.29)           Insurance (Authorized # of Visits):  2           A participate in planning and for this course of care. Thank you for your referral. If you have any questions, please contact me at Dept: [phone].     Sincerely,  Electronically signed by therapist Renée Hill PT, DPT, cert MDT    [phone]

## 2021-12-25 RX ORDER — TRAZODONE HYDROCHLORIDE 100 MG/1
100 TABLET ORAL NIGHTLY
Qty: 30 TABLET | Refills: 1 | Status: SHIPPED | OUTPATIENT
Start: 2021-12-25 | End: 2022-07-11

## 2021-12-25 NOTE — TELEPHONE ENCOUNTER
See interaction with Sertraline    Refill passed per [employer], [facility] protocol    Requested Prescriptions   Pending Prescriptions Disp Refills    TRAZODONE 100 MG Oral Tab [Pharmacy Med Name: TRAZODONE 100MG TABLETS] 30 tablet 0     Sig: TAKE 1 TABLET(100 MG) BY MOUTH EVERY NIGHT        Psychiatric Non-Scheduled (Anti-Anxiety) Passed - 12/25/2021  4:17 AM        Passed - Appointment in last 6 or next 3 months              Future Appointments         Provider Department Appt Notes    In 1 week Anahy Adan, [address] [facility] Endocrinology Follow up visit    In 1 month Jose Bran MD [facility] Ophthalmology EP/ DM EE            Recent Outpatient Visits              2 days ago     [facility] in [place], [place], [address] Visit    2 weeks ago Chronic left shoulder pain    [address] [facility] in [place], [place], [address] Visit    2 weeks ago Chronic left shoulder pain    [facility] in [facility], [address] Visit    2 months ago Chronic left shoulder pain    [facility] for Isha Farias MD    Office Visit    2 months ago Other chronic gastritis without hemorrhage    López Felder, Chastity Grey MD    Office Visit

## 2021-12-27 ENCOUNTER — APPOINTMENT (OUTPATIENT)
Dept: PHYSICAL THERAPY | Age: 56
End: 2021-12-27
Attending: INTERNAL MEDICINE
Payer: MEDICAID

## 2021-12-30 ENCOUNTER — APPOINTMENT (OUTPATIENT)
Dept: PHYSICAL THERAPY | Age: 56
End: 2021-12-30
Attending: INTERNAL MEDICINE
Payer: MEDICAID

## 2022-01-03 RX ORDER — LEVOTHYROXINE SODIUM 0.12 MG/1
TABLET ORAL
Qty: 90 TABLET | Refills: 0 | Status: SHIPPED | OUTPATIENT
Start: 2022-01-03

## 2022-01-04 ENCOUNTER — APPOINTMENT (OUTPATIENT)
Dept: GENERAL RADIOLOGY | Age: 57
End: 2022-01-04
Attending: EMERGENCY MEDICINE
Payer: MEDICAID

## 2022-01-04 ENCOUNTER — HOSPITAL ENCOUNTER (OUTPATIENT)
Age: 57
Discharge: HOME | End: 2022-01-04
Attending: EMERGENCY MEDICINE
Payer: MEDICAID

## 2022-01-04 VITALS
OXYGEN SATURATION: 97 % | RESPIRATION RATE: 18 BRPM | SYSTOLIC BLOOD PRESSURE: 135 MMHG | DIASTOLIC BLOOD PRESSURE: 69 MMHG | HEART RATE: 104 BPM | TEMPERATURE: 98 F

## 2022-01-04 DIAGNOSIS — E11.65 TYPE 2 DIABETES MELLITUS WITH HYPERGLYCEMIA, UNSPECIFIED WHETHER LONG TERM INSULIN USE (HCC): ICD-10-CM

## 2022-01-04 DIAGNOSIS — E87.6 HYPOKALEMIA: ICD-10-CM

## 2022-01-04 DIAGNOSIS — U07.1 COVID-19: Primary | ICD-10-CM

## 2022-01-04 LAB
#MXD IC: 0.7 X10ˆ3/UL (ref 0.1–1)
BUN BLD-MCNC: 20 MG/DL (ref 7–18)
CHLORIDE BLD-SCNC: 96 MMOL/L (ref 98–112)
CO2 BLD-SCNC: 28 MMOL/L (ref 21–32)
CREAT BLD-MCNC: 0.9 MG/DL
DDIMER WHOLE BLOOD: <200 NG/ML DDU (ref ?–400)
GLUCOSE BLD-MCNC: 194 MG/DL (ref 70–99)
HCT VFR BLD AUTO: 41.6 %
HCT VFR BLD CALC: 44 %
HGB BLD-MCNC: 13.9 G/DL
ISTAT IONIZED CALCIUM FOR CHEM 8: 1.26 MMOL/L (ref 1.12–1.32)
LYMPHOCYTES # BLD AUTO: 3.6 X10ˆ3/UL (ref 1–4)
LYMPHOCYTES NFR BLD AUTO: 38.2 %
MCH RBC QN AUTO: 26.4 PG (ref 26–34)
MCHC RBC AUTO-ENTMCNC: 33.4 G/DL (ref 31–37)
MCV RBC AUTO: 79.1 FL (ref 80–100)
MIXED CELL %: 7.1 %
NEUTROPHILS # BLD AUTO: 5 X10ˆ3/UL (ref 1.5–7.7)
NEUTROPHILS NFR BLD AUTO: 54.7 %
PLATELET # BLD AUTO: 324 X10ˆ3/UL (ref 150–450)
POTASSIUM BLD-SCNC: 3.5 MMOL/L (ref 3.6–5.1)
RBC # BLD AUTO: 5.26 X10ˆ6/UL
S PYO AG THROAT QL: NEGATIVE
SARS-COV-2 RNA RESP QL NAA+PROBE: DETECTED
SODIUM BLD-SCNC: 137 MMOL/L (ref 136–145)
TROPONIN I BLD-MCNC: <0.02 NG/ML
WBC # BLD AUTO: 9.3 X10ˆ3/UL (ref 4–11)

## 2022-01-04 PROCEDURE — 71046 X-RAY EXAM CHEST 2 VIEWS: CPT | Performed by: EMERGENCY MEDICINE

## 2022-01-04 PROCEDURE — 93010 ELECTROCARDIOGRAM REPORT: CPT | Performed by: EMERGENCY MEDICINE

## 2022-01-04 PROCEDURE — 87880 STREP A ASSAY W/OPTIC: CPT

## 2022-01-04 PROCEDURE — 99215 OFFICE O/P EST HI 40 MIN: CPT

## 2022-01-04 PROCEDURE — 99213 OFFICE O/P EST LOW 20 MIN: CPT

## 2022-01-04 PROCEDURE — 93005 ELECTROCARDIOGRAM TRACING: CPT

## 2022-01-04 PROCEDURE — 84484 ASSAY OF TROPONIN QUANT: CPT

## 2022-01-04 PROCEDURE — 85025 COMPLETE CBC W/AUTO DIFF WBC: CPT | Performed by: EMERGENCY MEDICINE

## 2022-01-04 PROCEDURE — 85378 FIBRIN DEGRADE SEMIQUANT: CPT | Performed by: EMERGENCY MEDICINE

## 2022-01-04 PROCEDURE — 93010 ELECTROCARDIOGRAM REPORT: CPT

## 2022-01-04 PROCEDURE — 80047 BASIC METABLC PNL IONIZED CA: CPT

## 2022-01-04 PROCEDURE — 36415 COLL VENOUS BLD VENIPUNCTURE: CPT

## 2022-01-04 NOTE — ED PROVIDER NOTES
Daughter  Patient Seen in: [facility]      History   Patient presents with:  Cough/URI    Stated Complaint: COUGH [phone]    Subjective:   HPI    The patient is a 43-year-old female with past history of asthma, type 2 diabetes, hypertensio Temp src Temporal   SpO2 97 %   O2 Device None (Room air)       Current:/69   Pulse 104   Temp 98.4 °F (36.9 °C) (Temporal)   Resp 18   SpO2 97%         Physical Exam    Constitutional: Well-developed well-nourished in no acute distress  Head: Norm patient and her family. Patient's mildly elevated glucose and decreased potassium were discussed with the patient as well. Recommend pushing p.o. fluids, anti-inflammatories for body aches, quarantine at home for 10 days from onset of symptoms.     MDM

## 2022-01-18 ENCOUNTER — PATIENT MESSAGE (OUTPATIENT)
Dept: INTERNAL MEDICINE CLINIC | Facility: CLINIC | Age: 57
End: 2022-01-18

## 2022-01-18 NOTE — TELEPHONE ENCOUNTER
Clover Diaz RN 1/18/2022 11:11 AM CST        ----- Message -----  From: Massiel Pappas  Sent: 1/18/2022 9:46 AM CST  To: Em Rn Triage  Subject: Vaccine     I’m marilyn’s daughter she had tested positive for corona before 2 weeks ago does she have LUKASZ

## 2022-01-28 ENCOUNTER — LAB ENCOUNTER (OUTPATIENT)
Dept: LAB | Age: 57
End: 2022-01-28
Attending: INTERNAL MEDICINE
Payer: MEDICAID

## 2022-01-28 ENCOUNTER — OFFICE VISIT (OUTPATIENT)
Dept: INTERNAL MEDICINE CLINIC | Facility: CLINIC | Age: 57
End: 2022-01-28
Payer: MEDICAID

## 2022-01-28 VITALS
HEART RATE: 109 BPM | BODY MASS INDEX: 33.25 KG/M2 | WEIGHT: 190 LBS | RESPIRATION RATE: 17 BRPM | SYSTOLIC BLOOD PRESSURE: 130 MMHG | HEIGHT: 63.5 IN | DIASTOLIC BLOOD PRESSURE: 79 MMHG

## 2022-01-28 DIAGNOSIS — E11.40 DIABETIC NEUROPATHY (HCC): Primary | ICD-10-CM

## 2022-01-28 DIAGNOSIS — E11.9 TYPE 2 DIABETES MELLITUS WITHOUT COMPLICATION, WITH LONG-TERM CURRENT USE OF INSULIN (HCC): ICD-10-CM

## 2022-01-28 DIAGNOSIS — Z79.4 TYPE 2 DIABETES MELLITUS WITH DIABETIC NEUROPATHY, WITH LONG-TERM CURRENT USE OF INSULIN (HCC): Primary | ICD-10-CM

## 2022-01-28 DIAGNOSIS — E78.5 DYSLIPIDEMIA: ICD-10-CM

## 2022-01-28 DIAGNOSIS — I10 PRIMARY HYPERTENSION: ICD-10-CM

## 2022-01-28 DIAGNOSIS — E11.65 TYPE 2 DIABETES MELLITUS WITH HYPERGLYCEMIA, WITH LONG-TERM CURRENT USE OF INSULIN (HCC): ICD-10-CM

## 2022-01-28 DIAGNOSIS — Z79.4 TYPE 2 DIABETES MELLITUS WITHOUT COMPLICATION, WITH LONG-TERM CURRENT USE OF INSULIN (HCC): ICD-10-CM

## 2022-01-28 DIAGNOSIS — M1A.09X0 IDIOPATHIC CHRONIC GOUT OF MULTIPLE SITES WITHOUT TOPHUS: ICD-10-CM

## 2022-01-28 DIAGNOSIS — E55.9 VITAMIN D DEFICIENCY: ICD-10-CM

## 2022-01-28 DIAGNOSIS — F32.1 CURRENT MODERATE EPISODE OF MAJOR DEPRESSIVE DISORDER WITHOUT PRIOR EPISODE (HCC): ICD-10-CM

## 2022-01-28 DIAGNOSIS — E11.40 TYPE 2 DIABETES MELLITUS WITH DIABETIC NEUROPATHY, WITH LONG-TERM CURRENT USE OF INSULIN (HCC): Primary | ICD-10-CM

## 2022-01-28 DIAGNOSIS — R10.32 LEFT LOWER QUADRANT ABDOMINAL PAIN: ICD-10-CM

## 2022-01-28 DIAGNOSIS — E03.8 OTHER SPECIFIED HYPOTHYROIDISM: ICD-10-CM

## 2022-01-28 DIAGNOSIS — E61.1 IRON DEFICIENCY: ICD-10-CM

## 2022-01-28 DIAGNOSIS — I10 ESSENTIAL HYPERTENSION: ICD-10-CM

## 2022-01-28 DIAGNOSIS — Z79.4 TYPE 2 DIABETES MELLITUS WITH HYPERGLYCEMIA, WITH LONG-TERM CURRENT USE OF INSULIN (HCC): ICD-10-CM

## 2022-01-28 LAB
ALBUMIN SERPL-MCNC: 4.2 G/DL (ref 3.4–5)
ALBUMIN/GLOB SERPL: 1.3 {RATIO} (ref 1–2)
ALP LIVER SERPL-CCNC: 101 U/L
ALT SERPL-CCNC: 56 U/L
ANION GAP SERPL CALC-SCNC: 6 MMOL/L (ref 0–18)
AST SERPL-CCNC: 31 U/L (ref 15–37)
BASOPHILS # BLD AUTO: 0.07 X10(3) UL (ref 0–0.2)
BASOPHILS NFR BLD AUTO: 0.7 %
BILIRUB SERPL-MCNC: 0.6 MG/DL (ref 0.1–2)
BILIRUB UR QL: NEGATIVE
BUN BLD-MCNC: 22 MG/DL (ref 7–18)
BUN/CREAT SERPL: 22.2 (ref 10–20)
CALCIUM BLD-MCNC: 10 MG/DL (ref 8.5–10.1)
CHLORIDE SERPL-SCNC: 101 MMOL/L (ref 98–112)
CHOLEST SERPL-MCNC: 148 MG/DL (ref ?–200)
CO2 SERPL-SCNC: 30 MMOL/L (ref 21–32)
COLOR UR: YELLOW
CREAT BLD-MCNC: 0.99 MG/DL
DEPRECATED HBV CORE AB SER IA-ACNC: 42.1 NG/ML
DEPRECATED RDW RBC AUTO: 40.1 FL (ref 35.1–46.3)
EOSINOPHIL # BLD AUTO: 0.26 X10(3) UL (ref 0–0.7)
EOSINOPHIL NFR BLD AUTO: 2.8 %
ERYTHROCYTE [DISTWIDTH] IN BLOOD BY AUTOMATED COUNT: 14 % (ref 11–15)
EST. AVERAGE GLUCOSE BLD GHB EST-MCNC: 183 MG/DL (ref 68–126)
FASTING PATIENT LIPID ANSWER: YES
FASTING STATUS PATIENT QL REPORTED: YES
GLOBULIN PLAS-MCNC: 3.2 G/DL (ref 2.8–4.4)
GLUCOSE BLD-MCNC: 147 MG/DL (ref 70–99)
GLUCOSE UR-MCNC: NEGATIVE MG/DL
HBA1C MFR BLD: 8 % (ref ?–5.7)
HCT VFR BLD AUTO: 38.2 %
HDLC SERPL-MCNC: 36 MG/DL (ref 40–59)
HGB BLD-MCNC: 13.2 G/DL
HGB UR QL STRIP.AUTO: NEGATIVE
HYALINE CASTS #/AREA URNS AUTO: PRESENT /LPF
IMM GRANULOCYTES # BLD AUTO: 0.02 X10(3) UL (ref 0–1)
IMM GRANULOCYTES NFR BLD: 0.2 %
KETONES UR-MCNC: NEGATIVE MG/DL
LDLC SERPL CALC-MCNC: 78 MG/DL (ref ?–100)
LYMPHOCYTES # BLD AUTO: 3.24 X10(3) UL (ref 1–4)
LYMPHOCYTES NFR BLD AUTO: 34.5 %
MCH RBC QN AUTO: 27.2 PG (ref 26–34)
MCHC RBC AUTO-ENTMCNC: 34.6 G/DL (ref 31–37)
MCV RBC AUTO: 78.8 FL
MONOCYTES # BLD AUTO: 0.63 X10(3) UL (ref 0.1–1)
MONOCYTES NFR BLD AUTO: 6.7 %
NEUTROPHILS # BLD AUTO: 5.18 X10 (3) UL (ref 1.5–7.7)
NEUTROPHILS # BLD AUTO: 5.18 X10(3) UL (ref 1.5–7.7)
NEUTROPHILS NFR BLD AUTO: 55.1 %
NITRITE UR QL STRIP.AUTO: NEGATIVE
NONHDLC SERPL-MCNC: 112 MG/DL (ref ?–130)
OSMOLALITY SERPL CALC.SUM OF ELEC: 290 MOSM/KG (ref 275–295)
PH UR: 5 [PH] (ref 5–8)
PLATELET # BLD AUTO: 339 10(3)UL (ref 150–450)
POTASSIUM SERPL-SCNC: 4.1 MMOL/L (ref 3.5–5.1)
PROT SERPL-MCNC: 7.4 G/DL (ref 6.4–8.2)
PROT UR-MCNC: 30 MG/DL
RBC # BLD AUTO: 4.85 X10(6)UL
SODIUM SERPL-SCNC: 137 MMOL/L (ref 136–145)
SP GR UR STRIP: 1.02 (ref 1–1.03)
T4 FREE SERPL-MCNC: 1.4 NG/DL (ref 0.8–1.7)
TRIGL SERPL-MCNC: 204 MG/DL (ref 30–149)
TSI SER-ACNC: 0.05 MIU/ML (ref 0.36–3.74)
UROBILINOGEN UR STRIP-ACNC: <2
VIT D+METAB SERPL-MCNC: 43.6 NG/ML (ref 30–100)
VLDLC SERPL CALC-MCNC: 32 MG/DL (ref 0–30)
WBC # BLD AUTO: 9.4 X10(3) UL (ref 4–11)

## 2022-01-28 PROCEDURE — 3052F HG A1C>EQUAL 8.0%<EQUAL 9.0%: CPT | Performed by: INTERNAL MEDICINE

## 2022-01-28 PROCEDURE — 3075F SYST BP GE 130 - 139MM HG: CPT | Performed by: INTERNAL MEDICINE

## 2022-01-28 PROCEDURE — 82306 VITAMIN D 25 HYDROXY: CPT

## 2022-01-28 PROCEDURE — 99214 OFFICE O/P EST MOD 30 MIN: CPT | Performed by: INTERNAL MEDICINE

## 2022-01-28 PROCEDURE — 3008F BODY MASS INDEX DOCD: CPT | Performed by: INTERNAL MEDICINE

## 2022-01-28 PROCEDURE — 3078F DIAST BP <80 MM HG: CPT | Performed by: INTERNAL MEDICINE

## 2022-01-28 PROCEDURE — 83036 HEMOGLOBIN GLYCOSYLATED A1C: CPT | Performed by: INTERNAL MEDICINE

## 2022-01-28 PROCEDURE — 36415 COLL VENOUS BLD VENIPUNCTURE: CPT | Performed by: INTERNAL MEDICINE

## 2022-01-28 PROCEDURE — 81001 URINALYSIS AUTO W/SCOPE: CPT | Performed by: INTERNAL MEDICINE

## 2022-01-28 PROCEDURE — 80061 LIPID PANEL: CPT

## 2022-01-28 PROCEDURE — 82728 ASSAY OF FERRITIN: CPT | Performed by: INTERNAL MEDICINE

## 2022-01-28 PROCEDURE — 84481 FREE ASSAY (FT-3): CPT | Performed by: INTERNAL MEDICINE

## 2022-01-28 PROCEDURE — 80053 COMPREHEN METABOLIC PANEL: CPT | Performed by: INTERNAL MEDICINE

## 2022-01-28 PROCEDURE — 84443 ASSAY THYROID STIM HORMONE: CPT | Performed by: INTERNAL MEDICINE

## 2022-01-28 PROCEDURE — 84439 ASSAY OF FREE THYROXINE: CPT | Performed by: INTERNAL MEDICINE

## 2022-01-28 PROCEDURE — 85025 COMPLETE CBC W/AUTO DIFF WBC: CPT | Performed by: INTERNAL MEDICINE

## 2022-01-28 RX ORDER — ALLOPURINOL 100 MG/1
100 TABLET ORAL NIGHTLY
Qty: 90 TABLET | Refills: 3 | Status: SHIPPED | OUTPATIENT
Start: 2022-01-28

## 2022-01-28 RX ORDER — GABAPENTIN 400 MG/1
400 CAPSULE ORAL 2 TIMES DAILY
Qty: 180 CAPSULE | Refills: 5 | Status: SHIPPED | OUTPATIENT
Start: 2022-01-28

## 2022-01-28 RX ORDER — OMEPRAZOLE 40 MG/1
40 CAPSULE, DELAYED RELEASE ORAL DAILY
Qty: 90 CAPSULE | Refills: 1 | Status: SHIPPED | OUTPATIENT
Start: 2022-01-28

## 2022-01-28 RX ORDER — GLIPIZIDE 10 MG/1
10 TABLET ORAL
Qty: 180 TABLET | Refills: 1 | Status: SHIPPED | OUTPATIENT
Start: 2022-01-28

## 2022-01-28 RX ORDER — CIPROFLOXACIN 500 MG/1
500 TABLET, FILM COATED ORAL 2 TIMES DAILY
Qty: 20 TABLET | Refills: 0 | Status: SHIPPED | OUTPATIENT
Start: 2022-01-28

## 2022-01-28 RX ORDER — SIMVASTATIN 80 MG
80 TABLET ORAL NIGHTLY
Qty: 90 TABLET | Refills: 1 | Status: SHIPPED | OUTPATIENT
Start: 2022-01-28

## 2022-01-28 RX ORDER — SERTRALINE HYDROCHLORIDE 100 MG/1
100 TABLET, FILM COATED ORAL DAILY
Qty: 90 TABLET | Refills: 1 | Status: SHIPPED | OUTPATIENT
Start: 2022-01-28

## 2022-01-28 RX ORDER — INSULIN GLARGINE 100 [IU]/ML
INJECTION, SOLUTION SUBCUTANEOUS
Qty: 45 ML | Refills: 1 | Status: SHIPPED | OUTPATIENT
Start: 2022-01-28

## 2022-01-28 RX ORDER — LOSARTAN POTASSIUM 100 MG/1
100 TABLET ORAL DAILY
Qty: 90 TABLET | Refills: 1 | Status: SHIPPED | OUTPATIENT
Start: 2022-01-28

## 2022-01-28 RX ORDER — ZOLPIDEM TARTRATE 10 MG/1
10 TABLET ORAL NIGHTLY
Qty: 30 TABLET | Refills: 0 | Status: SHIPPED | OUTPATIENT
Start: 2022-01-28

## 2022-01-29 LAB — T3FREE SERPL-MCNC: 2.97 PG/ML (ref 2.4–4.2)

## 2022-01-31 NOTE — PROGRESS NOTES
Subjective:   Patient ID: Nikkie Aguilar is a 55year old female. Resents for evaluation of abdominal pain shoulder pain, diabetes hypertension    HPI  Patient is here accompanied by her daughter.   Reports that last few days experiencing left lower quadr Delayed Release Take 1 capsule (40 mg total) by mouth daily. 90 capsule 1   • simvastatin 80 MG Oral Tab Take 1 tablet (80 mg total) by mouth nightly. 90 tablet 1   • gabapentin 400 MG Oral Cap Take 1 capsule (400 mg total) by mouth 2 (two) times daily.  25 tablet 1   • Diclofenac Sodium 1 % External Gel Apply 2 g topically 4 (four) times daily.  (Patient not taking: Reported on 1/28/2022) 1 each 0   • Ketoconazole 2 % External Shampoo Use to  Scalp twice a wekk (Patient not taking: Reported on 1/28/2022) 120 depressive disorder without prior episode ([facility] [address] in partial remission  Idiopathic chronic gout of multiple sites without tophus  Other specified hypothyroidism  Iron deficiency  Type 2 diabetes mellitus without complication, with long-term current use of in nightly. • gabapentin 400 MG Oral Cap 180 capsule 5     Sig: Take 1 capsule (400 mg total) by mouth 2 (two) times daily. • glipiZIDE 10 MG Oral Tab 180 tablet 1     Sig: Take 1 tablet (10 mg total) by mouth 2 (two) times daily before meals.    • zolpide

## 2022-02-18 ENCOUNTER — PROCEDURE PASS (OUTPATIENT)
Age: 57
End: 2022-02-18
Payer: MEDICAID

## 2022-02-18 ENCOUNTER — OFFICE VISIT (OUTPATIENT)
Dept: RHEUMATOLOGY | Facility: CLINIC | Age: 57
End: 2022-02-18
Payer: MEDICAID

## 2022-02-18 VITALS
BODY MASS INDEX: 34 KG/M2 | DIASTOLIC BLOOD PRESSURE: 83 MMHG | WEIGHT: 193 LBS | HEART RATE: 106 BPM | SYSTOLIC BLOOD PRESSURE: 131 MMHG

## 2022-02-18 DIAGNOSIS — G89.29 CHRONIC LEFT SHOULDER PAIN: Primary | ICD-10-CM

## 2022-02-18 DIAGNOSIS — M25.512 CHRONIC LEFT SHOULDER PAIN: Primary | ICD-10-CM

## 2022-02-18 DIAGNOSIS — R10.13 EPIGASTRIC PAIN: Primary | ICD-10-CM

## 2022-02-18 PROCEDURE — 3075F SYST BP GE 130 - 139MM HG: CPT | Performed by: INTERNAL MEDICINE

## 2022-02-18 PROCEDURE — 99213 OFFICE O/P EST LOW 20 MIN: CPT | Performed by: INTERNAL MEDICINE

## 2022-02-18 PROCEDURE — 20610 DRAIN/INJ JOINT/BURSA W/O US: CPT | Performed by: INTERNAL MEDICINE

## 2022-02-18 PROCEDURE — 3079F DIAST BP 80-89 MM HG: CPT | Performed by: INTERNAL MEDICINE

## 2022-02-18 RX ORDER — TRIAMCINOLONE ACETONIDE 40 MG/ML
40 INJECTION, SUSPENSION INTRA-ARTICULAR; INTRAMUSCULAR ONCE
Status: COMPLETED | OUTPATIENT
Start: 2022-02-18 | End: 2022-02-18

## 2022-02-18 RX ADMIN — TRIAMCINOLONE ACETONIDE 40 MG: 40 INJECTION, SUSPENSION INTRA-ARTICULAR; INTRAMUSCULAR at 15:37:00

## 2022-02-18 NOTE — PATIENT INSTRUCTIONS
You were seen for R shoulder pain  We injected your R shoulder with cortisone  Plan to get MRI R shoulder

## 2022-02-26 RX ORDER — ZOLPIDEM TARTRATE 10 MG/1
10 TABLET ORAL NIGHTLY
Qty: 30 TABLET | Refills: 0 | Status: SHIPPED | OUTPATIENT
Start: 2022-02-26 | End: 2022-02-28

## 2022-02-26 NOTE — TELEPHONE ENCOUNTER
Please review refill protocol failed/ no protocol  Requested Prescriptions   Pending Prescriptions Disp Refills    ZOLPIDEM 10 MG Oral Tab [Pharmacy Med Name: ZOLPIDEM 10MG TABLETS] 30 tablet 0     Sig: TAKE 1 TABLET(10 MG) BY MOUTH EVERY NIGHT        There is no refill protocol information for this order

## 2022-02-28 ENCOUNTER — TELEPHONE (OUTPATIENT)
Dept: INTERNAL MEDICINE CLINIC | Facility: CLINIC | Age: 57
End: 2022-02-28

## 2022-02-28 ENCOUNTER — PATIENT MESSAGE (OUTPATIENT)
Dept: INTERNAL MEDICINE CLINIC | Facility: CLINIC | Age: 57
End: 2022-02-28

## 2022-02-28 RX ORDER — LOSARTAN POTASSIUM 100 MG/1
100 TABLET ORAL DAILY
Qty: 90 TABLET | Refills: 1 | Status: SHIPPED | OUTPATIENT
Start: 2022-02-28

## 2022-02-28 RX ORDER — OMEPRAZOLE 40 MG/1
40 CAPSULE, DELAYED RELEASE ORAL DAILY
Qty: 90 CAPSULE | Refills: 1 | Status: SHIPPED | OUTPATIENT
Start: 2022-02-28

## 2022-02-28 RX ORDER — INSULIN GLARGINE 100 [IU]/ML
INJECTION, SOLUTION SUBCUTANEOUS
Qty: 45 ML | Refills: 1 | Status: SHIPPED | OUTPATIENT
Start: 2022-02-28

## 2022-02-28 RX ORDER — SERTRALINE HYDROCHLORIDE 100 MG/1
100 TABLET, FILM COATED ORAL DAILY
Qty: 90 TABLET | Refills: 1 | Status: SHIPPED | OUTPATIENT
Start: 2022-02-28

## 2022-02-28 RX ORDER — GABAPENTIN 400 MG/1
400 CAPSULE ORAL 2 TIMES DAILY
Qty: 180 CAPSULE | Refills: 5 | Status: SHIPPED | OUTPATIENT
Start: 2022-02-28

## 2022-02-28 RX ORDER — LEVOTHYROXINE SODIUM 0.12 MG/1
125 TABLET ORAL EVERY MORNING
Qty: 90 TABLET | Refills: 0 | Status: SHIPPED | OUTPATIENT
Start: 2022-02-28

## 2022-02-28 RX ORDER — ALLOPURINOL 100 MG/1
100 TABLET ORAL NIGHTLY
Qty: 90 TABLET | Refills: 3 | Status: SHIPPED | OUTPATIENT
Start: 2022-02-28

## 2022-02-28 RX ORDER — GLIPIZIDE 10 MG/1
10 TABLET ORAL
Qty: 180 TABLET | Refills: 1 | Status: SHIPPED | OUTPATIENT
Start: 2022-02-28

## 2022-02-28 RX ORDER — ZOLPIDEM TARTRATE 10 MG/1
10 TABLET ORAL NIGHTLY
Qty: 30 TABLET | Refills: 0 | Status: SHIPPED | OUTPATIENT
Start: 2022-02-28

## 2022-02-28 RX ORDER — SIMVASTATIN 80 MG
80 TABLET ORAL NIGHTLY
Qty: 90 TABLET | Refills: 1 | Status: SHIPPED | OUTPATIENT
Start: 2022-02-28

## 2022-03-01 ENCOUNTER — TELEPHONE (OUTPATIENT)
Dept: INTERNAL MEDICINE CLINIC | Facility: CLINIC | Age: 57
End: 2022-03-01

## 2022-03-01 NOTE — TELEPHONE ENCOUNTER
Please check with the pharmacy I refilled all her medications on January 28, 2022 for 6 months will he have refills?   I sent refills 1 more time

## 2022-03-03 ENCOUNTER — TELEPHONE (OUTPATIENT)
Dept: RHEUMATOLOGY | Facility: CLINIC | Age: 57
End: 2022-03-03

## 2022-03-03 NOTE — TELEPHONE ENCOUNTER
Please notify patient or family /daughters who are involved in her care, patient also should have follow-up visits when she can that omeprazole denied, they can buy this medication over-the-counter. Omeprazole may not be on formulary? And used to be that they cover only 3 or 4 months a year and patient needs to buy all medications.

## 2022-03-03 NOTE — TELEPHONE ENCOUNTER
[phone] option 1  Case # [id number]    Peer to peer tomorrow at 1:00pm CT  Dr. Hernan Vance mobile number provided.    With Dr. Guille Desir

## 2022-03-03 NOTE — TELEPHONE ENCOUNTER
Health plan has denied request for MRI. Please reach out to the patient with plan of care. Reference letter posted below should you decide to do a peer review.      Thank you

## 2022-03-04 ENCOUNTER — HOSPITAL ENCOUNTER (OUTPATIENT)
Dept: MRI IMAGING | Age: 57
Discharge: HOME | End: 2022-03-04
Attending: INTERNAL MEDICINE
Payer: MEDICAID

## 2022-03-04 DIAGNOSIS — M25.512 CHRONIC LEFT SHOULDER PAIN: ICD-10-CM

## 2022-03-04 DIAGNOSIS — G89.29 CHRONIC LEFT SHOULDER PAIN: ICD-10-CM

## 2022-03-04 PROCEDURE — 73223 MRI JOINT UPR EXTR W/O&W/DYE: CPT | Performed by: INTERNAL MEDICINE

## 2022-03-04 PROCEDURE — A9575 INJ GADOTERATE MEGLUMI 0.1ML: HCPCS | Performed by: INTERNAL MEDICINE

## 2022-03-04 NOTE — TELEPHONE ENCOUNTER
Updated referral. Attempted to contact patient and notify. Left message for them to schedule and provided number.

## 2022-03-07 ENCOUNTER — HOSPITAL ENCOUNTER (OUTPATIENT)
Age: 57
Discharge: HOME | End: 2022-03-07
Payer: MEDICAID

## 2022-03-07 ENCOUNTER — MED REC SCAN ONLY (OUTPATIENT)
Dept: INTERNAL MEDICINE CLINIC | Facility: CLINIC | Age: 57
End: 2022-03-07

## 2022-03-07 ENCOUNTER — TELEPHONE (OUTPATIENT)
Dept: INTERNAL MEDICINE CLINIC | Facility: CLINIC | Age: 57
End: 2022-03-07

## 2022-03-07 LAB — SARS-COV-2 RNA RESP QL NAA+PROBE: NOT DETECTED

## 2022-03-07 NOTE — TELEPHONE ENCOUNTER
Pharmacy calling stating that pt is Leaving to go out of country tomorrow and need Michelle Don to fill pts medication early   zolpidem 10 MG Oral Tab    Please advise.

## 2022-03-08 LAB — SARS-COV-2 RNA RESP QL NAA+PROBE: NOT DETECTED

## 2022-04-04 RX ORDER — MIRTAZAPINE 15 MG/1
TABLET, FILM COATED ORAL
Qty: 30 TABLET | Refills: 1 | OUTPATIENT
Start: 2022-04-04

## 2022-04-18 RX ORDER — NAPROXEN 500 MG/1
TABLET ORAL
Qty: 30 TABLET | Refills: 1 | Status: SHIPPED | OUTPATIENT
Start: 2022-04-18

## 2022-06-07 NOTE — TELEPHONE ENCOUNTER
DOS 6/23/2022 [place]    Confirm with patient:pt  Laterality: right hip  PCP: [facility]  Ins: [employer]  Case Request:  Yes-spoke w/Dianne 6/7/2022  Preop: yes  Postop:  yes  Surg in [place]: yes  Conf letter mailed on: 6/7/2022  Verbally confirmed no aspirin or anti-inflammatory meds or weight loss medications for 10 days prior to surgery, NPO instructions 8 hours prior/NPO after midnight night before surgery, preop with PCP 2-3 weeks prior to surgery, ride home if Out Patient and anesthesia: yes  COVID Instructions given/appt made: yes  Service to FP/IM/Cardiology: yes  Service to Physical Therapy for Pre Hab: yes  Messaged [facility] nurse pool with surgery date for patient: yes    Surgery scheduled / Covid order needed  Received: Today  JACKELIN Monique Ort Nurse Msg Pool  Surgery scheduled 6/23/2022 @ [address] [facility] - Right KAREN     PreSurgical COVID Order Needed for scheduled Lab Visit.      Date of Appt = 6/20/2022     Order Needed = [id number]   Order Class = ACL Location = LAB COLLECT     THANK Lluvia Lee Fax received at [address] [place] [address] with following message. This rx is for 1 pen which is only 10 day supply. Can we fill 90 days? Did you want refills?     Current Outpatient Medications   Medication Sig Dispense Refill   •       •       •       •       •       •

## 2022-06-22 ENCOUNTER — TELEPHONE (OUTPATIENT)
Dept: RHEUMATOLOGY | Facility: CLINIC | Age: 57
End: 2022-06-22

## 2022-07-06 ENCOUNTER — LAB ENCOUNTER (OUTPATIENT)
Dept: LAB | Facility: HOSPITAL | Age: 57
End: 2022-07-06
Attending: INTERNAL MEDICINE
Payer: MEDICAID

## 2022-07-06 ENCOUNTER — OFFICE VISIT (OUTPATIENT)
Dept: RHEUMATOLOGY | Facility: CLINIC | Age: 57
End: 2022-07-06
Payer: MEDICAID

## 2022-07-06 VITALS
HEIGHT: 64 IN | SYSTOLIC BLOOD PRESSURE: 121 MMHG | HEART RATE: 90 BPM | BODY MASS INDEX: 31.41 KG/M2 | DIASTOLIC BLOOD PRESSURE: 75 MMHG | WEIGHT: 184 LBS

## 2022-07-06 DIAGNOSIS — M79.641 BILATERAL HAND PAIN: ICD-10-CM

## 2022-07-06 DIAGNOSIS — M06.09 RHEUMATOID ARTHRITIS OF MULTIPLE SITES WITHOUT RHEUMATOID FACTOR (HCC): Primary | ICD-10-CM

## 2022-07-06 DIAGNOSIS — M06.09 RHEUMATOID ARTHRITIS OF MULTIPLE SITES WITH NEGATIVE RHEUMATOID FACTOR (HCC): Primary | ICD-10-CM

## 2022-07-06 DIAGNOSIS — M25.512 CHRONIC LEFT SHOULDER PAIN: ICD-10-CM

## 2022-07-06 DIAGNOSIS — M79.642 BILATERAL HAND PAIN: ICD-10-CM

## 2022-07-06 DIAGNOSIS — G89.29 CHRONIC LEFT SHOULDER PAIN: ICD-10-CM

## 2022-07-06 LAB
ALBUMIN SERPL-MCNC: 4 G/DL (ref 3.4–5)
ALBUMIN/GLOB SERPL: 1.1 {RATIO} (ref 1–2)
ALP LIVER SERPL-CCNC: 110 U/L
ALT SERPL-CCNC: 60 U/L
ANION GAP SERPL CALC-SCNC: 10 MMOL/L (ref 0–18)
AST SERPL-CCNC: 47 U/L (ref 15–37)
BILIRUB SERPL-MCNC: 0.5 MG/DL (ref 0.1–2)
BUN BLD-MCNC: 19 MG/DL (ref 7–18)
BUN/CREAT SERPL: 17.4 (ref 10–20)
CALCIUM BLD-MCNC: 10.2 MG/DL (ref 8.5–10.1)
CHLORIDE SERPL-SCNC: 96 MMOL/L (ref 98–112)
CO2 SERPL-SCNC: 27 MMOL/L (ref 21–32)
CREAT BLD-MCNC: 1.09 MG/DL
DEPRECATED RDW RBC AUTO: 38 FL (ref 35.1–46.3)
ERYTHROCYTE [DISTWIDTH] IN BLOOD BY AUTOMATED COUNT: 13.3 % (ref 11–15)
FASTING STATUS PATIENT QL REPORTED: NO
GLOBULIN PLAS-MCNC: 3.8 G/DL (ref 2.8–4.4)
GLUCOSE BLD-MCNC: 330 MG/DL (ref 70–99)
HBV CORE AB SERPL QL IA: NONREACTIVE
HBV SURFACE AB SER QL: NONREACTIVE
HBV SURFACE AB SERPL IA-ACNC: <3.1 MIU/ML
HBV SURFACE AG SER-ACNC: <0.1 [IU]/L
HBV SURFACE AG SERPL QL IA: NONREACTIVE
HCT VFR BLD AUTO: 40.9 %
HGB BLD-MCNC: 13.8 G/DL
MCH RBC QN AUTO: 26.4 PG (ref 26–34)
MCHC RBC AUTO-ENTMCNC: 33.7 G/DL (ref 31–37)
MCV RBC AUTO: 78.4 FL
OSMOLALITY SERPL CALC.SUM OF ELEC: 291 MOSM/KG (ref 275–295)
PLATELET # BLD AUTO: 399 10(3)UL (ref 150–450)
POTASSIUM SERPL-SCNC: 3.9 MMOL/L (ref 3.5–5.1)
PROT SERPL-MCNC: 7.8 G/DL (ref 6.4–8.2)
RBC # BLD AUTO: 5.22 X10(6)UL
RHEUMATOID FACT SERPL-ACNC: <10 IU/ML (ref ?–15)
SODIUM SERPL-SCNC: 133 MMOL/L (ref 136–145)
WBC # BLD AUTO: 9.9 X10(3) UL (ref 4–11)

## 2022-07-06 PROCEDURE — 80053 COMPREHEN METABOLIC PANEL: CPT | Performed by: INTERNAL MEDICINE

## 2022-07-06 PROCEDURE — 3008F BODY MASS INDEX DOCD: CPT | Performed by: INTERNAL MEDICINE

## 2022-07-06 PROCEDURE — 36415 COLL VENOUS BLD VENIPUNCTURE: CPT

## 2022-07-06 PROCEDURE — 99214 OFFICE O/P EST MOD 30 MIN: CPT | Performed by: INTERNAL MEDICINE

## 2022-07-06 PROCEDURE — 87340 HEPATITIS B SURFACE AG IA: CPT

## 2022-07-06 PROCEDURE — 86431 RHEUMATOID FACTOR QUANT: CPT | Performed by: INTERNAL MEDICINE

## 2022-07-06 PROCEDURE — 86480 TB TEST CELL IMMUN MEASURE: CPT

## 2022-07-06 PROCEDURE — 3078F DIAST BP <80 MM HG: CPT | Performed by: INTERNAL MEDICINE

## 2022-07-06 PROCEDURE — 86704 HEP B CORE ANTIBODY TOTAL: CPT | Performed by: INTERNAL MEDICINE

## 2022-07-06 PROCEDURE — 86803 HEPATITIS C AB TEST: CPT | Performed by: INTERNAL MEDICINE

## 2022-07-06 PROCEDURE — 86706 HEP B SURFACE ANTIBODY: CPT

## 2022-07-06 PROCEDURE — 87522 HEPATITIS C REVRS TRNSCRPJ: CPT | Performed by: INTERNAL MEDICINE

## 2022-07-06 PROCEDURE — 3074F SYST BP LT 130 MM HG: CPT | Performed by: INTERNAL MEDICINE

## 2022-07-06 PROCEDURE — 85027 COMPLETE CBC AUTOMATED: CPT | Performed by: INTERNAL MEDICINE

## 2022-07-06 PROCEDURE — 86200 CCP ANTIBODY: CPT | Performed by: INTERNAL MEDICINE

## 2022-07-06 RX ORDER — LEFLUNOMIDE 20 MG/1
20 TABLET ORAL DAILY
Qty: 90 TABLET | Refills: 0 | Status: SHIPPED | OUTPATIENT
Start: 2022-07-06

## 2022-07-06 NOTE — PATIENT INSTRUCTIONS
You were seen today for rheumatoid arthritis  MRI of your left shoulder did not show inflammation consistent with rheumatoid arthritis  Continue naproxen once or twice a day  We will start you on leflunomide 20 mg daily, 1 pill a day.   You need to stay on this medication  Blood work today  See me in the next 2 to 3 months

## 2022-07-08 LAB
CCP IGG SERPL-ACNC: 11.8 U/ML (ref 0–6.9)
HCV QNT BY NAAT (IU/ML): NOT DETECTED IU/ML
HCV QNT BY NAAT (LOG IU/ML): NOT DETECTED LOG IU/ML
HCV QNT BY NAAT INTERP: NOT DETECTED
M TB IFN-G CD4+ T-CELLS BLD-ACNC: 0.09 IU/ML
M TB TUBERC IFN-G BLD QL: NEGATIVE
M TB TUBERC IGNF/MITOGEN IGNF CONTROL: >10 IU/ML
QFT TB1 AG MINUS NIL: 0.06 IU/ML
QFT TB2 AG MINUS NIL: 0.12 IU/ML

## 2022-07-11 ENCOUNTER — OFFICE VISIT (OUTPATIENT)
Dept: INTERNAL MEDICINE CLINIC | Facility: CLINIC | Age: 57
End: 2022-07-11
Payer: MEDICAID

## 2022-07-11 VITALS
WEIGHT: 186.81 LBS | HEART RATE: 76 BPM | DIASTOLIC BLOOD PRESSURE: 101 MMHG | BODY MASS INDEX: 31.89 KG/M2 | HEIGHT: 64 IN | SYSTOLIC BLOOD PRESSURE: 169 MMHG | OXYGEN SATURATION: 97 %

## 2022-07-11 DIAGNOSIS — M1A.09X0 IDIOPATHIC CHRONIC GOUT OF MULTIPLE SITES WITHOUT TOPHUS: ICD-10-CM

## 2022-07-11 DIAGNOSIS — E11.9 TYPE 2 DIABETES MELLITUS WITHOUT COMPLICATION, WITH LONG-TERM CURRENT USE OF INSULIN (HCC): Primary | ICD-10-CM

## 2022-07-11 DIAGNOSIS — Z79.4 TYPE 2 DIABETES MELLITUS WITHOUT COMPLICATION, WITH LONG-TERM CURRENT USE OF INSULIN (HCC): Primary | ICD-10-CM

## 2022-07-11 DIAGNOSIS — E61.1 IRON DEFICIENCY: ICD-10-CM

## 2022-07-11 DIAGNOSIS — E11.65 TYPE 2 DIABETES MELLITUS WITH HYPERGLYCEMIA, WITH LONG-TERM CURRENT USE OF INSULIN (HCC): ICD-10-CM

## 2022-07-11 DIAGNOSIS — Z79.4 TYPE 2 DIABETES MELLITUS WITH HYPERGLYCEMIA, WITH LONG-TERM CURRENT USE OF INSULIN (HCC): ICD-10-CM

## 2022-07-11 DIAGNOSIS — E11.40 TYPE 2 DIABETES MELLITUS WITH DIABETIC NEUROPATHY, WITH LONG-TERM CURRENT USE OF INSULIN (HCC): ICD-10-CM

## 2022-07-11 DIAGNOSIS — Z79.4 TYPE 2 DIABETES MELLITUS WITH DIABETIC NEUROPATHY, WITH LONG-TERM CURRENT USE OF INSULIN (HCC): ICD-10-CM

## 2022-07-11 DIAGNOSIS — I10 ESSENTIAL HYPERTENSION: ICD-10-CM

## 2022-07-11 DIAGNOSIS — E03.8 OTHER SPECIFIED HYPOTHYROIDISM: ICD-10-CM

## 2022-07-11 DIAGNOSIS — I10 PRIMARY HYPERTENSION: ICD-10-CM

## 2022-07-11 DIAGNOSIS — R07.89 OTHER CHEST PAIN: ICD-10-CM

## 2022-07-11 DIAGNOSIS — D64.89 ANEMIA DUE TO OTHER CAUSE, NOT CLASSIFIED: ICD-10-CM

## 2022-07-11 DIAGNOSIS — F32.1 CURRENT MODERATE EPISODE OF MAJOR DEPRESSIVE DISORDER WITHOUT PRIOR EPISODE (HCC): ICD-10-CM

## 2022-07-11 RX ORDER — OMEPRAZOLE 40 MG/1
40 CAPSULE, DELAYED RELEASE ORAL DAILY
Qty: 90 CAPSULE | Refills: 1 | Status: SHIPPED | OUTPATIENT
Start: 2022-07-11

## 2022-07-11 RX ORDER — LEVOTHYROXINE SODIUM 0.12 MG/1
125 TABLET ORAL EVERY MORNING
Qty: 90 TABLET | Refills: 3 | Status: SHIPPED | OUTPATIENT
Start: 2022-07-11

## 2022-07-11 RX ORDER — ATORVASTATIN CALCIUM 80 MG/1
80 TABLET, FILM COATED ORAL NIGHTLY
Qty: 90 TABLET | Refills: 1 | Status: SHIPPED | OUTPATIENT
Start: 2022-07-11

## 2022-07-11 RX ORDER — LIRAGLUTIDE 6 MG/ML
1.8 INJECTION SUBCUTANEOUS EVERY MORNING
Qty: 9 ML | Refills: 1 | Status: SHIPPED | OUTPATIENT
Start: 2022-07-11

## 2022-07-11 RX ORDER — INSULIN GLARGINE 100 [IU]/ML
INJECTION, SOLUTION SUBCUTANEOUS
Qty: 45 ML | Refills: 3 | Status: SHIPPED | OUTPATIENT
Start: 2022-07-11

## 2022-07-11 RX ORDER — GLIPIZIDE 10 MG/1
10 TABLET ORAL
Qty: 180 TABLET | Refills: 3 | Status: SHIPPED | OUTPATIENT
Start: 2022-07-11

## 2022-07-11 RX ORDER — GABAPENTIN 400 MG/1
400 CAPSULE ORAL 2 TIMES DAILY
Qty: 180 CAPSULE | Refills: 5 | Status: SHIPPED | OUTPATIENT
Start: 2022-07-11

## 2022-07-11 RX ORDER — SERTRALINE HYDROCHLORIDE 100 MG/1
100 TABLET, FILM COATED ORAL DAILY
Qty: 90 TABLET | Refills: 1 | Status: SHIPPED | OUTPATIENT
Start: 2022-07-11

## 2022-07-11 RX ORDER — LOSARTAN POTASSIUM 100 MG/1
100 TABLET ORAL DAILY
Qty: 90 TABLET | Refills: 3 | Status: SHIPPED | OUTPATIENT
Start: 2022-07-11

## 2022-07-11 RX ORDER — TRAZODONE HYDROCHLORIDE 100 MG/1
100 TABLET ORAL NIGHTLY
Qty: 90 TABLET | Refills: 1 | Status: SHIPPED | OUTPATIENT
Start: 2022-07-11

## 2022-07-11 RX ORDER — ZOLPIDEM TARTRATE 10 MG/1
10 TABLET ORAL NIGHTLY
Qty: 30 TABLET | Refills: 0 | Status: SHIPPED | OUTPATIENT
Start: 2022-07-11

## 2022-07-11 RX ORDER — ALLOPURINOL 100 MG/1
100 TABLET ORAL NIGHTLY
Qty: 90 TABLET | Refills: 3 | Status: SHIPPED | OUTPATIENT
Start: 2022-07-11

## 2022-07-18 ENCOUNTER — PATIENT MESSAGE (OUTPATIENT)
Dept: INTERNAL MEDICINE CLINIC | Facility: CLINIC | Age: 57
End: 2022-07-18

## 2022-07-18 DIAGNOSIS — Z79.4 TYPE 2 DIABETES MELLITUS WITHOUT COMPLICATION, WITH LONG-TERM CURRENT USE OF INSULIN (HCC): Primary | ICD-10-CM

## 2022-07-18 DIAGNOSIS — E11.9 TYPE 2 DIABETES MELLITUS WITHOUT COMPLICATION, WITH LONG-TERM CURRENT USE OF INSULIN (HCC): Primary | ICD-10-CM

## 2022-07-18 NOTE — TELEPHONE ENCOUNTER
From: Pipe Garza  To:  Charmayne Favia, MD  Sent: 7/18/2022 2:43 PM CDT  Subject: Stomach pain    Hello she has bad stomach pain since last night can you please tell me any medicine to give her priscribe some medication for her

## 2022-07-21 ENCOUNTER — HOSPITAL ENCOUNTER (OUTPATIENT)
Dept: NUCLEAR MEDICINE | Facility: HOSPITAL | Age: 57
Discharge: HOME | End: 2022-07-21
Attending: INTERNAL MEDICINE
Payer: MEDICAID

## 2022-07-21 ENCOUNTER — HOSPITAL ENCOUNTER (OUTPATIENT)
Dept: CV DIAGNOSTICS | Facility: HOSPITAL | Age: 57
Discharge: HOME | End: 2022-07-21
Attending: INTERNAL MEDICINE
Payer: MEDICAID

## 2022-07-21 DIAGNOSIS — R07.89 OTHER CHEST PAIN: ICD-10-CM

## 2022-07-21 PROCEDURE — 78452 HT MUSCLE IMAGE SPECT MULT: CPT | Performed by: INTERNAL MEDICINE

## 2022-07-21 PROCEDURE — 93018 CV STRESS TEST I&R ONLY: CPT | Performed by: INTERNAL MEDICINE

## 2022-07-21 PROCEDURE — 93016 CV STRESS TEST SUPVJ ONLY: CPT | Performed by: INTERNAL MEDICINE

## 2022-07-21 PROCEDURE — 93017 CV STRESS TEST TRACING ONLY: CPT | Performed by: INTERNAL MEDICINE

## 2022-07-21 NOTE — TELEPHONE ENCOUNTER
Dr. Chloe Peng (Dr. Isaac Monae listed \"IM on-Call\" for In-basket messages) - please advise on Ophthalmology referral in [facility]

## 2022-07-26 ENCOUNTER — NURSE TRIAGE (OUTPATIENT)
Dept: INTERNAL MEDICINE CLINIC | Facility: CLINIC | Age: 57
End: 2022-07-26

## 2022-07-28 ENCOUNTER — OFFICE VISIT (OUTPATIENT)
Dept: INTERNAL MEDICINE CLINIC | Facility: CLINIC | Age: 57
End: 2022-07-28
Payer: MEDICAID

## 2022-07-28 VITALS
SYSTOLIC BLOOD PRESSURE: 140 MMHG | WEIGHT: 183 LBS | HEART RATE: 78 BPM | BODY MASS INDEX: 31.24 KG/M2 | HEIGHT: 64 IN | DIASTOLIC BLOOD PRESSURE: 90 MMHG

## 2022-07-28 DIAGNOSIS — K59.00 CONSTIPATION, UNSPECIFIED CONSTIPATION TYPE: ICD-10-CM

## 2022-07-28 DIAGNOSIS — R10.13 EPIGASTRIC PAIN: ICD-10-CM

## 2022-07-28 PROCEDURE — 3008F BODY MASS INDEX DOCD: CPT | Performed by: NURSE PRACTITIONER

## 2022-07-28 PROCEDURE — 99214 OFFICE O/P EST MOD 30 MIN: CPT | Performed by: NURSE PRACTITIONER

## 2022-07-28 PROCEDURE — 3077F SYST BP >= 140 MM HG: CPT | Performed by: NURSE PRACTITIONER

## 2022-07-28 PROCEDURE — 3080F DIAST BP >= 90 MM HG: CPT | Performed by: NURSE PRACTITIONER

## 2022-07-28 RX ORDER — DOCUSATE SODIUM 100 MG/1
100 CAPSULE, LIQUID FILLED ORAL 2 TIMES DAILY PRN
Qty: 60 CAPSULE | Refills: 2 | Status: SHIPPED | OUTPATIENT
Start: 2022-07-28

## 2022-07-29 PROCEDURE — 87338 HPYLORI STOOL AG IA: CPT | Performed by: NURSE PRACTITIONER

## 2022-08-02 LAB — H PYLORI AG STL QL IA: POSITIVE

## 2022-08-04 DIAGNOSIS — A04.8 H. PYLORI INFECTION: Primary | ICD-10-CM

## 2022-08-04 RX ORDER — CLARITHROMYCIN 500 MG/1
500 TABLET, COATED ORAL 2 TIMES DAILY
Qty: 28 TABLET | Refills: 0 | Status: SHIPPED | OUTPATIENT
Start: 2022-08-04 | End: 2022-08-18

## 2022-08-04 RX ORDER — AMOXICILLIN 500 MG/1
TABLET, FILM COATED ORAL
Qty: 56 TABLET | Refills: 0 | Status: SHIPPED | OUTPATIENT
Start: 2022-08-04

## 2022-08-04 RX ORDER — OMEPRAZOLE 40 MG/1
40 CAPSULE, DELAYED RELEASE ORAL 2 TIMES DAILY
Qty: 180 CAPSULE | Refills: 0 | Status: SHIPPED | OUTPATIENT
Start: 2022-08-04 | End: 2022-11-02

## 2022-08-05 ENCOUNTER — TELEPHONE (OUTPATIENT)
Dept: INTERNAL MEDICINE CLINIC | Facility: CLINIC | Age: 57
End: 2022-08-05

## 2022-08-10 RX ORDER — PEN NEEDLE, DIABETIC 32GX 5/32"
NEEDLE, DISPOSABLE MISCELLANEOUS
Qty: 100 EACH | Refills: 3 | Status: SHIPPED | OUTPATIENT
Start: 2022-08-10

## 2022-09-19 ENCOUNTER — APPOINTMENT (OUTPATIENT)
Dept: GENERAL RADIOLOGY | Facility: HOSPITAL | Age: 57
End: 2022-09-19
Attending: NURSE PRACTITIONER

## 2022-09-19 ENCOUNTER — OFFICE VISIT (OUTPATIENT)
Dept: INTERNAL MEDICINE CLINIC | Facility: CLINIC | Age: 57
End: 2022-09-19
Payer: MEDICAID

## 2022-09-19 ENCOUNTER — HOSPITAL ENCOUNTER (OUTPATIENT)
Facility: HOSPITAL | Age: 57
Setting detail: OBSERVATION
Discharge: HOME | End: 2022-09-21
Attending: HOSPITALIST | Admitting: HOSPITALIST

## 2022-09-19 VITALS
BODY MASS INDEX: 30.9 KG/M2 | DIASTOLIC BLOOD PRESSURE: 84 MMHG | HEIGHT: 64 IN | WEIGHT: 181 LBS | SYSTOLIC BLOOD PRESSURE: 156 MMHG | OXYGEN SATURATION: 96 % | HEART RATE: 86 BPM

## 2022-09-19 DIAGNOSIS — E11.65 TYPE 2 DIABETES MELLITUS WITH HYPERGLYCEMIA, WITH LONG-TERM CURRENT USE OF INSULIN (HCC): Primary | ICD-10-CM

## 2022-09-19 DIAGNOSIS — R55 SYNCOPE AND COLLAPSE: Primary | ICD-10-CM

## 2022-09-19 DIAGNOSIS — I10 PRIMARY HYPERTENSION: ICD-10-CM

## 2022-09-19 DIAGNOSIS — M79.10 MYALGIA: ICD-10-CM

## 2022-09-19 DIAGNOSIS — Z79.4 TYPE 2 DIABETES MELLITUS WITH HYPERGLYCEMIA, WITH LONG-TERM CURRENT USE OF INSULIN (HCC): Primary | ICD-10-CM

## 2022-09-19 LAB
ALBUMIN SERPL-MCNC: 3.8 G/DL (ref 3.4–5)
ALBUMIN/GLOB SERPL: 1 {RATIO} (ref 1–2)
ALP LIVER SERPL-CCNC: 101 U/L
ALT SERPL-CCNC: 63 U/L
ANION GAP SERPL CALC-SCNC: 11 MMOL/L (ref 0–18)
AST SERPL-CCNC: 38 U/L (ref 15–37)
BASOPHILS # BLD AUTO: 0.1 X10(3) UL (ref 0–0.2)
BASOPHILS NFR BLD AUTO: 0.9 %
BILIRUB SERPL-MCNC: 0.4 MG/DL (ref 0.1–2)
BILIRUB UR QL: NEGATIVE
BUN BLD-MCNC: 18 MG/DL (ref 7–18)
BUN/CREAT SERPL: 17.5 (ref 10–20)
CALCIUM BLD-MCNC: 9.3 MG/DL (ref 8.5–10.1)
CHLORIDE SERPL-SCNC: 95 MMOL/L (ref 98–112)
CK SERPL-CCNC: 81 U/L
CO2 SERPL-SCNC: 27 MMOL/L (ref 21–32)
COLOR UR: YELLOW
CREAT BLD-MCNC: 1.03 MG/DL
D DIMER PPP FEU-MCNC: 0.27 UG/ML FEU (ref ?–0.55)
DEPRECATED RDW RBC AUTO: 38.6 FL (ref 35.1–46.3)
EOSINOPHIL # BLD AUTO: 0.37 X10(3) UL (ref 0–0.7)
EOSINOPHIL NFR BLD AUTO: 3.5 %
ERYTHROCYTE [DISTWIDTH] IN BLOOD BY AUTOMATED COUNT: 14.6 % (ref 11–15)
EST. AVERAGE GLUCOSE BLD GHB EST-MCNC: 217 MG/DL (ref 68–126)
GFR SERPLBLD BASED ON 1.73 SQ M-ARVRAT: 64 ML/MIN/1.73M2 (ref 60–?)
GLOBULIN PLAS-MCNC: 3.9 G/DL (ref 2.8–4.4)
GLUCOSE BLD-MCNC: 267 MG/DL (ref 70–99)
GLUCOSE BLDC GLUCOMTR-MCNC: 154 MG/DL (ref 70–99)
GLUCOSE UR-MCNC: 250 MG/DL
HBA1C MFR BLD: 9.2 % (ref ?–5.7)
HCT VFR BLD AUTO: 39.2 %
HGB BLD-MCNC: 13.5 G/DL
HGB UR QL STRIP.AUTO: NEGATIVE
IMM GRANULOCYTES # BLD AUTO: 0.02 X10(3) UL (ref 0–1)
IMM GRANULOCYTES NFR BLD: 0.2 %
KETONES UR-MCNC: NEGATIVE MG/DL
LEUKOCYTE ESTERASE UR QL STRIP.AUTO: NEGATIVE
LIPASE SERPL-CCNC: 230 U/L (ref 73–393)
LYMPHOCYTES # BLD AUTO: 3.87 X10(3) UL (ref 1–4)
LYMPHOCYTES NFR BLD AUTO: 36.6 %
MCH RBC QN AUTO: 25.5 PG (ref 26–34)
MCHC RBC AUTO-ENTMCNC: 34.4 G/DL (ref 31–37)
MCV RBC AUTO: 74 FL
MONOCYTES # BLD AUTO: 0.81 X10(3) UL (ref 0.1–1)
MONOCYTES NFR BLD AUTO: 7.7 %
NEUTROPHILS # BLD AUTO: 5.39 X10 (3) UL (ref 1.5–7.7)
NEUTROPHILS # BLD AUTO: 5.39 X10(3) UL (ref 1.5–7.7)
NEUTROPHILS NFR BLD AUTO: 51.1 %
NITRITE UR QL STRIP.AUTO: NEGATIVE
OSMOLALITY SERPL CALC.SUM OF ELEC: 287 MOSM/KG (ref 275–295)
PH UR: 5.5 [PH] (ref 5–8)
PLATELET # BLD AUTO: 327 10(3)UL (ref 150–450)
POTASSIUM SERPL-SCNC: 3.2 MMOL/L (ref 3.5–5.1)
PROT SERPL-MCNC: 7.7 G/DL (ref 6.4–8.2)
PROT UR-MCNC: NEGATIVE MG/DL
RBC # BLD AUTO: 5.3 X10(6)UL
SARS-COV-2 RNA RESP QL NAA+PROBE: NOT DETECTED
SODIUM SERPL-SCNC: 133 MMOL/L (ref 136–145)
SP GR UR STRIP: 1.01 (ref 1–1.03)
TROPONIN I HIGH SENSITIVITY: 5 NG/L
UROBILINOGEN UR STRIP-ACNC: 0.2
WBC # BLD AUTO: 10.6 X10(3) UL (ref 4–11)

## 2022-09-19 PROCEDURE — 3080F DIAST BP >= 90 MM HG: CPT | Performed by: HOSPITALIST

## 2022-09-19 PROCEDURE — 3008F BODY MASS INDEX DOCD: CPT | Performed by: HOSPITALIST

## 2022-09-19 PROCEDURE — 71045 X-RAY EXAM CHEST 1 VIEW: CPT | Performed by: NURSE PRACTITIONER

## 2022-09-19 PROCEDURE — 3077F SYST BP >= 140 MM HG: CPT | Performed by: HOSPITALIST

## 2022-09-19 PROCEDURE — 99220 INITIAL OBSERVATION CARE,LEVL III: CPT | Performed by: HOSPITALIST

## 2022-09-19 RX ORDER — HEPARIN SODIUM 5000 [USP'U]/ML
5000 INJECTION, SOLUTION INTRAVENOUS; SUBCUTANEOUS EVERY 12 HOURS SCHEDULED
Status: DISCONTINUED | OUTPATIENT
Start: 2022-09-19 | End: 2022-09-21

## 2022-09-19 RX ORDER — DEXTROSE MONOHYDRATE 25 G/50ML
50 INJECTION, SOLUTION INTRAVENOUS
Status: DISCONTINUED | OUTPATIENT
Start: 2022-09-19 | End: 2022-09-21

## 2022-09-19 RX ORDER — PROCHLORPERAZINE EDISYLATE 5 MG/ML
5 INJECTION INTRAMUSCULAR; INTRAVENOUS EVERY 8 HOURS PRN
Status: DISCONTINUED | OUTPATIENT
Start: 2022-09-19 | End: 2022-09-21

## 2022-09-19 RX ORDER — NICOTINE POLACRILEX 4 MG
30 LOZENGE BUCCAL
Status: DISCONTINUED | OUTPATIENT
Start: 2022-09-19 | End: 2022-09-21

## 2022-09-19 RX ORDER — NICOTINE POLACRILEX 4 MG
15 LOZENGE BUCCAL
Status: DISCONTINUED | OUTPATIENT
Start: 2022-09-19 | End: 2022-09-21

## 2022-09-19 RX ORDER — ONDANSETRON 2 MG/ML
4 INJECTION INTRAMUSCULAR; INTRAVENOUS EVERY 6 HOURS PRN
Status: DISCONTINUED | OUTPATIENT
Start: 2022-09-19 | End: 2022-09-21

## 2022-09-19 RX ORDER — ACETAMINOPHEN 500 MG
500 TABLET ORAL EVERY 4 HOURS PRN
Status: DISCONTINUED | OUTPATIENT
Start: 2022-09-19 | End: 2022-09-21

## 2022-09-19 RX ORDER — LIRAGLUTIDE 6 MG/ML
1.8 INJECTION SUBCUTANEOUS EVERY MORNING
Qty: 9 ML | Refills: 3 | Status: ON HOLD | OUTPATIENT
Start: 2022-09-19

## 2022-09-19 RX ORDER — TEMAZEPAM 15 MG/1
15 CAPSULE ORAL NIGHTLY PRN
Status: DISCONTINUED | OUTPATIENT
Start: 2022-09-19 | End: 2022-09-21

## 2022-09-19 NOTE — ED INITIAL ASSESSMENT (HPI)
Pt presents via EMS from PCP office for c/o reported syncopal episodes at home via family. Family denies LOC with \"syncopal episodes\" and pt denies injury. Pt c/o cold sweats, feeling weak. Reports syptoms for last 7 days. Pt reports cough. Pt denies fevers, denies n/v/d.  Pt Upper sorbian speaking, triage obtained via  [place] #[id number]

## 2022-09-19 NOTE — ED QUICK NOTES
ERT at bedside to assist pt to bathroom for urine sample collection. ERT reports pt \"went limp and unconscious\" while sitting up on edge of bed, reports no head injury or fall. ERT reports approximately 30 seconds of sternal rubbing without response from pt. ERT yelled for help, two RNs at bedside. Pt found half laying on the bed, pt assisted to laying position in bed by this RN. No carotid pulse noted by this RN. Chest compressions initiated. Pt opens eyes after approximately three compressions. Pt initially looked confused, but appeared to quickly orient herself to the room. Pt reports that this is what is happening at home prompting her to present for evaluation to PCP. Cardiac monitor and pulse ox applied, NSR noted. WCTM.

## 2022-09-19 NOTE — ED QUICK NOTES
Orders for admission, patient is aware of plan and ready to go upstairs.  Any questions, please call ED RN Christian Aguilar at extension [phone]    Patient Covid vaccination status: Partially vaccinated     COVID Test Ordered in ED: Rapid SARS-CoV-2 by PCR    COVID Suspicion at Admission: N/A    Running Infusions:  None    Mental Status/LOC at time of transport: AOx4    Other pertinent information: from home, Albanian speaking, syncopal episode in ED  CIWA score: N/A   NIH score:  N/A

## 2022-09-19 NOTE — ED QUICK NOTES
Daughter arrives to bedside. Daughter given update on ED visit thus far. Daughter reports 2 episodes of \"fainting spells' yesterday and two again today.

## 2022-09-20 ENCOUNTER — APPOINTMENT (OUTPATIENT)
Dept: CV DIAGNOSTICS | Facility: HOSPITAL | Age: 57
End: 2022-09-20
Attending: HOSPITALIST

## 2022-09-20 ENCOUNTER — APPOINTMENT (OUTPATIENT)
Dept: ULTRASOUND IMAGING | Facility: HOSPITAL | Age: 57
End: 2022-09-20
Attending: HOSPITALIST

## 2022-09-20 ENCOUNTER — APPOINTMENT (OUTPATIENT)
Dept: GENERAL RADIOLOGY | Facility: HOSPITAL | Age: 57
End: 2022-09-20
Attending: HOSPITALIST

## 2022-09-20 LAB
ALBUMIN SERPL-MCNC: 3.6 G/DL (ref 3.4–5)
ALBUMIN/GLOB SERPL: 1 {RATIO} (ref 1–2)
ALP LIVER SERPL-CCNC: 93 U/L
ALT SERPL-CCNC: 57 U/L
ANION GAP SERPL CALC-SCNC: 8 MMOL/L (ref 0–18)
AST SERPL-CCNC: 32 U/L (ref 15–37)
BASOPHILS # BLD AUTO: 0.08 X10(3) UL (ref 0–0.2)
BASOPHILS NFR BLD AUTO: 0.9 %
BILIRUB SERPL-MCNC: 0.4 MG/DL (ref 0.1–2)
BUN BLD-MCNC: 14 MG/DL (ref 7–18)
BUN/CREAT SERPL: 17.7 (ref 10–20)
CALCIUM BLD-MCNC: 9.5 MG/DL (ref 8.5–10.1)
CHLORIDE SERPL-SCNC: 100 MMOL/L (ref 98–112)
CO2 SERPL-SCNC: 30 MMOL/L (ref 21–32)
CREAT BLD-MCNC: 0.79 MG/DL
DEPRECATED RDW RBC AUTO: 39.5 FL (ref 35.1–46.3)
EOSINOPHIL # BLD AUTO: 0.38 X10(3) UL (ref 0–0.7)
EOSINOPHIL NFR BLD AUTO: 4.4 %
ERYTHROCYTE [DISTWIDTH] IN BLOOD BY AUTOMATED COUNT: 14.6 % (ref 11–15)
FLUAV + FLUBV RNA SPEC NAA+PROBE: NEGATIVE
FLUAV + FLUBV RNA SPEC NAA+PROBE: NEGATIVE
GFR SERPLBLD BASED ON 1.73 SQ M-ARVRAT: 88 ML/MIN/1.73M2 (ref 60–?)
GLOBULIN PLAS-MCNC: 3.7 G/DL (ref 2.8–4.4)
GLUCOSE BLD-MCNC: 134 MG/DL (ref 70–99)
GLUCOSE BLDC GLUCOMTR-MCNC: 149 MG/DL (ref 70–99)
GLUCOSE BLDC GLUCOMTR-MCNC: 183 MG/DL (ref 70–99)
GLUCOSE BLDC GLUCOMTR-MCNC: 189 MG/DL (ref 70–99)
GLUCOSE BLDC GLUCOMTR-MCNC: 199 MG/DL (ref 70–99)
HCT VFR BLD AUTO: 40.2 %
HGB BLD-MCNC: 13.4 G/DL
IMM GRANULOCYTES # BLD AUTO: 0.02 X10(3) UL (ref 0–1)
IMM GRANULOCYTES NFR BLD: 0.2 %
LYMPHOCYTES # BLD AUTO: 4.05 X10(3) UL (ref 1–4)
LYMPHOCYTES NFR BLD AUTO: 47 %
MCH RBC QN AUTO: 25 PG (ref 26–34)
MCHC RBC AUTO-ENTMCNC: 33.3 G/DL (ref 31–37)
MCV RBC AUTO: 75.1 FL
MONOCYTES # BLD AUTO: 0.64 X10(3) UL (ref 0.1–1)
MONOCYTES NFR BLD AUTO: 7.4 %
NEUTROPHILS # BLD AUTO: 3.44 X10 (3) UL (ref 1.5–7.7)
NEUTROPHILS # BLD AUTO: 3.44 X10(3) UL (ref 1.5–7.7)
NEUTROPHILS NFR BLD AUTO: 40.1 %
OSMOLALITY SERPL CALC.SUM OF ELEC: 288 MOSM/KG (ref 275–295)
PLATELET # BLD AUTO: 348 10(3)UL (ref 150–450)
POTASSIUM SERPL-SCNC: 2.7 MMOL/L (ref 3.5–5.1)
POTASSIUM SERPL-SCNC: 3.8 MMOL/L (ref 3.5–5.1)
PROT SERPL-MCNC: 7.3 G/DL (ref 6.4–8.2)
RBC # BLD AUTO: 5.35 X10(6)UL
RSV RNA SPEC NAA+PROBE: NEGATIVE
SARS-COV-2 RNA RESP QL NAA+PROBE: NOT DETECTED
SODIUM SERPL-SCNC: 138 MMOL/L (ref 136–145)
WBC # BLD AUTO: 8.6 X10(3) UL (ref 4–11)

## 2022-09-20 PROCEDURE — 99226 SUBSEQUENT OBSERVATION CARE: CPT | Performed by: HOSPITALIST

## 2022-09-20 PROCEDURE — 93306 TTE W/DOPPLER COMPLETE: CPT | Performed by: HOSPITALIST

## 2022-09-20 PROCEDURE — 93880 EXTRACRANIAL BILAT STUDY: CPT | Performed by: HOSPITALIST

## 2022-09-20 PROCEDURE — 74018 RADEX ABDOMEN 1 VIEW: CPT | Performed by: HOSPITALIST

## 2022-09-20 RX ORDER — SERTRALINE HYDROCHLORIDE 100 MG/1
100 TABLET, FILM COATED ORAL DAILY
Status: DISCONTINUED | OUTPATIENT
Start: 2022-09-20 | End: 2022-09-21

## 2022-09-20 RX ORDER — ZOLPIDEM TARTRATE 5 MG/1
10 TABLET ORAL NIGHTLY
Status: DISCONTINUED | OUTPATIENT
Start: 2022-09-20 | End: 2022-09-21

## 2022-09-20 RX ORDER — ALLOPURINOL 100 MG/1
100 TABLET ORAL NIGHTLY
Status: DISCONTINUED | OUTPATIENT
Start: 2022-09-20 | End: 2022-09-21

## 2022-09-20 RX ORDER — HYDROCHLOROTHIAZIDE 25 MG/1
25 TABLET ORAL DAILY
Status: DISCONTINUED | OUTPATIENT
Start: 2022-09-20 | End: 2022-09-20

## 2022-09-20 RX ORDER — HYDRALAZINE HYDROCHLORIDE 50 MG/1
50 TABLET, FILM COATED ORAL EVERY 6 HOURS PRN
Status: DISCONTINUED | OUTPATIENT
Start: 2022-09-20 | End: 2022-09-21

## 2022-09-20 RX ORDER — ASPIRIN 81 MG/1
81 TABLET ORAL DAILY
Status: DISCONTINUED | OUTPATIENT
Start: 2022-09-20 | End: 2022-09-21

## 2022-09-20 RX ORDER — HYDROCODONE BITARTRATE AND ACETAMINOPHEN 7.5; 325 MG/1; MG/1
1-2 TABLET ORAL EVERY 4 HOURS PRN
Status: DISCONTINUED | OUTPATIENT
Start: 2022-09-20 | End: 2022-09-21

## 2022-09-20 RX ORDER — TRAZODONE HYDROCHLORIDE 50 MG/1
100 TABLET ORAL NIGHTLY
Status: DISCONTINUED | OUTPATIENT
Start: 2022-09-20 | End: 2022-09-21

## 2022-09-20 RX ORDER — POTASSIUM CHLORIDE 20 MEQ/1
40 TABLET, EXTENDED RELEASE ORAL ONCE
Status: COMPLETED | OUTPATIENT
Start: 2022-09-20 | End: 2022-09-20

## 2022-09-20 RX ORDER — LOSARTAN POTASSIUM 100 MG/1
100 TABLET ORAL DAILY
Status: DISCONTINUED | OUTPATIENT
Start: 2022-09-20 | End: 2022-09-21

## 2022-09-20 RX ORDER — CARVEDILOL 6.25 MG/1
6.25 TABLET ORAL 2 TIMES DAILY WITH MEALS
Status: DISCONTINUED | OUTPATIENT
Start: 2022-09-20 | End: 2022-09-20

## 2022-09-20 RX ORDER — CARVEDILOL 25 MG/1
25 TABLET ORAL 2 TIMES DAILY WITH MEALS
Status: DISCONTINUED | OUTPATIENT
Start: 2022-09-20 | End: 2022-09-21

## 2022-09-20 RX ORDER — POTASSIUM CHLORIDE 14.9 MG/ML
20 INJECTION INTRAVENOUS ONCE
Status: COMPLETED | OUTPATIENT
Start: 2022-09-20 | End: 2022-09-21

## 2022-09-20 NOTE — H&P
[facility]    PATIENT'S NAME: Yamilabritton Miles PHYSICIAN: Fam Beck MD   PATIENT ACCOUNT#:   [id number]    LOCATION:  [facility] [address] [facility] 1  MEDICAL RECORD #:   [id number]       YOB: 1965  ADMISSION DATE:       09/19/2022    HISTORY AND PHYSICAL EXAMINATION    CHIEF COMPLAINT:  Syncope. HISTORY OF PRESENT ILLNESS:  Patient is a 67-year-old [place] female who came into the emergency department for evaluation of body aches and fatigue for the last 2 days. Family member reportedly diagnosed with COVID-19. COVID-19 testing in the emergency room was negative. CBC and chemistry were unremarkable. ALT and AST slightly elevated. Potassium 3.2, sodium 133, and glucose 267. Urinalysis still pending. Chest x-ray showed no acute findings, and EKG showed normal sinus rhythm. While in the emergency room, she had a syncopal episode and was unresponsive requiring chest rub in order to wake up. Was not connected to a monitor at that time. PAST MEDICAL HISTORY:  Diabetes mellitus type 2, obesity, hypothyroidism, hypertension, hyperlipidemia, gout, asthma, diverticulitis, and depression. Reviewing the record, she had a 2D echocardiogram in January 2022 which showed left ventricular hypertrophy with no other abnormalities. She had a nuclear medicine stress test in July 2022 which was negative for reversible ischemia. PAST SURGICAL HISTORY:  None. MEDICATIONS:  Please see medication reconciliation list.     ALLERGIES:  No known drug allergies. FAMILY HISTORY:  Mother had diabetes mellitus type 2, hypertension, and cancer. Father had cancer and hypertension. SOCIAL HISTORY:  No tobacco, alcohol, or drug use. Lives with her family. Usually independent for basic activities of daily living. Patient, per the family, has been dealing with a lot of social and family stress recently. REVIEW OF SYSTEMS:  Currently feels fatigued but no chest pain.   Other 12-point review of systems is negative. PHYSICAL EXAMINATION:    GENERAL:  Alert and oriented to time, place and person. No acute distress. VITAL SIGNS:  Temperature 96.9, pulse 77, respiratory rate 18, blood pressure 156/81, pulse ox 96% on room air. HEENT:  Atraumatic. Oropharynx clear. Dry mucous membranes. Normal hard and soft palate. Eyes:  Anicteric sclerae. NECK:  Supple. No lymphadenopathy. Trachea midline. Full range of motion. LUNGS:  Clear to auscultation bilaterally. Normal respiratory effort. HEART:  Regular rate and rhythm. S1 and S2 auscultated. No murmur. ABDOMEN:  Soft, nondistended. No tenderness. Positive bowel sounds. EXTREMITIES:  No peripheral edema, clubbing or cyanosis. NEUROLOGIC:  Motor and sensory intact. ASSESSMENT:    1. Syncope of unclear etiology. 2.   Hypokalemia. 3.   Febrile illness, possible viral illness. 4.   Diabetes mellitus type 2. PLAN:  Patient will be admitted to general medical floor. Gentle hydration. Replace electrolytes. Obtain 2D echocardiogram with Doppler and carotid ultrasound. Obtain cardiology consult. Monitor rhythm. Fall precautions. Further recommendations to follow.      Dictated By Christine Fisher MD  d: 09/19/2022 18:39:21  t: 09/19/2022 19:16:16  Job [id number]  /

## 2022-09-20 NOTE — PLAN OF CARE
Problem: Patient Centered Care  Goal: Patient preferences are identified and integrated in the patient's plan of care  Description: Interventions:  - What would you like us to know as we care for you? From home with family   - Provide timely, complete, and accurate information to patient/family  - Incorporate patient and family knowledge, values, beliefs, and cultural backgrounds into the planning and delivery of care  - Encourage patient/family to participate in care and decision-making at the level they choose  - Honor patient and family perspectives and choices  Outcome: Progressing     Problem: Diabetes/Glucose Control  Goal: Glucose maintained within prescribed range  Description: INTERVENTIONS:  - Monitor Blood Glucose as ordered  - Assess for signs and symptoms of hyperglycemia and hypoglycemia  - Administer ordered medications to maintain glucose within target range  - Assess barriers to adequate nutritional intake and initiate nutrition consult as needed  - Instruct patient on self management of diabetes  Outcome: Progressing     Problem: Patient/Family Goals  Goal: Patient/Family Long Term Goal  Description: Patient's Long Term Goal: To go home    Interventions:  - See additional Care Plan goals for specific interventions  Outcome: Progressing  Goal: Patient/Family Short Term Goal  Description: Patient's Short Term Goal: To feel better   Interventions:   - See additional Care Plan goals for specific interventions  Outcome: Progressing    Pt aox4. RA. SBA. Admission completed. No acute events. Call light within reach. Plan for US and 2D echo tdy.

## 2022-09-20 NOTE — PROGRESS NOTES
Attempted to pass scheduled medications. Pt declined at this time, away for tests did not have breakfast, requested to wait until after meal was completed. 1215- pt ready for medications, BP elevated, will recheck 30-60 min post medication administration. Pt asymptomatic.     Hyacinth Storm RN

## 2022-09-20 NOTE — PLAN OF CARE
Pt received in no acute distress, no c/o, resting in bed, up w/SB assist, had echo refer to results, critical K in am, replaced per protocol. Plan is DC home when medically cleared. Problem: Patient Centered Care  Goal: Patient preferences are identified and integrated in the patient's plan of care  Description: Interventions:  - What would you like us to know as we care for you?  From home with family  - Provide timely, complete, and accurate information to patient/family  - Incorporate patient and family knowledge, values, beliefs, and cultural backgrounds into the planning and delivery of care  - Encourage patient/family to participate in care and decision-making at the level they choose  - Honor patient and family perspectives and choices  Outcome: Progressing     Problem: Diabetes/Glucose Control  Goal: Glucose maintained within prescribed range  Description: INTERVENTIONS:  - Monitor Blood Glucose as ordered  - Assess for signs and symptoms of hyperglycemia and hypoglycemia  - Administer ordered medications to maintain glucose within target range  - Assess barriers to adequate nutritional intake and initiate nutrition consult as needed  - Instruct patient on self management of diabetes  Outcome: Progressing     Problem: Patient/Family Goals  Goal: Patient/Family Long Term Goal  Description: Patient's Long Term Goal: to be discharged home safely   Interventions:  - Follow MD orders   - See additional Care Plan goals for specific interventions  Outcome: Progressing  Goal: Patient/Family Short Term Goal  Description: Patient's Short Term Goal: comfort  Interventions:   -promote comfort/safety  - See additional Care Plan goals for specific interventions  Outcome: Progressing

## 2022-09-21 ENCOUNTER — PATIENT MESSAGE (OUTPATIENT)
Dept: INTERNAL MEDICINE CLINIC | Facility: CLINIC | Age: 57
End: 2022-09-21

## 2022-09-21 VITALS
HEIGHT: 65 IN | BODY MASS INDEX: 29.96 KG/M2 | RESPIRATION RATE: 18 BRPM | OXYGEN SATURATION: 94 % | SYSTOLIC BLOOD PRESSURE: 147 MMHG | WEIGHT: 179.81 LBS | HEART RATE: 95 BPM | DIASTOLIC BLOOD PRESSURE: 70 MMHG | TEMPERATURE: 98 F

## 2022-09-21 PROBLEM — R55 SYNCOPE AND COLLAPSE: Status: RESOLVED | Noted: 2022-09-19 | Resolved: 2022-09-21

## 2022-09-21 LAB
ANION GAP SERPL CALC-SCNC: 6 MMOL/L (ref 0–18)
BUN BLD-MCNC: 12 MG/DL (ref 7–18)
BUN/CREAT SERPL: 16 (ref 10–20)
CALCIUM BLD-MCNC: 9.5 MG/DL (ref 8.5–10.1)
CHLORIDE SERPL-SCNC: 103 MMOL/L (ref 98–112)
CO2 SERPL-SCNC: 29 MMOL/L (ref 21–32)
CREAT BLD-MCNC: 0.75 MG/DL
DEPRECATED RDW RBC AUTO: 39 FL (ref 35.1–46.3)
ERYTHROCYTE [DISTWIDTH] IN BLOOD BY AUTOMATED COUNT: 14.6 % (ref 11–15)
GFR SERPLBLD BASED ON 1.73 SQ M-ARVRAT: 94 ML/MIN/1.73M2 (ref 60–?)
GLUCOSE BLD-MCNC: 129 MG/DL (ref 70–99)
GLUCOSE BLDC GLUCOMTR-MCNC: 147 MG/DL (ref 70–99)
GLUCOSE BLDC GLUCOMTR-MCNC: 188 MG/DL (ref 70–99)
HCT VFR BLD AUTO: 38.3 %
HGB BLD-MCNC: 13.1 G/DL
MAGNESIUM SERPL-MCNC: 2.1 MG/DL (ref 1.6–2.6)
MCH RBC QN AUTO: 25.6 PG (ref 26–34)
MCHC RBC AUTO-ENTMCNC: 34.2 G/DL (ref 31–37)
MCV RBC AUTO: 74.8 FL
OSMOLALITY SERPL CALC.SUM OF ELEC: 287 MOSM/KG (ref 275–295)
PLATELET # BLD AUTO: 324 10(3)UL (ref 150–450)
POTASSIUM SERPL-SCNC: 3.5 MMOL/L (ref 3.5–5.1)
POTASSIUM SERPL-SCNC: 3.5 MMOL/L (ref 3.5–5.1)
POTASSIUM SERPL-SCNC: 4.3 MMOL/L (ref 3.5–5.1)
RBC # BLD AUTO: 5.12 X10(6)UL
SODIUM SERPL-SCNC: 138 MMOL/L (ref 136–145)
WBC # BLD AUTO: 8.4 X10(3) UL (ref 4–11)

## 2022-09-21 PROCEDURE — 99217 OBSERVATION CARE DISCHARGE: CPT | Performed by: HOSPITALIST

## 2022-09-21 RX ORDER — POTASSIUM CHLORIDE 20 MEQ/1
40 TABLET, EXTENDED RELEASE ORAL EVERY 4 HOURS
Status: COMPLETED | OUTPATIENT
Start: 2022-09-21 | End: 2022-09-21

## 2022-09-21 NOTE — PLAN OF CARE
Problem: Patient Centered Care  Goal: Patient preferences are identified and integrated in the patient's plan of care  Description: Interventions:  - What would you like us to know as we care for you?   - Provide timely, complete, and accurate information to patient/family  - Incorporate patient and family knowledge, values, beliefs, and cultural backgrounds into the planning and delivery of care  - Encourage patient/family to participate in care and decision-making at the level they choose  - Honor patient and family perspectives and choices  Outcome: Progressing     Problem: Diabetes/Glucose Control  Goal: Glucose maintained within prescribed range  Description: INTERVENTIONS:  - Monitor Blood Glucose as ordered  - Assess for signs and symptoms of hyperglycemia and hypoglycemia  - Administer ordered medications to maintain glucose within target range  - Assess barriers to adequate nutritional intake and initiate nutrition consult as needed  - Instruct patient on self management of diabetes  Outcome: Progressing     Problem: Patient/Family Goals  Goal: Patient/Family Long Term Goal  Description: Patient's Long Term Goal:     Interventions:  -  - See additional Care Plan goals for specific interventions  Outcome: Progressing  Goal: Patient/Family Short Term Goal  Description: Patient's Short Term Goal:     Interventions:   -   - See additional Care Plan goals for specific interventions  Outcome: Progressing   Patient alert and oriented x4. Family at bedside. VSS. K+ replaced. Possible discharge home in AM pending med clearance.

## 2022-09-21 NOTE — PROGRESS NOTES
Patient cleared for discharge. After visit summary given to patient. Education given to son on follow up care, medications, and when to seek care. Education given on heart monitor which patient will receive outpatient and healthy diet. All questions answered. IV and tele monitor removed. All belongings with patient.

## 2022-09-21 NOTE — PLAN OF CARE
Patient denies dizziness. Ambulating with standby assist. Son at bedside. Plan is for discharge home this afternoon with heart monitor. Patient and son updated on plan of care. Problem: Patient Centered Care  Goal: Patient preferences are identified and integrated in the patient's plan of care  Description: Interventions:  - What would you like us to know as we care for you?  I live with my family  - Provide timely, complete, and accurate information to patient/family  - Incorporate patient and family knowledge, values, beliefs, and cultural backgrounds into the planning and delivery of care  - Encourage patient/family to participate in care and decision-making at the level they choose  - Honor patient and family perspectives and choices  Outcome: Progressing     Problem: Diabetes/Glucose Control  Goal: Glucose maintained within prescribed range  Description: INTERVENTIONS:  - Monitor Blood Glucose as ordered  - Assess for signs and symptoms of hyperglycemia and hypoglycemia  - Administer ordered medications to maintain glucose within target range  - Assess barriers to adequate nutritional intake and initiate nutrition consult as needed  - Instruct patient on self management of diabetes  Outcome: Progressing     Problem: Patient/Family Goals  Goal: Patient/Family Long Term Goal  Description: Patient's Long Term Goal: go home    Interventions:  - Medications  - consults  - See additional Care Plan goals for specific interventions  Outcome: Progressing  Goal: Patient/Family Short Term Goal  Description: Patient's Short Term Goal: Figure out why this keeps happening     Interventions:   - Consults  - heart monitor  - See additional Care Plan goals for specific interventions  Outcome: Progressing

## 2022-09-22 ENCOUNTER — PATIENT OUTREACH (OUTPATIENT)
Dept: CASE MANAGEMENT | Age: 57
End: 2022-09-22

## 2022-09-22 NOTE — PROGRESS NOTES
Left message on mailbox for pt/dtr-in-law to call NCM back for TCM. NCM contact information included in message.

## 2022-09-22 NOTE — PROGRESS NOTES
Left message on mailbox for pt (dtr-in-law Sherrill's #) to call NCM back for TCM. NCM contact information included in message.

## 2022-09-22 NOTE — TELEPHONE ENCOUNTER
Dr. Will Wheeler: please advise on soonest available hospital follow-up appointment, can we use res24 thank you

## 2022-09-23 LAB
ALDOSTERONE/RENIN ACTIVITY RATIO: 19.8 RATIO
ALDOSTERONE: 23.7 NG/DL
RENIN ACTIVITY: 1.2 NG/ML/HR

## 2022-09-28 ENCOUNTER — LAB ENCOUNTER (OUTPATIENT)
Dept: LAB | Age: 57
End: 2022-09-28
Attending: INTERNAL MEDICINE
Payer: MEDICAID

## 2022-09-28 ENCOUNTER — OFFICE VISIT (OUTPATIENT)
Dept: INTERNAL MEDICINE CLINIC | Facility: CLINIC | Age: 57
End: 2022-09-28
Payer: MEDICAID

## 2022-09-28 VITALS
HEIGHT: 65 IN | DIASTOLIC BLOOD PRESSURE: 80 MMHG | HEART RATE: 99 BPM | WEIGHT: 181 LBS | OXYGEN SATURATION: 96 % | SYSTOLIC BLOOD PRESSURE: 127 MMHG | BODY MASS INDEX: 30.16 KG/M2

## 2022-09-28 DIAGNOSIS — I10 PRIMARY HYPERTENSION: ICD-10-CM

## 2022-09-28 DIAGNOSIS — E11.40 TYPE 2 DIABETES MELLITUS WITH DIABETIC NEUROPATHY, WITH LONG-TERM CURRENT USE OF INSULIN (HCC): ICD-10-CM

## 2022-09-28 DIAGNOSIS — R55 SYNCOPE, UNSPECIFIED SYNCOPE TYPE: Primary | ICD-10-CM

## 2022-09-28 DIAGNOSIS — M1A.09X0 IDIOPATHIC CHRONIC GOUT OF MULTIPLE SITES WITHOUT TOPHUS: ICD-10-CM

## 2022-09-28 DIAGNOSIS — E87.6 HYPOKALEMIA: ICD-10-CM

## 2022-09-28 DIAGNOSIS — F32.1 CURRENT MODERATE EPISODE OF MAJOR DEPRESSIVE DISORDER WITHOUT PRIOR EPISODE (HCC): ICD-10-CM

## 2022-09-28 DIAGNOSIS — I10 ESSENTIAL HYPERTENSION: ICD-10-CM

## 2022-09-28 DIAGNOSIS — Z79.4 TYPE 2 DIABETES MELLITUS WITH DIABETIC NEUROPATHY, WITH LONG-TERM CURRENT USE OF INSULIN (HCC): ICD-10-CM

## 2022-09-28 LAB
BILIRUB DIRECT SERPL-MCNC: 0.1 MG/DL (ref 0–0.2)
FOLATE SERPL-MCNC: 19.5 NG/ML (ref 8.7–?)
HBV SURFACE AB SER QL: NONREACTIVE
HBV SURFACE AB SERPL IA-ACNC: <3.1 MIU/ML

## 2022-09-28 PROCEDURE — 85025 COMPLETE CBC W/AUTO DIFF WBC: CPT | Performed by: INTERNAL MEDICINE

## 2022-09-28 PROCEDURE — 86704 HEP B CORE ANTIBODY TOTAL: CPT | Performed by: INTERNAL MEDICINE

## 2022-09-28 PROCEDURE — 84550 ASSAY OF BLOOD/URIC ACID: CPT | Performed by: INTERNAL MEDICINE

## 2022-09-28 PROCEDURE — 36415 COLL VENOUS BLD VENIPUNCTURE: CPT | Performed by: INTERNAL MEDICINE

## 2022-09-28 PROCEDURE — 86709 HEPATITIS A IGM ANTIBODY: CPT | Performed by: INTERNAL MEDICINE

## 2022-09-28 PROCEDURE — 80053 COMPREHEN METABOLIC PANEL: CPT | Performed by: INTERNAL MEDICINE

## 2022-09-28 PROCEDURE — 99214 OFFICE O/P EST MOD 30 MIN: CPT | Performed by: INTERNAL MEDICINE

## 2022-09-28 PROCEDURE — 3074F SYST BP LT 130 MM HG: CPT | Performed by: INTERNAL MEDICINE

## 2022-09-28 PROCEDURE — 82550 ASSAY OF CK (CPK): CPT | Performed by: INTERNAL MEDICINE

## 2022-09-28 PROCEDURE — 82746 ASSAY OF FOLIC ACID SERUM: CPT | Performed by: INTERNAL MEDICINE

## 2022-09-28 PROCEDURE — 80061 LIPID PANEL: CPT | Performed by: INTERNAL MEDICINE

## 2022-09-28 PROCEDURE — 3079F DIAST BP 80-89 MM HG: CPT | Performed by: INTERNAL MEDICINE

## 2022-09-28 PROCEDURE — 3052F HG A1C>EQUAL 8.0%<EQUAL 9.0%: CPT | Performed by: INTERNAL MEDICINE

## 2022-09-28 PROCEDURE — 84443 ASSAY THYROID STIM HORMONE: CPT | Performed by: INTERNAL MEDICINE

## 2022-09-28 PROCEDURE — 82150 ASSAY OF AMYLASE: CPT | Performed by: INTERNAL MEDICINE

## 2022-09-28 PROCEDURE — 87522 HEPATITIS C REVRS TRNSCRPJ: CPT | Performed by: INTERNAL MEDICINE

## 2022-09-28 PROCEDURE — 86706 HEP B SURFACE ANTIBODY: CPT | Performed by: INTERNAL MEDICINE

## 2022-09-28 PROCEDURE — 83690 ASSAY OF LIPASE: CPT | Performed by: INTERNAL MEDICINE

## 2022-09-28 PROCEDURE — 86708 HEPATITIS A ANTIBODY: CPT | Performed by: INTERNAL MEDICINE

## 2022-09-28 PROCEDURE — 3008F BODY MASS INDEX DOCD: CPT | Performed by: INTERNAL MEDICINE

## 2022-09-28 PROCEDURE — 83540 ASSAY OF IRON: CPT | Performed by: INTERNAL MEDICINE

## 2022-09-28 PROCEDURE — 87340 HEPATITIS B SURFACE AG IA: CPT | Performed by: INTERNAL MEDICINE

## 2022-09-28 PROCEDURE — 82607 VITAMIN B-12: CPT | Performed by: INTERNAL MEDICINE

## 2022-09-28 PROCEDURE — 86803 HEPATITIS C AB TEST: CPT | Performed by: INTERNAL MEDICINE

## 2022-09-28 PROCEDURE — 84466 ASSAY OF TRANSFERRIN: CPT | Performed by: INTERNAL MEDICINE

## 2022-09-28 PROCEDURE — 80503 PATH CLIN CONSLTJ SF 5-20: CPT | Performed by: INTERNAL MEDICINE

## 2022-09-28 PROCEDURE — 83036 HEMOGLOBIN GLYCOSYLATED A1C: CPT | Performed by: INTERNAL MEDICINE

## 2022-09-28 PROCEDURE — 82728 ASSAY OF FERRITIN: CPT | Performed by: INTERNAL MEDICINE

## 2022-09-28 PROCEDURE — 82248 BILIRUBIN DIRECT: CPT | Performed by: INTERNAL MEDICINE

## 2022-09-29 ENCOUNTER — TELEPHONE (OUTPATIENT)
Dept: INTERNAL MEDICINE CLINIC | Facility: CLINIC | Age: 57
End: 2022-09-29

## 2022-09-29 ENCOUNTER — PATIENT MESSAGE (OUTPATIENT)
Dept: INTERNAL MEDICINE CLINIC | Facility: CLINIC | Age: 57
End: 2022-09-29

## 2022-09-29 RX ORDER — OMEPRAZOLE 40 MG/1
40 CAPSULE, DELAYED RELEASE ORAL 2 TIMES DAILY
Qty: 180 CAPSULE | Refills: 0 | COMMUNITY
Start: 2022-09-29

## 2022-09-29 RX ORDER — HYDROCHLOROTHIAZIDE 25 MG/1
TABLET ORAL
Qty: 180 TABLET | Refills: 1 | COMMUNITY
Start: 2022-09-29

## 2022-09-29 NOTE — TELEPHONE ENCOUNTER
Spoke to daughter CHAYITO earlier today, she was in the office with patient yesterday. Reviewed blood test results, normal CBC with touch of microcytosis, patient does not take Feosol because it causes constipation, advised to instruct patient to have foods high in iron on a regular basis. Blood sugar was 219 hemoglobin A1c 9.0 which we knew from previous testing recently, now patient take all medication as prescribed, she is known to be noncompliant, elevated liver enzymes second time, daughter states that patient  passed he had hepatitis C, rheumatologist already ordered hepatitis C evaluation testing can make a walker, RNA test by PCR is pending. I advised not to restart simvastatin until very clear what is going on with her liver. Patient could not tolerate atorvastatin because of the abdominal pains. Cholesterol , triglycerides 379, advised that it could be due to uncontrolled diabetes, we will recheck in 3 months again considering the patient will be compliant, daughter states that she will monitor patient even if she goes to travel to [facility] she will make sure that patient has good diet, but usually patient does not follow good diet when he she travels home. Normal thyroid function test, normal muscle test.  Uric acid slightly elevated, patient is compliant with allopurinol 100 mg daily. Potassium 3.3, patient is back on hydrochlorothiazide 25 mg twice a day, I reviewed all medication with daughter once more today. I advised that I will send prescription for potassium supplement to be taken once a day, and requesting repeat BMP in 2 weeks. Normal kidney function test.  Daughter stated that. She made appointment for patient to see cardiologist next week. She will follow-up with endocrinology as well. Kasandra Irene

## 2022-10-02 NOTE — TELEPHONE ENCOUNTER
From: Arlen Crowe MD  To: Vevelyn Kanner  Sent: 9/29/2022 1:52 PM CDT  Subject: Medication, follow-up    As we reviewed earlier today, I sent prescription for potassium chloride 20 mEq daily to the pharmacy, and advised to get blood test BMP in 2 weeks to follow-up on potassium and decide if she will need to stay on this medication longer.

## 2022-10-05 ENCOUNTER — OFFICE VISIT (OUTPATIENT)
Dept: ENDOCRINOLOGY CLINIC | Facility: CLINIC | Age: 57
End: 2022-10-05
Payer: MEDICAID

## 2022-10-05 ENCOUNTER — TELEPHONE (OUTPATIENT)
Dept: INTERNAL MEDICINE CLINIC | Facility: CLINIC | Age: 57
End: 2022-10-05

## 2022-10-05 ENCOUNTER — PATIENT MESSAGE (OUTPATIENT)
Dept: INTERNAL MEDICINE CLINIC | Facility: CLINIC | Age: 57
End: 2022-10-05

## 2022-10-05 VITALS
SYSTOLIC BLOOD PRESSURE: 139 MMHG | HEIGHT: 65 IN | BODY MASS INDEX: 30.82 KG/M2 | HEART RATE: 107 BPM | WEIGHT: 185 LBS | RESPIRATION RATE: 18 BRPM | DIASTOLIC BLOOD PRESSURE: 84 MMHG

## 2022-10-05 DIAGNOSIS — E03.9 HYPOTHYROIDISM, UNSPECIFIED TYPE: ICD-10-CM

## 2022-10-05 DIAGNOSIS — Z79.4 TYPE 2 DIABETES MELLITUS WITH HYPERGLYCEMIA, WITH LONG-TERM CURRENT USE OF INSULIN (HCC): Primary | ICD-10-CM

## 2022-10-05 DIAGNOSIS — E78.5 DYSLIPIDEMIA: ICD-10-CM

## 2022-10-05 DIAGNOSIS — E11.65 TYPE 2 DIABETES MELLITUS WITH HYPERGLYCEMIA, WITH LONG-TERM CURRENT USE OF INSULIN (HCC): Primary | ICD-10-CM

## 2022-10-05 DIAGNOSIS — I10 ESSENTIAL HYPERTENSION: ICD-10-CM

## 2022-10-05 LAB
GLUCOSE BLOOD: 198
TEST STRIP LOT #: NORMAL NUMERIC

## 2022-10-05 PROCEDURE — 3075F SYST BP GE 130 - 139MM HG: CPT | Performed by: INTERNAL MEDICINE

## 2022-10-05 PROCEDURE — 3008F BODY MASS INDEX DOCD: CPT | Performed by: INTERNAL MEDICINE

## 2022-10-05 PROCEDURE — 3079F DIAST BP 80-89 MM HG: CPT | Performed by: INTERNAL MEDICINE

## 2022-10-05 PROCEDURE — 82947 ASSAY GLUCOSE BLOOD QUANT: CPT | Performed by: INTERNAL MEDICINE

## 2022-10-05 PROCEDURE — 99214 OFFICE O/P EST MOD 30 MIN: CPT | Performed by: INTERNAL MEDICINE

## 2022-10-05 RX ORDER — PEN NEEDLE, DIABETIC 33 GX5/32"
1 NEEDLE, DISPOSABLE MISCELLANEOUS EVERY MORNING
Qty: 100 EACH | Refills: 3 | Status: SHIPPED | OUTPATIENT
Start: 2022-10-05 | End: 2023-01-03

## 2022-10-06 ENCOUNTER — TELEPHONE (OUTPATIENT)
Dept: INTERNAL MEDICINE CLINIC | Facility: CLINIC | Age: 57
End: 2022-10-06

## 2022-10-06 NOTE — TELEPHONE ENCOUNTER
Please see MyChart message re = negative Hep C screening 9/28/22      Per OV note 9/28/22 =   Daughter mentioned that patient's  had hepatitis C and patient has seen rheumatologist who ordered investigation and hepatitis C level came equivocal, additional testing results is pending

## 2022-10-06 NOTE — PROGRESS NOTES
Multiple attempts to reach the pt and messages left with no returned phone call. Pt went to HFU with PCP on 9/28/22, closing encounter.

## 2022-10-10 ENCOUNTER — TELEPHONE (OUTPATIENT)
Dept: INTERNAL MEDICINE CLINIC | Facility: CLINIC | Age: 57
End: 2022-10-10

## 2022-10-12 ENCOUNTER — PATIENT MESSAGE (OUTPATIENT)
Dept: ENDOCRINOLOGY CLINIC | Facility: CLINIC | Age: 57
End: 2022-10-12

## 2022-10-12 ENCOUNTER — TELEPHONE (OUTPATIENT)
Dept: ENDOCRINOLOGY CLINIC | Facility: CLINIC | Age: 57
End: 2022-10-12

## 2022-10-13 ENCOUNTER — TELEPHONE (OUTPATIENT)
Dept: INTERNAL MEDICINE CLINIC | Facility: CLINIC | Age: 57
End: 2022-10-13

## 2022-10-13 RX ORDER — BLOOD-GLUCOSE SENSOR
EACH MISCELLANEOUS
Qty: 9 EACH | Refills: 0 | Status: SHIPPED | OUTPATIENT
Start: 2022-10-13

## 2022-10-13 RX ORDER — PEN NEEDLE, DIABETIC 32GX 5/32"
NEEDLE, DISPOSABLE MISCELLANEOUS
Qty: 200 EACH | Refills: 3 | Status: SHIPPED | OUTPATIENT
Start: 2022-10-13

## 2022-10-13 RX ORDER — BLOOD-GLUCOSE,RECEIVER,CONT
EACH MISCELLANEOUS
Qty: 1 EACH | Refills: 0 | Status: SHIPPED | OUTPATIENT
Start: 2022-10-13

## 2022-10-13 RX ORDER — BLOOD-GLUCOSE TRANSMITTER
EACH MISCELLANEOUS
Qty: 1 EACH | Refills: 0 | Status: SHIPPED | OUTPATIENT
Start: 2022-10-13

## 2022-10-13 NOTE — TELEPHONE ENCOUNTER
Medication PA Requested:   Dexcom Sensors                                                       CoverMyMeds Used:  Key:  Quantity: 9  Day Supply: 90 days  Sig: Change sensor every 10 days  DX Code: E11.9                                    CPT code (if applicable):   Case Number/Pending Ref#:    Medication PA Requested:  Dexcom Transmitter                                                        CoverMyMeds Used:  Key:  Quantity: 1  Day Supply: 90 days  Sig: Change transmitter every 90 days  DX Code: E11.9                                    CPT code (if applicable):   Case Number/Pending Ref#:      Medication PA Requested:  Dexcom                                                         CoverMyMeds Used:  Key:  Quantity: 1  Day Supply: 365 days  Sig: Use to monitor blood sugar levels  DX Code: E11.9                                    CPT code (if applicable):   Case Number/Pending Ref#:

## 2022-10-19 ENCOUNTER — TELEPHONE (OUTPATIENT)
Dept: ENDOCRINOLOGY CLINIC | Facility: CLINIC | Age: 57
End: 2022-10-19

## 2022-10-21 DIAGNOSIS — E11.65 TYPE 2 DIABETES MELLITUS WITH HYPERGLYCEMIA, WITH LONG-TERM CURRENT USE OF INSULIN (HCC): ICD-10-CM

## 2022-10-21 DIAGNOSIS — Z79.4 TYPE 2 DIABETES MELLITUS WITH DIABETIC NEUROPATHY, WITH LONG-TERM CURRENT USE OF INSULIN (HCC): ICD-10-CM

## 2022-10-21 DIAGNOSIS — I10 ESSENTIAL HYPERTENSION: ICD-10-CM

## 2022-10-21 DIAGNOSIS — E11.40 TYPE 2 DIABETES MELLITUS WITH DIABETIC NEUROPATHY, WITH LONG-TERM CURRENT USE OF INSULIN (HCC): ICD-10-CM

## 2022-10-21 DIAGNOSIS — F32.1 CURRENT MODERATE EPISODE OF MAJOR DEPRESSIVE DISORDER WITHOUT PRIOR EPISODE (HCC): ICD-10-CM

## 2022-10-21 DIAGNOSIS — Z79.4 TYPE 2 DIABETES MELLITUS WITH HYPERGLYCEMIA, WITH LONG-TERM CURRENT USE OF INSULIN (HCC): ICD-10-CM

## 2022-10-21 DIAGNOSIS — M1A.09X0 IDIOPATHIC CHRONIC GOUT OF MULTIPLE SITES WITHOUT TOPHUS: ICD-10-CM

## 2022-10-21 RX ORDER — TRAZODONE HYDROCHLORIDE 100 MG/1
100 TABLET ORAL NIGHTLY
Qty: 90 TABLET | Refills: 1 | Status: CANCELLED | OUTPATIENT
Start: 2022-10-21

## 2022-10-21 RX ORDER — GLIPIZIDE 10 MG/1
10 TABLET ORAL
Qty: 180 TABLET | Refills: 3 | Status: CANCELLED | OUTPATIENT
Start: 2022-10-21

## 2022-10-21 RX ORDER — INSULIN GLARGINE 100 [IU]/ML
INJECTION, SOLUTION SUBCUTANEOUS
Qty: 45 ML | Refills: 3 | Status: CANCELLED | OUTPATIENT
Start: 2022-10-21

## 2022-10-21 RX ORDER — SERTRALINE HYDROCHLORIDE 100 MG/1
100 TABLET, FILM COATED ORAL DAILY
Qty: 90 TABLET | Refills: 1 | Status: CANCELLED | OUTPATIENT
Start: 2022-10-21

## 2022-10-21 RX ORDER — ALLOPURINOL 100 MG/1
100 TABLET ORAL NIGHTLY
Qty: 90 TABLET | Refills: 3 | Status: CANCELLED | OUTPATIENT
Start: 2022-10-21

## 2022-10-21 RX ORDER — LEVOTHYROXINE SODIUM 0.12 MG/1
125 TABLET ORAL EVERY MORNING
Qty: 90 TABLET | Refills: 3 | Status: CANCELLED | OUTPATIENT
Start: 2022-10-21

## 2022-10-21 RX ORDER — LOSARTAN POTASSIUM 100 MG/1
100 TABLET ORAL DAILY
Qty: 90 TABLET | Refills: 3 | Status: CANCELLED | OUTPATIENT
Start: 2022-10-21

## 2022-10-21 RX ORDER — PEN NEEDLE, DIABETIC 32GX 5/32"
NEEDLE, DISPOSABLE MISCELLANEOUS
Qty: 100 EACH | Refills: 3 | Status: CANCELLED | OUTPATIENT
Start: 2022-10-21

## 2022-10-21 RX ORDER — GABAPENTIN 400 MG/1
400 CAPSULE ORAL 2 TIMES DAILY
Qty: 180 CAPSULE | Refills: 5 | Status: CANCELLED | OUTPATIENT
Start: 2022-10-21

## 2022-10-24 RX ORDER — INSULIN GLARGINE 100 [IU]/ML
INJECTION, SOLUTION SUBCUTANEOUS
Qty: 36 ML | Refills: 0 | Status: SHIPPED | OUTPATIENT
Start: 2022-10-24

## 2022-10-24 RX ORDER — PEN NEEDLE, DIABETIC 32GX 5/32"
NEEDLE, DISPOSABLE MISCELLANEOUS
Qty: 100 EACH | Refills: 3 | Status: SHIPPED | OUTPATIENT
Start: 2022-10-24

## 2022-10-24 RX ORDER — BLOOD SUGAR DIAGNOSTIC
STRIP MISCELLANEOUS
Qty: 200 STRIP | Refills: 1 | Status: SHIPPED | OUTPATIENT
Start: 2022-10-24

## 2022-10-24 RX ORDER — GLIPIZIDE 10 MG/1
10 TABLET ORAL
Qty: 180 TABLET | Refills: 3 | Status: SHIPPED | OUTPATIENT
Start: 2022-10-24

## 2022-10-24 RX ORDER — LIRAGLUTIDE 6 MG/ML
1.8 INJECTION SUBCUTANEOUS EVERY MORNING
Qty: 9 ML | Refills: 3 | Status: SHIPPED | OUTPATIENT
Start: 2022-10-24

## 2022-10-24 RX ORDER — BLOOD-GLUCOSE METER
1 EACH MISCELLANEOUS AS DIRECTED
Qty: 1 KIT | Refills: 0 | Status: SHIPPED | OUTPATIENT
Start: 2022-10-24

## 2022-10-24 RX ORDER — NAPROXEN 500 MG/1
TABLET ORAL
Qty: 30 TABLET | Refills: 1 | Status: SHIPPED | OUTPATIENT
Start: 2022-10-24

## 2022-10-24 NOTE — TELEPHONE ENCOUNTER
JALYN KELLRE Requested: Dexcom G6     CoverMyMeds Used:  Key:  Quantity: 1  Day Supply: 1 year  Sig: Use as directed  DX Code: E11.65                                    Case Number/Pending Ref#:    JALYN KELLER Requested: Dexcom G6 sensor                                                         CoverMyMeds Used:  Key:  Quantity: 9  Day Supply: 90 days  Sig: Change sensor every 10 days  DX Code: E11.65                                   Case Number/Pending Ref#:    JALYN KELLER Requested: Dexcom G6 transmitter    CoverMyMeds Used:  Key:  Quantity: 1  Day Supply: 90 days  Sig: Change transmitter every 3 months   DX Code: E11.65                                   Case Number/Pending Ref#:

## 2022-10-25 RX ORDER — HYDROCORTISONE 25 MG/G
CREAM TOPICAL
Qty: 28 G | Refills: 0 | Status: SHIPPED | OUTPATIENT
Start: 2022-10-25

## 2022-10-25 RX ORDER — HYDROCHLOROTHIAZIDE 25 MG/1
TABLET ORAL
Qty: 180 TABLET | Refills: 1 | Status: SHIPPED | OUTPATIENT
Start: 2022-10-25

## 2022-10-25 RX ORDER — POTASSIUM CHLORIDE 750 MG/1
CAPSULE, EXTENDED RELEASE ORAL
Qty: 60 CAPSULE | Refills: 1 | Status: SHIPPED | OUTPATIENT
Start: 2022-10-25

## 2022-10-25 RX ORDER — CARVEDILOL 6.25 MG/1
6.25 TABLET ORAL 2 TIMES DAILY WITH MEALS
Qty: 180 TABLET | Refills: 1 | Status: SHIPPED | OUTPATIENT
Start: 2022-10-25

## 2022-10-25 RX ORDER — ZOLPIDEM TARTRATE 10 MG/1
10 TABLET ORAL NIGHTLY
Qty: 30 TABLET | Refills: 0 | Status: SHIPPED | OUTPATIENT
Start: 2022-10-25

## 2022-10-25 NOTE — TELEPHONE ENCOUNTER
Please review requests for potassium (no protocol), hydrochlorothiazide (patient reported medication) and zolpidem (controlled substance.

## 2022-10-27 NOTE — TELEPHONE ENCOUNTER
Called [facility] [phone], spoke with Satya Farnsworth, she states PA not received, need to refax. PA form refaxed to [facility] as Urgent, awaiting determination.

## 2022-10-29 ENCOUNTER — LAB ENCOUNTER (OUTPATIENT)
Dept: LAB | Age: 57
End: 2022-10-29
Attending: INTERNAL MEDICINE
Payer: MEDICAID

## 2022-10-29 DIAGNOSIS — R74.8 ELEVATED LIVER ENZYMES: ICD-10-CM

## 2022-10-29 PROCEDURE — 36415 COLL VENOUS BLD VENIPUNCTURE: CPT

## 2022-10-29 PROCEDURE — 84466 ASSAY OF TRANSFERRIN: CPT | Performed by: INTERNAL MEDICINE

## 2022-10-29 PROCEDURE — 82103 ALPHA-1-ANTITRYPSIN TOTAL: CPT | Performed by: INTERNAL MEDICINE

## 2022-10-29 PROCEDURE — 84165 PROTEIN E-PHORESIS SERUM: CPT

## 2022-10-29 PROCEDURE — 82390 ASSAY OF CERULOPLASMIN: CPT | Performed by: INTERNAL MEDICINE

## 2022-10-29 PROCEDURE — 80053 COMPREHEN METABOLIC PANEL: CPT | Performed by: INTERNAL MEDICINE

## 2022-10-29 PROCEDURE — 86256 FLUORESCENT ANTIBODY TITER: CPT | Performed by: INTERNAL MEDICINE

## 2022-10-29 PROCEDURE — 87522 HEPATITIS C REVRS TRNSCRPJ: CPT

## 2022-10-29 PROCEDURE — 82977 ASSAY OF GGT: CPT | Performed by: INTERNAL MEDICINE

## 2022-10-29 PROCEDURE — 82103 ALPHA-1-ANTITRYPSIN TOTAL: CPT

## 2022-10-29 PROCEDURE — 86038 ANTINUCLEAR ANTIBODIES: CPT

## 2022-10-31 LAB
A1AT SERPL-MCNC: 122 MG/DL (ref 90–200)
ALBUMIN SERPL ELPH-MCNC: 4.39 G/DL (ref 3.75–5.21)
ALBUMIN/GLOB SERPL: 1.46 {RATIO} (ref 1–2)
ALPHA1 GLOB SERPL ELPH-MCNC: 0.3 G/DL (ref 0.19–0.46)
ALPHA2 GLOB SERPL ELPH-MCNC: 0.83 G/DL (ref 0.48–1.05)
B-GLOBULIN SERPL ELPH-MCNC: 0.87 G/DL (ref 0.68–1.23)
GAMMA GLOB SERPL ELPH-MCNC: 1.02 G/DL (ref 0.62–1.7)
NUCLEAR IGG TITR SER IF: NEGATIVE {TITER}
PROT SERPL-MCNC: 7.4 G/DL (ref 6.4–8.2)

## 2022-11-01 NOTE — TELEPHONE ENCOUNTER
Called [facility]  spoke with Иван Jane. She states Dexcom CGM pa was approved from 7/29/2022-10/27/2023, auth #[id number]. She will refax approval letter to [phone]  Call ref #Anabel REYES [place], [phone], on hold over 35min, ended call. 16:25 called again and again on hold. My chart message sent to patient of approval and to contact office if any problem at pharmacy.

## 2022-11-02 LAB
HCV QNT BY NAAT (IU/ML): NOT DETECTED IU/ML
HCV QNT BY NAAT (LOG IU/ML): NOT DETECTED LOG IU/ML
HCV QNT BY NAAT INTERP: NOT DETECTED

## 2022-11-03 NOTE — TELEPHONE ENCOUNTER
Received approval from [facility] for dexcom g6 transmitter. See below, pt already notified. Sent to scanning.

## 2022-11-16 ENCOUNTER — TELEPHONE (OUTPATIENT)
Dept: RHEUMATOLOGY | Facility: CLINIC | Age: 57
End: 2022-11-16

## 2022-11-16 NOTE — TELEPHONE ENCOUNTER
Patient's daughter is calling to advise Dr. Hammad Cai the patient's hands are numbing with pain. Patient is scheduled for a follow up appointment for Friday, 11/18/22   Patient was offered a sooner appt on 11/17/22 and declined. Please advise.

## 2022-11-17 NOTE — TELEPHONE ENCOUNTER
Future Appointments   Date Time Provider Alysia Winters   11/18/2022  2:40 PM Leslie Chawla MD [facility]

## 2022-11-18 ENCOUNTER — OFFICE VISIT (OUTPATIENT)
Dept: RHEUMATOLOGY | Facility: CLINIC | Age: 57
End: 2022-11-18
Payer: MEDICAID

## 2022-11-18 VITALS
DIASTOLIC BLOOD PRESSURE: 84 MMHG | SYSTOLIC BLOOD PRESSURE: 142 MMHG | BODY MASS INDEX: 30.82 KG/M2 | HEART RATE: 98 BPM | WEIGHT: 185 LBS | HEIGHT: 65 IN

## 2022-11-18 DIAGNOSIS — M79.642 BILATERAL HAND PAIN: ICD-10-CM

## 2022-11-18 DIAGNOSIS — M06.09 RHEUMATOID ARTHRITIS OF MULTIPLE SITES WITH NEGATIVE RHEUMATOID FACTOR (HCC): ICD-10-CM

## 2022-11-18 DIAGNOSIS — R20.0 NUMBNESS AND TINGLING OF HAND: Primary | ICD-10-CM

## 2022-11-18 DIAGNOSIS — M79.641 BILATERAL HAND PAIN: ICD-10-CM

## 2022-11-18 DIAGNOSIS — R20.2 NUMBNESS AND TINGLING OF HAND: Primary | ICD-10-CM

## 2022-11-18 PROCEDURE — 20526 THER INJECTION CARP TUNNEL: CPT | Performed by: INTERNAL MEDICINE

## 2022-11-18 PROCEDURE — 99214 OFFICE O/P EST MOD 30 MIN: CPT | Performed by: INTERNAL MEDICINE

## 2022-11-18 PROCEDURE — 3077F SYST BP >= 140 MM HG: CPT | Performed by: INTERNAL MEDICINE

## 2022-11-18 PROCEDURE — 3079F DIAST BP 80-89 MM HG: CPT | Performed by: INTERNAL MEDICINE

## 2022-11-18 PROCEDURE — 3008F BODY MASS INDEX DOCD: CPT | Performed by: INTERNAL MEDICINE

## 2022-11-18 RX ORDER — METHYLPREDNISOLONE ACETATE 80 MG/ML
40 INJECTION, SUSPENSION INTRA-ARTICULAR; INTRALESIONAL; INTRAMUSCULAR; SOFT TISSUE
Status: COMPLETED | OUTPATIENT
Start: 2022-11-18 | End: 2022-11-18

## 2022-11-18 RX ADMIN — METHYLPREDNISOLONE ACETATE 40 MG: 80 INJECTION, SUSPENSION INTRA-ARTICULAR; INTRALESIONAL; INTRAMUSCULAR; SOFT TISSUE at 15:08:00

## 2022-11-18 RX ADMIN — METHYLPREDNISOLONE ACETATE 40 MG: 80 INJECTION, SUSPENSION INTRA-ARTICULAR; INTRALESIONAL; INTRAMUSCULAR; SOFT TISSUE at 15:09:00

## 2022-11-18 NOTE — PROCEDURES
With  consent, I injected the patient's B/L carpal tunnel with 0.5ml lidocaine  1% and 0.5ml Depomedrol 80. It was under sterile technique using iodine and alcohol swabs and ethyl chloride was used as an anaesthetic spray. Pt.  tolerated it well.

## 2022-11-18 NOTE — PATIENT INSTRUCTIONS
You were seen for numbness and tingling in both hands and pain  EMG/nerve test done last year showing carpal tunnel  We injected the wrist with cortisone, hopefully it helps  For the rheumatoid arthritis, will hold off on medication as you have tried 2 medications and they cause side effects or did not work  I gave you a referral to the hand surgeon

## 2022-12-05 RX ORDER — ZOLPIDEM TARTRATE 10 MG/1
TABLET ORAL
Qty: 30 TABLET | Refills: 0 | Status: SHIPPED | OUTPATIENT
Start: 2022-12-05

## 2022-12-30 DIAGNOSIS — M1A.09X0 IDIOPATHIC CHRONIC GOUT OF MULTIPLE SITES WITHOUT TOPHUS: ICD-10-CM

## 2022-12-30 DIAGNOSIS — Z79.4 TYPE 2 DIABETES MELLITUS WITH DIABETIC NEUROPATHY, WITH LONG-TERM CURRENT USE OF INSULIN (HCC): ICD-10-CM

## 2022-12-30 DIAGNOSIS — E11.40 TYPE 2 DIABETES MELLITUS WITH DIABETIC NEUROPATHY, WITH LONG-TERM CURRENT USE OF INSULIN (HCC): ICD-10-CM

## 2022-12-30 DIAGNOSIS — F32.1 CURRENT MODERATE EPISODE OF MAJOR DEPRESSIVE DISORDER WITHOUT PRIOR EPISODE (HCC): ICD-10-CM

## 2022-12-30 DIAGNOSIS — I10 ESSENTIAL HYPERTENSION: ICD-10-CM

## 2022-12-30 NOTE — TELEPHONE ENCOUNTER
Sertraline Refill passed per [facility] [place] [facility] protocol.     Requested Prescriptions   Pending Prescriptions Disp Refills    SERTRALINE 100 MG Oral Tab [Pharmacy Med Name: SERTRALINE 100MG TABLETS] 90 tablet 1     Sig: TAKE 1 TABLET(100 MG) BY MOUTH DAILY       Psychiatric Non-Scheduled (Anti-Anxiety) Passed - 12/30/2022  4:39 PM        Passed - In person appointment or virtual visit in the past 6 mos or appointment in next 3 mos     Recent Outpatient Visits              1 month ago Numbness and tingling of hand    [facility], [address], Odell Wallace MD    Office Visit    2 months ago Type 2 diabetes mellitus with hyperglycemia, with long-term current use of insulin [facility])    [address] [place] [address] [facility] Deborah Schwartz MD    Office Visit    3 months ago Syncope, unspecified syncope type    [facility], [address] [place] [address], Chastity Medellin MD    Office Visit    3 months ago Type 2 diabetes mellitus with hyperglycemia, with long-term current use of insulin [facility])    [address] [place] [address] [facility], [address] [place] [address], Chastity Medellin MD    Office Visit    5 months ago Epigastric pain    [address] [facility], [address] [place] [address], [facility]    Office Visit                        TRAZODONE 100 MG Oral Tab [place] Med Name: TRAZODONE 100MG TABLETS] 90 tablet 1     Sig: TAKE 1 TABLET(100 MG) BY MOUTH EVERY NIGHT       There is no refill protocol information for this order       METFORMIN 500 MG Oral Tab [Pharmacy Med Name: METFORMIN 500MG TABLETS] 360 tablet 1     Sig: TAKE 2 TABLETS BY MOUTH TWICE DAILY       Diabetes Medication Protocol Failed - 12/30/2022  4:39 PM        Failed - Last A1C < 7.5 and within past 6 months     Lab Results   Component Value Date    A1C 9.0 (H) 09/28/2022             Passed - In person appointment or virtual visit in the past 6 mos or appointment in next 3 mos     Recent Outpatient Visits              1 month ago Numbness and tingling of hand    [facility], [address], Josefa Alex Elliott MD    Office Visit    2 months ago Type 2 diabetes mellitus with hyperglycemia, with long-term current use of insulin [facility])    [address] [facility] Kinjal Lowry MD    Office Visit    3 months ago Syncope, unspecified syncope type    [facility],  Lyla Braden [place], MD    Office Visit    3 months ago Type 2 diabetes mellitus with hyperglycemia, with long-term current use of insulin [facility])    [address],  Lyla Braden [place], MD    Office Visit    5 months ago Epigastric pain    [address] [facility], [address] [place], [place] [address]Nayeli ESiri, APRN    Office Visit                      Passed - EGFRCR or GFRNAA > 50     GFR Evaluation  EGFRCR: 79 , resulted on 10/29/2022          Passed - GFR in the past 12 months          ZOLPIDEM 10 MG Oral Tab [Pharmacy Med Name: ZOLPIDEM 10MG TABLETS] 30 tablet 0     Sig: TAKE 1 TABLET(10 MG) BY MOUTH EVERY NIGHT       There is no refill protocol information for this order        Recent Outpatient Visits              1 month ago Numbness and tingling of hand    [facility] for [address] [place] [address] Road, Sweta Elliott Alex Matos MD    Office Visit    2 months ago Type 2 diabetes mellitus with hyperglycemia, with long-term current use of insulin [facility])    [address] [facility] Kinjal Lowry MD    Office Visit    3 months ago Syncope, unspecified syncope type    [facility],  Lyla Braden [place], MD    Office Visit    3 months ago Type 2 diabetes mellitus with hyperglycemia, with long-term current use of insulin [facility])    [address],  Lyla Braden [place], MD    Office Visit    5 months ago Epigastric pain    [address] [facility], [address] [place], [place] [address]Concepcion., APRN    Office Visit

## 2022-12-30 NOTE — TELEPHONE ENCOUNTER
Dr. Dalton Galindo -     Please advise on pended Rx's x 4    Sertraline 100mg---- passed, BUT alert message.    trazodone - no protocol   metformin - failed protocol   Zolpidem - no protocol

## 2022-12-31 RX ORDER — SERTRALINE HYDROCHLORIDE 100 MG/1
TABLET, FILM COATED ORAL
Qty: 90 TABLET | Refills: 1 | Status: SHIPPED | OUTPATIENT
Start: 2022-12-31

## 2022-12-31 RX ORDER — ZOLPIDEM TARTRATE 10 MG/1
TABLET ORAL
Qty: 30 TABLET | Refills: 0 | Status: SHIPPED | OUTPATIENT
Start: 2022-12-31

## 2022-12-31 RX ORDER — TRAZODONE HYDROCHLORIDE 100 MG/1
TABLET ORAL
Qty: 90 TABLET | Refills: 1 | Status: SHIPPED | OUTPATIENT
Start: 2022-12-31

## 2023-01-01 RX ORDER — POTASSIUM CHLORIDE 750 MG/1
CAPSULE, EXTENDED RELEASE ORAL
Qty: 60 CAPSULE | Refills: 1 | Status: SHIPPED | OUTPATIENT
Start: 2023-01-01

## 2023-01-01 NOTE — TELEPHONE ENCOUNTER
Please review; protocol failed.  Or has no protocol    Requested Prescriptions   Pending Prescriptions Disp Refills    POTASSIUM CHLORIDE ER 10 MEQ Oral Cap CR [Pharmacy Med Name: POTASSIUM CL 10MEQ ER CAPSULES] 60 capsule 1     Sig: TAKE 1 CAPSULE BY MOUTH TWICE DAILY       There is no refill protocol information for this order           Recent Outpatient Visits              1 month ago Numbness and tingling of hand    [facility], [address], Douglas Patel MD    Office Visit    2 months ago Type 2 diabetes mellitus with hyperglycemia, with long-term current use of insulin [facility])    [facility] Kinjal Lowry MD    Office Visit    3 months ago Syncope, unspecified syncope type    [facility],  Llya Braden [place], MD    Office Visit    3 months ago Type 2 diabetes mellitus with hyperglycemia, with long-term current use of insulin [facility])    [facility], [address] Lyla Braden [place], MD    Office Visit    5 months ago Epigastric pain    [address] [facility], [address] [place] [address], Concepcion Naik, APRBIJU    Office Visit

## 2023-01-01 NOTE — TELEPHONE ENCOUNTER
Please review; protocol failed.  Or has no protocol    Requested Prescriptions   Pending Prescriptions Disp Refills    POTASSIUM CHLORIDE ER 10 MEQ Oral Cap CR [Pharmacy Med Name: POTASSIUM CL 10MEQ ER CAPSULES] 60 capsule 1     Sig: TAKE 1 CAPSULE BY MOUTH TWICE DAILY       There is no refill protocol information for this order           Recent Outpatient Visits              1 month ago Numbness and tingling of hand    [facility], [address] [facility] [facility], Ulysses Meier, MD    Office Visit    2 months ago Type 2 diabetes mellitus with hyperglycemia, with long-term current use of insulin [facility])    [address] [facility] Geoff Lockett MD    Office Visit    3 months ago Syncope, unspecified syncope type    [facility],  Lanny Braden [place], MD    Office Visit    3 months ago Type 2 diabetes mellitus with hyperglycemia, with long-term current use of insulin [facility])    [facility],  Lanny Braden [place], MD    Office Visit    5 months ago Epigastric pain    [address] [facility], [address] [place] [address], [facility] APRN    Office Visit

## 2023-02-03 RX ORDER — BLOOD SUGAR DIAGNOSTIC
STRIP MISCELLANEOUS
Qty: 200 STRIP | Refills: 1 | Status: SHIPPED | OUTPATIENT
Start: 2023-02-03

## 2023-02-03 NOTE — TELEPHONE ENCOUNTER
LOV: 10/05/22    RTC:3months    F/U:No FU      Pending Monthly Supply: orders pending, please approve if appropriate.

## 2023-02-16 DIAGNOSIS — E11.40 TYPE 2 DIABETES MELLITUS WITH DIABETIC NEUROPATHY, WITH LONG-TERM CURRENT USE OF INSULIN (HCC): ICD-10-CM

## 2023-02-16 DIAGNOSIS — I10 ESSENTIAL HYPERTENSION: ICD-10-CM

## 2023-02-16 DIAGNOSIS — Z79.4 TYPE 2 DIABETES MELLITUS WITH DIABETIC NEUROPATHY, WITH LONG-TERM CURRENT USE OF INSULIN (HCC): ICD-10-CM

## 2023-02-16 DIAGNOSIS — M1A.09X0 IDIOPATHIC CHRONIC GOUT OF MULTIPLE SITES WITHOUT TOPHUS: ICD-10-CM

## 2023-02-16 DIAGNOSIS — E11.65 TYPE 2 DIABETES MELLITUS WITH HYPERGLYCEMIA, WITH LONG-TERM CURRENT USE OF INSULIN (HCC): ICD-10-CM

## 2023-02-16 DIAGNOSIS — F32.1 CURRENT MODERATE EPISODE OF MAJOR DEPRESSIVE DISORDER WITHOUT PRIOR EPISODE (HCC): ICD-10-CM

## 2023-02-16 DIAGNOSIS — Z79.4 TYPE 2 DIABETES MELLITUS WITH HYPERGLYCEMIA, WITH LONG-TERM CURRENT USE OF INSULIN (HCC): ICD-10-CM

## 2023-02-16 NOTE — TELEPHONE ENCOUNTER
Medication Quantity Refills Start End   naproxen 500 MG Oral Tab EC 30 tablet 1 10/24/2022      Medication Quantity Refills Start End   Diclofenac Sodium 1 % External Gel 1 each 0 10/19/2021      LOV: 11/18/22  No future appointments.     Instructions    You were seen for numbness and tingling in both hands and pain  EMG/nerve test done last year showing carpal tunnel  We injected the wrist with cortisone, hopefully it helps  For the rheumatoid arthritis, will hold off on medication as you have tried 2 medications and they cause side effects or did not work  I gave you a referral to the hand surgeon

## 2023-02-17 RX ORDER — LEVOTHYROXINE SODIUM 0.12 MG/1
125 TABLET ORAL EVERY MORNING
Qty: 90 TABLET | Refills: 1 | Status: SHIPPED | OUTPATIENT
Start: 2023-02-17

## 2023-02-17 RX ORDER — NAPROXEN 500 MG/1
TABLET ORAL
Qty: 30 TABLET | Refills: 1 | Status: SHIPPED | OUTPATIENT
Start: 2023-02-17

## 2023-02-17 RX ORDER — SERTRALINE HYDROCHLORIDE 100 MG/1
100 TABLET, FILM COATED ORAL DAILY
Qty: 90 TABLET | Refills: 1 | OUTPATIENT
Start: 2023-02-17

## 2023-02-17 RX ORDER — GABAPENTIN 400 MG/1
400 CAPSULE ORAL 2 TIMES DAILY
Qty: 180 CAPSULE | Refills: 1 | Status: SHIPPED | OUTPATIENT
Start: 2023-02-17

## 2023-02-17 RX ORDER — ALLOPURINOL 100 MG/1
100 TABLET ORAL NIGHTLY
Qty: 90 TABLET | Refills: 1 | OUTPATIENT
Start: 2023-02-17

## 2023-02-17 RX ORDER — TRAZODONE HYDROCHLORIDE 100 MG/1
100 TABLET ORAL NIGHTLY
Qty: 90 TABLET | Refills: 1 | OUTPATIENT
Start: 2023-02-17

## 2023-02-17 NOTE — TELEPHONE ENCOUNTER
Refill passed per [facility], [facility] protocol. Requested Prescriptions   Pending Prescriptions Disp Refills    cholecalciferol 50 MCG (2000 UT) Oral Tab 30 tablet 0     Sig: Take 1 tablet (2,000 Units total) by mouth every morning. There is no refill protocol information for this order       Ferrous Sulfate (IRON) 325 (65 Fe) MG Oral Tab 30 tablet 0     Sig: Take by mouth daily. There is no refill protocol information for this order       ketoconazole 2 % External Shampoo 120 mL 2     Sig: Use to  Scalp twice a wekk       There is no refill protocol information for this order       losartan 100 MG Oral Tab 90 tablet 3     Sig: Take 1 tablet (100 mg total) by mouth daily.        Hypertensive Medications Protocol Failed - 2/16/2023 10:10 AM        Failed - Last BP reading less than 140/90     BP Readings from Last 1 Encounters:  11/18/22 : 142/84              Passed - In person appointment in the past 12 or next 3 months     Recent Outpatient Visits              3 months ago Numbness and tingling of hand    [facility], [address], [facility] Kenneth Elliott MD    Office Visit    4 months ago Type 2 diabetes mellitus with hyperglycemia, with long-term current use of insulin ([address]    Ton Kaur, [address] [facility], Stefano [place], MD    Office Visit    4 months ago Syncope, unspecified syncope type    Ton Kaur [facility], Chastity Baez MD    Office Visit    5 months ago Type 2 diabetes mellitus with hyperglycemia, with long-term current use of insulin ([address]    Ton Kaur [facility], Chastity Baez MD    Office Visit    6 months ago Epigastric pain    [facility], [address], [address], Mccoy Schirmer., APRN    Office Visit          Future Appointments         Provider Department Appt Notes    In 1 month MD Ton Kennedy [address], [place] Bloodwork Passed - CMP or BMP in past 6 months     Recent Results (from the past 4392 hour(s))   COMP METABOLIC PANEL (14)    Collection Time: 10/29/22  3:57 PM   Result Value Ref Range    Glucose 244 (H) 70 - 99 mg/dL    Sodium 136 136 - 145 mmol/L    Potassium 3.5 3.5 - 5.1 mmol/L    Chloride 100 98 - 112 mmol/L    CO2 27.0 21.0 - 32.0 mmol/L    Anion Gap 9 0 - 18 mmol/L    BUN 16 7 - 18 mg/dL    Creatinine 0.87 0.55 - 1.02 mg/dL    BUN/CREA Ratio 18.4 10.0 - 20.0    Calcium, Total 9.5 8.5 - 10.1 mg/dL    Calculated Osmolality 291 275 - 295 mOsm/kg    eGFR-Cr 79 >=60 mL/min/1.73m2    ALT 59 (H) 13 - 56 U/L    AST 34 15 - 37 U/L    Alkaline Phosphatase 104 41 - 108 U/L    Bilirubin, Total 0.4 0.1 - 2.0 mg/dL    Total Protein 7.6 6.4 - 8.2 g/dL    Albumin 3.8 3.4 - 5.0 g/dL    Globulin  3.8 2.8 - 4.4 g/dL    A/G Ratio 1.0 1.0 - 2.0    Patient Fasting for CMP? No      *Note: Due to a large number of results and/or encounters for the requested time period, some results have not been displayed. A complete set of results can be found in Results Review.                Passed - In person appointment or virtual visit in the past 6 months     Recent Outpatient Visits              3 months ago Numbness and tingling of hand    Hu Negron MD    Office Visit    4 months ago Type 2 diabetes mellitus with hyperglycemia, with long-term current use of insulin [facility])    Angella Owens MD    Office Visit    4 months ago Syncope, unspecified syncope type    Terri Story [facility] Harpal, Chastity Ferraro MD    Office Visit    5 months ago Type 2 diabetes mellitus with hyperglycemia, with long-term current use of insulin ([facility]    Terri Story [facility] Harpal, Chastity Ferraro MD    Office Visit    6 months ago Epigastric pain    [facility], [address], [place], Julia Galeano, APRBIJU    Office Visit          Future Appointments         Provider Department Appt Notes    In 1 month Renetta Bolivar MD [facility], [facility], [place] [facility]               Passed - [facility] [address] [facility] or [facility] > 50     GFR Evaluation  EGFRCR: 79 , resulted on 10/29/2022            gabapentin 400 MG Oral Cap 180 capsule 5     Sig: Take 1 capsule (400 mg total) by mouth 2 (two) times daily. Neurology Medications Passed - 2/16/2023 10:10 AM        Passed - In person appointment or virtual visit in the past 6 mos or appointment in next 3 mos     Recent Outpatient Visits              3 months ago Numbness and tingling of hand    Jevon Trent MD    Office Visit    4 months ago Type 2 diabetes mellitus with hyperglycemia, with long-term current use of insulin ([facility]    [address], [address] [facility] [facility], Reyes Fan, MD    Office Visit    4 months ago Syncope, unspecified syncope type    [address], [facility], Chastity Marquez MD    Office Visit    5 months ago Type 2 diabetes mellitus with hyperglycemia, with long-term current use of insulin ([facility]    [address],[place] [address], [facility], Chastity Marquez MD    Office Visit    6 months ago Epigastric pain    [facility], [address] [place] [address] Meghan Zhang, JORGE L    Office Visit          Future Appointments         Provider Department Appt Notes    In 1 month [facility] [address], [place] [facility]                 levothyroxine 125 MCG Oral Tab 90 tablet 3     Sig: Take 1 tablet (125 mcg total) by mouth every morning.        Thyroid Medication Protocol Passed - 2/16/2023 10:10 AM        Passed - TSH in past 12 months        Passed - Last TSH value is normal     Lab Results   Component Value Date    TSH 0.463 09/28/2022                 Passed - In person appointment or virtual visit in the past 12 mos or appointment in next 3 mos     Recent Outpatient Visits              3 months ago Numbness and tingling of hand    [facility], [address], [facility] Kenneth Elliott MD    Office Visit    4 months ago Type 2 diabetes mellitus with hyperglycemia, with long-term current use of insulin ([address]    Jessenia Nesbitt, [address], [place], Echo Vivar MD    Office Visit    4 months ago Syncope, unspecified syncope type    Jessenia Nesbitt [facility] Lyla Rowan [place], MD    Office Visit    5 months ago Type 2 diabetes mellitus with hyperglycemia, with long-term current use of insulin ([facility]    Jessenia Nesbitt [facility] Lyla Rowan [place], MD    Office Visit    6 months ago Epigastric pain    [facility], [address] [place] [address], [facility] Daisy, JORGE L    Office Visit          Future Appointments         Provider Department Appt Notes    In 1 month Jen Salcido MD [facility], [address], [place] [facility]                 Omeprazole 40 MG Oral Capsule Delayed Release 180 capsule 0     Sig: Take 1 capsule (40 mg total) by mouth 2 (two) times daily.  Patient takes medication every other day because of insurance coverage       Gastrointestional Medication Protocol Passed - 2/16/2023 10:10 AM        Passed - In person appointment or virtual visit in the past 12 mos or appointment in next 3 mos     Recent Outpatient Visits              3 months ago Numbness and tingling of hand    Mic Cronin MD    Office Visit    4 months ago Type 2 diabetes mellitus with hyperglycemia, with long-term current use of insulin [facility])    [address] [facility], Keyur Burnette MD    Office Visit    4 months ago Syncope, unspecified syncope type    Win Childs [address], Chastity Wu MD Office Visit    5 months ago Type 2 diabetes mellitus with hyperglycemia, with long-term current use of insulin ([address]    [address], [facility] Chastity Sullivan MD    Office Visit    6 months ago Epigastric pain    [facility], [address], [place] Jose Haderrick., APRN    Office Visit          Future Appointments         Provider Department Appt Notes    In 1 month Yon Livingston MD [facility], [address], [place] Blood[facility]                 carvedilol 6.25 MG Oral Tab 180 tablet 1     Sig: Take 1 tablet (6.25 mg total) by mouth 2 (two) times daily with meals.        Hypertensive Medications Protocol Failed - 2/16/2023 10:10 AM        Failed - Last BP reading less than 140/90     BP Readings from Last 1 Encounters:  11/18/22 : 142/84              Passed - In person appointment in the past 12 or next 3 months     Recent Outpatient Visits              3 months ago Numbness and tingling of hand    [facility], [address], Yoel Lee MD    Office Visit    4 months ago Type 2 diabetes mellitus with hyperglycemia, with long-term current use of insulin ([address]    [address], [address], [place], Kymberly Crandall MD    Office Visit    4 months ago Syncope, unspecified syncope type    [address], [facility], Chastity Sullivan MD    Office Visit    5 months ago Type 2 diabetes mellitus with hyperglycemia, with long-term current use of insulin ([address]    [address], [facility], Chastity Sullivan MD    Office Visit    6 months ago Epigastric pain    [facility], [address] [place] [address], Soniya Brooks., APRN    Office Visit          Future Appointments         Provider Department Appt Notes    In 1 month Yon Livingston MD [facility], [address], [place] [facility]               Passed - CMP or BMP in past 6 months     Recent Results (from the past 4392 hour(s))   COMP METABOLIC PANEL (14)    Collection Time: 10/29/22  3:57 PM   Result Value Ref Range    Glucose 244 (H) 70 - 99 mg/dL    Sodium 136 136 - 145 mmol/L    Potassium 3.5 3.5 - 5.1 mmol/L    Chloride 100 98 - 112 mmol/L    CO2 27.0 21.0 - 32.0 mmol/L    Anion Gap 9 0 - 18 mmol/L    BUN 16 7 - 18 mg/dL    Creatinine 0.87 0.55 - 1.02 mg/dL    BUN/CREA Ratio 18.4 10.0 - 20.0    Calcium, Total 9.5 8.5 - 10.1 mg/dL    Calculated Osmolality 291 275 - 295 mOsm/kg    eGFR-Cr 79 >=60 mL/min/1.73m2    ALT 59 (H) 13 - 56 U/L    AST 34 15 - 37 U/L    Alkaline Phosphatase 104 41 - 108 U/L    Bilirubin, Total 0.4 0.1 - 2.0 mg/dL    Total Protein 7.6 6.4 - 8.2 g/dL    Albumin 3.8 3.4 - 5.0 g/dL    Globulin  3.8 2.8 - 4.4 g/dL    A/G Ratio 1.0 1.0 - 2.0    Patient Fasting for CMP? No      *Note: Due to a large number of results and/or encounters for the requested time period, some results have not been displayed. A complete set of results can be found in Results Review.                Passed - In person appointment or virtual visit in the past 6 months     Recent Outpatient Visits              3 months ago Numbness and tingling of hand    Pilo Rueda, [address] [facility],[address] [facility], Kenneth Candelario MD    Office Visit    4 months ago Type 2 diabetes mellitus with hyperglycemia, with long-term current use of insulin ([address]    Pilo Rueda, [address] [facility], Faustino Daniel MD    Office Visit    4 months ago Syncope, unspecified syncope type    Pilo Rueda [facility] Avni Rowan [place], MD    Office Visit    5 months ago Type 2 diabetes mellitus with hyperglycemia, with long-term current use of insulin ([address]    [facility] [place]    Office Visit    6 months ago Epigastric pain    [facility], [facility], [place] Amy, Yeison Gillespie., APRN    Office Visit          Future Appointments Provider Department Appt Notes    In 1 month MD [facility] [address], [place] [facility]               Passed - [facility] or [facility] > 50     GFR Evaluation  EGFRCR: 79 , resulted on 10/29/2022            hydrocortisone (PROCTOZONE-HC) 2.5 % External Cream 28 g 0     Sig: Apply to hemorrhoids for 7 to 10 days       Gastrointestional Medication Protocol Passed - 2/16/2023 10:10 AM        Passed - In person appointment or virtual visit in the past 12 mos or appointment in next 3 mos     Recent Outpatient Visits              3 months ago Numbness and tingling of hand    Subha Jacques, [address], Kenneth Saavedra MD    Office Visit    4 months ago Type 2 diabetes mellitus with hyperglycemia, with long-term current use of insulin ([address]    Subha Jacques, [address], [place], Kelsie Recinos MD    Office Visit    4 months ago Syncope, unspecified syncope type    [facility] [place], [facility]    Office Visit    5 months ago Type 2 diabetes mellitus with hyperglycemia, with long-term current use of insulin ([address]    Subha Jacques [facility] [place]    Office Visit    6 months ago Epigastric pain    [facility]    Office Visit          Future Appointments         Provider Department Appt Notes    In 1 month MD Brayan [facility], [address], [place] [facility]                 hydroCHLOROthiazide 25 MG Oral Tab 180 tablet 1     Sig: TAKE 1 TABLET BY MOUTH TWICE DAILY       Hypertensive Medications Protocol Failed - 2/16/2023 10:10 AM        Failed - Last BP reading less than 140/90     BP Readings from Last 1 Encounters:  11/18/22 : 142/84              Passed - In person appointment in the past 12 or next 3 months     Recent Outpatient Visits              3 months ago Numbness and tingling of hand    [facility], [address], [facility] Kenneth Elliott MD    Office Visit    4 months ago Type 2 diabetes mellitus with hyperglycemia, with long-term current use of insulin ([address]    Justen Rosangela, [address], [place]    Office Visit    4 months ago Syncope, unspecified syncope type    [facility], Saulo Lugo MD    Office Visit    5 months ago Type 2 diabetes mellitus with hyperglycemia, with long-term current use of insulin ([address]    Buzz Adames, [facility], Saulo Lugo MD    Office Visit    6 months ago Epigastric pain    [facility], [address], [place] Betty Reyes APRN    Office Visit          Future Appointments         Provider Department Appt Notes    In 1 month Real Tam MD [facility], [address], [place] Bloodwork               Passed - CMP or BMP in past 6 months     Recent Results (from the past 4392 hour(s))   COMP METABOLIC PANEL (14)    Collection Time: 10/29/22  3:57 PM   Result Value Ref Range    Glucose 244 (H) 70 - 99 mg/dL    Sodium 136 136 - 145 mmol/L    Potassium 3.5 3.5 - 5.1 mmol/L    Chloride 100 98 - 112 mmol/L    CO2 27.0 21.0 - 32.0 mmol/L    Anion Gap 9 0 - 18 mmol/L    BUN 16 7 - 18 mg/dL    Creatinine 0.87 0.55 - 1.02 mg/dL    BUN/CREA Ratio 18.4 10.0 - 20.0    Calcium, Total 9.5 8.5 - 10.1 mg/dL    Calculated Osmolality 291 275 - 295 mOsm/kg    eGFR-Cr 79 >=60 mL/min/1.73m2    ALT 59 (H) 13 - 56 U/L    AST 34 15 - 37 U/L    Alkaline Phosphatase 104 41 - 108 U/L    Bilirubin, Total 0.4 0.1 - 2.0 mg/dL    Total Protein 7.6 6.4 - 8.2 g/dL    Albumin 3.8 3.4 - 5.0 g/dL    Globulin  3.8 2.8 - 4.4 g/dL    A/G Ratio 1.0 1.0 - 2.0    Patient Fasting for CMP? No      *Note: Due to a large number of results and/or encounters for the requested time period, some results have not been displayed.  A complete set of results can be found in Results Review. Passed - In person appointment or virtual visit in the past 6 months     Recent Outpatient Visits              3 months ago Numbness and tingling of hand    [facility], [address], Kenneth Saavedra MD    Office Visit    4 months ago Type 2 diabetes mellitus with hyperglycemia, with long-term current use of insulin ([address]    Juan Daniel Lentz, [address], [place], Lisa Amaya MD    Office Visit    4 months ago Syncope, unspecified syncope type    [facility], [facility], [facility]    Office Visit    5 months ago Type 2 diabetes mellitus with hyperglycemia, with long-term current use of insulin ([address]    Juan Daniel Lentz, [facility], [facility], MD Chastity    Office Visit    6 months ago Epigastric pain    [facility], [address] [place] [address], Johnathan Kirkland, JORGE L    Office Visit          Future Appointments         Provider Department Appt Notes    In 1 month Ana M Saavedra MD [facility], [address], [place] Bloodwork               Passed - [facility] or [facility] > 50     GFR Evaluation  EGFRCR: 79 , resulted on 10/29/2022            sertraline 100 MG Oral Tab 90 tablet 1     Sig: Take 1 tablet (100 mg total) by mouth daily.        Psychiatric Non-Scheduled (Anti-Anxiety) Passed - 2/16/2023 10:10 AM        Passed - In person appointment or virtual visit in the past 6 mos or appointment in next 3 mos     Recent Outpatient Visits              3 months ago Numbness and tingling of hand    Enrico Hill MD    Office Visit    4 months ago Type 2 diabetes mellitus with hyperglycemia, with long-term current use of insulin [facility])    Stephen Krause, Andres Geiger MD    Office Visit    4 months ago Syncope, unspecified syncope type [facility], [facility]    Office Visit    5 months ago Type 2 diabetes mellitus with hyperglycemia, with long-term current use of insulin ([address]    [address], [facility] Chastity Montez MD    Office Visit    6 months ago Epigastric pain    [facility], [address] [place] [address], Vic Villa, APRN    Office Visit          Future Appointments         Provider Department Appt Notes    In 1 month Jen Salcido MD [facility], [address], [place] Bloodwork                 traZODone 100 MG Oral Tab 90 tablet 1     Sig: Take 1 tablet (100 mg total) by mouth nightly.        There is no refill protocol information for this order       metFORMIN 500 MG Oral Tab 360 tablet 1     Sig: TAKE 2 TABLETS BY MOUTH TWICE DAILY       Diabetes Medication Protocol Failed - 2/16/2023 10:10 AM        Failed - Last A1C < 7.5 and within past 6 months     Lab Results   Component Value Date    A1C 9.0 (H) 09/28/2022             Passed - In person appointment or virtual visit in the past 6 mos or appointment in next 3 mos     Recent Outpatient Visits              3 months ago Numbness and tingling of hand    [address], [address] [facility],[address], Douglas Patel MD    Office Visit    4 months ago Type 2 diabetes mellitus with hyperglycemia, with long-term current use of insulin ([address]    [address], [address] [facility],3Rd [facility], Echo Vivar MD    Office Visit    4 months ago Syncope, unspecified syncope type    [address], [facility] Chastity Montez MD    Office Visit    5 months ago Type 2 diabetes mellitus with hyperglycemia, with long-term current use of insulin ([address]    [address], [facility] Chastity Montez MD    Office Visit    6 months ago Epigastric pain    [facility], [address], [place] Concepcion Reyes, APRN    Office Visit          Future Appointments         Provider Department Appt Notes    In 1 month Alfredo Christine MD [facility], [facility] [address], [place] Bloodwork               Passed - [facility] or [facility] > 50     GFR Evaluation  EGFRCR: 79 , resulted on 10/29/2022          Passed - GFR in the past 12 months          zolpidem 10 MG Oral Tab 30 tablet 0     Sig: Take 1 tablet (10 mg total) by mouth nightly. There is no refill protocol information for this order       Potassium Chloride ER 10 MEQ Oral Cap CR 60 capsule 1     Sig: TAKE 1 CAPSULE BY MOUTH TWICE DAILY       There is no refill protocol information for this order       allopurinol 100 MG Oral Tab 90 tablet 1     Sig: Take 1 tablet (100 mg total) by mouth nightly.        There is no refill protocol information for this order           Future Appointments         Provider Department Appt Notes    In 1 month Alfredo Christine MD [address] [facility]            Recent Outpatient Visits              3 months ago Numbness and tingling of hand    [address] [facility], [address] [facility],[address], Kenneth Saavedra MD    Office Visit    4 months ago Type 2 diabetes mellitus with hyperglycemia, with long-term current use of insulin ([address]    [address] [facility], [address] [facility],3[facility], Bob Becker MD    Office Visit    4 months ago Syncope, unspecified syncope type    [address] [facility], Chastity Palacios MD    Office Visit    5 months ago Type 2 diabetes mellitus with hyperglycemia, with long-term current use of insulin ([address]    [address] [facility], Chastity Palacios MD    Office Visit    6 months ago Epigastric pain    [facility], [facility], [place] Re[place], Cherry Michaels., APRN    Office Visit

## 2023-02-17 NOTE — TELEPHONE ENCOUNTER
LOV; 10/05/22    RTC;3months    F/U;No FU     Pending Monthly Supply;order pending, approve if appropriate.

## 2023-02-17 NOTE — TELEPHONE ENCOUNTER
RX Omeprazole on Med Hx   Please review. Protocol failed/ No protocol      Requested Prescriptions   Pending Prescriptions Disp Refills    cholecalciferol 50 MCG (2000 UT) Oral Tab 30 tablet 0     Sig: Take 1 tablet (2,000 Units total) by mouth every morning. There is no refill protocol information for this order       Ferrous Sulfate (IRON) 325 (65 Fe) MG Oral Tab 30 tablet 0     Sig: Take 1 Dose by mouth daily. There is no refill protocol information for this order       ketoconazole 2 % External Shampoo 120 mL 2     Sig: Use to  Scalp twice a wekk       There is no refill protocol information for this order       losartan 100 MG Oral Tab 90 tablet 3     Sig: Take 1 tablet (100 mg total) by mouth daily.        Hypertensive Medications Protocol Failed - 2/17/2023  5:53 PM        Failed - Last BP reading less than 140/90     BP Readings from Last 1 Encounters:  11/18/22 : 142/84              Passed - In person appointment in the past 12 or next 3 months     Recent Outpatient Visits              3 months ago Numbness and tingling of hand    [facility], [address], Samina Mar MD    Office Visit    4 months ago Type 2 diabetes mellitus with hyperglycemia, with long-term current use of insulin ([facility] [address]    [address], [address] [facility], Faustino Daniel MD    Office Visit    4 months ago Syncope, unspecified syncope type    [address], [facility], Chastity Rand MD    Office Visit    5 months ago Type 2 diabetes mellitus with hyperglycemia, with long-term current use of insulin ([address]    [address], [address] [place] [address], Chastity Rand MD    Office Visit    6 months ago Epigastric pain    [facility], [facility] [address], [address] [place] [address], [facility], [facility]    Office Visit          Future Appointments         Provider Department Appt Notes    In 1 month Jodie Swanson MD [address], [facility] [address], [place] [facility]               Passed - CMP or BMP in past 6 months     Recent Results (from the past 4392 hour(s))   COMP METABOLIC PANEL (14)    Collection Time: 10/29/22  3:57 PM   Result Value Ref Range    Glucose 244 (H) 70 - 99 mg/dL    Sodium 136 136 - 145 mmol/L    Potassium 3.5 3.5 - 5.1 mmol/L    Chloride 100 98 - 112 mmol/L    CO2 27.0 21.0 - 32.0 mmol/L    Anion Gap 9 0 - 18 mmol/L    BUN 16 7 - 18 mg/dL    Creatinine 0.87 0.55 - 1.02 mg/dL    BUN/CREA Ratio 18.4 10.0 - 20.0    Calcium, Total 9.5 8.5 - 10.1 mg/dL    Calculated Osmolality 291 275 - 295 mOsm/kg    eGFR-Cr 79 >=60 mL/min/1.73m2    ALT 59 (H) 13 - 56 U/L    AST 34 15 - 37 U/L    Alkaline Phosphatase 104 41 - 108 U/L    Bilirubin, Total 0.4 0.1 - 2.0 mg/dL    Total Protein 7.6 6.4 - 8.2 g/dL    Albumin 3.8 3.4 - 5.0 g/dL    Globulin  3.8 2.8 - 4.4 g/dL    A/G Ratio 1.0 1.0 - 2.0    Patient Fasting for CMP? No      *Note: Due to a large number of results and/or encounters for the requested time period, some results have not been displayed. A complete set of results can be found in Results Review.                Passed - In person appointment or virtual visit in the past 6 months     Recent Outpatient Visits              3 months ago Numbness and tingling of hand    Terrie Vincent MD    Office Visit    4 months ago Type 2 diabetes mellitus with hyperglycemia, with long-term current use of insulin [facility])    He Galvin MD    Office Visit    4 months ago Syncope, unspecified syncope type    [address], [facility], Chastity Neal MD    Office Visit    5 months ago Type 2 diabetes mellitus with hyperglycemia, with long-term current use of insulin ([facility] [address]    [address], [facility], GibsonChastity Clark MD    Office Visit    6 months ago Epigastric pain    [facility], [address] [place] [address], Cecy Leslie., APRN    Office Visit          Future Appointments         Provider Department Appt Notes    In 1 month [facility] Connelly [facility], [place] [facility]               Passed - [facility] or [facility] > 50     GFR Evaluation  EGFRCR: 79 , resulted on 10/29/2022            Omeprazole 40 MG Oral Capsule Delayed Release 180 capsule 0     Sig: Take 1 capsule (40 mg total) by mouth 2 (two) times daily. Patient takes medication every other day because of insurance coverage       Gastrointestional Medication Protocol Passed - 2/17/2023  5:53 PM        Passed - In person appointment or virtual visit in the past 12 mos or appointment in next 3 mos     Recent Outpatient Visits              3 months ago Numbness and tingling of hand    Pooja Peña MD    Office Visit    4 months ago Type 2 diabetes mellitus with hyperglycemia, with long-term current use of insulin ([address]    Jarrett Mathew, [address] [facility], Lola Fernandez MD    Office Visit    4 months ago Syncope, unspecified syncope type    Jarrett Mathew [facility] [address], Barry Dance, [place], MD    Office Visit    5 months ago Type 2 diabetes mellitus with hyperglycemia, with long-term current use of insulin ([address]    Jarrett Mathew [facility], Barry Dance, [place], MD    Office Visit    6 months ago Epigastric pain    [facility], [address] [place] [address], Enrique Umair., APRN    Office Visit          Future Appointments         Provider Department Appt Notes    In 1 month MD [facility], [address], [place] [facility]                 carvedilol 6.25 MG Oral Tab 180 tablet 1     Sig: Take 1 tablet (6.25 mg total) by mouth 2 (two) times daily with meals.        Hypertensive Medications Protocol Failed - 2/17/2023  5:53 PM        Failed - Last BP reading less than 140/90     BP Readings from Last 1 Encounters:  11/18/22 : 142/84              Passed - In person appointment in the past 12 or next 3 months     Recent Outpatient Visits              3 months ago Numbness and tingling of hand    [facility], [address], [facility] Kenneth Elliott MD    Office Visit    4 months ago Type 2 diabetes mellitus with hyperglycemia, with long-term current use of insulin ([facility] [address]    [address], [address], [place], [place] MD DANIELLE    Office Visit    4 months ago Syncope, unspecified syncope type    [facility], Chastity Wu MD    Office Visit    5 months ago Type 2 diabetes mellitus with hyperglycemia, with long-term current use of insulin ([facility] [address]    [address], [facility], Chastity Wu MD    Office Visit    6 months ago Epigastric pain    [facility], [address], [place], Rebbeca Dub., APRN    Office Visit          Future Appointments         Provider Department Appt Notes    In 1 month Jen Salcido MD [facility], [address], [place] [facility]               Passed - CMP or BMP in past 6 months     Recent Results (from the past 4392 hour(s))   COMP METABOLIC PANEL (14)    Collection Time: 10/29/22  3:57 PM   Result Value Ref Range    Glucose 244 (H) 70 - 99 mg/dL    Sodium 136 136 - 145 mmol/L    Potassium 3.5 3.5 - 5.1 mmol/L    Chloride 100 98 - 112 mmol/L    CO2 27.0 21.0 - 32.0 mmol/L    Anion Gap 9 0 - 18 mmol/L    BUN 16 7 - 18 mg/dL    Creatinine 0.87 0.55 - 1.02 mg/dL    BUN/CREA Ratio 18.4 10.0 - 20.0    Calcium, Total 9.5 8.5 - 10.1 mg/dL    Calculated Osmolality 291 275 - 295 mOsm/kg    eGFR-Cr 79 >=60 mL/min/1.73m2    ALT 59 (H) 13 - 56 U/L    AST 34 15 - 37 U/L    Alkaline Phosphatase 104 41 - 108 U/L    Bilirubin, Total 0.4 0.1 - 2.0 mg/dL    Total Protein 7.6 6.4 - 8.2 g/dL    Albumin 3.8 3.4 - 5.0 g/dL    Globulin 3.8 2.8 - 4.4 g/dL    A/G Ratio 1.0 1.0 - 2.0    Patient Fasting for CMP? No      *Note: Due to a large number of results and/or encounters for the requested time period, some results have not been displayed. A complete set of results can be found in Results Review.                Passed - In person appointment or virtual visit in the past 6 months     Recent Outpatient Visits              3 months ago Numbness and tingling of hand    [facility], [address], Kenneth Candelario MD    Office Visit    4 months ago Type 2 diabetes mellitus with hyperglycemia, with long-term current use of insulin ([facility] [address]    Lana Murphy, [address], [place], Kusum Jacobson MD    Office Visit    4 months ago Syncope, unspecified syncope type    [facility], [facility], Corrinne Smack, [place], MD    Office Visit    5 months ago Type 2 diabetes mellitus with hyperglycemia, with long-term current use of insulin ([facility] [address]    Lana Murphy, [facility], Corrinne Smack, [place], MD    Office Visit    6 months ago Epigastric pain    [facility], [address] [place] [address], [facility] APRBIJU    Office Visit          Future Appointments         Provider Department Appt Notes    In 1 month Solange Coleman MD [facility], [facility] [address], [place] [facility]               Passed - [facility] or [facility] > 50     GFR Evaluation  EGFRCR: 79 , resulted on 10/29/2022            hydrocortisone (PROCTOZONE-HC) 2.5 % External Cream 28 g 0     Sig: Apply to hemorrhoids for 7 to 10 days       Gastrointestional Medication Protocol Passed - 2/17/2023  5:53 PM        Passed - In person appointment or virtual visit in the past 12 mos or appointment in next 3 mos     Recent Outpatient Visits              3 months ago Numbness and tingling of hand    Lana Murphy, [address], Jessica Aguillon MD    Office Visit    4 months ago Type 2 diabetes mellitus with hyperglycemia, with long-term current use of insulin ([address]    [address], [address], [facility] Bernard Mejia MD    Office Visit    4 months ago Syncope, unspecified syncope type    [facility], [address] [place] [address] Chastity Neal MD    Office Visit    5 months ago Type 2 diabetes mellitus with hyperglycemia, with long-term current use of insulin ([address]    [address], [facility] Chastity Neal MD    Office Visit    6 months ago Epigastric pain    [facility], [address], [place] Star Reyes., JORGE L    Office Visit          Future Appointments         Provider Department Appt Notes    In 1 month Jaydon Alexander MD [facility], [address], [place] [facility]                 hydroCHLOROthiazide 25 MG Oral Tab 180 tablet 1     Sig: TAKE 1 TABLET BY MOUTH TWICE DAILY       Hypertensive Medications Protocol Failed - 2/17/2023  5:53 PM        Failed - Last BP reading less than 140/90     BP Readings from Last 1 Encounters:  11/18/22 : 142/84              Passed - In person appointment in the past 12 or next 3 months     Recent Outpatient Visits              3 months ago Numbness and tingling of hand    [address], [address], [facility] Kenneth Elliott MD    Office Visit    4 months ago Type 2 diabetes mellitus with hyperglycemia, with long-term current use of insulin ([address]    He Ocasio MD    Office Visit    4 months ago Syncope, unspecified syncope type    [address], [facility] Chastity Neal MD    Office Visit    5 months ago Type 2 diabetes mellitus with hyperglycemia, with long-term current use of insulin ([address]    [address], [facility], Chastiyt Neal MD    Office Visit    6 months ago Epigastric pain    [facility], [facility] [address], Kaleigh Zacarias, JORGE L    Office Visit          Future Appointments         Provider Department Appt Notes    In 1 month Kandy Evans MD [facility], [address], [place] [facility]               Passed - CMP or BMP in past 6 months     Recent Results (from the past 4392 hour(s))   COMP METABOLIC PANEL (14)    Collection Time: 10/29/22  3:57 PM   Result Value Ref Range    Glucose 244 (H) 70 - 99 mg/dL    Sodium 136 136 - 145 mmol/L    Potassium 3.5 3.5 - 5.1 mmol/L    Chloride 100 98 - 112 mmol/L    CO2 27.0 21.0 - 32.0 mmol/L    Anion Gap 9 0 - 18 mmol/L    BUN 16 7 - 18 mg/dL    Creatinine 0.87 0.55 - 1.02 mg/dL    BUN/CREA Ratio 18.4 10.0 - 20.0    Calcium, Total 9.5 8.5 - 10.1 mg/dL    Calculated Osmolality 291 275 - 295 mOsm/kg    eGFR-Cr 79 >=60 mL/min/1.73m2    ALT 59 (H) 13 - 56 U/L    AST 34 15 - 37 U/L    Alkaline Phosphatase 104 41 - 108 U/L    Bilirubin, Total 0.4 0.1 - 2.0 mg/dL    Total Protein 7.6 6.4 - 8.2 g/dL    Albumin 3.8 3.4 - 5.0 g/dL    Globulin  3.8 2.8 - 4.4 g/dL    A/G Ratio 1.0 1.0 - 2.0    Patient Fasting for CMP? No      *Note: Due to a large number of results and/or encounters for the requested time period, some results have not been displayed. A complete set of results can be found in Results Review.                Passed - In person appointment or virtual visit in the past 6 months     Recent Outpatient Visits              3 months ago Numbness and tingling of hand    Bruna House MD    Office Visit    4 months ago Type 2 diabetes mellitus with hyperglycemia, with long-term current use of insulin [facility])    [place] Mo[place], Savanah Garnica MD    Office Visit    4 months ago Syncope, unspecified syncope type    [address], [facility], Tricia Alvarado MD    Office Visit    5 months ago Type 2 diabetes mellitus with hyperglycemia, with long-term current use of insulin ([address]    Win [facility]apleton Nathaneil Cecil, [place], MD    Office Visit    6 months ago Epigastric pain    [facility], [address] [place], [place] [address]Seema Chapman, JORGE L    Office Visit          Future Appointments         Provider Department Appt Notes    In 1 month Maco Solares MD [facility], [address], [place] [facility]               Passed - [facility] or [facility] > 50     GFR Evaluation  EGFRCR: 79 , resulted on 10/29/2022            zolpidem 10 MG Oral Tab 30 tablet 0     Sig: Take 1 tablet (10 mg total) by mouth nightly. There is no refill protocol information for this order       Potassium Chloride ER 10 MEQ Oral Cap CR 60 capsule 1     Sig: TAKE 1 CAPSULE BY MOUTH TWICE DAILY       There is no refill protocol information for this order      Signed Prescriptions Disp Refills    gabapentin 400 MG Oral Cap 180 capsule 1     Sig: Take 1 capsule (400 mg total) by mouth 2 (two) times daily.        Neurology Medications Passed - 2/16/2023 10:10 AM        Passed - In person appointment or virtual visit in the past 6 mos or appointment in next 3 mos     Recent Outpatient Visits              3 months ago Numbness and tingling of hand    Ernesto Rodriguez, [address], Sarah Mendez MD    Office Visit    4 months ago Type 2 diabetes mellitus with hyperglycemia, with long-term current use of insulin [facility])    Ernesto Rodriguez, [address], [place], Cecily Foster MD    Office Visit    4 months ago Syncope, unspecified syncope type    [facility] Nathaneil Cecil, [place], MD    Office Visit    5 months ago Type 2 diabetes mellitus with hyperglycemia, with long-term current use of insulin ([address]    Win Stapleton Nathaneil Cecil, [place], MD    Office Visit    6 months ago Epigastric pain    Win [facility]apleton [address], [place]y [place], JORGE L Office Visit          Future Appointments         Provider Department Appt Notes    In 1 month Morgan House MD [facility], [address], [place] [facility]                 levothyroxine 125 MCG Oral Tab 90 tablet 1     Sig: Take 1 tablet (125 mcg total) by mouth every morning. Thyroid Medication Protocol Passed - 2/16/2023 10:10 AM        Passed - TSH in past 12 months        Passed - Last TSH value is normal     Lab Results   Component Value Date    TSH 0.463 09/28/2022                 Passed - In person appointment or virtual visit in the past 12 mos or appointment in next 3 mos     Recent Outpatient Visits              3 months ago Numbness and tingling of hand    [address], [address], Kenneth Candelario MD    Office Visit    4 months ago Type 2 diabetes mellitus with hyperglycemia, with long-term current use of insulin ([facility] [address]    [address], [address], Julia Jimenez MD    Office Visit    4 months ago Syncope, unspecified syncope type    [address], [facility], Chastity Sears MD    Office Visit    5 months ago Type 2 diabetes mellitus with hyperglycemia, with long-term current use of insulin ([facility] [address]    [address], [facility], Teja Yung MD    Office Visit    6 months ago Epigastric pain    [facility], Lesly Griffith, JORGE L    Office Visit          Future Appointments         Provider Department Appt Notes    In 1 month Morgan House MD [facility], [address], [place] [facility]                Refused Prescriptions Disp Refills    sertraline 100 MG Oral Tab 90 tablet 1     Sig: Take 1 tablet (100 mg total) by mouth daily.        Psychiatric Non-Scheduled (Anti-Anxiety) Passed - 2/17/2023  5:53 PM        Passed - In person appointment or virtual visit in the past 6 mos or appointment in next 3 mos     Recent Outpatient Visits              3 months ago Numbness and tingling of hand    Chirag Monreal, Kenneth Candelario MD    Office Visit    4 months ago Type 2 diabetes mellitus with hyperglycemia, with long-term current use of insulin ([facility] [address]    [address], [address] [facility], Roberto Boykin MD    Office Visit    4 months ago Syncope, unspecified syncope type    [address], [facility]    Office Visit    5 months ago Type 2 diabetes mellitus with hyperglycemia, with long-term current use of insulin ([facility] [address]    [address], [facility], Corinne Chastity Severino MD    Office Visit    6 months ago Epigastric pain    [facility], [address] [place] [address], Tonia Albertina., APRN    Office Visit          Future Appointments         Provider Department Appt Notes    In 1 month Anahi Guzmán MD [facility], [address], [place] [facility]                 traZODone 100 MG Oral Tab 90 tablet 1     Sig: Take 1 tablet (100 mg total) by mouth nightly.        There is no refill protocol information for this order       metFORMIN 500 MG Oral Tab 360 tablet 1     Sig: TAKE 2 TABLETS BY MOUTH TWICE DAILY       Diabetes Medication Protocol Failed - 2/17/2023  5:53 PM        Failed - Last A1C < 7.5 and within past 6 months     Lab Results   Component Value Date    A1C 9.0 (H) 09/28/2022             Passed - In person appointment or virtual visit in the past 6 mos or appointment in next 3 mos     Recent Outpatient Visits              3 months ago Numbness and tingling of hand    [address], [address], [place] MD Joe    Office Visit    4 months ago Type 2 diabetes mellitus with hyperglycemia, with long-term current use of insulin [facility])    Chirag Monreal, David Muller MD    Office Visit    4 months ago Syncope, unspecified syncope type    [facility], [facility], Chastity Jackson MD    Office Visit    5 months ago Type 2 diabetes mellitus with hyperglycemia, with long-term current use of insulin ([address]    [address], [facility], Chastity Jackson MD    Office Visit    6 months ago Epigastric pain    [facility], [address], [place], Rafael Given., APRN    Office Visit          Future Appointments         Provider Department Appt Notes    In 1 month Edda Rapp MD [facility], [facility] [address], [place] [facility]               Passed - [facility] or [facility] > 50     GFR Evaluation  EGFRCR: 79 , resulted on 10/29/2022          Passed - GFR in the past 12 months          allopurinol 100 MG Oral Tab 90 tablet 1     Sig: Take 1 tablet (100 mg total) by mouth nightly.        There is no refill protocol information for this order          Future Appointments         Provider Department Appt Notes    In 1 month Edda Rapp MD [address], [facility]             Recent Outpatient Visits              3 months ago Numbness and tingling of hand    [address] [facility], [facility], Ou Katarzyna Elliott MD    Office Visit    4 months ago Type 2 diabetes mellitus with hyperglycemia, with long-term current use of insulin ([address]    [address], [facility]    Office Visit    4 months ago Syncope, unspecified syncope type    [address], [facility], Chastity Jackson MD    Office Visit    5 months ago Type 2 diabetes mellitus with hyperglycemia, with long-term current use of insulin ([address]    [address], [facility], Chastity Jackson MD    Office Visit    6 months ago Epigastric pain    [facility], [address], [place], Rafael Given., APRN    Office Visit

## 2023-02-18 RX ORDER — HYDROCHLOROTHIAZIDE 25 MG/1
TABLET ORAL
Qty: 180 TABLET | Refills: 1 | Status: SHIPPED | OUTPATIENT
Start: 2023-02-18

## 2023-02-18 RX ORDER — KETOCONAZOLE 20 MG/ML
SHAMPOO TOPICAL
Qty: 120 ML | Refills: 2 | Status: SHIPPED | OUTPATIENT
Start: 2023-02-18

## 2023-02-18 RX ORDER — POTASSIUM CHLORIDE 750 MG/1
CAPSULE, EXTENDED RELEASE ORAL
Qty: 60 CAPSULE | Refills: 1 | Status: SHIPPED | OUTPATIENT
Start: 2023-02-18

## 2023-02-18 RX ORDER — LOSARTAN POTASSIUM 100 MG/1
100 TABLET ORAL DAILY
Qty: 90 TABLET | Refills: 3 | Status: SHIPPED | OUTPATIENT
Start: 2023-02-18

## 2023-02-18 RX ORDER — CARVEDILOL 6.25 MG/1
6.25 TABLET ORAL 2 TIMES DAILY WITH MEALS
Qty: 180 TABLET | Refills: 1 | Status: SHIPPED | OUTPATIENT
Start: 2023-02-18

## 2023-02-18 RX ORDER — PNV NO.95/FERROUS FUM/FOLIC AC 28MG-0.8MG
1 TABLET ORAL DAILY
Qty: 30 TABLET | Refills: 0 | Status: SHIPPED | OUTPATIENT
Start: 2023-02-18

## 2023-02-18 RX ORDER — HYDROCORTISONE 25 MG/G
CREAM TOPICAL
Qty: 28 G | Refills: 0 | Status: SHIPPED | OUTPATIENT
Start: 2023-02-18

## 2023-02-18 RX ORDER — ZOLPIDEM TARTRATE 10 MG/1
10 TABLET ORAL NIGHTLY
Qty: 30 TABLET | Refills: 0 | Status: SHIPPED | OUTPATIENT
Start: 2023-02-18

## 2023-02-18 RX ORDER — CHOLECALCIFEROL (VITAMIN D3) 125 MCG
2000 CAPSULE ORAL EVERY MORNING
Qty: 30 TABLET | Refills: 0 | Status: SHIPPED | OUTPATIENT
Start: 2023-02-18

## 2023-02-18 RX ORDER — OMEPRAZOLE 40 MG/1
40 CAPSULE, DELAYED RELEASE ORAL 2 TIMES DAILY
Qty: 180 CAPSULE | Refills: 0 | Status: SHIPPED | OUTPATIENT
Start: 2023-02-18

## 2023-02-20 RX ORDER — LIRAGLUTIDE 6 MG/ML
1.8 INJECTION SUBCUTANEOUS EVERY MORNING
Qty: 9 ML | Refills: 3 | Status: SHIPPED | OUTPATIENT
Start: 2023-02-20

## 2023-02-20 RX ORDER — BLOOD-GLUCOSE METER
1 EACH MISCELLANEOUS AS DIRECTED
Qty: 1 KIT | Refills: 0 | Status: SHIPPED | OUTPATIENT
Start: 2023-02-20

## 2023-02-20 RX ORDER — BLOOD-GLUCOSE SENSOR
EACH MISCELLANEOUS
Qty: 9 EACH | Refills: 0 | Status: SHIPPED | OUTPATIENT
Start: 2023-02-20

## 2023-02-20 RX ORDER — PEN NEEDLE, DIABETIC 32GX 5/32"
NEEDLE, DISPOSABLE MISCELLANEOUS
Qty: 100 EACH | Refills: 3 | Status: SHIPPED | OUTPATIENT
Start: 2023-02-20

## 2023-02-20 RX ORDER — BLOOD SUGAR DIAGNOSTIC
STRIP MISCELLANEOUS
Qty: 200 STRIP | Refills: 1 | Status: SHIPPED | OUTPATIENT
Start: 2023-02-20

## 2023-02-20 RX ORDER — PEN NEEDLE, DIABETIC 32GX 5/32"
NEEDLE, DISPOSABLE MISCELLANEOUS
Qty: 200 EACH | Refills: 3 | Status: SHIPPED | OUTPATIENT
Start: 2023-02-20

## 2023-02-20 RX ORDER — INSULIN GLARGINE 100 [IU]/ML
INJECTION, SOLUTION SUBCUTANEOUS
Qty: 36 ML | Refills: 0 | Status: SHIPPED | OUTPATIENT
Start: 2023-02-20

## 2023-02-20 RX ORDER — GLIPIZIDE 10 MG/1
10 TABLET ORAL
Qty: 180 TABLET | Refills: 3 | Status: SHIPPED | OUTPATIENT
Start: 2023-02-20

## 2023-02-20 RX ORDER — BLOOD-GLUCOSE,RECEIVER,CONT
EACH MISCELLANEOUS
Qty: 1 EACH | Refills: 0 | Status: SHIPPED | OUTPATIENT
Start: 2023-02-20

## 2023-02-20 RX ORDER — BLOOD-GLUCOSE TRANSMITTER
EACH MISCELLANEOUS
Qty: 1 EACH | Refills: 0 | Status: SHIPPED | OUTPATIENT
Start: 2023-02-20

## 2023-03-14 ENCOUNTER — OFFICE VISIT (OUTPATIENT)
Dept: INTERNAL MEDICINE CLINIC | Facility: CLINIC | Age: 58
End: 2023-03-14

## 2023-03-14 ENCOUNTER — NURSE TRIAGE (OUTPATIENT)
Dept: INTERNAL MEDICINE CLINIC | Facility: CLINIC | Age: 58
End: 2023-03-14

## 2023-03-14 VITALS
SYSTOLIC BLOOD PRESSURE: 112 MMHG | WEIGHT: 187 LBS | HEART RATE: 82 BPM | BODY MASS INDEX: 31.16 KG/M2 | DIASTOLIC BLOOD PRESSURE: 73 MMHG | HEIGHT: 65 IN

## 2023-03-14 DIAGNOSIS — E55.9 VITAMIN D DEFICIENCY: ICD-10-CM

## 2023-03-14 DIAGNOSIS — Z12.31 BREAST CANCER SCREENING BY MAMMOGRAM: ICD-10-CM

## 2023-03-14 DIAGNOSIS — E11.40 TYPE 2 DIABETES MELLITUS WITH DIABETIC NEUROPATHY, WITH LONG-TERM CURRENT USE OF INSULIN (HCC): Primary | ICD-10-CM

## 2023-03-14 DIAGNOSIS — Z79.4 TYPE 2 DIABETES MELLITUS WITH DIABETIC NEUROPATHY, WITH LONG-TERM CURRENT USE OF INSULIN (HCC): Primary | ICD-10-CM

## 2023-03-14 DIAGNOSIS — I10 ESSENTIAL HYPERTENSION: ICD-10-CM

## 2023-03-14 DIAGNOSIS — S29.019D THORACIC MYOFASCIAL STRAIN, SUBSEQUENT ENCOUNTER: ICD-10-CM

## 2023-03-14 DIAGNOSIS — D50.0 IRON DEFICIENCY ANEMIA DUE TO CHRONIC BLOOD LOSS: ICD-10-CM

## 2023-03-14 DIAGNOSIS — F32.4 MAJOR DEPRESSIVE DISORDER WITH SINGLE EPISODE, IN PARTIAL REMISSION (HCC): ICD-10-CM

## 2023-03-14 DIAGNOSIS — M54.16 LUMBAR RADICULOPATHY: ICD-10-CM

## 2023-03-14 PROCEDURE — 3078F DIAST BP <80 MM HG: CPT | Performed by: INTERNAL MEDICINE

## 2023-03-14 PROCEDURE — 3008F BODY MASS INDEX DOCD: CPT | Performed by: INTERNAL MEDICINE

## 2023-03-14 PROCEDURE — 99214 OFFICE O/P EST MOD 30 MIN: CPT | Performed by: INTERNAL MEDICINE

## 2023-03-14 PROCEDURE — 3074F SYST BP LT 130 MM HG: CPT | Performed by: INTERNAL MEDICINE

## 2023-03-14 RX ORDER — METHOCARBAMOL 500 MG/1
500 TABLET, FILM COATED ORAL 3 TIMES DAILY
Qty: 60 TABLET | Refills: 0 | Status: SHIPPED | OUTPATIENT
Start: 2023-03-14

## 2023-03-19 PROBLEM — R07.9 CHEST PAIN, UNSPECIFIED TYPE: Status: RESOLVED | Noted: 2017-12-05 | Resolved: 2023-03-19

## 2023-03-19 PROBLEM — D50.0 IRON DEFICIENCY ANEMIA DUE TO CHRONIC BLOOD LOSS: Status: RESOLVED | Noted: 2020-07-21 | Resolved: 2023-03-19

## 2023-03-19 PROBLEM — N18.30 CKD (CHRONIC KIDNEY DISEASE), STAGE III (HCC): Status: RESOLVED | Noted: 2021-01-12 | Resolved: 2023-03-19

## 2023-04-05 DIAGNOSIS — Z79.4 TYPE 2 DIABETES MELLITUS WITH HYPERGLYCEMIA, WITH LONG-TERM CURRENT USE OF INSULIN (HCC): ICD-10-CM

## 2023-04-05 DIAGNOSIS — E11.65 TYPE 2 DIABETES MELLITUS WITH HYPERGLYCEMIA, WITH LONG-TERM CURRENT USE OF INSULIN (HCC): ICD-10-CM

## 2023-04-06 RX ORDER — LIRAGLUTIDE 6 MG/ML
1.8 INJECTION SUBCUTANEOUS EVERY MORNING
Qty: 9 ML | Refills: 3 | Status: SHIPPED | OUTPATIENT
Start: 2023-04-06

## 2023-04-06 NOTE — TELEPHONE ENCOUNTER
LOV;10/05/22    RTC;3months    F/U;No FU     Pending Monthly Supply;order pending, approve if appropriate.

## 2023-04-08 ENCOUNTER — APPOINTMENT (OUTPATIENT)
Dept: GENERAL RADIOLOGY | Facility: HOSPITAL | Age: 58
End: 2023-04-08
Attending: EMERGENCY MEDICINE
Payer: MEDICAID

## 2023-04-08 ENCOUNTER — HOSPITAL ENCOUNTER (EMERGENCY)
Facility: HOSPITAL | Age: 58
Discharge: HOME | End: 2023-04-08
Attending: EMERGENCY MEDICINE
Payer: MEDICAID

## 2023-04-08 ENCOUNTER — APPOINTMENT (OUTPATIENT)
Dept: CT IMAGING | Facility: HOSPITAL | Age: 58
End: 2023-04-08
Attending: EMERGENCY MEDICINE
Payer: MEDICAID

## 2023-04-08 VITALS
HEART RATE: 74 BPM | HEIGHT: 63 IN | OXYGEN SATURATION: 95 % | BODY MASS INDEX: 33.2 KG/M2 | WEIGHT: 187.38 LBS | SYSTOLIC BLOOD PRESSURE: 128 MMHG | RESPIRATION RATE: 13 BRPM | DIASTOLIC BLOOD PRESSURE: 78 MMHG | TEMPERATURE: 97 F

## 2023-04-08 DIAGNOSIS — J02.0 STREP PHARYNGITIS: ICD-10-CM

## 2023-04-08 DIAGNOSIS — R51.9 NONINTRACTABLE HEADACHE, UNSPECIFIED CHRONICITY PATTERN, UNSPECIFIED HEADACHE TYPE: Primary | ICD-10-CM

## 2023-04-08 DIAGNOSIS — M62.838 NECK MUSCLE SPASM: ICD-10-CM

## 2023-04-08 LAB
ANION GAP SERPL CALC-SCNC: 5 MMOL/L (ref 0–18)
ATRIAL RATE: 73 BPM
BASOPHILS # BLD AUTO: 0.1 X10(3) UL (ref 0–0.2)
BASOPHILS NFR BLD AUTO: 0.8 %
BUN BLD-MCNC: 17 MG/DL (ref 7–18)
BUN/CREAT SERPL: 17.3 (ref 10–20)
CALCIUM BLD-MCNC: 9.8 MG/DL (ref 8.5–10.1)
CHLORIDE SERPL-SCNC: 100 MMOL/L (ref 98–112)
CK SERPL-CCNC: 93 U/L
CO2 SERPL-SCNC: 32 MMOL/L (ref 21–32)
CREAT BLD-MCNC: 0.98 MG/DL
DEPRECATED RDW RBC AUTO: 38.4 FL (ref 35.1–46.3)
EOSINOPHIL # BLD AUTO: 0.48 X10(3) UL (ref 0–0.7)
EOSINOPHIL NFR BLD AUTO: 3.7 %
ERYTHROCYTE [DISTWIDTH] IN BLOOD BY AUTOMATED COUNT: 14.5 % (ref 11–15)
FLUAV + FLUBV RNA SPEC NAA+PROBE: NEGATIVE
FLUAV + FLUBV RNA SPEC NAA+PROBE: NEGATIVE
GFR SERPLBLD BASED ON 1.73 SQ M-ARVRAT: 68 ML/MIN/1.73M2 (ref 60–?)
GLUCOSE BLD-MCNC: 127 MG/DL (ref 70–99)
GLUCOSE BLDC GLUCOMTR-MCNC: 136 MG/DL (ref 70–99)
GLUCOSE BLDC GLUCOMTR-MCNC: 140 MG/DL (ref 70–99)
GLUCOSE BLDC GLUCOMTR-MCNC: 148 MG/DL (ref 70–99)
HCT VFR BLD AUTO: 38.2 %
HGB BLD-MCNC: 12.5 G/DL
IMM GRANULOCYTES # BLD AUTO: 0.04 X10(3) UL (ref 0–1)
IMM GRANULOCYTES NFR BLD: 0.3 %
LYMPHOCYTES # BLD AUTO: 4.17 X10(3) UL (ref 1–4)
LYMPHOCYTES NFR BLD AUTO: 32 %
MCH RBC QN AUTO: 24.3 PG (ref 26–34)
MCHC RBC AUTO-ENTMCNC: 32.7 G/DL (ref 31–37)
MCV RBC AUTO: 74.2 FL
MONOCYTES # BLD AUTO: 0.79 X10(3) UL (ref 0.1–1)
MONOCYTES NFR BLD AUTO: 6.1 %
NEUTROPHILS # BLD AUTO: 7.47 X10 (3) UL (ref 1.5–7.7)
NEUTROPHILS # BLD AUTO: 7.47 X10(3) UL (ref 1.5–7.7)
NEUTROPHILS NFR BLD AUTO: 57.1 %
OSMOLALITY SERPL CALC.SUM OF ELEC: 287 MOSM/KG (ref 275–295)
P AXIS: 42 DEGREES
P-R INTERVAL: 156 MS
PLATELET # BLD AUTO: 382 10(3)UL (ref 150–450)
POTASSIUM SERPL-SCNC: 3.5 MMOL/L (ref 3.5–5.1)
Q-T INTERVAL: 412 MS
QRS DURATION: 98 MS
QTC CALCULATION (BEZET): 453 MS
R AXIS: -4 DEGREES
RBC # BLD AUTO: 5.15 X10(6)UL
RSV RNA SPEC NAA+PROBE: NEGATIVE
S PYO AG THROAT QL: POSITIVE
SARS-COV-2 RNA RESP QL NAA+PROBE: NOT DETECTED
SODIUM SERPL-SCNC: 137 MMOL/L (ref 136–145)
T AXIS: 23 DEGREES
TROPONIN I HIGH SENSITIVITY: 5 NG/L
VENTRICULAR RATE: 73 BPM
WBC # BLD AUTO: 13.1 X10(3) UL (ref 4–11)

## 2023-04-08 PROCEDURE — 93010 ELECTROCARDIOGRAM REPORT: CPT

## 2023-04-08 PROCEDURE — 96361 HYDRATE IV INFUSION ADD-ON: CPT

## 2023-04-08 PROCEDURE — 71045 X-RAY EXAM CHEST 1 VIEW: CPT | Performed by: EMERGENCY MEDICINE

## 2023-04-08 PROCEDURE — 87880 STREP A ASSAY W/OPTIC: CPT

## 2023-04-08 PROCEDURE — 99285 EMERGENCY DEPT VISIT HI MDM: CPT

## 2023-04-08 PROCEDURE — 84484 ASSAY OF TROPONIN QUANT: CPT | Performed by: EMERGENCY MEDICINE

## 2023-04-08 PROCEDURE — 82962 GLUCOSE BLOOD TEST: CPT

## 2023-04-08 PROCEDURE — 80048 BASIC METABOLIC PNL TOTAL CA: CPT | Performed by: EMERGENCY MEDICINE

## 2023-04-08 PROCEDURE — 93005 ELECTROCARDIOGRAM TRACING: CPT

## 2023-04-08 PROCEDURE — 70496 CT ANGIOGRAPHY HEAD: CPT | Performed by: EMERGENCY MEDICINE

## 2023-04-08 PROCEDURE — 0241U SARS-COV-2/FLU A AND B/RSV BY PCR (GENEXPERT): CPT | Performed by: EMERGENCY MEDICINE

## 2023-04-08 PROCEDURE — 96375 TX/PRO/DX INJ NEW DRUG ADDON: CPT

## 2023-04-08 PROCEDURE — 82550 ASSAY OF CK (CPK): CPT | Performed by: EMERGENCY MEDICINE

## 2023-04-08 PROCEDURE — 85025 COMPLETE CBC W/AUTO DIFF WBC: CPT | Performed by: EMERGENCY MEDICINE

## 2023-04-08 PROCEDURE — 96374 THER/PROPH/DIAG INJ IV PUSH: CPT

## 2023-04-08 PROCEDURE — 70498 CT ANGIOGRAPHY NECK: CPT | Performed by: EMERGENCY MEDICINE

## 2023-04-08 PROCEDURE — 96372 THER/PROPH/DIAG INJ SC/IM: CPT

## 2023-04-08 RX ORDER — ORPHENADRINE CITRATE 30 MG/ML
30 INJECTION INTRAMUSCULAR; INTRAVENOUS ONCE
Status: COMPLETED | OUTPATIENT
Start: 2023-04-08 | End: 2023-04-08

## 2023-04-08 RX ORDER — DIPHENHYDRAMINE HYDROCHLORIDE 50 MG/ML
25 INJECTION INTRAMUSCULAR; INTRAVENOUS ONCE
Status: COMPLETED | OUTPATIENT
Start: 2023-04-08 | End: 2023-04-08

## 2023-04-08 RX ORDER — METOCLOPRAMIDE HYDROCHLORIDE 5 MG/ML
10 INJECTION INTRAMUSCULAR; INTRAVENOUS ONCE
Status: COMPLETED | OUTPATIENT
Start: 2023-04-08 | End: 2023-04-08

## 2023-04-08 RX ORDER — ORPHENADRINE CITRATE 100 MG/1
100 TABLET, EXTENDED RELEASE ORAL 2 TIMES DAILY
Qty: 20 TABLET | Refills: 0 | Status: SHIPPED | OUTPATIENT
Start: 2023-04-08 | End: 2023-04-18

## 2023-04-08 NOTE — ED INITIAL ASSESSMENT (HPI)
Melo Torres arrived with EMS from home for c/o L sided headache and also pain to her L sided neck and L arm. Pt was fasting yesterday and began to not feel well last night. Reported blood sugar reading of 67 at home, 144 per EMS. No c/o nausea or vomiting but she does report episodes of chest pain and shortness of breath over the past week or so. Language [place] [employer] [facility] [place]  utilized, reference [id number].

## 2023-04-08 NOTE — ED QUICK NOTES
Ambulatory to restroom with standby assist only. Headache has reportedly improved tremendously since arrival to ED. Pt was also able to sleep for a period of time while awaiting labs/imaging. Blood sugars have remained stable. Discharge instructions reviewed with Pt's son. Encouraged Pt to stay hydrated and consider alternative to fasting. Return to ED for any new or recurrent symptoms not managed with prn Norflex, otherwise to follow-up with primary medical doctor early next week.

## 2023-05-26 ENCOUNTER — OFFICE VISIT (OUTPATIENT)
Dept: INTERNAL MEDICINE CLINIC | Facility: CLINIC | Age: 58
End: 2023-05-26

## 2023-05-26 ENCOUNTER — TELEPHONE (OUTPATIENT)
Dept: INTERNAL MEDICINE CLINIC | Facility: CLINIC | Age: 58
End: 2023-05-26

## 2023-05-26 ENCOUNTER — NURSE TRIAGE (OUTPATIENT)
Dept: INTERNAL MEDICINE CLINIC | Facility: CLINIC | Age: 58
End: 2023-05-26

## 2023-05-26 ENCOUNTER — HOSPITAL ENCOUNTER (OUTPATIENT)
Dept: GENERAL RADIOLOGY | Age: 58
Discharge: HOME | End: 2023-05-26
Attending: INTERNAL MEDICINE
Payer: MEDICAID

## 2023-05-26 ENCOUNTER — LAB ENCOUNTER (OUTPATIENT)
Dept: LAB | Age: 58
End: 2023-05-26
Attending: INTERNAL MEDICINE
Payer: MEDICAID

## 2023-05-26 VITALS
WEIGHT: 190.19 LBS | HEIGHT: 63 IN | SYSTOLIC BLOOD PRESSURE: 110 MMHG | RESPIRATION RATE: 16 BRPM | HEART RATE: 88 BPM | DIASTOLIC BLOOD PRESSURE: 69 MMHG | BODY MASS INDEX: 33.7 KG/M2

## 2023-05-26 DIAGNOSIS — M79.18 MYOFASCIAL PAIN SYNDROME OF THORACIC SPINE: ICD-10-CM

## 2023-05-26 DIAGNOSIS — S39.012D STRAIN OF LUMBAR REGION, SUBSEQUENT ENCOUNTER: ICD-10-CM

## 2023-05-26 DIAGNOSIS — F32.1 CURRENT MODERATE EPISODE OF MAJOR DEPRESSIVE DISORDER WITHOUT PRIOR EPISODE (HCC): ICD-10-CM

## 2023-05-26 DIAGNOSIS — M79.18 MYOFASCIAL PAIN SYNDROME OF THORACIC SPINE: Primary | ICD-10-CM

## 2023-05-26 DIAGNOSIS — E55.9 VITAMIN D DEFICIENCY: ICD-10-CM

## 2023-05-26 DIAGNOSIS — E11.40 TYPE 2 DIABETES MELLITUS WITH DIABETIC NEUROPATHY, WITH LONG-TERM CURRENT USE OF INSULIN (HCC): ICD-10-CM

## 2023-05-26 DIAGNOSIS — M1A.09X0 IDIOPATHIC CHRONIC GOUT OF MULTIPLE SITES WITHOUT TOPHUS: ICD-10-CM

## 2023-05-26 DIAGNOSIS — Z79.4 TYPE 2 DIABETES MELLITUS WITH DIABETIC NEUROPATHY, WITH LONG-TERM CURRENT USE OF INSULIN (HCC): ICD-10-CM

## 2023-05-26 DIAGNOSIS — I10 ESSENTIAL HYPERTENSION: ICD-10-CM

## 2023-05-26 LAB
ALBUMIN SERPL-MCNC: 3.8 G/DL (ref 3.4–5)
ALBUMIN/GLOB SERPL: 0.9 {RATIO} (ref 1–2)
ALP LIVER SERPL-CCNC: 90 U/L
ALT SERPL-CCNC: 39 U/L
ANION GAP SERPL CALC-SCNC: 8 MMOL/L (ref 0–18)
AST SERPL-CCNC: 23 U/L (ref 15–37)
BASOPHILS # BLD AUTO: 0.06 X10(3) UL (ref 0–0.2)
BASOPHILS NFR BLD AUTO: 0.5 %
BILIRUB SERPL-MCNC: 0.5 MG/DL (ref 0.1–2)
BUN BLD-MCNC: 21 MG/DL (ref 7–18)
BUN/CREAT SERPL: 21.9 (ref 10–20)
CALCIUM BLD-MCNC: 9.8 MG/DL (ref 8.5–10.1)
CHLORIDE SERPL-SCNC: 104 MMOL/L (ref 98–112)
CHOLEST SERPL-MCNC: 273 MG/DL (ref ?–200)
CO2 SERPL-SCNC: 27 MMOL/L (ref 21–32)
CREAT BLD-MCNC: 0.96 MG/DL
CREAT UR-SCNC: 132 MG/DL
DEPRECATED HBV CORE AB SER IA-ACNC: 15.3 NG/ML
DEPRECATED RDW RBC AUTO: 41.7 FL (ref 35.1–46.3)
EOSINOPHIL # BLD AUTO: 0.31 X10(3) UL (ref 0–0.7)
EOSINOPHIL NFR BLD AUTO: 2.6 %
ERYTHROCYTE [DISTWIDTH] IN BLOOD BY AUTOMATED COUNT: 15.7 % (ref 11–15)
FASTING PATIENT LIPID ANSWER: NO
FASTING STATUS PATIENT QL REPORTED: NO
GFR SERPLBLD BASED ON 1.73 SQ M-ARVRAT: 69 ML/MIN/1.73M2 (ref 60–?)
GLOBULIN PLAS-MCNC: 4.1 G/DL (ref 2.8–4.4)
GLUCOSE BLD-MCNC: 205 MG/DL (ref 70–99)
HCT VFR BLD AUTO: 36.9 %
HDLC SERPL-MCNC: 38 MG/DL (ref 40–59)
HGB BLD-MCNC: 11.9 G/DL
IMM GRANULOCYTES # BLD AUTO: 0.04 X10(3) UL (ref 0–1)
IMM GRANULOCYTES NFR BLD: 0.3 %
IRON SATN MFR SERPL: 13 %
IRON SERPL-MCNC: 73 UG/DL
LDLC SERPL CALC-MCNC: 149 MG/DL (ref ?–100)
LYMPHOCYTES # BLD AUTO: 3.51 X10(3) UL (ref 1–4)
LYMPHOCYTES NFR BLD AUTO: 29.1 %
MCH RBC QN AUTO: 23.8 PG (ref 26–34)
MCHC RBC AUTO-ENTMCNC: 32.2 G/DL (ref 31–37)
MCV RBC AUTO: 73.9 FL
MICROALBUMIN UR-MCNC: 1.69 MG/DL
MICROALBUMIN/CREAT 24H UR-RTO: 12.8 UG/MG (ref ?–30)
MONOCYTES # BLD AUTO: 0.65 X10(3) UL (ref 0.1–1)
MONOCYTES NFR BLD AUTO: 5.4 %
NEUTROPHILS # BLD AUTO: 7.5 X10 (3) UL (ref 1.5–7.7)
NEUTROPHILS # BLD AUTO: 7.5 X10(3) UL (ref 1.5–7.7)
NEUTROPHILS NFR BLD AUTO: 62.1 %
NONHDLC SERPL-MCNC: 235 MG/DL (ref ?–130)
OSMOLALITY SERPL CALC.SUM OF ELEC: 297 MOSM/KG (ref 275–295)
PLATELET # BLD AUTO: 393 10(3)UL (ref 150–450)
POTASSIUM SERPL-SCNC: 3.8 MMOL/L (ref 3.5–5.1)
PROT SERPL-MCNC: 7.9 G/DL (ref 6.4–8.2)
RBC # BLD AUTO: 4.99 X10(6)UL
SODIUM SERPL-SCNC: 139 MMOL/L (ref 136–145)
T3FREE SERPL-MCNC: 2.6 PG/ML (ref 2.4–4.2)
T4 FREE SERPL-MCNC: 1.2 NG/DL (ref 0.8–1.7)
TIBC SERPL-MCNC: 562 UG/DL (ref 240–450)
TRANSFERRIN SERPL-MCNC: 377 MG/DL (ref 200–360)
TRIGL SERPL-MCNC: 453 MG/DL (ref 30–149)
TSI SER-ACNC: 0.25 MIU/ML (ref 0.36–3.74)
VIT B12 SERPL-MCNC: 1571 PG/ML (ref 193–986)
VIT D+METAB SERPL-MCNC: 52.2 NG/ML (ref 30–100)
VLDLC SERPL CALC-MCNC: 90 MG/DL (ref 0–30)
WBC # BLD AUTO: 12.1 X10(3) UL (ref 4–11)

## 2023-05-26 PROCEDURE — 83540 ASSAY OF IRON: CPT | Performed by: INTERNAL MEDICINE

## 2023-05-26 PROCEDURE — 84481 FREE ASSAY (FT-3): CPT | Performed by: INTERNAL MEDICINE

## 2023-05-26 PROCEDURE — 82570 ASSAY OF URINE CREATININE: CPT | Performed by: INTERNAL MEDICINE

## 2023-05-26 PROCEDURE — 85025 COMPLETE CBC W/AUTO DIFF WBC: CPT | Performed by: INTERNAL MEDICINE

## 2023-05-26 PROCEDURE — 36415 COLL VENOUS BLD VENIPUNCTURE: CPT

## 2023-05-26 PROCEDURE — 84466 ASSAY OF TRANSFERRIN: CPT | Performed by: INTERNAL MEDICINE

## 2023-05-26 PROCEDURE — 82607 VITAMIN B-12: CPT | Performed by: INTERNAL MEDICINE

## 2023-05-26 PROCEDURE — 82043 UR ALBUMIN QUANTITATIVE: CPT | Performed by: INTERNAL MEDICINE

## 2023-05-26 PROCEDURE — 82728 ASSAY OF FERRITIN: CPT | Performed by: INTERNAL MEDICINE

## 2023-05-26 PROCEDURE — 80061 LIPID PANEL: CPT | Performed by: INTERNAL MEDICINE

## 2023-05-26 PROCEDURE — 72110 X-RAY EXAM L-2 SPINE 4/>VWS: CPT | Performed by: INTERNAL MEDICINE

## 2023-05-26 PROCEDURE — 84443 ASSAY THYROID STIM HORMONE: CPT | Performed by: INTERNAL MEDICINE

## 2023-05-26 PROCEDURE — 82306 VITAMIN D 25 HYDROXY: CPT

## 2023-05-26 PROCEDURE — 80053 COMPREHEN METABOLIC PANEL: CPT | Performed by: INTERNAL MEDICINE

## 2023-05-26 PROCEDURE — 72072 X-RAY EXAM THORAC SPINE 3VWS: CPT | Performed by: INTERNAL MEDICINE

## 2023-05-26 PROCEDURE — 84439 ASSAY OF FREE THYROXINE: CPT | Performed by: INTERNAL MEDICINE

## 2023-05-26 RX ORDER — LOSARTAN POTASSIUM 100 MG/1
100 TABLET ORAL 2 TIMES DAILY
Qty: 180 TABLET | Refills: 3 | Status: SHIPPED | OUTPATIENT
Start: 2023-05-26

## 2023-05-26 RX ORDER — GABAPENTIN 400 MG/1
400 CAPSULE ORAL 2 TIMES DAILY
Qty: 180 CAPSULE | Refills: 5 | Status: SHIPPED | OUTPATIENT
Start: 2023-05-26

## 2023-05-26 RX ORDER — METHOCARBAMOL 500 MG/1
500 TABLET, FILM COATED ORAL 3 TIMES DAILY
Qty: 90 TABLET | Refills: 2 | Status: SHIPPED | OUTPATIENT
Start: 2023-05-26

## 2023-05-26 NOTE — TELEPHONE ENCOUNTER
Patient was in emergency room at [facility] in April, was given reviewed event monitor to wear for 2 weeks, she turned it back, I am looking for results if they are ever Abner Saldana, can you call cardiac diagnostics at the hospital and see if they can fprovide me with results of event monitor .

## 2023-05-31 ENCOUNTER — PATIENT MESSAGE (OUTPATIENT)
Dept: RHEUMATOLOGY | Facility: CLINIC | Age: 58
End: 2023-05-31

## 2023-05-31 ENCOUNTER — TELEPHONE (OUTPATIENT)
Dept: INTERNAL MEDICINE CLINIC | Facility: CLINIC | Age: 58
End: 2023-05-31

## 2023-05-31 DIAGNOSIS — Z79.4 TYPE 2 DIABETES MELLITUS WITHOUT COMPLICATION, WITH LONG-TERM CURRENT USE OF INSULIN (HCC): Primary | ICD-10-CM

## 2023-05-31 DIAGNOSIS — E11.9 TYPE 2 DIABETES MELLITUS WITHOUT COMPLICATION, WITH LONG-TERM CURRENT USE OF INSULIN (HCC): Primary | ICD-10-CM

## 2023-05-31 NOTE — TELEPHONE ENCOUNTER
Phoned Ike glaser for event monitor test results. Was told she had one 10/2022 not in April. Last seen by cardio in 10/2022  NO additional orders on file. Patient to f/u with medical records department. On cite staff, Ike glaser will be faxing results from October.

## 2023-06-01 RX ORDER — CARVEDILOL 12.5 MG/1
12.5 TABLET ORAL DAILY
Qty: 180 TABLET | Refills: 3 | Status: SHIPPED | OUTPATIENT
Start: 2023-06-01

## 2023-06-01 NOTE — TELEPHONE ENCOUNTER
Lab is too old and discarded yesterday. Patient will need to complete A1C. Is patient aware of order placed?

## 2023-06-01 NOTE — TELEPHONE ENCOUNTER
Spoke to daughter Denisse Nic reviewed with patient test results, slightly elevated white blood cell count, iron deficiency anemia. Advised patient to take Feosol 325 mg twice a day if tolerated with food, take medication separately from other medications. Medication can cause constipation, patient can try to take Slow Fe OTC as tolerated, advised to eat foods high in iron. Will recheck blood test in 3 months. Normal kidney liver function test, high cholesterol will restart patient on simvastatin 40 mg daily will need follow-up blood work in 3 months. Normal vitamin D level continue same vitamin D supplementation. TSH suppressed by will not change medication for now will recheck in 3 months again. X-ray of the thoracic spine shows scoliosis and mild DJD, question of opacity in the lungs. Advised that patient should have chest x-ray, order placed. Advised daughter that hemoglobin A1c was not done and she should bring patient to the lab and repeat CBC and hemoglobin A1c. Advised daughter that losartan covered only at 100 mg daily 1 tablet daily. Patient cannot take 2 tablets a day as she used to do before. Advised to increase carvedilol and take 12.5 mg twice a day continue 1 losartan 100 mg daily and hydrochlorothiazide. Daughter will report blood pressure readings and pulse in 7 to 10 days. Also requested that patient comes for follow-up visit in 4 to 6 weeks.

## 2023-06-08 ENCOUNTER — HOSPITAL ENCOUNTER (OUTPATIENT)
Dept: MAMMOGRAPHY | Age: 58
Discharge: HOME | End: 2023-06-08
Attending: INTERNAL MEDICINE
Payer: MEDICAID

## 2023-06-08 ENCOUNTER — HOSPITAL ENCOUNTER (OUTPATIENT)
Dept: GENERAL RADIOLOGY | Age: 58
Discharge: HOME | End: 2023-06-08
Attending: INTERNAL MEDICINE
Payer: MEDICAID

## 2023-06-08 DIAGNOSIS — R05.8 OTHER COUGH: ICD-10-CM

## 2023-06-08 DIAGNOSIS — Z12.31 BREAST CANCER SCREENING BY MAMMOGRAM: ICD-10-CM

## 2023-06-08 PROCEDURE — 77063 BREAST TOMOSYNTHESIS BI: CPT | Performed by: INTERNAL MEDICINE

## 2023-06-08 PROCEDURE — 71046 X-RAY EXAM CHEST 2 VIEWS: CPT | Performed by: INTERNAL MEDICINE

## 2023-06-08 PROCEDURE — 77067 SCR MAMMO BI INCL CAD: CPT | Performed by: INTERNAL MEDICINE

## 2023-06-09 ENCOUNTER — TELEPHONE (OUTPATIENT)
Dept: INTERNAL MEDICINE CLINIC | Facility: CLINIC | Age: 58
End: 2023-06-09

## 2023-06-12 NOTE — TELEPHONE ENCOUNTER
Spoke to daughter, reviewed x-ray results chest x-ray is negative, mammogram is normal, advised that patient should get blood test CBC and hemoglobin A1c done, patient has appointment with rheumatologist tomorrow advised that they can do it at [facility].

## 2023-06-13 ENCOUNTER — LAB ENCOUNTER (OUTPATIENT)
Dept: LAB | Facility: HOSPITAL | Age: 58
End: 2023-06-13
Attending: INTERNAL MEDICINE
Payer: MEDICAID

## 2023-06-13 ENCOUNTER — TELEPHONE (OUTPATIENT)
Dept: RHEUMATOLOGY | Facility: CLINIC | Age: 58
End: 2023-06-13

## 2023-06-13 ENCOUNTER — OFFICE VISIT (OUTPATIENT)
Dept: RHEUMATOLOGY | Facility: CLINIC | Age: 58
End: 2023-06-13

## 2023-06-13 VITALS
HEART RATE: 88 BPM | BODY MASS INDEX: 34 KG/M2 | DIASTOLIC BLOOD PRESSURE: 76 MMHG | SYSTOLIC BLOOD PRESSURE: 112 MMHG | WEIGHT: 191 LBS

## 2023-06-13 DIAGNOSIS — M25.50 POLYARTHRALGIA: Primary | ICD-10-CM

## 2023-06-13 DIAGNOSIS — M06.09 RHEUMATOID ARTHRITIS OF MULTIPLE SITES WITH NEGATIVE RHEUMATOID FACTOR (HCC): ICD-10-CM

## 2023-06-13 DIAGNOSIS — R20.0 NUMBNESS AND TINGLING OF HAND: ICD-10-CM

## 2023-06-13 DIAGNOSIS — R20.2 NUMBNESS AND TINGLING OF HAND: ICD-10-CM

## 2023-06-13 LAB
BASOPHILS # BLD AUTO: 0.06 X10(3) UL (ref 0–0.2)
BASOPHILS NFR BLD AUTO: 0.5 %
CRP SERPL-MCNC: 0.86 MG/DL (ref ?–0.3)
DEPRECATED RDW RBC AUTO: 39.8 FL (ref 35.1–46.3)
EOSINOPHIL # BLD AUTO: 0.34 X10(3) UL (ref 0–0.7)
EOSINOPHIL NFR BLD AUTO: 2.7 %
ERYTHROCYTE [DISTWIDTH] IN BLOOD BY AUTOMATED COUNT: 15.6 % (ref 11–15)
ERYTHROCYTE [SEDIMENTATION RATE] IN BLOOD: 26 MM/HR
EST. AVERAGE GLUCOSE BLD GHB EST-MCNC: 171 MG/DL (ref 68–126)
HBA1C MFR BLD: 7.6 % (ref ?–5.7)
HCT VFR BLD AUTO: 37.6 %
HGB BLD-MCNC: 12.5 G/DL
IMM GRANULOCYTES # BLD AUTO: 0.04 X10(3) UL (ref 0–1)
IMM GRANULOCYTES NFR BLD: 0.3 %
LYMPHOCYTES # BLD AUTO: 4.18 X10(3) UL (ref 1–4)
LYMPHOCYTES NFR BLD AUTO: 33 %
MCH RBC QN AUTO: 23.9 PG (ref 26–34)
MCHC RBC AUTO-ENTMCNC: 33.2 G/DL (ref 31–37)
MCV RBC AUTO: 72 FL
MONOCYTES # BLD AUTO: 0.67 X10(3) UL (ref 0.1–1)
MONOCYTES NFR BLD AUTO: 5.3 %
NEUTROPHILS # BLD AUTO: 7.38 X10 (3) UL (ref 1.5–7.7)
NEUTROPHILS # BLD AUTO: 7.38 X10(3) UL (ref 1.5–7.7)
NEUTROPHILS NFR BLD AUTO: 58.2 %
PLATELET # BLD AUTO: 369 10(3)UL (ref 150–450)
RBC # BLD AUTO: 5.22 X10(6)UL
RHEUMATOID FACT SERPL-ACNC: <10 IU/ML (ref ?–15)
WBC # BLD AUTO: 12.7 X10(3) UL (ref 4–11)

## 2023-06-13 PROCEDURE — 85025 COMPLETE CBC W/AUTO DIFF WBC: CPT | Performed by: INTERNAL MEDICINE

## 2023-06-13 PROCEDURE — 36415 COLL VENOUS BLD VENIPUNCTURE: CPT | Performed by: INTERNAL MEDICINE

## 2023-06-13 PROCEDURE — 3078F DIAST BP <80 MM HG: CPT | Performed by: INTERNAL MEDICINE

## 2023-06-13 PROCEDURE — 86431 RHEUMATOID FACTOR QUANT: CPT | Performed by: INTERNAL MEDICINE

## 2023-06-13 PROCEDURE — 99214 OFFICE O/P EST MOD 30 MIN: CPT | Performed by: INTERNAL MEDICINE

## 2023-06-13 PROCEDURE — 20526 THER INJECTION CARP TUNNEL: CPT | Performed by: INTERNAL MEDICINE

## 2023-06-13 PROCEDURE — 85652 RBC SED RATE AUTOMATED: CPT | Performed by: INTERNAL MEDICINE

## 2023-06-13 PROCEDURE — 86140 C-REACTIVE PROTEIN: CPT | Performed by: INTERNAL MEDICINE

## 2023-06-13 PROCEDURE — 3051F HG A1C>EQUAL 7.0%<8.0%: CPT | Performed by: INTERNAL MEDICINE

## 2023-06-13 PROCEDURE — 3074F SYST BP LT 130 MM HG: CPT | Performed by: INTERNAL MEDICINE

## 2023-06-13 PROCEDURE — 83036 HEMOGLOBIN GLYCOSYLATED A1C: CPT | Performed by: INTERNAL MEDICINE

## 2023-06-13 PROCEDURE — 86200 CCP ANTIBODY: CPT | Performed by: INTERNAL MEDICINE

## 2023-06-13 RX ORDER — NAPROXEN 500 MG/1
TABLET ORAL
Qty: 30 TABLET | Refills: 1 | Status: SHIPPED | OUTPATIENT
Start: 2023-06-13

## 2023-06-13 RX ORDER — METHYLPREDNISOLONE ACETATE 80 MG/ML
40 INJECTION, SUSPENSION INTRA-ARTICULAR; INTRALESIONAL; INTRAMUSCULAR; SOFT TISSUE
Status: COMPLETED | OUTPATIENT
Start: 2023-06-13 | End: 2023-06-13

## 2023-06-13 RX ADMIN — METHYLPREDNISOLONE ACETATE 40 MG: 80 INJECTION, SUSPENSION INTRA-ARTICULAR; INTRALESIONAL; INTRAMUSCULAR; SOFT TISSUE at 15:11:00

## 2023-06-13 NOTE — PATIENT INSTRUCTIONS
You were seen today for hand pain, numbness and tingling and joint pain  Blood work in the past showed possible rheumatoid arthritis  EMG/nerve test also showed carpal tunnel  We injected both wrist with cortisone  Plan to repeat blood work  Get MRI of the right hand  If you continue to have the numbness and tingling would recommend to see hand surgeon

## 2023-06-14 NOTE — TELEPHONE ENCOUNTER
Pt scheduled appointment. [facility]aged Care to obtain PA.       Future Appointments   Date Time Provider Alysia Vianey   7/13/2023  3:45 PM Jocelyn Wallace MD [address] [facility] OF THE [facility]   7/14/2023  5:00 PM [facility] MRI RM2 (3T WIDE) [facility]

## 2023-06-20 LAB — CCP IGG SERPL-ACNC: 24.2 U/ML (ref 0–6.9)

## 2023-06-27 DIAGNOSIS — Z79.4 TYPE 2 DIABETES MELLITUS WITH DIABETIC NEUROPATHY, WITH LONG-TERM CURRENT USE OF INSULIN (HCC): ICD-10-CM

## 2023-06-27 DIAGNOSIS — E11.40 TYPE 2 DIABETES MELLITUS WITH DIABETIC NEUROPATHY, WITH LONG-TERM CURRENT USE OF INSULIN (HCC): ICD-10-CM

## 2023-06-29 RX ORDER — INSULIN GLARGINE 100 [IU]/ML
INJECTION, SOLUTION SUBCUTANEOUS
Qty: 12 ML | Refills: 0 | Status: SHIPPED | OUTPATIENT
Start: 2023-06-29

## 2023-07-03 DIAGNOSIS — E11.40 TYPE 2 DIABETES MELLITUS WITH DIABETIC NEUROPATHY, WITH LONG-TERM CURRENT USE OF INSULIN (HCC): ICD-10-CM

## 2023-07-03 DIAGNOSIS — Z79.4 TYPE 2 DIABETES MELLITUS WITH HYPERGLYCEMIA, WITH LONG-TERM CURRENT USE OF INSULIN (HCC): ICD-10-CM

## 2023-07-03 DIAGNOSIS — Z79.4 TYPE 2 DIABETES MELLITUS WITH DIABETIC NEUROPATHY, WITH LONG-TERM CURRENT USE OF INSULIN (HCC): ICD-10-CM

## 2023-07-03 DIAGNOSIS — E11.65 TYPE 2 DIABETES MELLITUS WITH HYPERGLYCEMIA, WITH LONG-TERM CURRENT USE OF INSULIN (HCC): ICD-10-CM

## 2023-07-03 DIAGNOSIS — M1A.09X0 IDIOPATHIC CHRONIC GOUT OF MULTIPLE SITES WITHOUT TOPHUS: ICD-10-CM

## 2023-07-03 DIAGNOSIS — F32.1 CURRENT MODERATE EPISODE OF MAJOR DEPRESSIVE DISORDER WITHOUT PRIOR EPISODE (HCC): ICD-10-CM

## 2023-07-03 DIAGNOSIS — I10 ESSENTIAL HYPERTENSION: ICD-10-CM

## 2023-07-03 RX ORDER — CARVEDILOL 6.25 MG/1
6.25 TABLET ORAL 2 TIMES DAILY WITH MEALS
Qty: 180 TABLET | Refills: 1 | Status: CANCELLED | OUTPATIENT
Start: 2023-07-03

## 2023-07-03 RX ORDER — HYDROCHLOROTHIAZIDE 25 MG/1
TABLET ORAL
Qty: 180 TABLET | Refills: 1 | Status: CANCELLED | OUTPATIENT
Start: 2023-07-03

## 2023-07-03 RX ORDER — POTASSIUM CHLORIDE 750 MG/1
CAPSULE, EXTENDED RELEASE ORAL
Qty: 180 CAPSULE | Refills: 3 | Status: CANCELLED | OUTPATIENT
Start: 2023-07-03

## 2023-07-03 RX ORDER — GABAPENTIN 400 MG/1
400 CAPSULE ORAL 2 TIMES DAILY
Qty: 180 CAPSULE | Refills: 5 | Status: CANCELLED | OUTPATIENT
Start: 2023-07-03

## 2023-07-03 RX ORDER — TRAZODONE HYDROCHLORIDE 100 MG/1
100 TABLET ORAL NIGHTLY
Qty: 90 TABLET | Refills: 1 | Status: CANCELLED | OUTPATIENT
Start: 2023-07-03

## 2023-07-03 RX ORDER — LOSARTAN POTASSIUM 100 MG/1
100 TABLET ORAL 2 TIMES DAILY
Qty: 180 TABLET | Refills: 3 | Status: CANCELLED | OUTPATIENT
Start: 2023-07-03

## 2023-07-03 RX ORDER — ALLOPURINOL 100 MG/1
100 TABLET ORAL NIGHTLY
Qty: 90 TABLET | Refills: 1 | Status: CANCELLED | OUTPATIENT
Start: 2023-07-03

## 2023-07-03 RX ORDER — METHOCARBAMOL 500 MG/1
500 TABLET, FILM COATED ORAL 3 TIMES DAILY
Qty: 90 TABLET | Refills: 2 | Status: CANCELLED | OUTPATIENT
Start: 2023-07-03

## 2023-07-03 RX ORDER — SIMVASTATIN 40 MG
40 TABLET ORAL NIGHTLY
Qty: 90 TABLET | Refills: 3 | Status: CANCELLED | OUTPATIENT
Start: 2023-07-03

## 2023-07-03 RX ORDER — LEVOTHYROXINE SODIUM 0.12 MG/1
125 TABLET ORAL EVERY MORNING
Qty: 90 TABLET | Refills: 1 | Status: CANCELLED | OUTPATIENT
Start: 2023-07-03

## 2023-07-03 RX ORDER — CARVEDILOL 12.5 MG/1
12.5 TABLET ORAL DAILY
Qty: 180 TABLET | Refills: 3 | Status: CANCELLED | OUTPATIENT
Start: 2023-07-03

## 2023-07-03 NOTE — TELEPHONE ENCOUNTER
LOV: 6/13/23  Last Refilled:Naproxen #30, 1rf 6/13/23  Diclofenac Gel#1, 0rfs 2/17/23    Future Appointments   Date Time Provider Alysia Winters   7/13/2023  3:45 PM Shaun Baptiste MD [address] [facility] OF THE [facility]   7/14/2023  5:00  [facility] MRI RM2 (3T WIDE) 300 [facility] MRI EM [place]     ASSESSMENT/PLAN:      Bilateral Carpal tunnel  - EMG in the past did show evidence of carpal tunnel, right worse than left  - Both carpal tunnel region was injected with 0.5 cc of lidocaine and 40 mg of Depo-Medrol in sterile fashion. Patient tolerated procedure well  - Advised patient if she continues to have symptoms she can see hand surgeon     Possible Seropositive rheumatoid arthritis (+ CCP, MRI of the left shoulder showing inflammatory synovitis)  - It was always questionable if she had inflammatory arthritis as she had a low titer CCP and no evidence of synovitis on exam.  MRI of the left shoulder now confirms rheumatoid arthritis  - s/p cortisone injections in L shoulder in the past   - Continues to have pain in her hands, wrists and ankles  - She was on Plaquenil in the past but did not help  - Was also on Leflunomide but caused s/e  - plan to repeat blood work  - Plan to also get MRI of the right hand for further evaluation     Pt will follow up after MRI R hand      Lory Walker MD  6/13/2023  3:00 PM     Please advise.

## 2023-07-04 RX ORDER — HYDROCORTISONE 25 MG/G
CREAM TOPICAL
Qty: 28 G | Refills: 5 | Status: SHIPPED | OUTPATIENT
Start: 2023-07-04

## 2023-07-04 RX ORDER — OMEPRAZOLE 40 MG/1
40 CAPSULE, DELAYED RELEASE ORAL DAILY
Qty: 90 CAPSULE | Refills: 3 | Status: SHIPPED | OUTPATIENT
Start: 2023-07-04

## 2023-07-04 RX ORDER — NAPROXEN 500 MG/1
TABLET ORAL
Qty: 30 TABLET | Refills: 1 | Status: SHIPPED | OUTPATIENT
Start: 2023-07-04

## 2023-07-04 RX ORDER — KETOCONAZOLE 20 MG/ML
SHAMPOO TOPICAL
Qty: 120 ML | Refills: 5 | Status: SHIPPED | OUTPATIENT
Start: 2023-07-04

## 2023-07-04 RX ORDER — CARVEDILOL 6.25 MG/1
6.25 TABLET ORAL 2 TIMES DAILY WITH MEALS
Qty: 180 TABLET | Refills: 1 | Status: CANCELLED | OUTPATIENT
Start: 2023-07-04

## 2023-07-04 RX ORDER — ZOLPIDEM TARTRATE 10 MG/1
10 TABLET ORAL NIGHTLY
Qty: 30 TABLET | Refills: 1 | Status: SHIPPED | OUTPATIENT
Start: 2023-07-04

## 2023-07-04 RX ORDER — SERTRALINE HYDROCHLORIDE 100 MG/1
100 TABLET, FILM COATED ORAL DAILY
Qty: 90 TABLET | Refills: 3 | Status: SHIPPED | OUTPATIENT
Start: 2023-07-04

## 2023-07-04 RX ORDER — CHOLECALCIFEROL (VITAMIN D3) 125 MCG
2000 CAPSULE ORAL EVERY MORNING
Qty: 90 TABLET | Refills: 3 | Status: SHIPPED | OUTPATIENT
Start: 2023-07-04

## 2023-07-04 RX ORDER — TRAZODONE HYDROCHLORIDE 100 MG/1
100 TABLET ORAL NIGHTLY
Qty: 90 TABLET | Refills: 3 | Status: SHIPPED | OUTPATIENT
Start: 2023-07-04

## 2023-07-04 NOTE — TELEPHONE ENCOUNTER
Refill passed per [facility] [place] protocol. Requested Prescriptions   Pending Prescriptions Disp Refills    sertraline 100 MG Oral Tab 90 tablet 1     Sig: Take 1 tablet (100 mg total) by mouth daily.        Psychiatric Non-Scheduled (Anti-Anxiety) Passed - 7/3/2023  2:34 PM        Passed - In person appointment or virtual visit in the past 6 mos or appointment in next 3 mos     Recent Outpatient Visits              3 weeks ago Manjula Estrella, [address] [facility], Alicia Candelario MD    Office Visit    1 month ago Myofascial pain syndrome of thoracic spine    [place], [facility], Chastity Marquez MD    Office Visit    3 months ago Type 2 diabetes mellitus with diabetic neuropathy, with long-term current use of insulin ([facility] [address]    Williancarlitos Cameron, [facility], Chastity Marquez MD    Office Visit    7 months ago Numbness and tingling of hand    Laurentrinity Cameron, [address] [facility],[address] [facility], Alicia Candelario MD    Office Visit    9 months ago Type 2 diabetes mellitus with hyperglycemia, with long-term current use of insulin ([address]    Kristen Hess, Isaiah Aponte MD    Office Visit          Future Appointments         Provider Department Appt Notes    In 1 week [facility] [address], [facility] dm ee    In 1 week 300 [facility] MRI RM2 (3T WIDE) [facility] AND CLINICS MRI QA [facility] [address] CONSENTS SIGNED                 metFORMIN 500 MG Oral Tab 360 tablet 1     Sig: TAKE 2 TABLETS BY MOUTH TWICE DAILY       Diabetes Medication Protocol Failed - 7/3/2023  2:34 PM        Failed - Last A1C < 7.5 and within past 6 months     Lab Results   Component Value Date    A1C 7.6 (H) 06/13/2023             Passed - In person appointment or virtual visit in the past 6 mos or appointment in next 3 mos     Recent Outpatient Visits              3 weeks ago [address], Ivis Candelario MD    Office Visit    1 month ago Myofascial pain syndrome of thoracic spine    [address], [facility], Chastity Matson MD    Office Visit    3 months ago Type 2 diabetes mellitus with diabetic neuropathy, with long-term current use of insulin ([facility] [address]    [address], [place], Chastity Matson MD    Office Visit    7 months ago Numbness and tingling of hand    [address], [address], Ivis Candelario MD    Office Visit    9 months ago Type 2 diabetes mellitus with hyperglycemia, with long-term current use of insulin ([facility] [address]    Baldomero Reina MD    Office Visit          Future Appointments         Provider Department Appt Notes    In 1 week Cristino Manley MD [facility], [address], [facility] dm ee    In 1 week 300 [facility] MRI RM2 ([address] [facility]) [facility] AND CLINICS MRI QA [address] CONSENTS SIGNED               Passed - EGFRCR or GFRNAA > 50     GFR Evaluation  EGFRCR: 69 , resulted on 5/26/2023          Passed - GFR in the past 12 months          cholecalciferol 50 MCG (2000 UT) Oral Tab 30 tablet 0     Sig: Take 1 tablet (2,000 Units total) by mouth every morning. There is no refill protocol information for this order       ketoconazole 2 % External Shampoo 120 mL 2     Sig: Use to  Scalp twice a we       There is no refill protocol information for this order       Omeprazole 40 MG Oral Capsule Delayed Release 180 capsule 0     Sig: Take 1 capsule (40 mg total) by mouth 2 (two) times daily.  Patient takes medication every other day because of insurance coverage       Gastrointestional Medication Protocol Passed - 7/3/2023  2:34 PM        Passed - In person appointment or virtual visit in the past 12 mos or appointment in next 3 mos     Recent Outpatient Visits              3 weeks ago [address], Nataliia Candelario MD    Office Visit    1 month ago Myofascial pain syndrome of thoracic spine    [address], [place], Chastity Baez MD    Office Visit    3 months ago Type 2 diabetes mellitus with diabetic neuropathy, with long-term current use of insulin ([facility] [address]    [address], [place], Chastity Baez MD    Office Visit    7 months ago Numbness and tingling of hand    [address], [address], Nataliia Candelario MD    Office Visit    9 months ago Type 2 diabetes mellitus with hyperglycemia, with long-term current use of insulin ([facility])    Jared Wylie [place], Stefano Denver, MD    Office Visit          Future Appointments         Provider Department Appt Notes    In 1 week Misha Childress MD [facility], [address], [facility] dm ee    In 1 week [address] [facility] MRI RM2 ([address] [facility] [address]) [facility] AND CLINICS MRI QA [address] CONSENTS SIGNED                 hydrocortisone (PROCTOZONE-HC) 2.5 % External Cream 28 g 0     Sig: Apply to hemorrhoids for 7 to 10 days       Gastrointestional Medication Protocol Passed - 7/3/2023  2:34 PM        Passed - In person appointment or virtual visit in the past 12 mos or appointment in next 3 mos     Recent Outpatient Visits              3 weeks ago Vernon Alanis, [address]Godwin MD    Office Visit    1 month ago Myofascial pain syndrome of thoracic spine    [address], [place], Evelia Bell MD    Office Visit    3 months ago Type 2 diabetes mellitus with diabetic neuropathy, with long-term current use of insulin ([facility] [address]    [address], [place], Chastity Baez MD    Office Visit    7 months ago Numbness and tingling of hand    [address], [address], Nataliia Candelario MD    Office Visit    9 months ago Type 2 diabetes mellitus with hyperglycemia, with long-term current use of insulin (HCC)    Emma Dai MD    Office Visit          Future Appointments         Provider Department Appt Notes    In 1 week Ninfa Oleary MD [facility], [address], [facility] dm ee    In 1 week 300 [facility] MRI RM2 ([address] [facility] [address]) [facility] [place] AND CLINICS MRI QA CLK, CONSENTS SIGNED                 zolpidem 10 MG Oral Tab 30 tablet 1     Sig: Take 1 tablet (10 mg total) by mouth nightly. There is no refill protocol information for this order       carvedilol 6.25 MG Oral Tab 180 tablet 1     Sig: Take 1 tablet (6.25 mg total) by mouth 2 (two) times daily with meals. There is no refill protocol information for this order       traZODone 100 MG Oral Tab 90 tablet 1     Sig: Take 1 tablet (100 mg total) by mouth nightly.        There is no refill protocol information for this order

## 2023-07-04 NOTE — TELEPHONE ENCOUNTER
Please review refill failed/no protocol - sertraline drug interaction     Requested Prescriptions     Pending Prescriptions Disp Refills    sertraline 100 MG Oral Tab 90 tablet 3     Sig: Take 1 tablet (100 mg total) by mouth daily. metFORMIN 500 MG Oral Tab 360 tablet 3     Sig: TAKE 2 TABLETS BY MOUTH TWICE DAILY    cholecalciferol 50 MCG (2000 UT) Oral Tab 90 tablet 3     Sig: Take 1 tablet (2,000 Units total) by mouth every morning.    ketoconazole 2 % External Shampoo 120 mL 5     Sig: Use to  Scalp twice a wekk    zolpidem 10 MG Oral Tab 30 tablet 5     Sig: Take 1 tablet (10 mg total) by mouth nightly. carvedilol 6.25 MG Oral Tab 180 tablet 1     Sig: Take 1 tablet (6.25 mg total) by mouth 2 (two) times daily with meals. traZODone 100 MG Oral Tab 90 tablet 3     Sig: Take 1 tablet (100 mg total) by mouth nightly. Signed Prescriptions Disp Refills    Omeprazole 40 MG Oral Capsule Delayed Release 90 capsule 3     Sig: Take 1 capsule (40 mg total) by mouth daily.      Authorizing Provider: Katiuska Hernández     Ordering User: Jeneane Nose    hydrocortisone (PROCTOZONE-HC) 2.5 % External Cream 28 g 5     Sig: Apply to hemorrhoids for 7 to 10 days     Authorizing Provider: Katiuska Hernández     Ordering User: Jennieshane Nose         Recent Visits  Date Type Provider Dept   05/26/23 Office Visit Natalya Justice MD [facility]   03/14/23 Office Visit Natalya Justice MD [facility]   09/28/22 Office Visit Natalya Justice MD [facility]   09/19/22 Office Visit Natalya Justice MD [facility]   09/16/22 Appointment Natalya Justice MD [facility]   07/28/22 Office Visit JORGE L Davis [facility]   07/11/22 Office Visit Natalya Justice MD [facility]   01/28/22 Office Visit Natalya Justice MD [facility]   Showing recent visits within past 540 days with a meds authorizing provider and meeting all other requirements  Future Appointments  No visits were found meeting these conditions. Showing future appointments within next 150 days with a meds authorizing provider and meeting all other requirements    Requested Prescriptions   Pending Prescriptions Disp Refills    sertraline 100 MG Oral Tab 90 tablet 3     Sig: Take 1 tablet (100 mg total) by mouth daily.        Psychiatric Non-Scheduled (Anti-Anxiety) Passed - 7/3/2023  2:34 PM        Passed - In person appointment or virtual visit in the past 6 mos or appointment in next 3 mos     Recent Outpatient Visits              [facility] [address], Mio Candelario MD    Office Visit    1 month ago Myofascial pain syndrome of thoracic spine    [address] [facility] [address], [facility]    Office Visit    3 months ago Type 2 diabetes mellitus with diabetic neuropathy, with long-term current use of insulin ([address]    [address]  Bertin Fountain, [address] [place] [address], [facility]    Office Visit    7 months ago Numbness and tingling of hand    [address], [address] [facility] [facility], Mio Candelario MD    Office Visit    9 months ago Type 2 diabetes mellitus with hyperglycemia, with long-term current use of insulin ([address]    Delvin Cardenas, Daniel Vogt MD    Office Visit          Future Appointments         Provider Department Appt Notes    In 1 week Estell Koyanagi[facility], [address], [facility] dm ee    In 1 week [address] [place] [address] MRI RM2 (3T WIDE) [facility] AND CLINICS MRI QA [address] CONSENTS SIGNED                 metFORMIN 500 MG Oral Tab 360 tablet 3     Sig: TAKE 2 TABLETS BY MOUTH TWICE DAILY       Diabetes Medication Protocol Failed - 7/3/2023  2:34 PM        Failed - Last A1C < 7.5 and within past 6 months     Lab Results   Component Value Date    A1C 7.6 (H) 06/13/2023             Passed - In person appointment or virtual visit in the past 6 mos or appointment in next 3 mos     Recent Outpatient Visits              3 weeks ago Michele Luis, [address], Lilibeth Candelario MD    Office Visit    1 month ago Myofascial pain syndrome of thoracic spine    [address], [facility]    Office Visit    3 months ago Type 2 diabetes mellitus with diabetic neuropathy, with long-term current use of insulin ([facility] [address]    [address], [facility]    Office Visit    7 months ago Numbness and tingling of hand    [address], [address], Lilibeth Candelario MD    Office Visit    9 months ago Type 2 diabetes mellitus with hyperglycemia, with long-term current use of insulin [facility])    [address] [facility], Julia Jimenez MD    Office Visit          Future Appointments         Provider Department Appt Notes    In 1 week Rigoberto Garcia MD [facility], [address], [place] ep dm ee    In 1 week [address] [facility] MRI RM2 ([address]) [facility] AND CLINICS MRI QA [facility] [address] CONSENTS SIGNED               Passed - EGFRCR or GFRNAA > 50     GFR Evaluation  EGFRCR: 69 , resulted on 5/26/2023          Passed - GFR in the past 12 months          cholecalciferol 50 MCG (2000 UT) Oral Tab 90 tablet 3     Sig: Take 1 tablet (2,000 Units total) by mouth every morning. There is no refill protocol information for this order       ketoconazole 2 % External Shampoo 120 mL 5     Sig: Use to  Scalp twice a wekk       There is no refill protocol information for this order       zolpidem 10 MG Oral Tab 30 tablet 5     Sig: Take 1 tablet (10 mg total) by mouth nightly. There is no refill protocol information for this order       carvedilol 6.25 MG Oral Tab 180 tablet 1     Sig: Take 1 tablet (6.25 mg total) by mouth 2 (two) times daily with meals.        There is no refill protocol information for this order       traZODone 100 MG Oral Tab 90 tablet 3     Sig: Take 1 tablet (100 mg total) by mouth nightly. There is no refill protocol information for this order      Signed Prescriptions Disp Refills    Omeprazole 40 MG Oral Capsule Delayed Release 90 capsule 3     Sig: Take 1 capsule (40 mg total) by mouth daily.        Gastrointestional Medication Protocol Passed - 7/3/2023  2:34 PM        Passed - In person appointment or virtual visit in the past 12 mos or appointment in next 3 mos     Recent Outpatient Visits              3 weeks ago Jessica Goodman, [address], [place] MD Joe    Office Visit    1 month ago Myofascial pain syndrome of thoracic spine    [address], Corinne Chastity Severino MD    Office Visit    3 months ago Type 2 diabetes mellitus with diabetic neuropathy, with long-term current use of insulin ([address]    Sun Estrada, [address] [place] [address], Corinne Chastity Severino MD    Office Visit    7 months ago Numbness and tingling of hand    [address], [address] [facility],[address], Kenneth Saavedra MD    Office Visit    9 months ago Type 2 diabetes mellitus with hyperglycemia, with long-term current use of insulin ([address]    Larisa Rivera, David Muller MD    Office Visit          Future Appointments         Provider Department Appt Notes    In 1 week [facility], [address], [place] ep dm ee    In 1 week [facility] MRI RM2 ([address]) [facility] AND CLINICS MRI QA CLK, CONSENTS SIGNED                 hydrocortisone (PROCTOZONE-HC) 2.5 % External Cream 28 g 5     Sig: Apply to hemorrhoids for 7 to 10 days       Gastrointestional Medication Protocol Passed - 7/3/2023  2:34 PM        Passed - In person appointment or virtual visit in the past 12 mos or appointment in next 3 mos     Recent Outpatient Visits              3 weeks ago [address], Zeeshan Candelario MD    Office Visit    1 month ago Myofascial pain syndrome of thoracic spine    [address], Corinne Horseman, [place], MD    Office Visit    3 months ago Type 2 diabetes mellitus with diabetic neuropathy, with long-term current use of insulin ([facility] [address]    [address] [place], Corinne Horseman, [place], MD    Office Visit    7 months ago Numbness and tingling of hand    Sun Estrada, [address], Zeeshan Candelario MD    Office Visit    9 months ago Type 2 diabetes mellitus with hyperglycemia, with long-term current use of insulin [facility])    Aimta Lugo, David Muller MD    Office Visit          Future Appointments         Provider Department Appt Notes    In 1 week MD Amita Najera [place] ep dm ee    In 1 week [facility] MRI RM2 (3T WIDE) [facility] AND CLINICS MRI [facility], CONSENTS SIGNED

## 2023-07-05 ENCOUNTER — TELEPHONE (OUTPATIENT)
Dept: RHEUMATOLOGY | Facility: CLINIC | Age: 58
End: 2023-07-05

## 2023-07-05 RX ORDER — PROCHLORPERAZINE 25 MG/1
SUPPOSITORY RECTAL
Qty: 9 EACH | Refills: 0 | Status: SHIPPED | OUTPATIENT
Start: 2023-07-05

## 2023-07-05 RX ORDER — LIRAGLUTIDE 6 MG/ML
1.8 INJECTION SUBCUTANEOUS EVERY MORNING
Qty: 9 ML | Refills: 3 | Status: SHIPPED | OUTPATIENT
Start: 2023-07-05

## 2023-07-05 RX ORDER — PEN NEEDLE, DIABETIC 32GX 5/32"
NEEDLE, DISPOSABLE MISCELLANEOUS
Qty: 200 EACH | Refills: 3 | Status: SHIPPED | OUTPATIENT
Start: 2023-07-05

## 2023-07-05 RX ORDER — GLIPIZIDE 10 MG/1
10 TABLET ORAL
Qty: 60 TABLET | Refills: 0 | Status: SHIPPED | OUTPATIENT
Start: 2023-07-05 | End: 2023-08-04

## 2023-07-05 RX ORDER — INSULIN GLARGINE 100 [IU]/ML
INJECTION, SOLUTION SUBCUTANEOUS
Qty: 12 ML | Refills: 0 | Status: SHIPPED | OUTPATIENT
Start: 2023-07-05

## 2023-07-05 NOTE — TELEPHONE ENCOUNTER
Current Outpatient Medications   Medication Sig Dispense Refill    diclofenac 1 % External Gel Apply 2 g topically 4 (four) times daily.  1 each 0       Key: [id number]

## 2023-07-10 ENCOUNTER — TELEPHONE (OUTPATIENT)
Dept: RHEUMATOLOGY | Facility: CLINIC | Age: 58
End: 2023-07-10

## 2023-07-10 NOTE — TELEPHONE ENCOUNTER
Please be advised pt's MRI R Hand has been denied by the insurance.      [employer] # [phone], Option 4  Reference # [id number]    The following is the denial letter received from insurance with reasoning:

## 2023-07-10 NOTE — TELEPHONE ENCOUNTER
Called P2P number to verify availibility dates:  Preferred for MD: Thursday after 2:00pm. Approved. Dr. Charly Bueno will be calling.

## 2023-07-13 ENCOUNTER — OFFICE VISIT (OUTPATIENT)
Dept: OPHTHALMOLOGY | Facility: CLINIC | Age: 58
End: 2023-07-13

## 2023-07-13 DIAGNOSIS — H02.883 MEIBOMIAN GLAND DYSFUNCTION (MGD) OF BOTH EYES: ICD-10-CM

## 2023-07-13 DIAGNOSIS — H02.886 MEIBOMIAN GLAND DYSFUNCTION (MGD) OF BOTH EYES: ICD-10-CM

## 2023-07-13 DIAGNOSIS — E11.9 TYPE 2 DIABETES MELLITUS WITHOUT RETINOPATHY (HCC): Primary | ICD-10-CM

## 2023-07-13 DIAGNOSIS — H25.13 AGE-RELATED NUCLEAR CATARACT OF BOTH EYES: ICD-10-CM

## 2023-07-13 PROCEDURE — 2023F DILAT RTA XM W/O RTNOPTHY: CPT | Performed by: OPHTHALMOLOGY

## 2023-07-13 PROCEDURE — 92014 COMPRE OPH EXAM EST PT 1/>: CPT | Performed by: OPHTHALMOLOGY

## 2023-07-13 NOTE — ASSESSMENT & PLAN NOTE
Patient was instructed to use warm compresses to the eyelids twice a day everyday. Instructions for warm compress use:   Patient should place wash compresses on both eyelids for 5 minutes every morning and every night. After 5 minutes of holding the warm compresses on the eyelids, patient should gently rub the eyelashes and then rinse thoroughly with warm water. Diagnosis discussed with patient. Use preservative free artificial tears (any over the counter brand is okay) up to 4 times per day as needed for dry eye symptoms.

## 2023-07-13 NOTE — PROGRESS NOTES
Lizet Dejesus is a 65year old female. HPI:     HPI    Pt in today for a diabetic eye exam. Pt's daughter-in-law is here to translate for pt. Pt states vision is stable with OTC readers but complains of eye pain, mainly in the right eye but mentioned she only tried doing warm compresses a few times then stopped. Pt has been a diabetic for 5+ years       Pt's diabetes is currently controlled by pills & injectable and insulin   Pt checks BS 1-2x a day    Pt's last blood sugar was 200 yesterday   Last HA1C was 7.6 on 6/13/23  Endocrinologist: Dr. Prince Cantu     Consult: per Dr. Charisse Barrientos   Last edited by Husam Grande, O.T. on 7/13/2023  3:52 PM.        Patient History:  Past Medical History:   Diagnosis Date    Asthma     Depression     Diabetes ([address]     Diabetes type 2, controlled ([address]     Disorder of thyroid     Diverticulitis 5/15    Esophageal reflux     Essential hypertension     Gout     Hepatic steatosis     Hepatomegaly     High blood pressure     High cholesterol     Hyperlipidemia     Thyroid disease        Surgical History: Lizet Dejesus has a past surgical history that includes colonoscopy (N/A, 3/29/2017) (Procedure: COLONOSCOPY;  Surgeon: Ron Donohue MD;  Location: [facility]) and colonoscopy (N/A, 1/8/2021) (Procedure: COLONOSCOPY;  Surgeon: Terry Cisneros MD;  Location: [facility]). Family History   Problem Relation Age of Onset    Cancer Father     Hypertension Father     Diabetes Mother     Cancer Mother     Hypertension Mother     Glaucoma Neg     Macular degeneration Neg        Social History:   Social History     Socioeconomic History    Marital status:     Tobacco Use    Smoking status: Never    Smokeless tobacco: Never   Vaping Use    Vaping Use: Never used   Substance and Sexual Activity    Alcohol use: No    Drug use: No   Social History Narrative    ** Merged History Encounter **            Medications:  Current Outpatient Medications   Medication Sig Dispense Refill    Insulin Pen Needle (TRUEPLUS 5-BEVEL PEN NEEDLES) 32G X 4 MM Does not apply Misc Use twice a day 200 each 3    Continuous Blood Gluc Sensor (DEXCOM G6 SENSOR) Does not apply Misc Change sensor every 10 days 9 each 0    Liraglutide (VICTOZA) 18 MG/3ML Subcutaneous Solution Pen-injector Inject 1.8 mg into the skin every morning. 9 mL 3    glipiZIDE 10 MG Oral Tab Take 1 tablet (10 mg total) by mouth 2 (two) times daily before meals. 60 tablet 0    insulin glargine (BASAGLAR KWIKPEN) 100 UNIT/ML Subcutaneous Solution Pen-injector ADMINISTER 40 UNITS UNDER THE SKIN EVERY DAY 12 mL 0    diclofenac 1 % External Gel Apply 2 g topically 4 (four) times daily. 1 each 0    naproxen 500 MG Oral Tab EC Take 1 tablet p.o. at bedtime as needed for severe pain 30 tablet 1    sertraline 100 MG Oral Tab Take 1 tablet (100 mg total) by mouth daily. 90 tablet 3    metFORMIN 500 MG Oral Tab TAKE 2 TABLETS BY MOUTH TWICE DAILY 360 tablet 3    cholecalciferol 50 MCG (2000 UT) Oral Tab Take 1 tablet (2,000 Units total) by mouth every morning. 90 tablet 3    ketoconazole 2 % External Shampoo Use to  Scalp twice a wekk 120 mL 5    Omeprazole 40 MG Oral Capsule Delayed Release Take 1 capsule (40 mg total) by mouth daily. 90 capsule 3    hydrocortisone (PROCTOZONE-HC) 2.5 % External Cream Apply to hemorrhoids for 7 to 10 days 28 g 5    zolpidem 10 MG Oral Tab Take 1 tablet (10 mg total) by mouth nightly. 30 tablet 1    traZODone 100 MG Oral Tab Take 1 tablet (100 mg total) by mouth nightly. 90 tablet 3    docusate sodium 100 MG Oral Cap Take 1 capsule (100 mg total) by mouth 2 (two) times daily as needed for constipation. 60 capsule 2    carvedilol 12.5 MG Oral Tab Take 1 tablet (12.5 mg total) by mouth daily. 180 tablet 3    simvastatin 40 MG Oral Tab Take 1 tablet (40 mg total) by mouth nightly. 90 tablet 3    methocarbamol 500 MG Oral Tab Take 1 tablet (500 mg total) by mouth 3 (three) times daily.  90 tablet 2    gabapentin 400 MG Oral Cap Take 1 capsule (400 mg total) by mouth 2 (two) times daily. 180 capsule 5    losartan 100 MG Oral Tab Take 1 tablet (100 mg total) by mouth 2 (two) times daily. 180 tablet 3    Potassium Chloride ER 10 MEQ Oral Cap CR TAKE 1 CAPSULE BY MOUTH TWICE DAILY 180 capsule 3    Continuous Blood Gluc Transmit (DEXCOM G6 TRANSMITTER) Does not apply Misc Change sensor every 90 days 1 each 0    Continuous Blood Gluc  (DEXCOM G6 ) Does not apply Device Use to monitor blood sugar level 1 each 0    Blood Glucose Monitoring Suppl (ONETOUCH VERIO) w/Device Does not apply Kit 1 kit As Directed. 1 kit 0    Insulin Pen Needle (TRUEPLUS PEN NEEDLES) 32G X 4 MM Does not apply Misc Use needles to inject insulin daily 100 each 3    Glucose Blood (ONETOUCH VERIO) In Vitro Strip USE TO CHECK BLOOD SUGAR TWICE DAILY, FASTING AND 2 HOURS AFTER A MEAL 200 strip 1    Ferrous Sulfate (IRON) 325 (65 Fe) MG Oral Tab Take 1 Dose by mouth daily. 30 tablet 0    hydroCHLOROthiazide 25 MG Oral Tab TAKE 1 TABLET BY MOUTH TWICE DAILY 180 tablet 1    levothyroxine 125 MCG Oral Tab Take 1 tablet (125 mcg total) by mouth every morning. 90 tablet 1    allopurinol 100 MG Oral Tab Take 1 tablet (100 mg total) by mouth nightly. 90 tablet 1    Lancets (ONETOUCH DELICA PLUS EXZIZK42Q) Does not apply Misc 1 strip by In Vitro route 2 (two) times daily. FASTING AND 2 HOURS AFTER A MEAL 200 each 3    HYDROcodone-acetaminophen 7.5-325 MG Oral Tab Take 1-2 tablets by mouth every 4 (four) hours as needed for Pain. 15 tablet 0    aspirin 81 MG Oral Tab EC Take 1 tablet (81 mg total) by mouth daily. Pt states 2x a day.          Allergies:  No Known Allergies    ROS:     ROS    Positive for: Endocrine, Eyes  Negative for: Constitutional, Gastrointestinal, Neurological, Skin, Genitourinary, Musculoskeletal, HENT, Cardiovascular, Respiratory, Psychiatric, Allergic/Imm, Heme/Lymph  Last edited by Jeanine Coardo OT on 7/13/2023  3:19 PM. PHYSICAL EXAM:     Base Eye Exam       Visual Acuity (Snellen - Linear)         Right Left    Dist sc 20/20 -1 20/20 -2    Near cc 20/20 20/20   NVA checked with +2.25 trial lens              Tonometry (Icare, 3:35 PM)         Right Left    Pressure 19 18              Pupils         Pupils    Right PERRL    Left PERRL              Visual Fields         Left Right     Full Full   Difficult- poor understanding              Extraocular Movement         Right Left     Full, Ortho Full, Ortho              Dilation       Both eyes: 1.0% Mydriacyl and 2.5% Joe Synephrine @ 3:36 PM              Dilation #2       Both eyes: 1.0% Mydriacyl and 2.5% Joe Synephrine @ 3:39 PM                  Slit Lamp and Fundus Exam       Slit Lamp Exam         Right Left    Lids/Lashes Dermatochalasis, Meibomian gland dysfunction, oily lids Dermatochalasis, Meibomian gland dysfunction, costa upper lid- no FB    Conjunctiva/Sclera oily tear film Nasal pinguecula, oily tear film    Cornea Clear Clear    Anterior Chamber Deep and quiet Deep and quiet    Iris Normal Normal    Lens Trace Nuclear sclerosis, Trace Posterior subcapsular cataract Trace Nuclear sclerosis, Trace Posterior subcapsular cataract    Vitreous Clear Clear              Fundus Exam         Right Left    Disc Good rim, Temporal crescent Good rim, Temporal crescent    C/D Ratio 0.4 0.4    Macula Normal- no BDR Normal- no BDR    Vessels Normal Normal    Periphery Normal Normal                  Refraction       Wearing Rx         Sphere Cylinder    Right +2.25 Sphere    Left +2.25 Sphere      Type: OTC reading only              Manifest Refraction    Pt declines refraction, happy with her OTC readers only                    ASSESSMENT/PLAN:     Diagnoses and Plan:     Age-related nuclear cataract of both eyes  Discussed mild cataracts in both eyes that are not affecting vision and are not surgical at this time.         Type 2 diabetes mellitus without retinopathy ([facility]  Diabetes type II: no background of retinopathy, no signs of neovascularization noted. Discussed ocular and systemic benefits of blood sugar control. Diagnosis and treatment discussed in detail with patient. Meibomian gland dysfunction (MGD) of both eyes  Patient was instructed to use warm compresses to the eyelids twice a day everyday. Instructions for warm compress use:   Patient should place wash compresses on both eyelids for 5 minutes every morning and every night. After 5 minutes of holding the warm compresses on the eyelids, patient should gently rub the eyelashes and then rinse thoroughly with warm water. Diagnosis discussed with patient. Use preservative free artificial tears (any over the counter brand is okay) up to 4 times per day as needed for dry eye symptoms. No orders of the defined types were placed in this encounter.       Meds This Visit:  Requested Prescriptions      No prescriptions requested or ordered in this encounter        Follow up instructions:  Return in about 1 year (around 7/13/2024) for DM Eye Exam.    7/13/2023  Scribed by: Sandy Genao MD

## 2023-07-13 NOTE — PATIENT INSTRUCTIONS
Age-related nuclear cataract of both eyes  Discussed mild cataracts in both eyes that are not affecting vision and are not surgical at this time. Type 2 diabetes mellitus without retinopathy ([address]  Diabetes type II: no background of retinopathy, no signs of neovascularization noted. Discussed ocular and systemic benefits of blood sugar control. Diagnosis and treatment discussed in detail with patient. Meibomian gland dysfunction (MGD) of both eyes  Patient was instructed to use warm compresses to the eyelids twice a day everyday. Instructions for warm compress use:   Patient should place wash compresses on both eyelids for 5 minutes every morning and every night. After 5 minutes of holding the warm compresses on the eyelids, patient should gently rub the eyelashes and then rinse thoroughly with warm water. Diagnosis discussed with patient. Use preservative free artificial tears (any over the counter brand is okay) up to 4 times per day as needed for dry eye symptoms.

## 2023-07-14 ENCOUNTER — HOSPITAL ENCOUNTER (OUTPATIENT)
Dept: MRI IMAGING | Facility: HOSPITAL | Age: 58
Discharge: HOME | End: 2023-07-14
Attending: INTERNAL MEDICINE
Payer: MEDICAID

## 2023-07-14 DIAGNOSIS — M25.50 POLYARTHRALGIA: ICD-10-CM

## 2023-07-14 DIAGNOSIS — M06.09 RHEUMATOID ARTHRITIS OF MULTIPLE SITES WITH NEGATIVE RHEUMATOID FACTOR (HCC): ICD-10-CM

## 2023-07-14 PROCEDURE — A9575 INJ GADOTERATE MEGLUMI 0.1ML: HCPCS | Performed by: INTERNAL MEDICINE

## 2023-07-14 PROCEDURE — 73220 MRI UPPR EXTREMITY W/O&W/DYE: CPT | Performed by: INTERNAL MEDICINE

## 2023-07-14 RX ORDER — GADOTERATE MEGLUMINE 376.9 MG/ML
20 INJECTION INTRAVENOUS
Status: COMPLETED | OUTPATIENT
Start: 2023-07-14 | End: 2023-07-14

## 2023-07-14 RX ADMIN — GADOTERATE MEGLUMINE 20 ML: 376.9 INJECTION INTRAVENOUS at 18:18:00

## 2023-07-17 ENCOUNTER — TELEPHONE (OUTPATIENT)
Dept: RHEUMATOLOGY | Facility: CLINIC | Age: 58
End: 2023-07-17

## 2023-07-17 DIAGNOSIS — E11.40 TYPE 2 DIABETES MELLITUS WITH DIABETIC NEUROPATHY, WITH LONG-TERM CURRENT USE OF INSULIN (HCC): ICD-10-CM

## 2023-07-17 DIAGNOSIS — Z79.4 TYPE 2 DIABETES MELLITUS WITH HYPERGLYCEMIA, WITH LONG-TERM CURRENT USE OF INSULIN (HCC): ICD-10-CM

## 2023-07-17 DIAGNOSIS — Z79.4 TYPE 2 DIABETES MELLITUS WITH DIABETIC NEUROPATHY, WITH LONG-TERM CURRENT USE OF INSULIN (HCC): ICD-10-CM

## 2023-07-17 DIAGNOSIS — E11.65 TYPE 2 DIABETES MELLITUS WITH HYPERGLYCEMIA, WITH LONG-TERM CURRENT USE OF INSULIN (HCC): ICD-10-CM

## 2023-07-17 NOTE — TELEPHONE ENCOUNTER
Noted.     Horace Soria  Em [facility] Clinical Staff3 days ago     SF  Noted. Referral has been auto updated.

## 2023-07-18 ENCOUNTER — PATIENT MESSAGE (OUTPATIENT)
Dept: RHEUMATOLOGY | Facility: CLINIC | Age: 58
End: 2023-07-18

## 2023-07-19 NOTE — TELEPHONE ENCOUNTER
Informed MRI of hand was normal.     They would like to know what next steps are as she is still having pain. Please advise.

## 2023-07-20 NOTE — TELEPHONE ENCOUNTER
LOV;10/05/22    RTC;3months    FU; No FU    Pending Monthly Supply;order pending, approve if appropriate.

## 2023-07-20 NOTE — TELEPHONE ENCOUNTER
LOV 10/5/22  RTC 3 months  No F/U scheduled  Mychart message sent reminding appt needed  Refill pending 1 month supply

## 2023-07-21 RX ORDER — LIRAGLUTIDE 6 MG/ML
1.8 INJECTION SUBCUTANEOUS EVERY MORNING
Qty: 9 ML | Refills: 3 | Status: SHIPPED | OUTPATIENT
Start: 2023-07-21

## 2023-07-21 RX ORDER — INSULIN GLARGINE 100 [IU]/ML
INJECTION, SOLUTION SUBCUTANEOUS
Qty: 12 ML | Refills: 0 | Status: SHIPPED | OUTPATIENT
Start: 2023-07-21

## 2023-07-22 ENCOUNTER — PATIENT MESSAGE (OUTPATIENT)
Dept: RHEUMATOLOGY | Facility: CLINIC | Age: 58
End: 2023-07-22

## 2023-07-22 DIAGNOSIS — I10 ESSENTIAL HYPERTENSION: ICD-10-CM

## 2023-07-22 DIAGNOSIS — F32.1 CURRENT MODERATE EPISODE OF MAJOR DEPRESSIVE DISORDER WITHOUT PRIOR EPISODE (HCC): ICD-10-CM

## 2023-07-22 DIAGNOSIS — E11.40 TYPE 2 DIABETES MELLITUS WITH DIABETIC NEUROPATHY, WITH LONG-TERM CURRENT USE OF INSULIN (HCC): ICD-10-CM

## 2023-07-22 DIAGNOSIS — M1A.09X0 IDIOPATHIC CHRONIC GOUT OF MULTIPLE SITES WITHOUT TOPHUS: ICD-10-CM

## 2023-07-22 DIAGNOSIS — Z79.4 TYPE 2 DIABETES MELLITUS WITH DIABETIC NEUROPATHY, WITH LONG-TERM CURRENT USE OF INSULIN (HCC): ICD-10-CM

## 2023-07-23 RX ORDER — LEVOTHYROXINE SODIUM 0.12 MG/1
125 TABLET ORAL EVERY MORNING
Qty: 90 TABLET | Refills: 1 | Status: SHIPPED | OUTPATIENT
Start: 2023-07-23

## 2023-07-23 RX ORDER — ALLOPURINOL 100 MG/1
100 TABLET ORAL NIGHTLY
Qty: 90 TABLET | Refills: 1 | Status: SHIPPED | OUTPATIENT
Start: 2023-07-23

## 2023-07-23 NOTE — TELEPHONE ENCOUNTER
Failed Protocol/No Protocol. Requested Prescriptions   Pending Prescriptions Disp Refills    allopurinol 100 MG Oral Tab 90 tablet 1     Sig: Take 1 tablet (100 mg total) by mouth nightly.        There is no refill protocol information for this order          Future Appointments         Provider Department Appt Notes    In 11 months Wilfrido Ruth MD [address] [facility] [address], [place] ep [place] ee          Recent Outpatient Visits              1 week ago Type 2 diabetes mellitus without retinopathy [facility])    [address], [address], Maty Ty MD    Office Visit    1 month ago [facility], [address] [facility],[address], Marimar Gimenez MD    Office Visit    1 month ago Myofascial pain syndrome of thoracic spine    [address], [facility], Love Adhikari MD    Office Visit    4 months ago Type 2 diabetes mellitus with diabetic neuropathy, with long-term current use of insulin ([facility])    [address], [place] Adhikari MD    Office Visit    8 months ago Numbness and tingling of hand    [address], [address] [facility],[address], Marimar Gimenez MD    Office Visit

## 2023-07-23 NOTE — TELEPHONE ENCOUNTER
TSH out of range for protocol. No active lab orders. Please advise on refill request.    Requested Prescriptions     Pending Prescriptions Disp Refills    levothyroxine 125 MCG Oral Tab 90 tablet 1     Sig: Take 1 tablet (125 mcg total) by mouth every morning. Recent Visits  Date Type Provider Dept   05/26/23 Office Visit Joanie Bass MD [facility]   03/14/23 Office Visit Joanie Bass MD [facility]   09/28/22 Office Visit Joanie Bass MD [facility]   09/19/22 Office Visit Joanie Bass MD [facility]   09/16/22 [facility]   07/28/22 Office Visit JORGE L Acuña [facility]   07/11/22 Office Visit Joanie Bass MD [facility]   Showing recent visits within past 540 days with a meds authorizing provider and meeting all other requirements  Future Appointments  No visits were found meeting these conditions. Showing future appointments within next 150 days with a meds authorizing provider and meeting all other requirements     Requested Prescriptions   Pending Prescriptions Disp Refills    levothyroxine 125 MCG Oral Tab 90 tablet 1     Sig: Take 1 tablet (125 mcg total) by mouth every morning.        Thyroid Medication Protocol Failed - 7/22/2023  3:01 PM        Failed - Last TSH value is normal     Lab Results   Component Value Date    TSH 0.250 (L) 05/26/2023                 Passed - TSH in past 12 months        Passed - In person appointment or virtual visit in the past 12 mos or appointment in next 3 mos     Recent Outpatient Visits              1 week ago Type 2 diabetes mellitus without retinopathy ([facility]    [address] [facility], Maty Ty MD    Office Visit    1 month ago Clarence Noriega, [address], OuJulio Saavedra MD    Office Visit    1 month ago Myofascial pain syndrome of thoracic spine    [facility], [facility] William Reinoso MD    Office Visit    4 months ago Type 2 diabetes mellitus with diabetic neuropathy, with long-term current use of insulin ([address]    [address], Corinne Horseman, [place], MD    Office Visit    8 months ago Numbness and tingling of hand    [address], [address], [place] MD Joe    Office Visit          Future Appointments         Provider Department Appt Notes    In 11 months MD Sun Najera, [address], [place] ep dm ee                   Future Appointments         Provider Department Appt Notes    In 11 months MD Sun Najera, [address], [place] ep dm ee           Recent Outpatient Visits              1 week ago Type 2 diabetes mellitus without retinopathy [facility])    Varghese Gooden MD    Office Visit    1 month ago [facility], Mya Diaz MD    Office Visit    1 month ago Myofascial pain syndrome of thoracic spine    [address], Jenny Primrose, MD    Office Visit    4 months ago Type 2 diabetes mellitus with diabetic neuropathy, with long-term current use of insulin ([address]    Adan Nicolas, Jenny Primrose, MD    Office Visit    8 months ago Numbness and tingling of hand    [address], [address] [facility], Mya Diaz MD    Office Visit

## 2023-07-24 NOTE — TELEPHONE ENCOUNTER
From: Charity Garcia  To: Karishma Ernst MD  Sent: 7/22/2023 3:05 PM CDT  Subject: MRI     Hello   What the next step we should take if mri is normal she has still that pain not that much but still has it please tell me if you gonna give any medication

## 2023-07-27 ENCOUNTER — PATIENT MESSAGE (OUTPATIENT)
Dept: ENDOCRINOLOGY CLINIC | Facility: CLINIC | Age: 58
End: 2023-07-27

## 2023-07-31 ENCOUNTER — TELEPHONE (OUTPATIENT)
Dept: INTERNAL MEDICINE CLINIC | Facility: CLINIC | Age: 58
End: 2023-07-31

## 2023-07-31 DIAGNOSIS — I10 ESSENTIAL HYPERTENSION: ICD-10-CM

## 2023-07-31 DIAGNOSIS — M1A.09X0 IDIOPATHIC CHRONIC GOUT OF MULTIPLE SITES WITHOUT TOPHUS: ICD-10-CM

## 2023-07-31 DIAGNOSIS — E11.40 TYPE 2 DIABETES MELLITUS WITH DIABETIC NEUROPATHY, WITH LONG-TERM CURRENT USE OF INSULIN (HCC): ICD-10-CM

## 2023-07-31 DIAGNOSIS — F32.1 CURRENT MODERATE EPISODE OF MAJOR DEPRESSIVE DISORDER WITHOUT PRIOR EPISODE (HCC): ICD-10-CM

## 2023-07-31 DIAGNOSIS — Z79.4 TYPE 2 DIABETES MELLITUS WITH DIABETIC NEUROPATHY, WITH LONG-TERM CURRENT USE OF INSULIN (HCC): ICD-10-CM

## 2023-08-01 RX ORDER — HYDROCHLOROTHIAZIDE 25 MG/1
TABLET ORAL
Qty: 180 TABLET | Refills: 3 | Status: SHIPPED | OUTPATIENT
Start: 2023-08-01

## 2023-08-01 RX ORDER — HYDROCORTISONE 25 MG/G
CREAM TOPICAL
Qty: 28 G | Refills: 5 | OUTPATIENT
Start: 2023-08-01

## 2023-08-01 RX ORDER — CARVEDILOL 12.5 MG/1
12.5 TABLET ORAL DAILY
Qty: 90 TABLET | Refills: 3 | Status: SHIPPED | OUTPATIENT
Start: 2023-08-01

## 2023-08-01 NOTE — TELEPHONE ENCOUNTER
Refill passed per [facility] protocol.   Requested Prescriptions   Pending Prescriptions Disp Refills    hydroCHLOROthiazide 25 MG Oral Tab 180 tablet 1     Sig: TAKE 1 TABLET BY MOUTH TWICE DAILY       Hypertensive Medications Protocol Passed - 7/31/2023  5:29 PM        Passed - In person appointment in the past 12 or next 3 months     Recent Outpatient Visits              2 weeks ago Type 2 diabetes mellitus without retinopathy ([facility] [address]    [facility], [address], Bartolo Ashby MD    Office Visit    1 month ago Sanam Negro, [address], Kenneth Candelario MD    Office Visit    2 months ago Myofascial pain syndrome of thoracic spine    [facility], [place] Alisa Sieving, MD    Office Visit    4 months ago Type 2 diabetes mellitus with diabetic neuropathy, with long-term current use of insulin ([facility] [address]    [facility], Amarilis Rose MD    Office Visit    8 months ago Numbness and tingling of hand    [facility], [address], Kenneth Candelario MD    Office Visit          Future Appointments         Provider Department Appt Notes    In 11 months David Lynn MD [facility], [address] [facility] [place], [place] ep dm ee               Passed - Last BP reading less than 140/90     BP Readings from Last 1 Encounters:  06/13/23 : 112/76              Passed - CMP or BMP in past 6 months     Recent Results (from the past 4392 hour(s))   COMP METABOLIC PANEL (14)    Collection Time: 05/26/23  4:25 PM   Result Value Ref Range    Glucose 205 (H) 70 - 99 mg/dL    Sodium 139 136 - 145 mmol/L    Potassium 3.8 3.5 - 5.1 mmol/L    Chloride 104 98 - 112 mmol/L    CO2 27.0 21.0 - 32.0 mmol/L    Anion Gap 8 0 - 18 mmol/L    BUN 21 (H) 7 - 18 mg/dL    Creatinine 0.96 0.55 - 1.02 mg/dL    BUN/CREA Ratio 21.9 (H) 10.0 - 20.0    Calcium, Total 9.8 8.5 - 10.1 mg/dL Calculated Osmolality 297 (H) 275 - 295 mOsm/kg    eGFR-Cr 69 >=60 mL/min/1.73m2    ALT 39 13 - 56 U/L    AST 23 15 - 37 U/L    Alkaline Phosphatase 90 46 - 118 U/L    Bilirubin, Total 0.5 0.1 - 2.0 mg/dL    Total Protein 7.9 6.4 - 8.2 g/dL    Albumin 3.8 3.4 - 5.0 g/dL    Globulin  4.1 2.8 - 4.4 g/dL    A/G Ratio 0.9 (L) 1.0 - 2.0    Patient Fasting for CMP? No      *Note: Due to a large number of results and/or encounters for the requested time period, some results have not been displayed. A complete set of results can be found in Results Review. Passed - In person appointment or virtual visit in the past 6 months     Recent Outpatient Visits              2 weeks ago Type 2 diabetes mellitus without retinopathy ([facility] [address]    [address] [facility], Diego Hutchinson MD    Office Visit    1 month ago Aminta Fraire MD    Office Visit    2 months ago Myofascial pain syndrome of thoracic spine    [address],[facility] [place] Chastity Beatty MD    Office Visit    4 months ago Type 2 diabetes mellitus with diabetic neuropathy, with long-term current use of insulin ([facility] [address]    [address],[facility] 100[facility] Chastity Beatty MD    Office Visit    8 months ago Numbness and tingling of hand    [address], [address] [facility],[address], Aminta Bell MD    Office Visit          Future Appointments         Provider Department Appt Notes    In 11 months [facility], [address], [facility] dm ee               Passed - EGFRCR or GFRNAA > 50     GFR Evaluation  EGFRCR: 69 , resulted on 5/26/2023            carvedilol 12.5 MG Oral Tab 180 tablet 3     Sig: Take 1 tablet (12.5 mg total) by mouth daily.        Hypertensive Medications Protocol Passed - 7/31/2023  5:29 PM        Passed - In person appointment in the past 12 or next 3 months Recent Outpatient Visits              2 weeks ago Type 2 diabetes mellitus without retinopathy ([facility] [address]    [address], [address], Nahid Ny MD    Office Visit    1 month ago Sweta Barnard Celedonio Dillon, MD    Office Visit    2 months ago Myofascial pain syndrome of thoracic spine    [address], [facility], [place] Chastity Archuleta MD    Office Visit    4 months ago Type 2 diabetes mellitus with diabetic neuropathy, with long-term current use of insulin ([facility] [address]    [address], [facility], pat Chastity Archuleta MD    Office Visit    8 months ago Numbness and tingling of hand    [address], [address], Evangelist Candelario MD    Office Visit          Future Appointments         Provider Department Appt Notes    In 11 months Amador Claros MD [facility], [address], [facility] dm ee               Passed - Last BP reading less than 140/90     BP Readings from Last 1 Encounters:  06/13/23 : 112/76              Passed - CMP or BMP in past 6 months     Recent Results (from the past 4392 hour(s))   COMP METABOLIC PANEL (14)    Collection Time: 05/26/23  4:25 PM   Result Value Ref Range    Glucose 205 (H) 70 - 99 mg/dL    Sodium 139 136 - 145 mmol/L    Potassium 3.8 3.5 - 5.1 mmol/L    Chloride 104 98 - 112 mmol/L    CO2 27.0 21.0 - 32.0 mmol/L    Anion Gap 8 0 - 18 mmol/L    BUN 21 (H) 7 - 18 mg/dL    Creatinine 0.96 0.55 - 1.02 mg/dL    BUN/CREA Ratio 21.9 (H) 10.0 - 20.0    Calcium, Total 9.8 8.5 - 10.1 mg/dL    Calculated Osmolality 297 (H) 275 - 295 mOsm/kg    eGFR-Cr 69 >=60 mL/min/1.73m2    ALT 39 13 - 56 U/L    AST 23 15 - 37 U/L    Alkaline Phosphatase 90 46 - 118 U/L    Bilirubin, Total 0.5 0.1 - 2.0 mg/dL    Total Protein 7.9 6.4 - 8.2 g/dL    Albumin 3.8 3.4 - 5.0 g/dL    Globulin  4.1 2.8 - 4.4 g/dL    A/G Ratio 0.9 (L) 1.0 - 2.0 Patient Fasting for CMP? No      *Note: Due to a large number of results and/or encounters for the requested time period, some results have not been displayed. A complete set of results can be found in Results Review.                Passed - In person appointment or virtual visit in the past 6 months     Recent Outpatient Visits              2 weeks ago Type 2 diabetes mellitus without retinopathy ([facility] [address]    Karie Faith MD    Office Visit    1 month ago Pavan Araujo, Kailee Delgado MD    Office Visit    2 months ago Myofascial pain syndrome of thoracic spine    [facility]    Office Visit    4 months ago Type 2 diabetes mellitus with diabetic neuropathy, with long-term current use of insulin ([facility] [address]    [facility]    Office Visit    8 months ago Numbness and tingling of hand    Juan Daniel Lentz, [address] [facility] [address], Kailee Delgado MD    Office Visit          Future Appointments         Provider Department Appt Notes    In 11 months Amina Faustin MD [facility], [address], [facility] dm ee               Passed - EGFRCR or GFRNAA > 50     GFR Evaluation  EGFRCR: 69 , resulted on 5/26/2023           Refused Prescriptions Disp Refills    hydrocortisone (PROCTOZONE-HC) 2.5 % External Cream 28 g 5     Sig: Apply to hemorrhoids for 7 to 10 days       Gastrointestional Medication Protocol Passed - 7/31/2023  5:29 PM        Passed - In person appointment or virtual visit in the past 12 mos or appointment in next 3 mos     Recent Outpatient Visits              2 weeks ago Type 2 diabetes mellitus without retinopathy ([facility] [address]    Karie Faith MD    Office Visit    1 month ago Angel Ragsdale Rohit Green, [address], Julio Candelario MD    Office Visit    2 months ago Myofascial pain syndrome of thoracic spine    [address], [place], [facility]    Office Visit    4 months ago Type 2 diabetes mellitus with diabetic neuropathy, with long-term current use of insulin ([address]    [address], [place], [facility]    Office Visit    8 months ago Numbness and tingling of hand    [address], [address], Marimar Gimenez MD    Office Visit          Future Appointments         Provider Department Appt Notes    In 11 months Wilfrido Ruth MD [address], [address], [place] ep dm ee                  Recent Outpatient Visits              2 weeks ago Type 2 diabetes mellitus without retinopathy [facility])    Maria Teresa Gonzalez, Maty Ty MD    Office Visit    1 month ago Marimar Bowie MD    Office Visit    2 months ago Myofascial pain syndrome of thoracic spine    [address], [place], Love Adhikari MD    Office Visit    4 months ago Type 2 diabetes mellitus with diabetic neuropathy, with long-term current use of insulin ([address]    [address], [facility] MD Chastity    Office Visit    8 months ago Numbness and tingling of hand    Marimar Butler MD    Office Visit          Future Appointments         Provider Department Appt Notes    In 11 months MD Maria Teresa Salgado [place] ep dm ee

## 2023-08-04 NOTE — TELEPHONE ENCOUNTER
Hi!  The reason is because, she was supposed to see me in follow-up 7 months ago. She was supposed to send me her blood  sugars in October. I cannot refill prescriptions if I do not know whether or not this is the right medication for her. Please ask her to make an appointment so that we can determine whether or not this is the correct medication. Thank you!

## 2023-08-04 NOTE — TELEPHONE ENCOUNTER
Spoke to pharmacy - patient picked up basaglar (one month supply)  [facility] sent advising patient that Dr. Марина Stanford is booking into mid-September and to call to schedule f/u

## 2023-08-08 NOTE — TELEPHONE ENCOUNTER
Left message to schedule follow up appointment with Dr. Hernan Vance. July 24, 2023  Me   to Lizet Dejesus   [facility]      7/24/23  9:48 AM  Pranay Rivas,     If you are continuing to have pain. It would be best to schedule a follow up appointment with the provider. Thank you  Denise Ansari Last read by Lizet Dejesus at 11:12 PM on 7/25/2023.

## 2023-08-18 DIAGNOSIS — Z79.4 TYPE 2 DIABETES MELLITUS WITH DIABETIC NEUROPATHY, WITH LONG-TERM CURRENT USE OF INSULIN (HCC): ICD-10-CM

## 2023-08-18 DIAGNOSIS — E11.40 TYPE 2 DIABETES MELLITUS WITH DIABETIC NEUROPATHY, WITH LONG-TERM CURRENT USE OF INSULIN (HCC): ICD-10-CM

## 2023-08-18 RX ORDER — INSULIN GLARGINE 100 [IU]/ML
INJECTION, SOLUTION SUBCUTANEOUS
Qty: 12 ML | Refills: 0 | Status: SHIPPED | OUTPATIENT
Start: 2023-08-18

## 2023-08-18 NOTE — TELEPHONE ENCOUNTER
Current Outpatient Medications   Medication Sig Dispense Refill    insulin glargine (BASAGLAR KWIKPEN) 100 UNIT/ML Subcutaneous Solution Pen-injector ADMINISTER 40 UNITS UNDER THE SKIN EVERY DAY 12 mL 0

## 2023-09-06 DIAGNOSIS — Z79.4 TYPE 2 DIABETES MELLITUS WITH DIABETIC NEUROPATHY, WITH LONG-TERM CURRENT USE OF INSULIN (HCC): ICD-10-CM

## 2023-09-06 DIAGNOSIS — E11.40 TYPE 2 DIABETES MELLITUS WITH DIABETIC NEUROPATHY, WITH LONG-TERM CURRENT USE OF INSULIN (HCC): ICD-10-CM

## 2023-09-06 DIAGNOSIS — E11.65 TYPE 2 DIABETES MELLITUS WITH HYPERGLYCEMIA, WITH LONG-TERM CURRENT USE OF INSULIN (HCC): ICD-10-CM

## 2023-09-06 DIAGNOSIS — Z79.4 TYPE 2 DIABETES MELLITUS WITH HYPERGLYCEMIA, WITH LONG-TERM CURRENT USE OF INSULIN (HCC): ICD-10-CM

## 2023-09-07 RX ORDER — INSULIN GLARGINE 100 [IU]/ML
INJECTION, SOLUTION SUBCUTANEOUS
Qty: 12 ML | Refills: 0 | Status: SHIPPED | OUTPATIENT
Start: 2023-09-07

## 2023-09-07 RX ORDER — LIRAGLUTIDE 6 MG/ML
1.8 INJECTION SUBCUTANEOUS EVERY MORNING
Qty: 9 ML | Refills: 3 | Status: SHIPPED | OUTPATIENT
Start: 2023-09-07

## 2023-09-11 ENCOUNTER — TELEPHONE (OUTPATIENT)
Dept: ENDOCRINOLOGY CLINIC | Facility: CLINIC | Age: 58
End: 2023-09-11

## 2023-09-11 DIAGNOSIS — E11.40 TYPE 2 DIABETES MELLITUS WITH DIABETIC NEUROPATHY, WITH LONG-TERM CURRENT USE OF INSULIN (HCC): ICD-10-CM

## 2023-09-11 DIAGNOSIS — Z79.4 TYPE 2 DIABETES MELLITUS WITH DIABETIC NEUROPATHY, WITH LONG-TERM CURRENT USE OF INSULIN (HCC): ICD-10-CM

## 2023-09-11 DIAGNOSIS — M1A.09X0 IDIOPATHIC CHRONIC GOUT OF MULTIPLE SITES WITHOUT TOPHUS: ICD-10-CM

## 2023-09-11 DIAGNOSIS — E11.65 TYPE 2 DIABETES MELLITUS WITH HYPERGLYCEMIA, WITH LONG-TERM CURRENT USE OF INSULIN (HCC): ICD-10-CM

## 2023-09-11 DIAGNOSIS — Z79.4 TYPE 2 DIABETES MELLITUS WITH HYPERGLYCEMIA, WITH LONG-TERM CURRENT USE OF INSULIN (HCC): ICD-10-CM

## 2023-09-11 DIAGNOSIS — F32.1 CURRENT MODERATE EPISODE OF MAJOR DEPRESSIVE DISORDER WITHOUT PRIOR EPISODE (HCC): ICD-10-CM

## 2023-09-11 DIAGNOSIS — I10 ESSENTIAL HYPERTENSION: ICD-10-CM

## 2023-09-11 RX ORDER — GLIPIZIDE 10 MG/1
10 TABLET ORAL
Qty: 60 TABLET | Refills: 0 | Status: SHIPPED | OUTPATIENT
Start: 2023-09-11 | End: 2023-09-14

## 2023-09-11 NOTE — TELEPHONE ENCOUNTER
Hyperglycemia  - Dr. Nikunj Roblero (I)    Onset of hyperglycemia:   1 week    Daughter states they have been rationing patient's medication supply to make them last longer. It has been a week of not being on usual regimen. Pt has only been taking metformin 500 mg BID    BG levels: This AM - \"200 something fasting\"  Yesterday and before yesterday 204  Other days were: 195, 189, 207    Symptoms:   Headache    Pattern of hyperglycemia:   Fasting    Steroid therapy: Denies    Acute Illness: Denies    Change in Diet: Denies    List DM Medications/Compliance:   Basaglar total of 40 units per day (pt is doing 20 units in the morning, 20 units in the evening)  Metformin 1000 mg BID >>  500 mg BID to make supply longer  Glipizide 10 mg BID   Victoza 1.8 mg daily      Per patient's daughter, Dr. Nikunj Roblero explained to them during last visit that follow up appointments are needed in order to refill medication as that is the only way efficacy of regimen, but claims office had been withholding medication for a month. RN informed daughter that providers do not withhold medication and would always give a month supply just to hold medication till scheduled appointment. RN also stressed to daughter that in order to safely manage patient's care, follow up appointments are necessary. RN suggested to make recommended follow up appointment before leaving the clinic in order to secure spot and avoid issues of getting in to see the doctor. RN advised daughter to resume DM medications as presribed once they get the supplies from the pharmacy. RN explained to daughter that this is the main reason why hyperglycemia is occurring -- not getting the medications/dose patient needs. Daughter was also informed that basaglar and Kati Cirri were actually refilled on 9/7/23. Per daughter, she was told by the pharmacy that they did not have medications in stock and to call the office.      RN sent metformin and glipizide per protocol to last till next appointment. RN tried calling daughter back. No answer. Left detailed message regarding victoza and basaglar per pharmacy below and also that we sent metformin and glipizide.   RN also sent mychart message    Future Appointments   Date Time Provider Alysia Winters   10/11/2023  5:00 PM Maliha Siu MD [facility] [place]     Will route to provider as Zen Edwards and see if there are further recommendation     Florina Freedman - pharmacist  Ronaldo Reyna ready for

## 2023-09-11 NOTE — TELEPHONE ENCOUNTER
Ryder Tran states patient has been experiencing elevated blood sugars between 230 - 300 mg/dL. Please call. Thank you.

## 2023-09-14 RX ORDER — GLIPIZIDE 10 MG/1
10 TABLET ORAL
Qty: 180 TABLET | Refills: 0 | Status: SHIPPED | OUTPATIENT
Start: 2023-09-14 | End: 2023-12-13

## 2023-09-21 DIAGNOSIS — M1A.09X0 IDIOPATHIC CHRONIC GOUT OF MULTIPLE SITES WITHOUT TOPHUS: ICD-10-CM

## 2023-09-21 DIAGNOSIS — Z79.4 TYPE 2 DIABETES MELLITUS WITH DIABETIC NEUROPATHY, WITH LONG-TERM CURRENT USE OF INSULIN (HCC): ICD-10-CM

## 2023-09-21 DIAGNOSIS — I10 ESSENTIAL HYPERTENSION: ICD-10-CM

## 2023-09-21 DIAGNOSIS — E11.40 TYPE 2 DIABETES MELLITUS WITH DIABETIC NEUROPATHY, WITH LONG-TERM CURRENT USE OF INSULIN (HCC): ICD-10-CM

## 2023-09-21 DIAGNOSIS — F32.1 CURRENT MODERATE EPISODE OF MAJOR DEPRESSIVE DISORDER WITHOUT PRIOR EPISODE (HCC): ICD-10-CM

## 2023-09-21 NOTE — TELEPHONE ENCOUNTER
Requested Prescriptions     Pending Prescriptions Disp Refills    diclofenac 1 % External Gel 1 each 0     Sig: Apply 2 g topically 4 (four) times daily.     naproxen 500 MG Oral Tab EC 30 tablet 1     Sig: Take 1 tablet p.o. at bedtime as needed for severe pain     LOV:  6/13/23   Future Appointments   Date Time Provider Alysia Winters   9/27/2023  4:20 PM José Manuel Pimentel MD [facility] [place]   10/11/2023  5:00 PM Nadege Weber MD [facility]   7/16/2024  4:45 PM Cristino Manley MD [address] [place]     Labs:   Component      Latest Ref Rng 6/13/2023   WBC      4.0 - 11.0 x10(3) uL 12.7 (H)    RBC      3.80 - 5.30 x10(6)uL 5.22    Hemoglobin      12.0 - 16.0 g/dL 12.5    Hematocrit      35.0 - 48.0 % 37.6    MCV      80.0 - 100.0 fL 72.0 (L)    MCH      26.0 - 34.0 pg 23.9 (L)    MCHC      31.0 - 37.0 g/dL 33.2    RDW-SD      35.1 - 46.3 fL 39.8    RDW      11.0 - 15.0 % 15.6 (H)    Platelet Count      978.0 - 450.0 10(3)uL 369.0    Prelim Neutrophil Abs      1.50 - 7.70 x10 (3) uL 7.38    Neutrophils Absolute      1.50 - 7.70 x10(3) uL 7.38    Lymphocytes Absolute      1.00 - 4.00 x10(3) uL 4.18 (H)    Monocytes Absolute      0.10 - 1.00 x10(3) uL 0.67    Eosinophils Absolute      0.00 - 0.70 x10(3) uL 0.34    Basophils Absolute      0.00 - 0.20 x10(3) uL 0.06    Immature Granulocyte Absolute      0.00 - 1.00 x10(3) uL 0.04    Neutrophils %      % 58.2    Lymphocytes %      % 33.0    Monocytes %      % 5.3    Eosinophils %      % 2.7    Basophils %      % 0.5    Immature Granulocyte %      % 0.3    HEMOGLOBIN A1c      <5.7 % 7.6 (H)    ESTIMATED AVERAGE GLUCOSE      68 - 126 mg/dL 171 (H)    C-Citrullinated Peptide IgG AB      0.0 - 6.9 U/mL 24.2 (H)    C-REACTIVE PROTEIN      <0.30 mg/dL 0.86 (H)    SED RATE      0 - 30 mm/Hr 26    RHEUMATOID FACTOR      <15 IU/mL <10       Legend:  (H) High  (L) Low  You were seen today for hand pain, numbness and tingling and joint pain  Blood work in the past showed possible rheumatoid arthritis  EMG/nerve test also showed carpal tunnel  We injected both wrist with cortisone  Plan to repeat blood work  Get MRI of the right hand  If you continue to have the numbness and tingling would recommend to see hand surgeon

## 2023-09-22 RX ORDER — SIMVASTATIN 40 MG
40 TABLET ORAL NIGHTLY
Qty: 90 TABLET | Refills: 3 | OUTPATIENT
Start: 2023-09-22

## 2023-09-22 RX ORDER — HYDROCORTISONE 25 MG/G
CREAM TOPICAL
Qty: 28 G | Refills: 5 | Status: SHIPPED | OUTPATIENT
Start: 2023-09-22

## 2023-09-22 RX ORDER — CARVEDILOL 12.5 MG/1
12.5 TABLET ORAL DAILY
Qty: 90 TABLET | Refills: 3 | OUTPATIENT
Start: 2023-09-22

## 2023-09-22 RX ORDER — TRAZODONE HYDROCHLORIDE 100 MG/1
100 TABLET ORAL NIGHTLY
Qty: 90 TABLET | Refills: 3 | OUTPATIENT
Start: 2023-09-22

## 2023-09-22 RX ORDER — ALLOPURINOL 100 MG/1
100 TABLET ORAL NIGHTLY
Qty: 90 TABLET | Refills: 1 | OUTPATIENT
Start: 2023-09-22

## 2023-09-22 RX ORDER — LOSARTAN POTASSIUM 100 MG/1
100 TABLET ORAL 2 TIMES DAILY
Qty: 180 TABLET | Refills: 3 | OUTPATIENT
Start: 2023-09-22

## 2023-09-22 RX ORDER — LEVOTHYROXINE SODIUM 0.12 MG/1
125 TABLET ORAL EVERY MORNING
Qty: 90 TABLET | Refills: 1 | OUTPATIENT
Start: 2023-09-22

## 2023-09-22 RX ORDER — HYDROCHLOROTHIAZIDE 25 MG/1
TABLET ORAL
Qty: 180 TABLET | Refills: 3 | OUTPATIENT
Start: 2023-09-22

## 2023-09-22 RX ORDER — GABAPENTIN 400 MG/1
400 CAPSULE ORAL 2 TIMES DAILY
Qty: 180 CAPSULE | Refills: 5 | OUTPATIENT
Start: 2023-09-22

## 2023-09-22 RX ORDER — METHOCARBAMOL 500 MG/1
500 TABLET, FILM COATED ORAL 3 TIMES DAILY
Qty: 90 TABLET | Refills: 2 | Status: SHIPPED | OUTPATIENT
Start: 2023-09-22

## 2023-09-22 RX ORDER — ZOLPIDEM TARTRATE 10 MG/1
10 TABLET ORAL NIGHTLY
Qty: 30 TABLET | Refills: 1 | Status: SHIPPED | OUTPATIENT
Start: 2023-09-22

## 2023-09-22 NOTE — TELEPHONE ENCOUNTER
Please review; protocol failed. Requested Prescriptions   Pending Prescriptions Disp Refills    methocarbamol 500 MG Oral Tab 90 tablet 2     Sig: Take 1 tablet (500 mg total) by mouth 3 (three) times daily. There is no refill protocol information for this order       zolpidem 10 MG Oral Tab 30 tablet 1     Sig: Take 1 tablet (10 mg total) by mouth nightly.        There is no refill protocol information for this order      Signed Prescriptions Disp Refills    hydrocortisone (PROCTOZONE-HC) 2.5 % External Cream 28 g 5     Sig: Apply to hemorrhoids for 7 to 10 days       Gastrointestional Medication Protocol Passed - 9/21/2023  3:07 PM        Passed - In person appointment or virtual visit in the past 12 mos or appointment in next 3 mos     Recent Outpatient Visits              2 months ago Type 2 diabetes mellitus without retinopathy ([facility]    [address], [address] [facility],[address]Rd Floor, Barbra Toledo MD    Office Visit    3 months ago [facility], [address] [place] [address],[address], Armand Candelario MD    Office Visit    3 months ago Myofascial pain syndrome of thoracic spine    Page, DeChastity Conner MD    Office Visit    6 months ago Type 2 diabetes mellitus with diabetic neuropathy, with long-term current use of insulin ([facility]    [address], [facility] Chastity Lin MD    Office Visit    10 months ago Numbness and tingling of hand    [address], [address] [facility],3Rd Floor, Merritt Le MD    Office Visit          Future Appointments         Provider Department Appt Notes    In 5 days Faina Moran MD [address], [address], [place] Pimple and dryness on scalp    In 2 weeks Davi Mccollum MD [address], [place] [address] [facility] Regular check up n blood work    In 5 months Giovany Beck MD [address], [address] [facility],[address] [place] [address] ep dm ee                     Refused Prescriptions Disp Refills    gabapentin 400 MG Oral Cap 180 capsule 5     Sig: Take 1 capsule (400 mg total) by mouth 2 (two) times daily. Neurology Medications Passed - 9/21/2023  3:07 PM        Passed - In person appointment or virtual visit in the past 6 mos or appointment in next 3 mos     Recent Outpatient Visits              2 months ago Type 2 diabetes mellitus without retinopathy ([facility]    Juan Almonte MD    Office Visit    3 months ago Demi George, [address] [facility] [address], Minerva Candelario MD    Office Visit    3 months ago Myofascial pain syndrome of thoracic spine    [facility], Chastity Hendrix MD    Office Visit    6 months ago Type 2 diabetes mellitus with diabetic neuropathy, with long-term current use of insulin ([facility]    [address],[facility], [facility], Chastity Hendrix MD    Office Visit    10 months ago Numbness and tingling of hand    [address], [address] [facility],3Rd Floor, Johan Reyes MD    Office Visit          Future Appointments         Provider Department Appt Notes    In 5 days Favio Smith MD [facility] [address], [place] Pimple and dryness on scalp    In 2 weeks Argelia Zee MD [address],[facility], [place] [address]001 [facility] Regular check up n blood work    In 9 months Jocelyn Wallace MD [facility] ep dm ee                      losartan 100 MG Oral Tab 180 tablet 3     Sig: Take 1 tablet (100 mg total) by mouth 2 (two) times daily.        Hypertensive Medications Protocol Passed - 9/21/2023  3:07 PM        Passed - In person appointment in the past 12 or next 3 months     Recent Outpatient Visits              2 months ago Type 2 diabetes mellitus without retinopathy ([facility]    [facility] Camille Coleman MD    Office Visit    3 months ago [employer], [address], Favio Candelario MD    Office Visit    3 months ago Myofascial pain syndrome of thoracic spine    [employer], [facility], Chastity Kang MD    Office Visit    6 months ago Type 2 diabetes mellitus with diabetic neuropathy, with long-term current use of insulin ([facility] [address] [address]    [address], [facility], Chastity Kang MD    Office Visit    10 months ago Numbness and tingling of hand    [address], [address], Favio Candelario MD    Office Visit          Future Appointments         Provider Department Appt Notes    In 5 days Maco Solares MD [address], [place] Pimple and dryness on scalp    In 2 weeks Polo Lorenzo MD [address] Regular check up n blood work    In 9 months Beto Olmstead MD [address] ep dm ee                    Passed - Last BP reading less than 140/90     BP Readings from Last 1 Encounters:  06/13/23 : 112/76              Passed - CMP or BMP in past 6 months     Recent Results (from the past 4392 hour(s))   Comp Metabolic Panel (14)    Collection Time: 05/26/23  4:25 PM   Result Value Ref Range    Glucose 205 (H) 70 - 99 mg/dL    Sodium 139 136 - 145 mmol/L    Potassium 3.8 3.5 - 5.1 mmol/L    Chloride 104 98 - 112 mmol/L    CO2 27.0 21.0 - 32.0 mmol/L    Anion Gap 8 0 - 18 mmol/L    BUN 21 (H) 7 - 18 mg/dL    Creatinine 0.96 0.55 - 1.02 mg/dL    BUN/CREA Ratio 21.9 (H) 10.0 - 20.0    Calcium, Total 9.8 8.5 - 10.1 mg/dL    Calculated Osmolality 297 (H) 275 - 295 mOsm/kg    eGFR-Cr 69 >=60 mL/min/1.73m2    ALT 39 13 - 56 U/L    AST 23 15 - 37 U/L    Alkaline Phosphatase 90 46 - 118 U/L    Bilirubin, Total 0.5 0.1 - 2.0 mg/dL    Total Protein 7.9 6.4 - 8.2 g/dL Albumin 3.8 3.4 - 5.0 g/dL    Globulin  4.1 2.8 - 4.4 g/dL    A/G Ratio 0.9 (L) 1.0 - 2.0    Patient Fasting for CMP? No      *Note: Due to a large number of results and/or encounters for the requested time period, some results have not been displayed. A complete set of results can be found in Results Review. Passed - In person appointment or virtual visit in the past 6 months     Recent Outpatient Visits              2 months ago Type 2 diabetes mellitus without retinopathy ([facility] [address]    Maty Butler MD    Office Visit    3 months ago Sweta Bowie Lambert Olea, MD    Office Visit    3 months ago Myofascial pain syndrome of thoracic spine    [facility]    Office Visit    6 months ago Type 2 diabetes mellitus with diabetic neuropathy, with long-term current use of insulin ([facility] [address]    [address],[place] [address], [facility]    Office Visit    10 months ago Numbness and tingling of hand    [address], [address] [facility],3Rd Floor, Marimar Gimenez MD    Office Visit          Future Appointments         Provider Department Appt Notes    In 5 days Joanie Bass MD [facility], [address], [place] Pimple and dryness on scalp    In 2 weeks Stewart Tang MD [facility], [place] [address] [place] [address] Regular check up n blood work    In 9 months Wilfrido Ruth MD [address], [address], [facility] dm ee                    Passed - EGFRCR or GFRNAA > 50     GFR Evaluation  EGFRCR: 69 , resulted on 5/26/2023            simvastatin 40 MG Oral Tab 90 tablet 3     Sig: Take 1 tablet (40 mg total) by mouth nightly.        Cholesterol Medication Protocol Failed - 9/21/2023  3:07 PM        Failed - Last LDL < 130     Lab Results   Component Value Date  (H) 2023             Passed - ALT in past 12 months        Passed - LDL in past 12 months        Passed - Last ALT < 80     Lab Results   Component Value Date    ALT 39 2023             Passed - In person appointment or virtual visit in the past 12 mos or appointment in next 3 mos     Recent Outpatient Visits              2 months ago Type 2 diabetes mellitus without retinopathy ([facility] [address]    Apolinar Munoz, Penny Jimenez MD    Office Visit    3 months ago [facility], [address], Mikey Candelario MD    Office Visit    3 months ago Myofascial pain syndrome of thoracic spine    [facility], [facility]    Office Visit    6 months ago Type 2 diabetes mellitus with diabetic neuropathy, with long-term current use of insulin ([facility] [address]    [facility], [facility] Chastity Rae MD    Office Visit    10 months ago Numbness and tingling of hand    [facility], [address] [facility],3Rd Floor, Ting Aranda MD    Office Visit          Future Appointments         Provider Department Appt Notes    In 5 days Cee Van MD [facility], [facility] [address], [place] Pimple and dryness on scalp    In 2 weeks Alize Roche MD [facility], [place] [address] [facility] Regular check up n blood work    In 9 months Jim Musa MD [facility] dm ee                      traZODone 100 MG Oral Tab 90 tablet 3     Sig: Take 1 tablet (100 mg total) by mouth nightly. There is no refill protocol information for this order       levothyroxine 125 MCG Oral Tab 90 tablet 1     Sig: Take 1 tablet (125 mcg total) by mouth every morning.        Thyroid Medication Protocol Failed - 2023  3:07 PM        Failed - Last TSH value is normal     Lab Results   Component Value Date    TSH 0.250 (L) 05/26/2023                 Passed - TSH in past 12 months        Passed - In person appointment or virtual visit in the past 12 mos or appointment in next 3 mos     Recent Outpatient Visits              2 months ago Type 2 diabetes mellitus without retinopathy ([facility] [address]    [facility], Nahid Ny MD    Office Visit    3 months ago Ailin No, [address], Evangelist Candelario MD    Office Visit    3 months ago Myofascial pain syndrome of thoracic spine    [facility], [facility], MD    Office Visit    6 months ago Type 2 diabetes mellitus with diabetic neuropathy, with long-term current use of insulin ([facility] [address]    [address], [facility] Cl[place], MD    Office Visit    10 months ago Numbness and tingling of hand    [address], [address], Efrem Norris MD    Office Visit          Future Appointments         Provider Department Appt Notes    In 5 days Natalya Justice MD [facility], [facility] [address], [place] Pimple and dryness on scalp    In 2 weeks Ronnie Rossi MD [facility], [address], [address] Regular check up n blood work    In 9 months Amador Claros MD [address] dm ee                      allopurinol 100 MG Oral Tab 90 tablet 1     Sig: Take 1 tablet (100 mg total) by mouth nightly.        There is no refill protocol information for this order       hydroCHLOROthiazide 25 MG Oral Tab 180 tablet 3     Sig: TAKE 1 TABLET BY MOUTH TWICE DAILY       Hypertensive Medications Protocol Passed - 9/21/2023  3:07 PM        Passed - In person appointment in the past 12 or next 3 months     Recent Outpatient Visits              2 months ago Type 2 diabetes mellitus without retinopathy ([facility] [address]    [address], [address], [address] Jose Bran MD    Office Visit    3 months ago [facility], [address], Rikki Candelario MD    Office Visit    3 months ago Myofascial pain syndrome of thoracic spine    [facility], [facility], Chastity Grey MD    Office Visit    6 months ago Type 2 diabetes mellitus with diabetic neuropathy, with long-term current use of insulin ([address]    Hasmukh Clemens, [facility], Chastity Grey MD    Office Visit    10 months ago Numbness and tingling of hand    Hasmukh Clemens, [address], Rikki Candelario MD    Office Visit          Future Appointments         Provider Department Appt Notes    In 5 days MD Hasmukh [place], [facility] [address], [place] Pimple and dryness on scalp    In 2 weeks Anahy Adan MD [facility], [address] Regular check up n blood work    In 9 months MD Hasmukh Luu, [facility] ep dm ee                    Passed - Last BP reading less than 140/90     BP Readings from Last 1 Encounters:  06/13/23 : 112/76              Passed - CMP or BMP in past 6 months     Recent Results (from the past 4392 hour(s))   Comp Metabolic Panel (14)    Collection Time: 05/26/23  4:25 PM   Result Value Ref Range    Glucose 205 (H) 70 - 99 mg/dL    Sodium 139 136 - 145 mmol/L    Potassium 3.8 3.5 - 5.1 mmol/L    Chloride 104 98 - 112 mmol/L    CO2 27.0 21.0 - 32.0 mmol/L    Anion Gap 8 0 - 18 mmol/L    BUN 21 (H) 7 - 18 mg/dL    Creatinine 0.96 0.55 - 1.02 mg/dL    BUN/CREA Ratio 21.9 (H) 10.0 - 20.0    Calcium, Total 9.8 8.5 - 10.1 mg/dL    Calculated Osmolality 297 (H) 275 - 295 mOsm/kg    eGFR-Cr 69 >=60 mL/min/1.73m2    ALT 39 13 - 56 U/L    AST 23 15 - 37 U/L    Alkaline Phosphatase 90 46 - 118 U/L    Bilirubin, Total 0.5 0.1 - 2.0 mg/dL    Total Protein 7.9 6.4 - 8.2 g/dL    Albumin 3.8 3.4 - 5.0 g/dL    Globulin  4.1 2.8 - 4.4 g/dL    A/G Ratio 0.9 (L) 1.0 - 2.0    Patient Fasting for CMP? No      *Note: Due to a large number of results and/or encounters for the requested time period, some results have not been displayed. A complete set of results can be found in Results Review. Passed - In person appointment or virtual visit in the past 6 months     Recent Outpatient Visits              2 months ago Type 2 diabetes mellitus without retinopathy ([facility] [address]    [address], [address], Bartolo Ashby MD    Office Visit    3 months ago Sanam Negro, [address], Katarzyna Candelario MD    Office Visit    3 months ago Myofascial pain syndrome of thoracic spine    [facility], [facility], Chastity Jackson MD    Office Visit    6 months ago Type 2 diabetes mellitus with diabetic neuropathy, with long-term current use of insulin ([facility] [address]    [address], [facility], Chastity Jackson MD    Office Visit    10 months ago Numbness and tingling of hand    [address], [address], Nasreen Mims MD    Office Visit          Future Appointments         Provider Department Appt Notes    In 5 days Edda Rapp MD [facility], [facility] [address], [place] Pimple and dryness on scalp    In 2 weeks Narcisa Ponce MD [facility], [place] [address] [facility] Regular check up n blood work    In 9 months David Lynn MD [address], [address], [place] ep dm ee                    Passed - EGFRCR or GFRNAA > 50     GFR Evaluation  EGFRCR: 69 , resulted on 5/26/2023            carvedilol 12.5 MG Oral Tab 90 tablet 3     Sig: Take 1 tablet (12.5 mg total) by mouth daily.        Hypertensive Medications Protocol Passed - 9/21/2023  3:07 PM        Passed - In person appointment in the past 12 or next 3 months     Recent Outpatient Visits              2 months ago Type 2 diabetes mellitus without retinopathy ([facility] [address]    Juan Daniel Lentz, [address] [address], Karie Saleem MD    Office Visit    3 months ago Lyubov Vincent, [address], Kenneth Candelario MD    Office Visit    3 months ago Myofascial pain syndrome of thoracic spine    [facility], [facility], [facility]    Office Visit    6 months ago Type 2 diabetes mellitus with diabetic neuropathy, with long-term current use of insulin ([facility] [address]    Juan Daniel Lentz, [facility], [facility] system[facility]ity Cline MD    Office Visit    10 months ago Numbness and tingling of hand    Juan Daniel Lentz, [address], Kenneth Candelario MD    Office Visit          Future Appointments         Provider Department Appt Notes    In 5 days MD [facility] [place] Pimple and dryness on scalp    In 2 weeks Julio C Clark MD [facility], [place] [address] [place] [address], [facility] [address] Regular check up n blood work    In 9 months MD [facility] ep dm ee                    Passed - Last BP reading less than 140/90     BP Readings from Last 1 Encounters:  06/13/23 : 112/76              Passed - CMP or BMP in past 6 months     Recent Results (from the past 4392 hour(s))   Comp Metabolic Panel (14)    Collection Time: 05/26/23  4:25 PM   Result Value Ref Range    Glucose 205 (H) 70 - 99 mg/dL    Sodium 139 136 - 145 mmol/L    Potassium 3.8 3.5 - 5.1 mmol/L    Chloride 104 98 - 112 mmol/L    CO2 27.0 21.0 - 32.0 mmol/L    Anion Gap 8 0 - 18 mmol/L    BUN 21 (H) 7 - 18 mg/dL    Creatinine 0.96 0.55 - 1.02 mg/dL    BUN/CREA Ratio 21.9 (H) 10.0 - 20.0    Calcium, Total 9.8 8.5 - 10.1 mg/dL    Calculated Osmolality 297 (H) 275 - 295 mOsm/kg    eGFR-Cr 69 >=60 mL/min/1.73m2    ALT 39 13 - 56 U/L    AST 23 15 - 37 U/L    Alkaline Phosphatase 90 46 - 118 U/L    Bilirubin, Total 0.5 0.1 - 2.0 mg/dL    Total Protein 7.9 6.4 - 8.2 g/dL    Albumin 3.8 3.4 - 5.0 g/dL    Globulin  4.1 2.8 - 4.4 g/dL    A/G Ratio 0.9 (L) 1.0 - 2.0    Patient Fasting for CMP? No      *Note: Due to a large number of results and/or encounters for the requested time period, some results have not been displayed. A complete set of results can be found in Results Review.                Passed - In person appointment or virtual visit in the past 6 months     Recent Outpatient Visits              2 months ago Type 2 diabetes mellitus without retinopathy ([address]    Mari Crump MD    Office Visit    3 months ago Kelsey Gordon, [address] [facility] [address] [facility] [address], Rikki Candelario MD    Office Visit    3 months ago Myofascial pain syndrome of thoracic spine    [facility], [facility], Chastity Grey MD    Office Visit    6 months ago Type 2 diabetes mellitus with diabetic neuropathy, with long-term current use of insulin ([facility] [address]    Hasmukh Clemens, [facility], Chastity Grey MD    Office Visit    10 months ago Numbness and tingling of hand    Hasmukh Clemens, [address] [facility],3R[address], Valdene Moritz, MD    Office Visit          Future Appointments         Provider Department Appt Notes    In 5 days Nile Goodwin MD [facility], [facility] [address], [place] Pimple and dryness on scalp    In 2 weeks Anahy Adan MD [facility], [place] [address] [facility] Regular check up n blood work    In 9 months MD Hasmukh Luu, [address], [facility] dm ee                    Passed - EGFRCR or GFRNAA > 50     GFR Evaluation  EGFRCR: 69 , resulted on 5/26/2023             Recent Outpatient Visits              2 months ago Type 2 diabetes mellitus without retinopathy [facility])    [address] Александр, Gracy José MD    Office Visit    3 months ago Charly Bergeron, [address], Yang Schrader MD    Office Visit    3 months ago Myofascial pain syndrome of thoracic spine    [facility], [facility], Chastity Dubon MD    Office Visit    6 months ago Type 2 diabetes mellitus with diabetic neuropathy, with long-term current use of insulin ([facility] [address]    [facility], Chastity Dubon [facility]    Office Visit    10 months ago Numbness and tingling of hand    [employer], [address], Yang Schrader MD    Office Visit          Future Appointments         Provider Department Appt Notes    In 5 days Nathan Zhang MD [facility], [facility] [address], [place] Pimple and dryness on scalp    In 2 weeks Ivette Orta MD [facility], [place] [address] [facility] Regular check up n blood work    In 9 months Stewart White MD [address] [facility] [employer] [place] Александр, Sonal walker dm ee

## 2023-09-25 RX ORDER — NAPROXEN 500 MG/1
TABLET ORAL
Qty: 30 TABLET | Refills: 1 | Status: SHIPPED | OUTPATIENT
Start: 2023-09-25

## 2023-09-27 ENCOUNTER — OFFICE VISIT (OUTPATIENT)
Dept: INTERNAL MEDICINE CLINIC | Facility: CLINIC | Age: 58
End: 2023-09-27

## 2023-09-27 VITALS
HEIGHT: 63 IN | RESPIRATION RATE: 18 BRPM | BODY MASS INDEX: 34.11 KG/M2 | SYSTOLIC BLOOD PRESSURE: 131 MMHG | WEIGHT: 192.5 LBS | DIASTOLIC BLOOD PRESSURE: 77 MMHG | HEART RATE: 108 BPM

## 2023-09-27 DIAGNOSIS — K59.09 OTHER CONSTIPATION: ICD-10-CM

## 2023-09-27 DIAGNOSIS — I10 ESSENTIAL HYPERTENSION: ICD-10-CM

## 2023-09-27 DIAGNOSIS — E11.40 TYPE 2 DIABETES MELLITUS WITH DIABETIC NEUROPATHY, WITH LONG-TERM CURRENT USE OF INSULIN (HCC): Primary | ICD-10-CM

## 2023-09-27 DIAGNOSIS — L29.9 SCALP ITCH: ICD-10-CM

## 2023-09-27 DIAGNOSIS — M54.16 LUMBAR RADICULOPATHY: ICD-10-CM

## 2023-09-27 DIAGNOSIS — M1A.09X0 IDIOPATHIC CHRONIC GOUT OF MULTIPLE SITES WITHOUT TOPHUS: ICD-10-CM

## 2023-09-27 DIAGNOSIS — Z79.4 TYPE 2 DIABETES MELLITUS WITH DIABETIC NEUROPATHY, WITH LONG-TERM CURRENT USE OF INSULIN (HCC): Primary | ICD-10-CM

## 2023-09-27 DIAGNOSIS — S29.019D THORACIC MYOFASCIAL STRAIN, SUBSEQUENT ENCOUNTER: ICD-10-CM

## 2023-09-27 DIAGNOSIS — M67.911 BILATERAL SHOULDER TENDINOPATHY: ICD-10-CM

## 2023-09-27 DIAGNOSIS — E03.8 OTHER SPECIFIED HYPOTHYROIDISM: ICD-10-CM

## 2023-09-27 DIAGNOSIS — F32.1 CURRENT MODERATE EPISODE OF MAJOR DEPRESSIVE DISORDER WITHOUT PRIOR EPISODE (HCC): ICD-10-CM

## 2023-09-27 DIAGNOSIS — M67.912 BILATERAL SHOULDER TENDINOPATHY: ICD-10-CM

## 2023-09-27 PROCEDURE — 3075F SYST BP GE 130 - 139MM HG: CPT | Performed by: INTERNAL MEDICINE

## 2023-09-27 PROCEDURE — 3078F DIAST BP <80 MM HG: CPT | Performed by: INTERNAL MEDICINE

## 2023-09-27 PROCEDURE — 99214 OFFICE O/P EST MOD 30 MIN: CPT | Performed by: INTERNAL MEDICINE

## 2023-09-27 PROCEDURE — 3008F BODY MASS INDEX DOCD: CPT | Performed by: INTERNAL MEDICINE

## 2023-10-04 RX ORDER — SIMVASTATIN 40 MG
40 TABLET ORAL NIGHTLY
Qty: 90 TABLET | Refills: 3 | Status: SHIPPED | OUTPATIENT
Start: 2023-10-04

## 2023-10-04 RX ORDER — TRAZODONE HYDROCHLORIDE 100 MG/1
100 TABLET ORAL NIGHTLY
Qty: 90 TABLET | Refills: 3 | Status: SHIPPED | OUTPATIENT
Start: 2023-10-04

## 2023-10-04 RX ORDER — SERTRALINE HYDROCHLORIDE 100 MG/1
100 TABLET, FILM COATED ORAL DAILY
Qty: 90 TABLET | Refills: 3 | Status: SHIPPED | OUTPATIENT
Start: 2023-10-04

## 2023-10-04 RX ORDER — ASPIRIN 81 MG/1
81 TABLET ORAL DAILY
Qty: 90 TABLET | Refills: 3 | Status: SHIPPED | OUTPATIENT
Start: 2023-10-04

## 2023-10-04 RX ORDER — GABAPENTIN 400 MG/1
400 CAPSULE ORAL 2 TIMES DAILY
Qty: 180 CAPSULE | Refills: 3 | Status: SHIPPED | OUTPATIENT
Start: 2023-10-04

## 2023-10-04 RX ORDER — LOSARTAN POTASSIUM 100 MG/1
100 TABLET ORAL DAILY
Qty: 90 TABLET | Refills: 3 | Status: SHIPPED | OUTPATIENT
Start: 2023-10-04

## 2023-10-04 RX ORDER — POTASSIUM CHLORIDE 750 MG/1
CAPSULE, EXTENDED RELEASE ORAL
Qty: 180 CAPSULE | Refills: 3 | Status: SHIPPED | OUTPATIENT
Start: 2023-10-04

## 2023-10-12 DIAGNOSIS — F32.1 CURRENT MODERATE EPISODE OF MAJOR DEPRESSIVE DISORDER WITHOUT PRIOR EPISODE (HCC): ICD-10-CM

## 2023-10-12 DIAGNOSIS — M1A.09X0 IDIOPATHIC CHRONIC GOUT OF MULTIPLE SITES WITHOUT TOPHUS: ICD-10-CM

## 2023-10-12 DIAGNOSIS — I10 ESSENTIAL HYPERTENSION: ICD-10-CM

## 2023-10-12 DIAGNOSIS — E11.40 TYPE 2 DIABETES MELLITUS WITH DIABETIC NEUROPATHY, WITH LONG-TERM CURRENT USE OF INSULIN (HCC): ICD-10-CM

## 2023-10-12 DIAGNOSIS — Z79.4 TYPE 2 DIABETES MELLITUS WITH DIABETIC NEUROPATHY, WITH LONG-TERM CURRENT USE OF INSULIN (HCC): ICD-10-CM

## 2023-10-26 DIAGNOSIS — I10 ESSENTIAL HYPERTENSION: ICD-10-CM

## 2023-10-26 DIAGNOSIS — E11.40 TYPE 2 DIABETES MELLITUS WITH DIABETIC NEUROPATHY, WITH LONG-TERM CURRENT USE OF INSULIN (HCC): ICD-10-CM

## 2023-10-26 DIAGNOSIS — M1A.09X0 IDIOPATHIC CHRONIC GOUT OF MULTIPLE SITES WITHOUT TOPHUS: ICD-10-CM

## 2023-10-26 DIAGNOSIS — Z79.4 TYPE 2 DIABETES MELLITUS WITH DIABETIC NEUROPATHY, WITH LONG-TERM CURRENT USE OF INSULIN (HCC): ICD-10-CM

## 2023-10-26 DIAGNOSIS — F32.1 CURRENT MODERATE EPISODE OF MAJOR DEPRESSIVE DISORDER WITHOUT PRIOR EPISODE (HCC): ICD-10-CM

## 2023-10-27 RX ORDER — LEVOTHYROXINE SODIUM 0.12 MG/1
125 TABLET ORAL EVERY MORNING
Qty: 90 TABLET | Refills: 1 | OUTPATIENT
Start: 2023-10-27

## 2023-10-27 RX ORDER — ALLOPURINOL 100 MG/1
100 TABLET ORAL NIGHTLY
Qty: 90 TABLET | Refills: 1 | OUTPATIENT
Start: 2023-10-27

## 2023-10-28 DIAGNOSIS — I10 ESSENTIAL HYPERTENSION: ICD-10-CM

## 2023-10-28 DIAGNOSIS — E11.40 TYPE 2 DIABETES MELLITUS WITH DIABETIC NEUROPATHY, WITH LONG-TERM CURRENT USE OF INSULIN (HCC): ICD-10-CM

## 2023-10-28 DIAGNOSIS — E11.65 TYPE 2 DIABETES MELLITUS WITH HYPERGLYCEMIA, WITH LONG-TERM CURRENT USE OF INSULIN (HCC): ICD-10-CM

## 2023-10-28 DIAGNOSIS — F32.1 CURRENT MODERATE EPISODE OF MAJOR DEPRESSIVE DISORDER WITHOUT PRIOR EPISODE (HCC): ICD-10-CM

## 2023-10-28 DIAGNOSIS — Z79.4 TYPE 2 DIABETES MELLITUS WITH DIABETIC NEUROPATHY, WITH LONG-TERM CURRENT USE OF INSULIN (HCC): ICD-10-CM

## 2023-10-28 DIAGNOSIS — M1A.09X0 IDIOPATHIC CHRONIC GOUT OF MULTIPLE SITES WITHOUT TOPHUS: ICD-10-CM

## 2023-10-28 DIAGNOSIS — Z79.4 TYPE 2 DIABETES MELLITUS WITH HYPERGLYCEMIA, WITH LONG-TERM CURRENT USE OF INSULIN (HCC): ICD-10-CM

## 2023-10-30 RX ORDER — GLIPIZIDE 10 MG/1
10 TABLET ORAL
Qty: 180 TABLET | Refills: 0 | Status: SHIPPED | OUTPATIENT
Start: 2023-10-30 | End: 2024-01-28

## 2023-10-30 RX ORDER — INSULIN GLARGINE 100 [IU]/ML
INJECTION, SOLUTION SUBCUTANEOUS
Qty: 12 ML | Refills: 0 | Status: SHIPPED | OUTPATIENT
Start: 2023-10-30

## 2023-10-30 RX ORDER — CARVEDILOL 12.5 MG/1
12.5 TABLET ORAL DAILY
Qty: 90 TABLET | Refills: 3 | OUTPATIENT
Start: 2023-10-30

## 2023-10-30 RX ORDER — LIRAGLUTIDE 6 MG/ML
1.8 INJECTION SUBCUTANEOUS EVERY MORNING
Qty: 9 ML | Refills: 3 | Status: SHIPPED | OUTPATIENT
Start: 2023-10-30

## 2023-11-01 DIAGNOSIS — E11.40 TYPE 2 DIABETES MELLITUS WITH DIABETIC NEUROPATHY, WITH LONG-TERM CURRENT USE OF INSULIN (HCC): ICD-10-CM

## 2023-11-01 DIAGNOSIS — Z79.4 TYPE 2 DIABETES MELLITUS WITH DIABETIC NEUROPATHY, WITH LONG-TERM CURRENT USE OF INSULIN (HCC): ICD-10-CM

## 2023-11-02 RX ORDER — INSULIN GLARGINE 100 [IU]/ML
INJECTION, SOLUTION SUBCUTANEOUS
Qty: 12 ML | Refills: 0 | Status: SHIPPED | OUTPATIENT
Start: 2023-11-02

## 2023-11-27 DIAGNOSIS — Z79.4 TYPE 2 DIABETES MELLITUS WITH HYPERGLYCEMIA, WITH LONG-TERM CURRENT USE OF INSULIN (HCC): ICD-10-CM

## 2023-11-27 DIAGNOSIS — E11.65 TYPE 2 DIABETES MELLITUS WITH HYPERGLYCEMIA, WITH LONG-TERM CURRENT USE OF INSULIN (HCC): ICD-10-CM

## 2023-11-28 ENCOUNTER — OFFICE VISIT (OUTPATIENT)
Dept: ENDOCRINOLOGY CLINIC | Facility: CLINIC | Age: 58
End: 2023-11-28

## 2023-11-28 ENCOUNTER — LAB ENCOUNTER (OUTPATIENT)
Dept: LAB | Facility: HOSPITAL | Age: 58
End: 2023-11-28
Attending: INTERNAL MEDICINE
Payer: MEDICAID

## 2023-11-28 VITALS
HEART RATE: 92 BPM | WEIGHT: 192.81 LBS | BODY MASS INDEX: 34.16 KG/M2 | DIASTOLIC BLOOD PRESSURE: 80 MMHG | HEIGHT: 63 IN | SYSTOLIC BLOOD PRESSURE: 144 MMHG

## 2023-11-28 DIAGNOSIS — E78.5 DYSLIPIDEMIA: ICD-10-CM

## 2023-11-28 DIAGNOSIS — E11.40 TYPE 2 DIABETES MELLITUS WITH DIABETIC NEUROPATHY, WITH LONG-TERM CURRENT USE OF INSULIN (HCC): ICD-10-CM

## 2023-11-28 DIAGNOSIS — Z79.4 TYPE 2 DIABETES MELLITUS WITH DIABETIC NEUROPATHY, WITH LONG-TERM CURRENT USE OF INSULIN (HCC): ICD-10-CM

## 2023-11-28 DIAGNOSIS — I10 ESSENTIAL HYPERTENSION: ICD-10-CM

## 2023-11-28 DIAGNOSIS — Z79.4 TYPE 2 DIABETES MELLITUS WITH HYPERGLYCEMIA, WITH LONG-TERM CURRENT USE OF INSULIN (HCC): Primary | ICD-10-CM

## 2023-11-28 DIAGNOSIS — E11.65 TYPE 2 DIABETES MELLITUS WITH HYPERGLYCEMIA, WITH LONG-TERM CURRENT USE OF INSULIN (HCC): Primary | ICD-10-CM

## 2023-11-28 LAB
ALBUMIN SERPL-MCNC: 4.6 G/DL (ref 3.2–4.8)
ALBUMIN/GLOB SERPL: 1.7 {RATIO} (ref 1–2)
ALP LIVER SERPL-CCNC: 90 U/L
ALT SERPL-CCNC: 32 U/L
ANION GAP SERPL CALC-SCNC: 7 MMOL/L (ref 0–18)
AST SERPL-CCNC: 24 U/L (ref ?–34)
BASOPHILS # BLD AUTO: 0.07 X10(3) UL (ref 0–0.2)
BASOPHILS NFR BLD AUTO: 0.7 %
BILIRUB SERPL-MCNC: 0.5 MG/DL (ref 0.3–1.2)
BUN BLD-MCNC: 10 MG/DL (ref 9–23)
BUN/CREAT SERPL: 11.8 (ref 10–20)
CALCIUM BLD-MCNC: 9.7 MG/DL (ref 8.7–10.4)
CARTRIDGE EXPIRATION DATE: ABNORMAL DATE
CARTRIDGE LOT#: ABNORMAL NUMERIC
CHLORIDE SERPL-SCNC: 105 MMOL/L (ref 98–112)
CHOLEST SERPL-MCNC: 162 MG/DL (ref ?–200)
CO2 SERPL-SCNC: 26 MMOL/L (ref 21–32)
CREAT BLD-MCNC: 0.85 MG/DL
DEPRECATED RDW RBC AUTO: 40.2 FL (ref 35.1–46.3)
EGFRCR SERPLBLD CKD-EPI 2021: 80 ML/MIN/1.73M2 (ref 60–?)
EOSINOPHIL # BLD AUTO: 0.37 X10(3) UL (ref 0–0.7)
EOSINOPHIL NFR BLD AUTO: 3.9 %
ERYTHROCYTE [DISTWIDTH] IN BLOOD BY AUTOMATED COUNT: 15.3 % (ref 11–15)
EST. AVERAGE GLUCOSE BLD GHB EST-MCNC: 192 MG/DL (ref 68–126)
FASTING PATIENT LIPID ANSWER: YES
FASTING STATUS PATIENT QL REPORTED: YES
GLOBULIN PLAS-MCNC: 2.7 G/DL (ref 2.8–4.4)
GLUCOSE BLD-MCNC: 171 MG/DL (ref 70–99)
GLUCOSE BLOOD: 171
HBA1C MFR BLD: 8.3 % (ref ?–5.7)
HCT VFR BLD AUTO: 37.8 %
HDLC SERPL-MCNC: 42 MG/DL (ref 40–59)
HEMOGLOBIN A1C: 8.7 % (ref 4.3–5.6)
HGB BLD-MCNC: 12.5 G/DL
IMM GRANULOCYTES # BLD AUTO: 0.03 X10(3) UL (ref 0–1)
IMM GRANULOCYTES NFR BLD: 0.3 %
IRON SATN MFR SERPL: 8 %
IRON SERPL-MCNC: 37 UG/DL
LDLC SERPL CALC-MCNC: 94 MG/DL (ref ?–100)
LYMPHOCYTES # BLD AUTO: 3.47 X10(3) UL (ref 1–4)
LYMPHOCYTES NFR BLD AUTO: 36.2 %
MCH RBC QN AUTO: 24.2 PG (ref 26–34)
MCHC RBC AUTO-ENTMCNC: 33.1 G/DL (ref 31–37)
MCV RBC AUTO: 73.1 FL
MONOCYTES # BLD AUTO: 0.63 X10(3) UL (ref 0.1–1)
MONOCYTES NFR BLD AUTO: 6.6 %
NEUTROPHILS # BLD AUTO: 5.02 X10 (3) UL (ref 1.5–7.7)
NEUTROPHILS # BLD AUTO: 5.02 X10(3) UL (ref 1.5–7.7)
NEUTROPHILS NFR BLD AUTO: 52.3 %
NONHDLC SERPL-MCNC: 120 MG/DL (ref ?–130)
OSMOLALITY SERPL CALC.SUM OF ELEC: 289 MOSM/KG (ref 275–295)
PLATELET # BLD AUTO: 315 10(3)UL (ref 150–450)
POTASSIUM SERPL-SCNC: 4.3 MMOL/L (ref 3.5–5.1)
PROT SERPL-MCNC: 7.3 G/DL (ref 5.7–8.2)
RBC # BLD AUTO: 5.17 X10(6)UL
SODIUM SERPL-SCNC: 138 MMOL/L (ref 136–145)
T4 FREE SERPL-MCNC: 1.5 NG/DL (ref 0.8–1.7)
TEST STRIP EXPIRATION DATE: NORMAL DATE
TEST STRIP LOT #: NORMAL NUMERIC
TIBC SERPL-MCNC: 451 UG/DL (ref 250–425)
TRANSFERRIN SERPL-MCNC: 303 MG/DL (ref 250–380)
TRIGL SERPL-MCNC: 147 MG/DL (ref 30–149)
TSI SER-ACNC: 0.17 MIU/ML (ref 0.55–4.78)
URATE SERPL-MCNC: 3.6 MG/DL
VLDLC SERPL CALC-MCNC: 24 MG/DL (ref 0–30)
WBC # BLD AUTO: 9.6 X10(3) UL (ref 4–11)

## 2023-11-28 PROCEDURE — 3077F SYST BP >= 140 MM HG: CPT | Performed by: INTERNAL MEDICINE

## 2023-11-28 PROCEDURE — 80061 LIPID PANEL: CPT | Performed by: INTERNAL MEDICINE

## 2023-11-28 PROCEDURE — 83036 HEMOGLOBIN GLYCOSYLATED A1C: CPT | Performed by: INTERNAL MEDICINE

## 2023-11-28 PROCEDURE — 84439 ASSAY OF FREE THYROXINE: CPT | Performed by: INTERNAL MEDICINE

## 2023-11-28 PROCEDURE — 99214 OFFICE O/P EST MOD 30 MIN: CPT | Performed by: INTERNAL MEDICINE

## 2023-11-28 PROCEDURE — 3079F DIAST BP 80-89 MM HG: CPT | Performed by: INTERNAL MEDICINE

## 2023-11-28 PROCEDURE — 3008F BODY MASS INDEX DOCD: CPT | Performed by: INTERNAL MEDICINE

## 2023-11-28 PROCEDURE — 85025 COMPLETE CBC W/AUTO DIFF WBC: CPT | Performed by: INTERNAL MEDICINE

## 2023-11-28 PROCEDURE — 3052F HG A1C>EQUAL 8.0%<EQUAL 9.0%: CPT | Performed by: INTERNAL MEDICINE

## 2023-11-28 PROCEDURE — 82947 ASSAY GLUCOSE BLOOD QUANT: CPT | Performed by: INTERNAL MEDICINE

## 2023-11-28 PROCEDURE — 80053 COMPREHEN METABOLIC PANEL: CPT | Performed by: INTERNAL MEDICINE

## 2023-11-28 PROCEDURE — 36415 COLL VENOUS BLD VENIPUNCTURE: CPT | Performed by: INTERNAL MEDICINE

## 2023-11-28 PROCEDURE — 84443 ASSAY THYROID STIM HORMONE: CPT | Performed by: INTERNAL MEDICINE

## 2023-11-28 PROCEDURE — 84466 ASSAY OF TRANSFERRIN: CPT | Performed by: INTERNAL MEDICINE

## 2023-11-28 PROCEDURE — 84550 ASSAY OF BLOOD/URIC ACID: CPT | Performed by: INTERNAL MEDICINE

## 2023-11-28 PROCEDURE — 83540 ASSAY OF IRON: CPT | Performed by: INTERNAL MEDICINE

## 2023-11-28 RX ORDER — ZOLPIDEM TARTRATE 10 MG/1
10 TABLET ORAL NIGHTLY
Qty: 30 TABLET | Refills: 1 | Status: SHIPPED | OUTPATIENT
Start: 2023-11-28

## 2023-11-28 RX ORDER — HYDROCORTISONE 25 MG/G
CREAM TOPICAL
Qty: 28 G | Refills: 5 | Status: SHIPPED | OUTPATIENT
Start: 2023-11-28

## 2023-11-28 RX ORDER — LOSARTAN POTASSIUM 100 MG/1
100 TABLET ORAL DAILY
Qty: 90 TABLET | Refills: 3 | Status: SHIPPED | OUTPATIENT
Start: 2023-11-28

## 2023-11-28 RX ORDER — DULAGLUTIDE 1.5 MG/.5ML
1.5 INJECTION, SOLUTION SUBCUTANEOUS WEEKLY
Qty: 6 ML | Refills: 1 | Status: SHIPPED | OUTPATIENT
Start: 2023-11-28 | End: 2024-02-26

## 2023-11-28 RX ORDER — PREGABALIN 75 MG/1
75 CAPSULE ORAL 2 TIMES DAILY
Qty: 180 CAPSULE | Refills: 0 | Status: SHIPPED | OUTPATIENT
Start: 2023-11-28 | End: 2024-02-26

## 2023-11-28 NOTE — TELEPHONE ENCOUNTER
Please review; protocol failed. Requested Prescriptions   Pending Prescriptions Disp Refills    hydrocortisone (PROCTOZONE-HC) 2.5 % External Cream 28 g 5     Sig: Apply to hemorrhoids for 7 to 10 days       Gastrointestional Medication Protocol Passed - 11/27/2023  1:54 PM        Passed - In person appointment or virtual visit in the past 12 mos or appointment in next 3 mos     Recent Outpatient Visits              Today Type 2 diabetes mellitus with hyperglycemia, with long-term current use of insulin [facility])    Rula Simons MD    Office Visit    2 months ago Type 2 diabetes mellitus with diabetic neuropathy, with long-term current use of insulin ([facility] [address]    [address], [facility], Martín Young MD    Office Visit    4 months ago Type 2 diabetes mellitus without retinopathy ([facility] [address]    Juan Galvin MD    Office Visit    5 months ago [facility], [address], Marleni Neves MD    Office Visit    6 months ago Myofascial pain syndrome of thoracic spine    Martín Galvin MD    Office Visit          Future Appointments         Provider Department Appt Notes    In 1 month Rony Colon MD [address], [address] [facility] [address],[address], [place] Constipation    In 7 months Marcela Villafana MD [facility] ep dm ee                 zolpidem 10 MG Oral Tab 30 tablet 1     Sig: Take 1 tablet (10 mg total) by mouth nightly. There is no refill protocol information for this order       losartan 100 MG Oral Tab 90 tablet 3     Sig: Take 1 tablet (100 mg total) by mouth daily.        Hypertensive Medications Protocol Failed - 11/27/2023  1:54 PM        Failed - CMP or BMP in past 6 months     Recent Results (from the past 4392 hour(s))   Comp Metabolic Panel (14)    Collection Time: 11/28/23 12:08 PM   Result Value Ref Range    Glucose 171 (H) 70 - 99 mg/dL    Sodium 138 136 - 145 mmol/L    Potassium 4.3 3.5 - 5.1 mmol/L    Chloride 105 98 - 112 mmol/L    CO2 26.0 21.0 - 32.0 mmol/L    Anion Gap 7 0 - 18 mmol/L    BUN 10 9 - 23 mg/dL    Creatinine 0.85 0.55 - 1.02 mg/dL    BUN/CREA Ratio 11.8 10.0 - 20.0    Calcium, Total 9.7 8.7 - 10.4 mg/dL    Calculated Osmolality 289 275 - 295 mOsm/kg    eGFR-Cr 80 >=60 mL/min/1.73m2    ALT 32 10 - 49 U/L    AST 24 <=34 U/L    Alkaline Phosphatase 90 46 - 118 U/L    Bilirubin, Total 0.5 0.3 - 1.2 mg/dL    Total Protein 7.3 5.7 - 8.2 g/dL    Albumin 4.6 3.2 - 4.8 g/dL    Globulin  2.7 (L) 2.8 - 4.4 g/dL    A/G Ratio 1.7 1.0 - 2.0    Patient Fasting for CMP? Yes      *Note: Due to a large number of results and/or encounters for the requested time period, some results have not been displayed. A complete set of results can be found in Results Review.                Passed - In person appointment in the past 12 or next 3 months     Recent Outpatient Visits              Today Type 2 diabetes mellitus with hyperglycemia, with long-term current use of insulin [facility])    Rula Simons MD    Office Visit    2 months ago Type 2 diabetes mellitus with diabetic neuropathy, with long-term current use of insulin ([facility] [address]    [facility]    Office Visit    4 months ago Type 2 diabetes mellitus without retinopathy [facility])    Aye Galvin Ruperto Sidle, MD    Office Visit    5 months ago Sweta Robles Nedra Journey, MD    Office Visit    6 months ago Myofascial pain syndrome of thoracic spine    Martín Galvin MD    Office Visit          Future Appointments         Provider Department Appt Notes    In 1 month [facility] Hammans, [facility], [address], [place] Constipation    In 7 months [facility] Fam [facility], [address], [place] ep dm ee               Passed - Last BP reading less than 140/90     BP Readings from Last 1 Encounters:   11/28/23 144/80               Passed - In person appointment or virtual visit in the past 6 months     Recent Outpatient Visits              Today Type 2 diabetes mellitus with hyperglycemia, with long-term current use of insulin [facility])    Ambar Vines MD    Office Visit    2 months ago Type 2 diabetes mellitus with diabetic neuropathy, with long-term current use of insulin ([facility] [address]    [address], [address], Barry Dance, MD Chastity    Office Visit    4 months ago Type 2 diabetes mellitus without retinopathy ([facility] [address]    Mic Nicole MD    Office Visit    5 months ago Carolene Meek, Candance Baba, MD    Office Visit    6 months ago Myofascial pain syndrome of thoracic spine    Jeyson Nicole MD    Office Visit          Future Appointments         Provider Department Appt Notes    In 1 month Rayford Hammans, MD [address], [address], [place] Constipation    In 7 months [facility], [address], [place] ep dm ee               Passed - EGFRCR or GFRNAA > 50     GFR Evaluation  EGFRCR: 80 , resulted on 11/28/2023             Recent Outpatient Visits              Today Type 2 diabetes mellitus with hyperglycemia, with long-term current use of insulin [facility])    Ambar Vines MD    Office Visit    2 months ago Type 2 diabetes mellitus with diabetic neuropathy, with long-term current use of insulin ([facility])    [address], [facility], Tay Talbert MD    Office Visit    4 months ago Type 2 diabetes mellitus without retinopathy [facility])    [address] [facility], Patric Griffiths MD    Office Visit    5 months ago Sweta Mendes, Mio Walton MD    Office Visit    6 months ago Myofascial pain syndrome of thoracic spine    [address] [facility], Tay Talbert MD    Office Visit          Future Appointments         Provider Department Appt Notes    In 1 month Rita Rodriguez MD [address], [address], [place] Constipation    In 7 months Estell Koyanagi, MD [facility], [address], [place] ep dm ee

## 2023-11-28 NOTE — PROGRESS NOTES
Name: Cony Grimes  Date: 11/28/23    Referring Physician: No ref. provider found    HISTORY OF PRESENT ILLNESS   Cony Grimes is a 65year old female who presents for follow-up of management of uncontrolled T2D. She was diagnosed about 4 years ago, based upon blood tests. At the last visit, more than a year ago, I had asked her to continue all of her medications that she is taking now. However, she often times does not take all of the insulin prescribed due to low blood sugars. Occasionally she will take the full dose of metformin and occasionally she will take half of the dose. Blood Glucose Today: 171  HbA1C or glycohemoglobin is 8.7 today (and it was 8.2 on 10/05/22 was 9.3 on 6/15/21 and was 10.4% on 10/26/20)  Type 1 or Type 2?: Type 2.   Checking twice a day:   Fasting- 150  2-3 hours after eating- 180  Has not been checking because her meter is broken  Medications for DM : Glipizide 10mg PO bid; metformin 1g po bid, victoza 1.8mg qday; Basaglar 40 units twice a day;   Episodes of hypoglycemia: none    Dietary compliance: not very good  Breakfast- brown bread two eggs, derick without sugar  Dinner- 1-2 roti, meat and pam and sabzi ; sweet    Exercise: none    Polyuria/polydipsia: none    Blurred vision: none    Flu Vaccine This Season: no    Covid Vaccine: yes    REVIEW OF SYSTEMS  Eyes: Diabetic retinopathy present: none            Most recent visit to eye doctor in last 12 months: checks regularly    CV: Cardiovascular disease present: no         Hypertension present: yes, better, PCP adjusted meds         Hyperlipidemia present: yes, not at goal (5/26/23)          Peripheral Vascular Disease present: no    : Nephropathy present: none (5/26/23); creatinine- 0.96    Neuro: Neuropathy present: she has this    Skin: Infection or ulceration: no    Osteoporosis: no    Thyroid disease: yes    Medications:     Current Outpatient Medications:     Dulaglutide (TRULICITY) 1.5 BJ/5.4RN Subcutaneous Solution Pen-injector, Inject 1.5 mg into the skin once a week., Disp: 6 mL, Rfl: 1    insulin glargine (BASAGLAR KWIKPEN) 100 UNIT/ML Subcutaneous Solution Pen-injector, ADMINISTER 40 UNITS UNDER THE SKIN EVERY DAY, Disp: 12 mL, Rfl: 0    glipiZIDE 10 MG Oral Tab, Take 1 tablet (10 mg total) by mouth 2 (two) times daily before meals. , Disp: 180 tablet, Rfl: 0    metFORMIN 500 MG Oral Tab, TAKE 2 TABLETS BY MOUTH TWICE DAILY, Disp: 120 tablet, Rfl: 0    simvastatin 40 MG Oral Tab, Take 1 tablet (40 mg total) by mouth nightly., Disp: 90 tablet, Rfl: 3    sertraline 100 MG Oral Tab, Take 1 tablet (100 mg total) by mouth daily. , Disp: 90 tablet, Rfl: 3    Potassium Chloride ER 10 MEQ Oral Cap CR, TAKE 1 CAPSULE BY MOUTH TWICE DAILY, Disp: 180 capsule, Rfl: 3    traZODone 100 MG Oral Tab, Take 1 tablet (100 mg total) by mouth nightly., Disp: 90 tablet, Rfl: 3    losartan 100 MG Oral Tab, Take 1 tablet (100 mg total) by mouth daily. , Disp: 90 tablet, Rfl: 3    gabapentin 400 MG Oral Cap, Take 1 capsule (400 mg total) by mouth 2 (two) times daily. , Disp: 180 capsule, Rfl: 3    aspirin 81 MG Oral Tab EC, Take 1 tablet (81 mg total) by mouth daily. Pt states 2x a day., Disp: 90 tablet, Rfl: 3    diclofenac 1 % External Gel, Apply 2 g topically 4 (four) times daily. , Disp: 1 each, Rfl: 0    naproxen 500 MG Oral Tab EC, Take 1 tablet p.o. at bedtime as needed for severe pain, Disp: 30 tablet, Rfl: 1    methocarbamol 500 MG Oral Tab, Take 1 tablet (500 mg total) by mouth 3 (three) times daily. , Disp: 90 tablet, Rfl: 2    hydrocortisone (PROCTOZONE-HC) 2.5 % External Cream, Apply to hemorrhoids for 7 to 10 days, Disp: 28 g, Rfl: 5    zolpidem 10 MG Oral Tab, Take 1 tablet (10 mg total) by mouth nightly., Disp: 30 tablet, Rfl: 1    hydroCHLOROthiazide 25 MG Oral Tab, TAKE 1 TABLET BY MOUTH TWICE DAILY, Disp: 180 tablet, Rfl: 3    carvedilol 12.5 MG Oral Tab, Take 1 tablet (12.5 mg total) by mouth daily. , Disp: 90 tablet, Rfl: 3    levothyroxine 125 MCG Oral Tab, Take 1 tablet (125 mcg total) by mouth every morning., Disp: 90 tablet, Rfl: 1    allopurinol 100 MG Oral Tab, Take 1 tablet (100 mg total) by mouth nightly., Disp: 90 tablet, Rfl: 1    Insulin Pen Needle (TRUEPLUS 5-BEVEL PEN NEEDLES) 32G X 4 MM Does not apply Misc, Use twice a day, Disp: 200 each, Rfl: 3    Continuous Blood Gluc Sensor (DEXCOM G6 SENSOR) Does not apply Misc, Change sensor every 10 days, Disp: 9 each, Rfl: 0    cholecalciferol 50 MCG (2000 UT) Oral Tab, Take 1 tablet (2,000 Units total) by mouth every morning., Disp: 90 tablet, Rfl: 3    ketoconazole 2 % External Shampoo, Use to  Scalp twice a wekk, Disp: 120 mL, Rfl: 5    Omeprazole 40 MG Oral Capsule Delayed Release, Take 1 capsule (40 mg total) by mouth daily. , Disp: 90 capsule, Rfl: 3    docusate sodium 100 MG Oral Cap, Take 1 capsule (100 mg total) by mouth 2 (two) times daily as needed for constipation. , Disp: 60 capsule, Rfl: 2    Continuous Blood Gluc Transmit (DEXCOM G6 TRANSMITTER) Does not apply Misc, Change sensor every 90 days, Disp: 1 each, Rfl: 0    Continuous Blood Gluc  (DEXCOM G6 ) Does not apply Device, Use to monitor blood sugar level, Disp: 1 each, Rfl: 0    Blood Glucose Monitoring Suppl (ONETOUCH VERIO) w/Device Does not apply Kit, 1 kit As Directed., Disp: 1 kit, Rfl: 0    Insulin Pen Needle (TRUEPLUS PEN NEEDLES) 32G X 4 MM Does not apply Misc, Use needles to inject insulin daily, Disp: 100 each, Rfl: 3    Glucose Blood (ONETOUCH VERIO) In Vitro Strip, USE TO CHECK BLOOD SUGAR TWICE DAILY, FASTING AND 2 HOURS AFTER A MEAL, Disp: 200 strip, Rfl: 1    Ferrous Sulfate (IRON) 325 (65 Fe) MG Oral Tab, Take 1 Dose by mouth daily. , Disp: 30 tablet, Rfl: 0    Lancets (Lucrezia Lark) Does not apply Misc, 1 strip by In Vitro route 2 (two) times daily.  FASTING AND 2 HOURS AFTER A MEAL, Disp: 200 each, Rfl: 3    HYDROcodone-acetaminophen 7.5-325 MG Oral Tab, Take 1-2 tablets by mouth every 4 (four) hours as needed for Pain., Disp: 15 tablet, Rfl: 0     Allergies:   No Known Allergies    Social History:   Social History     Socioeconomic History    Marital status:    Tobacco Use    Smoking status: Never    Smokeless tobacco: Never   Vaping Use    Vaping Use: Never used   Substance and Sexual Activity    Alcohol use: No    Drug use: No   Social History Narrative    ** Merged History Encounter **            Medical History:   Past Medical History:   Diagnosis Date    Asthma     Depression     Diabetes ([facility] [address]     Diabetes type 2, controlled ([facility] [address]     Disorder of thyroid     Diverticulitis 5/15    Esophageal reflux     Essential hypertension     Gout     Hepatic steatosis     Hepatomegaly     High blood pressure     High cholesterol     Hyperlipidemia     Thyroid disease        Surgical history:   Past Surgical History:   Procedure Laterality Date    COLONOSCOPY N/A 3/29/2017    Procedure: COLONOSCOPY;  Surgeon: Alia Barrientos MD;  Location: [address] [place] [address] ENDOSCOPY    COLONOSCOPY N/A 1/8/2021    Procedure: COLONOSCOPY;  Surgeon: Yoel Dunlap MD;  Location: [address] [place] [address]         PHYSICAL EXAM  Vitals:    11/28/23 1210   BP: 144/80   Pulse: 92   Weight: 192 lb 12.8 oz (87.5 kg)   Height: 5' 3\" (1.6 m)         General Appearance:  alert, well developed, in no acute distress  Eyes:  normal conjunctivae, sclera. , normal sclera and normal pupils  Ears/Nose/Mouth/Throat/Neck:  no palpable thyroid nodules or cervical lymphadenopathy  Neck: Trachea midline: Normal  Back: no kyphosis or back tenderness  Psychiatric:  oriented to time, self, and place  Nutritional:  no abnormal weight gain or loss    Lab Data:   Lab Results   Component Value Date     (H) 11/28/2023    A1C 8.3 (H) 11/28/2023     Lab Results   Component Value Date     (H) 11/28/2023    BUN 10 11/28/2023    BUNCREA 11.8 11/28/2023    CREATSERUM 0.85 11/28/2023    ANIONGAP 7 11/28/2023    GFRNAA 57 (L) 07/06/2022 GFRAA 66 07/06/2022    CA 9.7 11/28/2023    OSMOCALC 289 11/28/2023    ALKPHO 90 11/28/2023    AST 24 11/28/2023    ALT 32 11/28/2023    BILT 0.5 11/28/2023    TP 7.3 11/28/2023    ALB 4.6 11/28/2023    GLOBULIN 2.7 (L) 11/28/2023     11/28/2023    K 4.3 11/28/2023     11/28/2023    CO2 26.0 11/28/2023     Lab Results   Component Value Date    CHOLEST 162 11/28/2023    TRIG 147 11/28/2023    HDL 42 11/28/2023    LDL 94 11/28/2023    VLDL 24 11/28/2023    NONHDLC 120 11/28/2023     Lab Results   Component Value Date    MALBP 1.69 05/26/2023    CREUR 132.00 05/26/2023         ASSESSMENT/PLAN:    This is a 49year-old woman here for evaluation and management of uncontrolled type 2 diabetes. We discussed the ABCs of DM.     1.) Hyperglycemia Management- We discussed the importance of glycemic control to prevent complications of diabetes. - Continue glipizide 10mg PO bid  - Continue metformin 1g PO bid  - Stop Victoza and start Trulicity 3.0DF qweekly   - Continue Basaglar to 40 units bid  - Check blood sugars fasting and two hours after biggest meal and send them to me in 2 weeks  - I will make adjustments in medications at that time    2.) Management of Diabetic Complications- We discussed the complications of diabetes include retinopathy, neuropathy, nephropathy and cardiovascular disease.   - Covid vaccine- up to date  - Neuropathy- she has this, will stop gabapentin and   - CAD- none  - HTN- on meds, managed by PCP  - Ophtho- gave referral for eye exam  - MAC- check in 3 months  - Lipids- check in 3 months  - CMP- check in 3 months    3.) Lifestyle Management for Diabetes- We discussed importance of a low CHO diet, and recommend 45gm per meal or 135gm per day.  We discussed the importance of trying to follow a Mediterranean diet, with an emphasis on vegetables at every meal, with lots whole grains, and protein from either plant-based sources, or poultry and fish.   - asked her to eat more vegetables and less red meat- she is trying to do this  - I have given them the site '[facility]' which has a lot of good advice for how to eat nutritious meals as a usama    4.) Hypothyroidism- TFTs within normal limits on 9/28/22 and on 125mcg of levothyroxine    She will return to clinic in 3 months    Prior to this encounter, I spent over 15 minutes with preparing for the visit, including reviewing documents from other specialties as well as from PCP and going over test results. During the face to face encounter, I spent an additional 15 minutes which were determined for follow-up. Greater than 50% of the time was spent in counseling, anticipatory guidance, and coordination of care. Patient concerns were answered to the best of my knowledge. 10/05/22  Veena Roblero MD

## 2023-11-29 RX ORDER — LIRAGLUTIDE 6 MG/ML
1.8 INJECTION SUBCUTANEOUS EVERY MORNING
Qty: 9 ML | Refills: 3 | OUTPATIENT
Start: 2023-11-29

## 2023-12-06 DIAGNOSIS — K59.00 CONSTIPATION, UNSPECIFIED CONSTIPATION TYPE: ICD-10-CM

## 2023-12-07 RX ORDER — DOCUSATE SODIUM 100 MG/1
100 CAPSULE, LIQUID FILLED ORAL 2 TIMES DAILY PRN
Qty: 180 CAPSULE | Refills: 3 | Status: SHIPPED | OUTPATIENT
Start: 2023-12-07

## 2023-12-07 RX ORDER — CARVEDILOL 12.5 MG/1
12.5 TABLET ORAL DAILY
Qty: 90 TABLET | Refills: 3 | OUTPATIENT
Start: 2023-12-07

## 2023-12-07 NOTE — TELEPHONE ENCOUNTER
LOV: 6/13/23  Last Refilled:#1, 0rfs 9/25/23    Future Appointments   Date Time Provider Alysia Winters   1/17/2024  3:00 PM Zulma Rome MD [facility] [facility]   2/5/2024  4:00 PM Yon Livingston MD [facility] [place]   2/14/2024  4:15 PM King Geri MD [facility] Sean ARNOLD   3/25/2024  3:00 PM Joseline Mackenzie MD [address] [facility]   7/16/2024  4:45 PM Tigist Pereyra MD [facility]     ASSESSMENT/PLAN:      Bilateral Carpal tunnel  - EMG in the past did show evidence of carpal tunnel, right worse than left  - Both carpal tunnel region was injected with 0.5 cc of lidocaine and 40 mg of Depo-Medrol in sterile fashion. Patient tolerated procedure well  - Advised patient if she continues to have symptoms she can see hand surgeon     Possible Seropositive rheumatoid arthritis (+ CCP, MRI of the left shoulder showing inflammatory synovitis)  - It was always questionable if she had inflammatory arthritis as she had a low titer CCP and no evidence of synovitis on exam.  MRI of the left shoulder now confirms rheumatoid arthritis  - s/p cortisone injections in L shoulder in the past   - Continues to have pain in her hands, wrists and ankles  - She was on Plaquenil in the past but did not help  - Was also on Leflunomide but caused s/e  - plan to repeat blood work  - Plan to also get MRI of the right hand for further evaluation     Pt will follow up after MRI R hand      Megan Aguilar MD  6/13/2023  3:00 PM  Please advise.

## 2023-12-07 NOTE — TELEPHONE ENCOUNTER
Refill passed per Lara Avilez protocol    Requested Prescriptions   Pending Prescriptions Disp Refills    docusate sodium 100 MG Oral Cap 180 capsule 3     Sig: Take 1 capsule (100 mg total) by mouth 2 (two) times daily as needed for constipation.        Gastrointestional Medication Protocol Passed - 12/6/2023  5:13 PM        Passed - In person appointment or virtual visit in the past 12 mos or appointment in next 3 mos     Recent Outpatient Visits              1 week ago Type 2 diabetes mellitus with hyperglycemia, with long-term current use of insulin [facility])    Rachel Mcnamara MD    Office Visit    2 months ago Type 2 diabetes mellitus with diabetic neuropathy, with long-term current use of [facility]    [address],[facility], [facility], Ludwin Arthur MD    Office Visit    4 months ago Type 2 diabetes mellitus without retinopathy ([facility] [address]    [address],[facility], [address], Reginaldo Philip MD    Office Visit    5 months ago Chance Thompson, [address], Donnie Candelario MD    Office Visit    6 months ago Myofascial pain syndrome of thoracic spine    [address],[facility], [facility], Ludwin Arthur MD    Office Visit          Future Appointments         Provider Department Appt Notes    In 1 month Abilio Beatty MD [address], [address], [place] Constipation    In 2 months Real Tam MD [address], [place] Follow-up    In 2 months Cookie Valadez MD [address], [address] [facility], [place] Diabetes    In 3 months Tiana Jimenez MD [facility], [address], [place] Injections in both hands    In 7 months MD Marquita Moses, [place] ep dm ee

## 2023-12-08 RX ORDER — LEVOTHYROXINE SODIUM 0.12 MG/1
125 TABLET ORAL EVERY MORNING
Qty: 90 TABLET | Refills: 1 | Status: SHIPPED | OUTPATIENT
Start: 2023-12-08

## 2023-12-08 NOTE — TELEPHONE ENCOUNTER
Please review. Protocol failed / Has no protocol. Requested Prescriptions   Pending Prescriptions Disp Refills    levothyroxine 125 MCG Oral Tab 90 tablet 1     Sig: Take 1 tablet (125 mcg total) by mouth every morning.        Thyroid Medication Protocol Failed - 12/6/2023  5:13 PM        Failed - Last TSH value is normal     Lab Results   Component Value Date    TSH 0.173 (L) 11/28/2023                 Passed - TSH in past 12 months        Passed - In person appointment or virtual visit in the past 12 mos or appointment in next 3 mos     Recent Outpatient Visits              1 week ago Type 2 diabetes mellitus with hyperglycemia, with long-term current use of insulin [facility])    [address], [facility], Wali Carr MD    Office Visit    2 months ago Type 2 diabetes mellitus with diabetic neuropathy, with long-term current use of insulin ([facility] [address]    [address], [facility], Chastity Lara MD    Office Visit    4 months ago Type 2 diabetes mellitus without retinopathy ([facility] [address]    [address], [address], Jane Crook MD    Office Visit    [facility] [address], Mateo Candelario MD    Office Visit    6 months ago Myofascial pain syndrome of thoracic spine    [address], [place] Adalberto Singh MD    Office Visit          Future Appointments         Provider Department Appt Notes    In 1 month Matthew Morales MD [address], [address], [place] Constipation    In 2 months MD Lyssa Vargas,[address], [place] Follow-up    In 2 months MD Chinmay [address], [address] [facility], [place] Diabetes    In 3 months Santo hTapa MD [address], [address], [place] Injections in both hands    In 7 months MD Alysia Santillan [facility], [place] ep dm ee                Refused Prescriptions Disp Refills    carvedilol 12.5 MG Oral Tab 90 tablet 3     Sig: Take 1 tablet (12.5 mg total) by mouth daily.        Hypertensive Medications Protocol Failed - 12/6/2023  5:13 PM        Failed - Last BP reading less than 140/90     BP Readings from Last 1 Encounters:   11/28/23 144/80               Passed - In person appointment in the past 12 or next 3 months     Recent Outpatient Visits              1 week ago Type 2 diabetes mellitus with hyperglycemia, with long-term current use of insulin ([facility] [address]    Quentin Beck MD    Office Visit    2 months ago Type 2 diabetes mellitus with diabetic neuropathy, with long-term current use of insulin ([facility] [address]    [address], [facility], Allie Mcgrath MD    Office Visit    4 months ago Type 2 diabetes mellitus without retinopathy ([facility] [address]    [address], [address], Nahid Ny MD    Office Visit    5 months ago Ailin Sarabia, [address], Evangelist Candelario MD    Office Visit    6 months ago Myofascial pain syndrome of thoracic spine    [address], [facility], Irene Grider MD    Office Visit          Future Appointments         Provider Department Appt Notes    In 1 month Roxi Spencer MD [address], [address], [place] Constipation    In 2 months Natalya Justice MD [address], [address], [place] Follow-up    In 2 months Ronnie Rossi MD [address], [address] [facility] Diabetes    In 3 months Jalen Umaña MD [address], [address], [place] Injections in both hands    In 7 months [facility], [address], [place] ep dm ee               Passed - CMP or BMP in past 6 months     Recent Results (from the past 4392 hour(s))   Comp Metabolic Panel (14)    Collection Time: 11/28/23 12:08 PM   Result Value Ref Range    Glucose 171 (H) 70 - 99 mg/dL    Sodium 138 136 - 145 mmol/L    Potassium 4.3 3.5 - 5.1 mmol/L    Chloride 105 98 - 112 mmol/L    CO2 26.0 21.0 - 32.0 mmol/L    Anion Gap 7 0 - 18 mmol/L    BUN 10 9 - 23 mg/dL    Creatinine 0.85 0.55 - 1.02 mg/dL    BUN/CREA Ratio 11.8 10.0 - 20.0    Calcium, Total 9.7 8.7 - 10.4 mg/dL    Calculated Osmolality 289 275 - 295 mOsm/kg    eGFR-Cr 80 >=60 mL/min/1.73m2    ALT 32 10 - 49 U/L    AST 24 <=34 U/L    Alkaline Phosphatase 90 46 - 118 U/L    Bilirubin, Total 0.5 0.3 - 1.2 mg/dL    Total Protein 7.3 5.7 - 8.2 g/dL    Albumin 4.6 3.2 - 4.8 g/dL    Globulin  2.7 (L) 2.8 - 4.4 g/dL    A/G Ratio 1.7 1.0 - 2.0    Patient Fasting for CMP? Yes      *Note: Due to a large number of results and/or encounters for the requested time period, some results have not been displayed. A complete set of results can be found in Results Review.                Passed - In person appointment or virtual visit in the past 6 months     Recent Outpatient Visits              1 week ago Type 2 diabetes mellitus with hyperglycemia, with long-term current use of insulin [facility])    Drexel Boas, Clearnce Dayhoff, MD    Office Visit    2 months ago Type 2 diabetes mellitus with diabetic neuropathy, with long-term current use of insulin ([address]    Drexel Boas, [facility], Minh Mina MD    Office Visit    4 months ago Type 2 diabetes mellitus without retinopathy [facility])    Aye Gan, Katelyn Block MD    Office Visit    5 months ago Sweta Mahan Jame Bent, MD    Office Visit    6 months ago Myofascial pain syndrome of thoracic spine    Tyler Gan [place], MD Office Visit          Future Appointments         Provider Department Appt Notes    In 1 month MD Hood Smith, [address], [place] Constipation    In 2 months MD Hood Vargas [address], [place] Follow-up    In 2 months MD Hood Bonds, [address] [facility], [place] Diabetes    In 3 months MD Hood Hernandez, [address], [place] Injections in both hands    In 7 months Ethyl MD Hood Perez, [address], [place] ep dm ee               Passed - EGFRCR or GFRNAA > 50     GFR Evaluation  EGFRCR: 80 , resulted on 11/28/2023             Future Appointments         Provider Department Appt Notes    In 1 month MD Hood Smith, [address], [place] Constipation    In 2 months MD Hood Vargas [address], [place] Follow-up    In 2 months MD Hood Bonds, [address] [facility], [place] Diabetes    In 3 months MD Hood Hernandez, [address], [place] Injections in both hands    In 7 months MD Hood Santillan, [address], [place] ep dm ee           Recent Outpatient Visits              1 week ago Type 2 diabetes mellitus with hyperglycemia, with long-term current use of insulin [facility])    Peggy Mehta MD    Office Visit    2 months ago Type 2 diabetes mellitus with diabetic neuropathy, with long-term current use of insulin ([address]    Hood Rosales [facility], Chastity Lara MD    Office Visit    4 months ago Type 2 diabetes mellitus without retinopathy [facility])    Aye Ellison, Yi Zhang MD    Office Visit    5 months ago [facility], [address], Zach Beasley MD    Office Visit    6 months ago Myofascial pain syndrome of thoracic spine    [facility], Flex Jacobs, Alie Montero MD    Office Visit

## 2023-12-17 DIAGNOSIS — Z79.4 TYPE 2 DIABETES MELLITUS WITH DIABETIC NEUROPATHY, WITH LONG-TERM CURRENT USE OF INSULIN (HCC): ICD-10-CM

## 2023-12-17 DIAGNOSIS — E11.40 TYPE 2 DIABETES MELLITUS WITH DIABETIC NEUROPATHY, WITH LONG-TERM CURRENT USE OF INSULIN (HCC): ICD-10-CM

## 2023-12-18 RX ORDER — INSULIN GLARGINE 100 [IU]/ML
INJECTION, SOLUTION SUBCUTANEOUS
Qty: 12 ML | Refills: 0 | Status: SHIPPED | OUTPATIENT
Start: 2023-12-18

## 2023-12-18 NOTE — TELEPHONE ENCOUNTER
LOV: 6/13/23  Last Refilled:#1, 0rfs 12/7/23    Future Appointments   Date Time Provider Alysia Winters   1/17/2024  3:00 PM Abilio Beatty MD [facility]   2/5/2024  4:00 PM Real Tam MD [facility] [place]   2/14/2024  4:15 PM [facility] ARNOLD   3/25/2024  3:00 PM Tiana Jimenez MD [facility]   7/16/2024  4:45 PM Corrie Noland MD [address] [place] [address]     ASSESSMENT/PLAN:      Bilateral Carpal tunnel  - EMG in the past did show evidence of carpal tunnel, right worse than left  - Both carpal tunnel region was injected with 0.5 cc of lidocaine and 40 mg of Depo-Medrol in sterile fashion. Patient tolerated procedure well  - Advised patient if she continues to have symptoms she can see hand surgeon     Possible Seropositive rheumatoid arthritis (+ CCP, MRI of the left shoulder showing inflammatory synovitis)  - It was always questionable if she had inflammatory arthritis as she had a low titer CCP and no evidence of synovitis on exam.  MRI of the left shoulder now confirms rheumatoid arthritis  - s/p cortisone injections in L shoulder in the past   - Continues to have pain in her hands, wrists and ankles  - She was on Plaquenil in the past but did not help  - Was also on Leflunomide but caused s/e  - plan to repeat blood work  - Plan to also get MRI of the right hand for further evaluation     Pt will follow up after MRI R hand      Erika Guillory MD  6/13/2023  3:00 PM           Please advise.

## 2024-01-01 DIAGNOSIS — Z79.4 TYPE 2 DIABETES MELLITUS WITH DIABETIC NEUROPATHY, WITH LONG-TERM CURRENT USE OF INSULIN (HCC): ICD-10-CM

## 2024-01-01 DIAGNOSIS — E11.40 TYPE 2 DIABETES MELLITUS WITH DIABETIC NEUROPATHY, WITH LONG-TERM CURRENT USE OF INSULIN (HCC): ICD-10-CM

## 2024-01-01 DIAGNOSIS — I10 ESSENTIAL HYPERTENSION: ICD-10-CM

## 2024-01-01 DIAGNOSIS — M1A.09X0 IDIOPATHIC CHRONIC GOUT OF MULTIPLE SITES WITHOUT TOPHUS: ICD-10-CM

## 2024-01-01 DIAGNOSIS — Z79.4 TYPE 2 DIABETES MELLITUS WITH HYPERGLYCEMIA, WITH LONG-TERM CURRENT USE OF INSULIN (HCC): ICD-10-CM

## 2024-01-01 DIAGNOSIS — F32.1 CURRENT MODERATE EPISODE OF MAJOR DEPRESSIVE DISORDER WITHOUT PRIOR EPISODE (HCC): ICD-10-CM

## 2024-01-01 DIAGNOSIS — E11.65 TYPE 2 DIABETES MELLITUS WITH HYPERGLYCEMIA, WITH LONG-TERM CURRENT USE OF INSULIN (HCC): ICD-10-CM

## 2024-01-02 RX ORDER — PREGABALIN 75 MG/1
75 CAPSULE ORAL 2 TIMES DAILY
Qty: 180 CAPSULE | Refills: 0 | Status: SHIPPED | OUTPATIENT
Start: 2024-01-02 | End: 2024-04-01

## 2024-01-02 RX ORDER — TRAZODONE HYDROCHLORIDE 100 MG/1
100 TABLET ORAL NIGHTLY
Qty: 90 TABLET | Refills: 3 | OUTPATIENT
Start: 2024-01-02

## 2024-01-02 RX ORDER — HYDROCHLOROTHIAZIDE 25 MG/1
TABLET ORAL
Qty: 180 TABLET | Refills: 3 | OUTPATIENT
Start: 2024-01-02

## 2024-01-02 RX ORDER — GLIPIZIDE 10 MG/1
10 TABLET ORAL
Qty: 180 TABLET | Refills: 0 | Status: SHIPPED | OUTPATIENT
Start: 2024-01-02 | End: 2024-04-01

## 2024-01-02 RX ORDER — SERTRALINE HYDROCHLORIDE 100 MG/1
100 TABLET, FILM COATED ORAL DAILY
Qty: 90 TABLET | Refills: 3 | OUTPATIENT
Start: 2024-01-02

## 2024-01-02 RX ORDER — INSULIN GLARGINE 100 [IU]/ML
INJECTION, SOLUTION SUBCUTANEOUS
Qty: 12 ML | Refills: 0 | Status: SHIPPED | OUTPATIENT
Start: 2024-01-02

## 2024-01-02 RX ORDER — ZOLPIDEM TARTRATE 10 MG/1
10 TABLET ORAL NIGHTLY
Qty: 30 TABLET | Refills: 1 | OUTPATIENT
Start: 2024-01-02

## 2024-01-02 RX ORDER — CARVEDILOL 12.5 MG/1
12.5 TABLET ORAL DAILY
Qty: 90 TABLET | Refills: 3 | OUTPATIENT
Start: 2024-01-02

## 2024-01-02 RX ORDER — DULAGLUTIDE 1.5 MG/.5ML
1.5 INJECTION, SOLUTION SUBCUTANEOUS WEEKLY
Qty: 6 ML | Refills: 0 | Status: SHIPPED | OUTPATIENT
Start: 2024-01-02 | End: 2024-04-01

## 2024-01-02 RX ORDER — OMEPRAZOLE 40 MG/1
40 CAPSULE, DELAYED RELEASE ORAL DAILY
Qty: 90 CAPSULE | Refills: 3 | OUTPATIENT
Start: 2024-01-02

## 2024-01-02 RX ORDER — SIMVASTATIN 40 MG
40 TABLET ORAL NIGHTLY
Qty: 90 TABLET | Refills: 3 | OUTPATIENT
Start: 2024-01-02

## 2024-01-02 RX ORDER — LOSARTAN POTASSIUM 100 MG/1
100 TABLET ORAL DAILY
Qty: 90 TABLET | Refills: 3 | OUTPATIENT
Start: 2024-01-02

## 2024-01-02 NOTE — TELEPHONE ENCOUNTER
Please review; protocol failed/ has no protocol    Requested Prescriptions   Pending Prescriptions Disp Refills    allopurinol 100 MG Oral Tab 90 tablet 1     Sig: Take 1 tablet (100 mg total) by mouth nightly.       There is no refill protocol information for this order      Refused Prescriptions Disp Refills    Omeprazole 40 MG Oral Capsule Delayed Release 90 capsule 3     Sig: Take 1 capsule (40 mg total) by mouth daily.       Gastrointestional Medication Protocol Passed - 1/1/2024 10:00 PM        Passed - In person appointment or virtual visit in the past 12 mos or appointment in next 3 mos     Recent Outpatient Visits              1 month ago Type 2 diabetes mellitus with hyperglycemia, with long-term current use of insulin ([facility])    [facility] Veena Emery MD    Office Visit    3 months ago Type 2 diabetes mellitus with diabetic neuropathy, with long-term current use of insulin ([facility])    [facility] [address], [place] Nikki Alcantara MD    Office Visit    5 months ago Type 2 diabetes mellitus without retinopathy ([facility])    [facility] Bk Heml MD    Office Visit    6 months ago Polyarthralgia    [facility] Marge Angel MD    Office Visit    7 months ago Myofascial pain syndrome of thoracic spine    [facility] [address], [place] Nikki Alcantara MD    Office Visit          Future Appointments         Provider Department Appt Notes    In 2 weeks Arthur Roberts MD [facility] Constipation    In 1 month Nikki Alcantara MD [facility] [address], [place] Follow-up    In 1 month Veena Emery MD [facility] Diabetes    In 2 months [facility]     In 6 months Bk Helm MD [facility]                 hydroCHLOROthiazide 25 MG Oral Tab 180 tablet 3     Sig: TAKE 1 TABLET BY MOUTH TWICE DAILY       Hypertensive Medications Protocol Failed - 1/1/2024 10:00 PM        Failed - Last BP reading less than 140/90     BP Readings from Last 1 Encounters:   11/28/23 144/80               Passed - In person appointment in the past 12 or next 3 months     Recent Outpatient Visits              1 month ago Type 2 diabetes mellitus with hyperglycemia, with long-term current use of insulin ([facility])    [facility] Veena Emery MD    Office Visit    3 months ago Type 2 diabetes mellitus with diabetic neuropathy, with long-term current use of insulin ([facility])    [facility] [address], [place] Nikki Alcantara MD    Office Visit    5 months ago Type 2 diabetes mellitus without retinopathy ([facility])    [facility] Bk Helm MD    Office Visit    6 months ago [facility]    [facility]    Office Visit    7 months ago Myofascial pain syndrome of thoracic spine    [facility] [address], [place] Nikki Alcantara MD    Office Visit          Future Appointments         Provider Department Appt Notes    In 2 weeks Arthur Roberts MD [facility] Constipation    In 1 month Nikki Alcantara MD [facility] [address], [place] Follow-up    In 1 month [facility]    In 2 months Marge Angel MD [facility] [place] Injections in both hands    In 6 months Bk Helm MD [facility] ep dm ee               Passed - CMP or BMP in past 6 months      Recent Results (from the past 4392 hour(s))   Comp Metabolic Panel (14)    Collection Time: 11/28/23 12:08 PM   Result Value Ref Range    Glucose 171 (H) 70 - 99 mg/dL    Sodium 138 136 - 145 mmol/L    Potassium 4.3 3.5 - 5.1 mmol/L    Chloride 105 98 - 112 mmol/L    CO2 26.0 21.0 - 32.0 mmol/L    Anion Gap 7 0 - 18 mmol/L    BUN 10 9 - 23 mg/dL    Creatinine 0.85 0.55 - 1.02 mg/dL    BUN/CREA Ratio 11.8 10.0 - 20.0    Calcium, Total 9.7 8.7 - 10.4 mg/dL    Calculated Osmolality 289 275 - 295 mOsm/kg    eGFR-Cr 80 >=60 mL/min/1.73m2    ALT 32 10 - 49 U/L    AST 24 <=34 U/L    Alkaline Phosphatase 90 46 - 118 U/L    Bilirubin, Total 0.5 0.3 - 1.2 mg/dL    Total Protein 7.3 5.7 - 8.2 g/dL    Albumin 4.6 3.2 - 4.8 g/dL    Globulin  2.7 (L) 2.8 - 4.4 g/dL    A/G Ratio 1.7 1.0 - 2.0    Patient Fasting for CMP? Yes      *Note: Due to a large number of results and/or encounters for the requested time period, some results have not been displayed. A complete set of results can be found in Results Review.               Passed - In person appointment or virtual visit in the past 6 months     Recent Outpatient Visits              1 month ago Type 2 diabetes mellitus with hyperglycemia, with long-term current use of insulin ([facility])    [facility]    Office Visit    3 months ago Type 2 diabetes mellitus with diabetic neuropathy, with long-term current use of insulin ([facility])    [facility] [place] Kandel, Ninel, MD    Office Visit    5 months ago Type 2 diabetes mellitus without retinopathy ([facility])    [facility]    Office Visit    6 months ago Polyarthralgia    [facility], Marge Santiago MD    Office Visit    7 months ago Myofascial pain syndrome of thoracic spine    [facility] [place] Nikki Alcantara MD     Office Visit          Future Appointments         Provider Department Appt Notes    In 2 weeks Arthur Roberts MD [facility] Constipation    In 1 month Nikki Alcantara MD [facility] [address], [place] Follow-up    In 1 month Veena Emery MD [facility] Diabetes    In 2 months Marge Angel MD [facility] Injections in both hands    In 6 months Bk Helm MD [facility] ep dm ee               Passed - EGFRCR or GFRNAA > 50     GFR Evaluation  EGFRCR: 80 , resulted on 11/28/2023            carvedilol 12.5 MG Oral Tab 90 tablet 3     Sig: Take 1 tablet (12.5 mg total) by mouth daily.       Hypertensive Medications Protocol Failed - 1/1/2024 10:00 PM        Failed - Last BP reading less than 140/90     BP Readings from Last 1 Encounters:   11/28/23 144/80               Passed - In person appointment in the past 12 or next 3 months     Recent Outpatient Visits              1 month ago Type 2 diabetes mellitus with hyperglycemia, with long-term current use of insulin ([facility])    [facility] Veena Emery MD    Office Visit    3 months ago Type 2 diabetes mellitus with diabetic neuropathy, with long-term current use of insulin ([facility])    [facility] [address], [place] Nikki Alcantara MD    Office Visit    5 months ago Type 2 diabetes mellitus without retinopathy ([facility])    [facility] Bk Helm MD    Office Visit    6 months ago Polyarthralgia    [facility] Marge Angel MD    Office Visit    7 months ago Myofascial pain syndrome of thoracic spine    [facility] [address], [place] Nikki Alcantara MD    Office Visit          Future Appointments          Provider Department Appt Notes    In 2 weeks Arthur Roberts MD [facility] Constipation    In 1 month Nikki Alcantara MD [facility] [address], [place] Follow-up    In 1 month Veena Emery MD [facility] Diabetes    In 2 months Marge Angel MD [facility] Injections in both hands    In 6 months Bk Helm MD [facility] ep dm ee               Passed - CMP or BMP in past 6 months     Recent Results (from the past 4392 hour(s))   Comp Metabolic Panel (14)    Collection Time: 11/28/23 12:08 PM   Result Value Ref Range    Glucose 171 (H) 70 - 99 mg/dL    Sodium 138 136 - 145 mmol/L    Potassium 4.3 3.5 - 5.1 mmol/L    Chloride 105 98 - 112 mmol/L    CO2 26.0 21.0 - 32.0 mmol/L    Anion Gap 7 0 - 18 mmol/L    BUN 10 9 - 23 mg/dL    Creatinine 0.85 0.55 - 1.02 mg/dL    BUN/CREA Ratio 11.8 10.0 - 20.0    Calcium, Total 9.7 8.7 - 10.4 mg/dL    Calculated Osmolality 289 275 - 295 mOsm/kg    eGFR-Cr 80 >=60 mL/min/1.73m2    ALT 32 10 - 49 U/L    AST 24 <=34 U/L    Alkaline Phosphatase 90 46 - 118 U/L    Bilirubin, Total 0.5 0.3 - 1.2 mg/dL    Total Protein 7.3 5.7 - 8.2 g/dL    Albumin 4.6 3.2 - 4.8 g/dL    Globulin  2.7 (L) 2.8 - 4.4 g/dL    A/G Ratio 1.7 1.0 - 2.0    Patient Fasting for CMP? Yes      *Note: Due to a large number of results and/or encounters for the requested time period, some results have not been displayed. A complete set of results can be found in Results Review.               Passed - In person appointment or virtual visit in the past 6 months     Recent Outpatient Visits              1 month ago Type 2 diabetes mellitus with hyperglycemia, with long-term current use of insulin (HCC)    [facility] [place] Veena Emeyr MD    Office Visit    3 months ago  Type 2 diabetes mellitus with diabetic neuropathy, with long-term current use of insulin ([facility])    [facility] [address], [place] Nikki Alcantara MD    Office Visit    5 months ago Type 2 diabetes mellitus without retinopathy ([facility])    [facility] Bk Helm MD    Office Visit    6 months ago Polyarthralgia    [facility] Marge Angel MD    Office Visit    7 months ago Myofascial pain syndrome of thoracic spine    [facility] [address], [place] Nikki Alcantara MD    Office Visit          Future Appointments         Provider Department Appt Notes    In 2 weeks Arthur Roberts MD [facility] Constipation    In 1 month Nikki Alcantara MD [facility] [address], [place] Follow-up    In 1 month Veena Emery MD [facility] Diabetes    In 2 months Marge Angel MD [facility] Injections in both hands    In 6 months Bk Helm MD [facility] ep dm ee               Passed - EGFRCR or GFRNAA > 50     GFR Evaluation  EGFRCR: 80 , resulted on 11/28/2023            simvastatin 40 MG Oral Tab 90 tablet 3     Sig: Take 1 tablet (40 mg total) by mouth nightly.       Cholesterol Medication Protocol Passed - 1/1/2024 10:00 PM        Passed - ALT in past 12 months        Passed - LDL in past 12 months        Passed - Last ALT < 80     Lab Results   Component Value Date    ALT 32 11/28/2023             Passed - Last LDL < 130     Lab Results   Component Value Date    LDL 94 11/28/2023             Passed - In person appointment or virtual visit in the past 12 mos or appointment in next 3 mos     Recent Outpatient Visits              1 month ago Type 2 diabetes mellitus with hyperglycemia, with long-term current use  of insulin ([facility])    [facility] Veena Emery MD    Office Visit    3 months ago Type 2 diabetes mellitus with diabetic neuropathy, with long-term current use of insulin ([facility])    [facility] [address], [place] Nikki Alcantara MD    Office Visit    5 months ago Type 2 diabetes mellitus without retinopathy ([facility])    [facility] Bk Helm MD    Office Visit    6 months ago Polyarthralgia    [facility] Marge Angel MD    Office Visit    7 months ago Myofascial pain syndrome of thoracic spine    [facility] [address], [place] Nkiki Alcantara MD    Office Visit          Future Appointments         Provider Department Appt Notes    In 2 weeks Arthur Roberts MD [facility] Constipation    In 1 month Nikki Alcantara MD [facility] [address], [place] Follow-up    In 1 month Veena Emery MD [facility] Diabetes    In 2 months [facility]    In 6 months Bk Helm MD [facility] ep dm ee                 sertraline 100 MG Oral Tab 90 tablet 3     Sig: Take 1 tablet (100 mg total) by mouth daily.       Psychiatric Non-Scheduled (Anti-Anxiety) Passed - 1/1/2024 10:00 PM        Passed - In person appointment or virtual visit in the past 6 mos or appointment in next 3 mos     Recent Outpatient Visits              1 month ago Type 2 diabetes mellitus with hyperglycemia, with long-term current use of insulin ([facility])    [facility]    Office Visit    3 months ago Type 2 diabetes mellitus with diabetic neuropathy, with long-term current use of insulin  ([facility])    [facility] [address], [place] Nikki Alcantara MD    Office Visit    5 months ago Type 2 diabetes mellitus without retinopathy ([facility])    [facility] Bk Helm MD    Office Visit    6 months ago Polyarthralgia    [facility] Marge Angel MD    Office Visit    7 months ago Myofascial pain syndrome of thoracic spine    [facility] [address], [place] Nikki Alcantara MD    Office Visit          Future Appointments         Provider Department Appt Notes    In 2 weeks Arthur Roberts MD [facility] Constipation    In 1 month Nikki Alcantara MD [facility] [address], [place] Follow-up    In 1 month Ve[facility] MD [facility] Diabetes    In 2 months Marge Angel MD [facility] Injections in both hands    In 6 months Bk Helm MD [facility] ep dm ee                 traZODone 100 MG Oral Tab 90 tablet 3     Sig: Take 1 tablet (100 mg total) by mouth nightly.       There is no refill protocol information for this order       zolpidem 10 MG Oral Tab 30 tablet 1     Sig: Take 1 tablet (10 mg total) by mouth nightly.       There is no refill protocol information for this order       losartan 100 MG Oral Tab 90 tablet 3     Sig: Take 1 tablet (100 mg total) by mouth daily.       Hypertensive Medications Protocol Failed - 1/1/2024 10:00 PM        Failed - Last BP reading less than 140/90     BP Readings from Last 1 Encounters:   11/28/23 144/80               Passed - In person appointment in the past 12 or next 3 months     Recent Outpatient Visits              1 month ago Type 2 diabetes mellitus with hyperglycemia, with long-term current use of insulin ([facility])    [facility] Veena Eemry MD    Office Visit    3 months ago Type 2 diabetes mellitus with diabetic neuropathy, with long-term current use of insulin ([facility])    [facility] [address], [place] Nikki Alcantara MD    Office Visit    5 months ago Type 2 diabetes mellitus without retinopathy ([facility])    [facility] Bk Helm MD    Office Visit    6 months ago Polyarthralgia    [facility] Marge Angel MD    Office Visit    7 months ago Myofascial pain syndrome of thoracic spine    [facility] [address], [place] Nikki Alcantara MD    Office Visit          Future Appointments         Provider Department Appt Notes    In 2 weeks Arthur Roberts MD [facility] Constipation    In 1 month Nikki Alcantara MD [facility] [address], [place] Follow-up    In 1 month Veena Emery MD [facility] Diabetes    In 2 months Marge Angel MD [facility] Injections in both hands    In 6 months Bk Helm MD [facility] ep dm ee               Passed - CMP or BMP in past 6 months     Recent Results (from the past 4392 hour(s))   Comp Metabolic Panel (14)    Collection Time: 11/28/23 12:08 PM   Result Value Ref Range    Glucose 171 (H) 70 - 99 mg/dL    Sodium 138 136 - 145 mmol/L    Potassium 4.3 3.5 - 5.1 mmol/L    Chloride 105 98 - 112 mmol/L    CO2 26.0 21.0 - 32.0 mmol/L    Anion Gap 7 0 - 18 mmol/L    BUN 10 9 - 23 mg/dL    Creatinine 0.85 0.55 - 1.02 mg/dL    BUN/CREA Ratio 11.8 10.0 - 20.0    Calcium, Total 9.7 8.7 - 10.4 mg/dL    Calculated Osmolality 289 275 - 295 mOsm/kg    eGFR-Cr 80 >=60 mL/min/1.73m2    ALT 32 10 - 49 U/L    AST 24 <=34 U/L    Alkaline Phosphatase 90 46 - 118 U/L     Bilirubin, Total 0.5 0.3 - 1.2 mg/dL    Total Protein 7.3 5.7 - 8.2 g/dL    Albumin 4.6 3.2 - 4.8 g/dL    Globulin  2.7 (L) 2.8 - 4.4 g/dL    A/G Ratio 1.7 1.0 - 2.0    Patient Fasting for CMP? Yes      *Note: Due to a large number of results and/or encounters for the requested time period, some results have not been displayed. A complete set of results can be found in Results Review.               Passed - In person appointment or virtual visit in the past 6 months     Recent Outpatient Visits              1 month ago Type 2 diabetes mellitus with hyperglycemia, with long-term current use of insulin ([facility])    [facility]    Office Visit    3 months ago Type 2 diabetes mellitus with diabetic neuropathy, with long-term current use of insulin ([facility])    [facility] [address], [place] Nikki Alcantara MD    Office Visit    5 months ago Type 2 diabetes mellitus without retinopathy ([facility])    [facility]    Office Visit    6 months ago [facility]arthralgia    [facility]    Office Visit    7 months ago Myofascial pain syndrome of thoracic spine    [facility] [address], [place] Nikki Alcantara MD    Office Visit          Future Appointments         Provider Department Appt Notes    In 2 weeks Arthur Roberts MD [facility] Constipation    In 1 month Nikki Alcantara MD [facility] [address], [place] Follow-up    In 1 month Veena Emery MD [facility] [place] Diabetes    In 2 months Marge Angel MD [facility] [place] Injections in both hands    In 6 months [facility]               Passed - EGFRCR or  GFRNAA > 50     GFR Evaluation  EGFRCR: 80 , resulted on 11/28/2023             Recent Outpatient Visits              1 month ago Type 2 diabetes mellitus with hyperglycemia, with long-term current use of insulin ([facility])    [facility] Veena Emery MD    Office Visit    3 months ago Type 2 diabetes mellitus with diabetic neuropathy, with long-term current use of insulin ([facility])    [facility] [address], [place] Nikki Alcantara MD    Office Visit    5 months ago Type 2 diabetes mellitus without retinopathy ([facility])    [facility]    Office Visit    6 months ago Polyarthralgia    [facility] Marge Angel MD    Office Visit    7 months ago Myofascial pain syndrome of thoracic spine    [facility] [address], [place] Nikki Alcantara MD    Office Visit          Future Appointments         Provider Department Appt Notes    In 2 weeks Arthur Roberts MD [facility] [place] Constipation    In 1 month Nikki Alcantara MD [facility] [address], [place] Follow-up    In 1 month Veena Emery MD [facility] [place] Diabetes    In 2 months Marge Angel MD [facility] [place] Injections in both hands    In 6 months Bk Helm MD [facility] ep dm ee

## 2024-01-02 NOTE — TELEPHONE ENCOUNTER
Requested Prescriptions     Pending Prescriptions Disp Refills    diclofenac 1 % External Gel 1 each 0     Sig: Apply 2 g topically 4 (four) times daily.     Lf: 12/18/23 #1 each w/ 0 rf  LOV: 6/13/23  Future Appointments   Date Time Provider Department Center   1/17/2024  3:00 PM Arthur Roberts MD [id number]   2/5/2024  4:00 PM Nikki Alcantara MD [id number] [facility] [place]   2/14/2024  4:15 PM Veena Emery MD [facility]WMOENDO [facility]   3/25/2024  3:00 PM Marge Angel MD [id number]   7/16/2024  4:45 PM Bk Helm MD [id number]

## 2024-01-04 RX ORDER — ALLOPURINOL 100 MG/1
100 TABLET ORAL NIGHTLY
Qty: 90 TABLET | Refills: 1 | Status: SHIPPED | OUTPATIENT
Start: 2024-01-04

## 2024-01-13 ENCOUNTER — PATIENT MESSAGE (OUTPATIENT)
Dept: ENDOCRINOLOGY CLINIC | Facility: CLINIC | Age: 59
End: 2024-01-13

## 2024-01-13 DIAGNOSIS — E11.40 TYPE 2 DIABETES MELLITUS WITH DIABETIC NEUROPATHY, WITH LONG-TERM CURRENT USE OF INSULIN (HCC): Primary | ICD-10-CM

## 2024-01-13 DIAGNOSIS — Z79.4 TYPE 2 DIABETES MELLITUS WITH DIABETIC NEUROPATHY, WITH LONG-TERM CURRENT USE OF INSULIN (HCC): Primary | ICD-10-CM

## 2024-01-15 DIAGNOSIS — I10 ESSENTIAL HYPERTENSION: ICD-10-CM

## 2024-01-15 DIAGNOSIS — Z79.4 TYPE 2 DIABETES MELLITUS WITH HYPERGLYCEMIA, WITH LONG-TERM CURRENT USE OF INSULIN (HCC): ICD-10-CM

## 2024-01-15 DIAGNOSIS — E11.40 TYPE 2 DIABETES MELLITUS WITH DIABETIC NEUROPATHY, WITH LONG-TERM CURRENT USE OF INSULIN (HCC): ICD-10-CM

## 2024-01-15 DIAGNOSIS — F32.1 CURRENT MODERATE EPISODE OF MAJOR DEPRESSIVE DISORDER WITHOUT PRIOR EPISODE (HCC): ICD-10-CM

## 2024-01-15 DIAGNOSIS — M1A.09X0 IDIOPATHIC CHRONIC GOUT OF MULTIPLE SITES WITHOUT TOPHUS: ICD-10-CM

## 2024-01-15 DIAGNOSIS — E11.65 TYPE 2 DIABETES MELLITUS WITH HYPERGLYCEMIA, WITH LONG-TERM CURRENT USE OF INSULIN (HCC): ICD-10-CM

## 2024-01-15 DIAGNOSIS — Z79.4 TYPE 2 DIABETES MELLITUS WITH DIABETIC NEUROPATHY, WITH LONG-TERM CURRENT USE OF INSULIN (HCC): ICD-10-CM

## 2024-01-15 RX ORDER — CARVEDILOL 12.5 MG/1
12.5 TABLET ORAL DAILY
Qty: 90 TABLET | Refills: 3 | OUTPATIENT
Start: 2024-01-15

## 2024-01-15 RX ORDER — LOSARTAN POTASSIUM 100 MG/1
100 TABLET ORAL DAILY
Qty: 90 TABLET | Refills: 3 | OUTPATIENT
Start: 2024-01-15

## 2024-01-15 RX ORDER — DULAGLUTIDE 1.5 MG/.5ML
1.5 INJECTION, SOLUTION SUBCUTANEOUS WEEKLY
Qty: 6 ML | Refills: 1 | OUTPATIENT
Start: 2024-01-15 | End: 2024-04-14

## 2024-01-15 RX ORDER — HYDROCHLOROTHIAZIDE 25 MG/1
TABLET ORAL
Qty: 180 TABLET | Refills: 3 | OUTPATIENT
Start: 2024-01-15

## 2024-01-15 NOTE — TELEPHONE ENCOUNTER
Patients Daughter called to request re-fills for the below medication. Pharmacy on file verified.     - hydroCHLOROthiazide 25 MG Oral Tab   - losartan 100 MG Oral Tab  - carvedilol 12.5 MG Oral Tab  - trulicity insulin 1.5 MG-out of this medication and most important.

## 2024-01-15 NOTE — TELEPHONE ENCOUNTER
01/15/2024-Called pharmacy to verify that there were still refills on file for losartan, carvedilol, and hydrochlorothiazide. Routed Trulicity to [facility]

## 2024-01-16 NOTE — TELEPHONE ENCOUNTER
From: Randi Lobo  To: Veena Emery  Sent: 1/13/2024 7:45 PM CST  Subject: Sameera    Helbairon it’s Sherrill here her daughter she’s out of town so has been using new insulin injection every week but it seems like doesn’t suits her when she have new injection her sugar level goes high for 3,4 days like 200+ before eating so my mom saying this injection is not suiting her or can you please order her new injection it’s finished now   Thank you !

## 2024-01-17 RX ORDER — DULAGLUTIDE 3 MG/.5ML
3 INJECTION, SOLUTION SUBCUTANEOUS WEEKLY
Qty: 6 ML | Refills: 0 | Status: SHIPPED | OUTPATIENT
Start: 2024-01-17 | End: 2024-04-16

## 2024-01-17 NOTE — TELEPHONE ENCOUNTER
Dr Emery,    Please see below. Would you like pt to return to Victoza or increase Trulicity dose?

## 2024-01-18 NOTE — TELEPHONE ENCOUNTER
Hi!  Please let patient know that I would much rather that she increase the dose of the Trulcity to 3mg qweekly. She must check her blood sugars fasting (after not eating anything for 8-10 hours) and then 2 hours after her biggest meal). I would like her to send me these blood sugars in 1 week. Thank you!

## 2024-03-04 ENCOUNTER — OFFICE VISIT (OUTPATIENT)
Dept: INTERNAL MEDICINE CLINIC | Facility: CLINIC | Age: 59
End: 2024-03-04

## 2024-03-04 VITALS
DIASTOLIC BLOOD PRESSURE: 64 MMHG | HEIGHT: 63 IN | RESPIRATION RATE: 16 BRPM | BODY MASS INDEX: 33.49 KG/M2 | HEART RATE: 92 BPM | WEIGHT: 189 LBS | SYSTOLIC BLOOD PRESSURE: 96 MMHG

## 2024-03-04 DIAGNOSIS — R10.9 FLANK PAIN: Primary | ICD-10-CM

## 2024-03-04 DIAGNOSIS — Z79.4 TYPE 2 DIABETES MELLITUS WITH DIABETIC NEUROPATHY, WITH LONG-TERM CURRENT USE OF INSULIN (HCC): ICD-10-CM

## 2024-03-04 DIAGNOSIS — N39.0 URINARY TRACT INFECTION WITHOUT HEMATURIA, SITE UNSPECIFIED: ICD-10-CM

## 2024-03-04 DIAGNOSIS — F32.1 CURRENT MODERATE EPISODE OF MAJOR DEPRESSIVE DISORDER WITHOUT PRIOR EPISODE (HCC): ICD-10-CM

## 2024-03-04 DIAGNOSIS — E11.40 TYPE 2 DIABETES MELLITUS WITH DIABETIC NEUROPATHY, WITH LONG-TERM CURRENT USE OF INSULIN (HCC): ICD-10-CM

## 2024-03-04 DIAGNOSIS — I10 ESSENTIAL HYPERTENSION: ICD-10-CM

## 2024-03-04 DIAGNOSIS — K59.00 CONSTIPATION, UNSPECIFIED CONSTIPATION TYPE: ICD-10-CM

## 2024-03-04 PROCEDURE — 99214 OFFICE O/P EST MOD 30 MIN: CPT | Performed by: INTERNAL MEDICINE

## 2024-03-04 RX ORDER — GABAPENTIN 300 MG/1
300 CAPSULE ORAL 3 TIMES DAILY
Refills: 0 | Status: CANCELLED | OUTPATIENT
Start: 2024-03-04

## 2024-03-04 RX ORDER — GABAPENTIN 400 MG/1
400 CAPSULE ORAL 2 TIMES DAILY
Qty: 180 CAPSULE | Refills: 1 | Status: SHIPPED | OUTPATIENT
Start: 2024-03-04

## 2024-03-04 RX ORDER — PANTOPRAZOLE SODIUM 40 MG/1
40 TABLET, DELAYED RELEASE ORAL
Qty: 90 TABLET | Refills: 1 | Status: SHIPPED | OUTPATIENT
Start: 2024-03-04

## 2024-03-04 RX ORDER — TIZANIDINE 4 MG/1
4 TABLET ORAL EVERY 8 HOURS PRN
Qty: 30 TABLET | Refills: 0 | Status: SHIPPED | OUTPATIENT
Start: 2024-03-04

## 2024-03-04 NOTE — PROGRESS NOTES
Subjective:     Patient ID: Randi Lobo is a 58 year old female.    HPI  Patient is here accompanied by her daughter who is  from [facility].  Patient visited her son in [place] and fell in January, injured left side of the body continues to have pain in the left flank and left suborbital area which is always bothers her for years he had negative workup in the past, complains of pain in the both  shoulders and neck and low back pain after fall.  Unable to do physical therapy because of the transportation issue.  Patient has been feeling more stressed lately, does not eat much last few days and blood pressure on the low side, blood sugar runs in 70-90 range, she is taking insulin 30 mg twice a day.  Takes other medications as prescribed.  Endocrinologist change medication for neuropathy  To Lyrica medication is not agreeing with patient she wants to go back on gabapentin    Current Outpatient Medications   Medication Sig Dispense Refill    tiZANidine 4 MG Oral Tab Take 1 tablet (4 mg total) by mouth every 8 (eight) hours as needed. 30 tablet 0    pantoprazole 40 MG Oral Tab EC Take 1 tablet (40 mg total) by mouth every morning before breakfast. 90 tablet 1    pregabalin 75 MG Oral Cap Take 1 capsule (75 mg total) by mouth 2 (two) times daily. 180 capsule 0    Dulaglutide (TRULICITY) 3 MG/0.5ML Subcutaneous Solution Pen-injector Inject 3 mg into the skin once a week. 6 mL 0    allopurinol 100 MG Oral Tab Take 1 tablet (100 mg total) by mouth nightly. 90 tablet 1    diclofenac 1 % External Gel Apply 2 g topically 4 (four) times daily. 1 each 0    glipiZIDE 10 MG Oral Tab Take 1 tablet (10 mg total) by mouth 2 (two) times daily before meals. 180 tablet 0    metFORMIN 500 MG Oral Tab TAKE 2 TABLETS BY MOUTH TWICE DAILY 360 tablet 0    insulin glargine (BASAGLAR KWIKPEN) 100 UNIT/ML Subcutaneous Solution Pen-injector ADMINISTER 40 UNITS UNDER THE SKIN EVERY DAY 12 mL 0    levothyroxine 125 MCG Oral Tab Take 1 tablet  (125 mcg total) by mouth every morning. 90 tablet 1    hydrocortisone (PROCTOZONE-HC) 2.5 % External Cream Apply to hemorrhoids for 7 to 10 days 28 g 5    zolpidem 10 MG Oral Tab Take 1 tablet (10 mg total) by mouth nightly. 30 tablet 1    losartan 100 MG Oral Tab Take 1 tablet (100 mg total) by mouth daily. 90 tablet 3    simvastatin 40 MG Oral Tab Take 1 tablet (40 mg total) by mouth nightly. 90 tablet 3    sertraline 100 MG Oral Tab Take 1 tablet (100 mg total) by mouth daily. 90 tablet 3    Potassium Chloride ER 10 MEQ Oral Cap CR TAKE 1 CAPSULE BY MOUTH TWICE DAILY 180 capsule 3    traZODone 100 MG Oral Tab Take 1 tablet (100 mg total) by mouth nightly. 90 tablet 3    aspirin 81 MG Oral Tab EC Take 1 tablet (81 mg total) by mouth daily. Pt states 2x a day. 90 tablet 3    naproxen 500 MG Oral Tab EC Take 1 tablet p.o. at bedtime as needed for severe pain 30 tablet 1    hydroCHLOROthiazide 25 MG Oral Tab TAKE 1 TABLET BY MOUTH TWICE DAILY 180 tablet 3    carvedilol 12.5 MG Oral Tab Take 1 tablet (12.5 mg total) by mouth daily. 90 tablet 3    Insulin Pen Needle (TRUEPLUS 5-BEVEL PEN NEEDLES) 32G X 4 MM Does not apply Misc Use twice a day 200 each 3    Continuous Blood Gluc Sensor (DEXCOM G6 SENSOR) Does not apply Misc Change sensor every 10 days 9 each 0    cholecalciferol 50 MCG (2000 UT) Oral Tab Take 1 tablet (2,000 Units total) by mouth every morning. 90 tablet 3    ketoconazole 2 % External Shampoo Use to  Scalp twice a wekk 120 mL 5    Omeprazole 40 MG Oral Capsule Delayed Release Take 1 capsule (40 mg total) by mouth daily. 90 capsule 3    Continuous Blood Gluc Transmit (DEXCOM G6 TRANSMITTER) Does not apply Misc Change sensor every 90 days 1 each 0    Continuous Blood Gluc  (DEXCOM G6 ) Does not apply Device Use to monitor blood sugar level 1 each 0    Blood Glucose Monitoring Suppl (ONETOUCH VERIO) w/Device Does not apply Kit 1 kit As Directed. 1 kit 0    Insulin Pen Needle (TRUEPLUS PEN  NEEDLES) 32G X 4 MM Does not apply Misc Use needles to inject insulin daily 100 each 3    Glucose Blood (ONETOUCH VERIO) In Vitro Strip USE TO CHECK BLOOD SUGAR TWICE DAILY, FASTING AND 2 HOURS AFTER A MEAL 200 strip 1    Ferrous Sulfate (IRON) 325 (65 Fe) MG Oral Tab Take 1 Dose by mouth daily. 30 tablet 0    Lancets (ONETOUCH DELICA PLUS IHCOMI00T) Does not apply Misc 1 strip by In Vitro route 2 (two) times daily. FASTING AND 2 HOURS AFTER A MEAL 200 each 3    HYDROcodone-acetaminophen 7.5-325 MG Oral Tab Take 1-2 tablets by mouth every 4 (four) hours as needed for Pain. 15 tablet 0     Allergies:No Known Allergies    Past Medical History:   Diagnosis Date    Asthma (HCC)     Depression     Diabetes (HCC)     Diabetes type 2, controlled (HCC)     Disorder of thyroid     Diverticulitis 5/15    Esophageal reflux     Essential hypertension     Gout     Hepatic steatosis     Hepatomegaly     High blood pressure     High cholesterol     Hyperlipidemia     Thyroid disease       Past Surgical History:   Procedure Laterality Date    COLONOSCOPY N/A 3/29/2017    Procedure: COLONOSCOPY;  Surgeon: Lenny Miranda MD;  Location: [facility] ENDOSCOPY    COLONOSCOPY N/A 1/8/2021    Procedure: COLONOSCOPY;  Surgeon: Arthur Roberts MD;  Location: [facility]      Family History   Problem Relation Age of Onset    Cancer Father     Hypertension Father     Diabetes Mother     Cancer Mother     Hypertension Mother     Glaucoma Neg     Macular degeneration Neg       Social History:   Social History     Socioeconomic History    Marital status:    Tobacco Use    Smoking status: Never    Smokeless tobacco: Never   Vaping Use    Vaping Use: Never used   Substance and Sexual Activity    Alcohol use: No    Drug use: No   Social History Narrative    ** Merged History Encounter **             BP 96/64 (BP Location: Left arm, Patient Position: Sitting, Cuff Size: large)   Pulse 92   Resp 16   Ht 5' 3\" (1.6 m)   Wt 189 lb (85.7 kg)   BMI  33.48 kg/m²    Physical Exam  Constitutional:       General: She is not in acute distress.     Appearance: Normal appearance. She is obese. She is not toxic-appearing.   HENT:      Head: Normocephalic and atraumatic.      Mouth/Throat:      Mouth: Mucous membranes are dry.   Eyes:      General: No scleral icterus.     Extraocular Movements: Extraocular movements intact.      Conjunctiva/sclera: Conjunctivae normal.      Pupils: Pupils are equal, round, and reactive to light.   Neck:      Vascular: No carotid bruit.   Cardiovascular:      Rate and Rhythm: Normal rate and regular rhythm.      Heart sounds: No murmur heard.     No gallop.   Pulmonary:      Effort: Pulmonary effort is normal.      Breath sounds: No wheezing or rhonchi.   Abdominal:      General: Bowel sounds are normal.      Palpations: Abdomen is soft. There is no mass.      Tenderness: There is no abdominal tenderness. There is no guarding or rebound.   Musculoskeletal:         General: Normal range of motion.      Cervical back: Normal range of motion and neck supple.      Right lower leg: No edema.      Left lower leg: No edema.   Lymphadenopathy:      Cervical: No cervical adenopathy.   Skin:     General: Skin is warm.      Coloration: Skin is not jaundiced.   Neurological:      General: No focal deficit present.      Mental Status: She is alert and oriented to person, place, and time. Mental status is at baseline.      Gait: Gait normal.      Deep Tendon Reflexes: Reflexes normal.   Psychiatric:         Mood and Affect: Mood normal.         Behavior: Behavior normal.         Thought Content: Thought content normal.         Assessment & Plan:     ICD-10-CM    1. Flank pain most likely due to chest contusion, patient will try muscle relaxant tizanidine and will restart gabapentin R10.9 CBC With Differential With Platelet     Comp Metabolic Panel (14)     Hemoglobin A1C      2. Urinary tract infection without hematuria, site unspecified will check UA  and culture N39.0 Urinalysis, Routine     Urine Culture, Routine      3. Type 2 diabetes mellitus with diabetic neuropathy, with long-term current use of insulin (HCC) control unknown, check hemoglobin A1c, urine microalbumin CMP continue insulin but at lower dose if not adequate oral intake decreased to 26 units twice a day E11.40     Z79.4       4. Essential hypertension with tendency to hypotension currently, advised patient to hold hydrochlorothiazide check blood pressure every time she needs to take medications, if running on the low side report medication required adjustment decreasing dose I10       5. Current moderate episode of major depressive disorder without prior episode (HCC) controlled on current medications F32.1       6. Constipation advised patient to increase fluid intake, consistent high-fiber diet recommended, use MiraLAX every day, seeing gastroenterology for evaluation  Advised patient to take MiraLAX every day, increase fluid intake, consistency with high-fiber i food intake see gastroenterology for evaluation   7.        Fibromyalgia advised patient to do physical therapy again patient will think about this will work on making arrangements for transportation from insurance      Orders Placed This Encounter   Procedures    CBC With Differential With Platelet    Comp Metabolic Panel (14)    Hemoglobin A1C    Urinalysis, Routine    Urine Culture, Routine       Meds This Visit:  Requested Prescriptions     Pending Prescriptions Disp Refills    gabapentin 300 MG Oral Cap  0     Sig: Take 1 capsule (300 mg total) by mouth 3 (three) times daily.     Signed Prescriptions Disp Refills    tiZANidine 4 MG Oral Tab 30 tablet 0     Sig: Take 1 tablet (4 mg total) by mouth every 8 (eight) hours as needed.    pantoprazole 40 MG Oral Tab EC 90 tablet 1     Sig: Take 1 tablet (40 mg total) by mouth every morning before breakfast.       Imaging & Referrals:  None   Follow-up in 3 months

## 2024-03-10 DIAGNOSIS — E11.40 TYPE 2 DIABETES MELLITUS WITH DIABETIC NEUROPATHY, WITH LONG-TERM CURRENT USE OF INSULIN (HCC): ICD-10-CM

## 2024-03-10 DIAGNOSIS — Z79.4 TYPE 2 DIABETES MELLITUS WITH DIABETIC NEUROPATHY, WITH LONG-TERM CURRENT USE OF INSULIN (HCC): ICD-10-CM

## 2024-03-10 DIAGNOSIS — F32.1 CURRENT MODERATE EPISODE OF MAJOR DEPRESSIVE DISORDER WITHOUT PRIOR EPISODE (HCC): ICD-10-CM

## 2024-03-10 DIAGNOSIS — M1A.09X0 IDIOPATHIC CHRONIC GOUT OF MULTIPLE SITES WITHOUT TOPHUS: ICD-10-CM

## 2024-03-10 DIAGNOSIS — I10 ESSENTIAL HYPERTENSION: ICD-10-CM

## 2024-03-11 DIAGNOSIS — Z79.4 TYPE 2 DIABETES MELLITUS WITH DIABETIC NEUROPATHY, WITH LONG-TERM CURRENT USE OF INSULIN (HCC): ICD-10-CM

## 2024-03-11 DIAGNOSIS — E11.40 TYPE 2 DIABETES MELLITUS WITH DIABETIC NEUROPATHY, WITH LONG-TERM CURRENT USE OF INSULIN (HCC): ICD-10-CM

## 2024-03-11 RX ORDER — LEVOTHYROXINE SODIUM 0.12 MG/1
125 TABLET ORAL EVERY MORNING
Qty: 30 TABLET | Refills: 0 | Status: SHIPPED | OUTPATIENT
Start: 2024-03-11

## 2024-03-11 RX ORDER — POTASSIUM CHLORIDE 750 MG/1
CAPSULE, EXTENDED RELEASE ORAL
Qty: 180 CAPSULE | Refills: 3 | OUTPATIENT
Start: 2024-03-11

## 2024-03-11 RX ORDER — ASPIRIN 81 MG/1
81 TABLET ORAL DAILY
Qty: 90 TABLET | Refills: 3 | OUTPATIENT
Start: 2024-03-11

## 2024-03-11 RX ORDER — CHOLECALCIFEROL (VITAMIN D3) 125 MCG
2000 CAPSULE ORAL EVERY MORNING
Qty: 90 TABLET | Refills: 3 | Status: SHIPPED | OUTPATIENT
Start: 2024-03-11

## 2024-03-11 RX ORDER — KETOCONAZOLE 20 MG/ML
SHAMPOO TOPICAL
Qty: 120 ML | Refills: 5 | Status: SHIPPED | OUTPATIENT
Start: 2024-03-11

## 2024-03-11 RX ORDER — ALLOPURINOL 100 MG/1
100 TABLET ORAL NIGHTLY
Qty: 90 TABLET | Refills: 1 | OUTPATIENT
Start: 2024-03-11

## 2024-03-11 RX ORDER — HYDROCORTISONE 25 MG/G
CREAM TOPICAL
Qty: 28 G | Refills: 5 | OUTPATIENT
Start: 2024-03-11

## 2024-03-11 NOTE — TELEPHONE ENCOUNTER
Please review; protocol failed/ has no protocol    Requested Prescriptions   Pending Prescriptions Disp Refills    cholecalciferol 50 MCG (2000 UT) Oral Tab 90 tablet 3     Sig: Take 1 tablet (2,000 Units total) by mouth every morning.       There is no refill protocol information for this order       ketoconazole 2 % External Shampoo 120 mL 5     Sig: Use to  Scalp twice a wekk       There is no refill protocol information for this order       levothyroxine 125 MCG Oral Tab 90 tablet 1     Sig: Take 1 tablet (125 mcg total) by mouth every morning.       Thyroid Medication Protocol Failed - 3/10/2024  4:40 PM        Failed - Last TSH value is normal     Lab Results   Component Value Date    TSH 0.173 (L) 11/28/2023                 Passed - TSH in past 12 months        Passed - In person appointment or virtual visit in the past 12 mos or appointment in next 3 mos     Recent Outpatient Visits              1 week ago Flank pain    [facility], [address], [place] Nikki Alcantara MD    Office Visit    3 months ago Type 2 diabetes mellitus with hyperglycemia, with long-term current use of insulin ([facility])    [facility] Veena Emery MD    Office Visit    5 months ago Type 2 diabetes mellitus with diabetic neuropathy, with long-term current use of insulin ([facility]    [facility]    8 months ago Type 2 diabetes mellitus without retinopathy ([facility])    [facility] Bk Helm MD    Office Visit    9 months ago [facility]arthralgia    [facility]    Office Visit          Future Appointments         Provider Department Appt Notes    In 1 month Veena Emery MD [facility] Diabetes    In 4 months Bk Helm MD [facility]  [facility] ep dm ee                Refused Prescriptions Disp Refills    Potassium Chloride ER 10 MEQ Oral Cap  capsule 3     Sig: TAKE 1 CAPSULE BY MOUTH TWICE DAILY       There is no refill protocol information for this order       aspirin 81 MG Oral Tab EC 90 tablet 3     Sig: Take 1 tablet (81 mg total) by mouth daily. Pt states 2x a day.       Aspirin Protocol Passed - 3/10/2024  4:40 PM        Passed - In person appointment or virtual visit in the past 6 mos or appointment in next 3 mos     Recent Outpatient Visits              1 week ago Flank pain    [facility], [address], [place] Nikki Alcantara MD    Office Visit    3 months ago Type 2 diabetes mellitus with hyperglycemia, with long-term current use of insulin ([facility])    [facility] Veena Emery MD    Office Visit    5 months ago Type 2 diabetes mellitus with diabetic neuropathy, with long-term current use of insulin ([facility])    [facility] [address], [place] Nikki Alcantara MD    Office Visit    8 months ago Type 2 diabetes mellitus without retinopathy ([facility])    [facility] Bk Helm MD    Office Visit    9 months ago Polyarthralgia    [facility] Marge Angel MD    Office Visit          Future Appointments         Provider Department Appt Notes    In 1 month Veena Emery MD [facility] Diabetes    In 4 months Bk Helm MD [facility] ep dm ee                 hydrocortisone (PROCTOZONE-HC) 2.5 % External Cream 28 g 5     Sig: Apply to hemorrhoids for 7 to 10 days       Gastrointestional Medication Protocol Passed - 3/10/2024  4:40 PM        Passed - In person appointment or virtual visit in the past 12 mos or appointment in next 3 mos     Recent Outpatient  Visits              1 week ago Flank pain    [facility] [place]    Office Visit    3 months ago Type 2 diabetes mellitus with hyperglycemia, with long-term current use of insulin ([facility]    [facility]    5 months ago Type 2 diabetes mellitus with diabetic neuropathy, with long-term current use of insulin ([facility])    [facility] [place] Kandel, Ninel, MD    Office Visit    8 months ago Type 2 diabetes mellitus without retinopathy ([facility]    [facility]    9 months ago Polyarthralgia    [facility], Marge Santiago MD    Office Visit          Future Appointments         Provider Department Appt Notes    In 1 month Veena Emery MD [facility] [place] Diabetes    In 4 months Bk Helm MD [facility] ep dm ee                  Recent Outpatient Visits              1 week ago Flank pain    [facility] [place] Kandel, Ninel, MD    Office Visit    3 months ago Type 2 diabetes mellitus with hyperglycemia, with long-term current use of insulin ([facility])    [facility], Veena Tom MD    Office Visit    5 months ago Type 2 diabetes mellitus with diabetic neuropathy, with long-term current use of insulin ([facility])    [facility] [place] Kandel, Ninel, MD    Office Visit    8 months ago Type 2 diabetes mellitus without retinopathy ([facility])    [facility]    Office Visit    9 months ago [facility]arthralgia    [facility]    [facility]           Future Appointments         Provider Department Appt Notes    In 1 month Veena Emery MD [facility], [facility], [place] Diabetes    In 4 months Bk Helm MD [facility], [facility], [place] denise dm ee

## 2024-03-11 NOTE — TELEPHONE ENCOUNTER
Requested Prescriptions     Pending Prescriptions Disp Refills    diclofenac 1 % External Gel 1 each 0     Sig: Apply 2 g topically 4 (four) times daily.     Lf; 1/2/24  LOV: 6/13/23  Future Appointments   Date Time Provider Department Center   4/10/2024  3:15 PM Veena Emery MD [facility]   7/16/2024  4:45 PM Bk Helm MD [facility]     Labs:   Component      Latest Ref Rng 11/28/2023   WBC      4.0 - 11.0 x10(3) uL 9.6    RBC      3.80 - 5.30 x10(6)uL 5.17    Hemoglobin      12.0 - 16.0 g/dL 12.5    Hematocrit      35.0 - 48.0 % 37.8    MCV      80.0 - 100.0 fL 73.1 (L)    MCH      26.0 - 34.0 pg 24.2 (L)    MCHC      31.0 - 37.0 g/dL 33.1    RDW-SD      35.1 - 46.3 fL 40.2    RDW      11.0 - 15.0 % 15.3 (H)    Platelet Count      150.0 - 450.0 10(3)uL 315.0    Prelim Neutrophil Abs      1.50 - 7.70 x10 (3) uL 5.02    Neutrophils Absolute      1.50 - 7.70 x10(3) uL 5.02    Lymphocytes Absolute      1.00 - 4.00 x10(3) uL 3.47    Monocytes Absolute      0.10 - 1.00 x10(3) uL 0.63    Eosinophils Absolute      0.00 - 0.70 x10(3) uL 0.37    Basophils Absolute      0.00 - 0.20 x10(3) uL 0.07    Immature Granulocyte Absolute      0.00 - 1.00 x10(3) uL 0.03    Neutrophils %      % 52.3    Lymphocytes %      % 36.2    Monocytes %      % 6.6    Eosinophils %      % 3.9    Basophils %      % 0.7    Immature Granulocyte %      % 0.3    Glucose      70 - 99 mg/dL 171 (H)    Sodium      136 - 145 mmol/L 138    Potassium      3.5 - 5.1 mmol/L 4.3    Chloride      98 - 112 mmol/L 105    Carbon Dioxide, Total      21.0 - 32.0 mmol/L 26.0    ANION GAP      0 - 18 mmol/L 7    BUN      9 - 23 mg/dL 10    CREATININE      0.55 - 1.02 mg/dL 0.85    BUN/CREATININE RATIO      10.0 - 20.0  11.8    CALCIUM      8.7 - 10.4 mg/dL 9.7    CALCULATED OSMOLALITY      275 - 295 mOsm/kg 289    EGFR      >=60 mL/min/1.73m2 80    ALT (SGPT)      10 - 49 U/L 32    AST (SGOT)      <=34 U/L 24    ALKALINE PHOSPHATASE       46 - 118 U/L 90    Total Bilirubin      0.3 - 1.2 mg/dL 0.5    PROTEIN, TOTAL      5.7 - 8.2 g/dL 7.3    Albumin      3.2 - 4.8 g/dL 4.6    Globulin      2.8 - 4.4 g/dL 2.7 (L)    A/G Ratio      1.0 - 2.0  1.7    Patient Fasting for CMP? Yes    Cholesterol, Total      <200 mg/dL 162    HDL Cholesterol      40 - 59 mg/dL 42    Triglycerides      30 - 149 mg/dL 147    LDL Cholesterol Calc      <100 mg/dL 94    VLDL      0 - 30 mg/dL 24    NON-HDL CHOLESTEROL      <130 mg/dL 120    Patient Fasting for Lipid? Yes    Iron, Serum      50 - 170 ug/dL 37 (L)    Transferrin      250 - 380 mg/dL 303    Iron Bind.Cap.(TIBC)      250 - 425 ug/dL 451 (H)    Iron Saturation      15 - 50 % 8 (L)    TSH      0.550 - 4.780 mIU/mL 0.173 (L)    T4,Free (Direct)      0.8 - 1.7 ng/dL 1.5    URIC ACID      3.1 - 7.8 mg/dL 3.6       Legend:  (L) Low  (H) High    You were seen today for hand pain, numbness and tingling and joint pain  Blood work in the past showed possible rheumatoid arthritis  EMG/nerve test also showed carpal tunnel  We injected both wrist with cortisone  Plan to repeat blood work  Get MRI of the right hand  If you continue to have the numbness and tingling would recommend to see hand surgeon

## 2024-03-12 ENCOUNTER — TELEPHONE (OUTPATIENT)
Dept: RHEUMATOLOGY | Facility: CLINIC | Age: 59
End: 2024-03-12

## 2024-03-12 RX ORDER — INSULIN GLARGINE 100 [IU]/ML
INJECTION, SOLUTION SUBCUTANEOUS
Qty: 12 ML | Refills: 0 | Status: SHIPPED | OUTPATIENT
Start: 2024-03-12

## 2024-03-12 RX ORDER — PROCHLORPERAZINE 25 MG/1
SUPPOSITORY RECTAL
Qty: 9 EACH | Refills: 0 | Status: SHIPPED | OUTPATIENT
Start: 2024-03-12

## 2024-03-12 RX ORDER — PROCHLORPERAZINE 25 MG/1
SUPPOSITORY RECTAL
Qty: 1 EACH | Refills: 0 | Status: SHIPPED | OUTPATIENT
Start: 2024-03-12

## 2024-03-12 RX ORDER — BLOOD SUGAR DIAGNOSTIC
STRIP MISCELLANEOUS
Qty: 200 STRIP | Refills: 1 | Status: SHIPPED | OUTPATIENT
Start: 2024-03-12

## 2024-03-12 RX ORDER — PEN NEEDLE, DIABETIC 32GX 5/32"
NEEDLE, DISPOSABLE MISCELLANEOUS
Qty: 200 EACH | Refills: 3 | Status: SHIPPED | OUTPATIENT
Start: 2024-03-12

## 2024-03-12 RX ORDER — GLIPIZIDE 10 MG/1
10 TABLET ORAL
Qty: 180 TABLET | Refills: 0 | Status: SHIPPED | OUTPATIENT
Start: 2024-03-12 | End: 2024-06-10

## 2024-03-12 NOTE — TELEPHONE ENCOUNTER
PA Approved    Prior authorization for: diclofenac 1% gel    Medication form: gel    Date received: 3/12/2024    Approval #:[id number]    Approved dates: 4/6/2023 to 7/5/2024    Could not reach pharmacy on hold for over 11 minutes.

## 2024-03-12 NOTE — TELEPHONE ENCOUNTER
PA start    Prior authorization for: Diclofenac 1%    Medication form: Gel    Submission method: Forms completed and faxed to [employer]    Spoke with (if by phone):     Date submitted: 3/12/2024

## 2024-03-12 NOTE — TELEPHONE ENCOUNTER
Current Outpatient Medications   Medication Sig Dispense Refill    diclofenac 1 % External Gel Apply 2 g topically 4 (four) times daily. 1 each 0     Key: [id number]

## 2024-03-14 RX ORDER — DULAGLUTIDE 3 MG/.5ML
3 INJECTION, SOLUTION SUBCUTANEOUS WEEKLY
Qty: 6 ML | Refills: 0 | Status: SHIPPED | OUTPATIENT
Start: 2024-03-14 | End: 2024-06-12

## 2024-03-16 DIAGNOSIS — Z79.4 TYPE 2 DIABETES MELLITUS WITH DIABETIC NEUROPATHY, WITH LONG-TERM CURRENT USE OF INSULIN (HCC): ICD-10-CM

## 2024-03-16 DIAGNOSIS — E11.40 TYPE 2 DIABETES MELLITUS WITH DIABETIC NEUROPATHY, WITH LONG-TERM CURRENT USE OF INSULIN (HCC): ICD-10-CM

## 2024-03-18 ENCOUNTER — TELEPHONE (OUTPATIENT)
Dept: ENDOCRINOLOGY CLINIC | Facility: CLINIC | Age: 59
End: 2024-03-18

## 2024-03-18 NOTE — TELEPHONE ENCOUNTER
Per chart review:  Refills for the following prescriptions were sent to the [facility] in [place]:  Trulicity 3mg on 3/14/24  Dexcom G6 sensors and transmitter on 3/12/24    MC sent advising patient

## 2024-03-18 NOTE — TELEPHONE ENCOUNTER
Endocrine refill protocol for basal insulins       Protocol Criteria:  - Appointment with Endocrinology completed in the last 6 months or scheduled in the next 3 months    - A1c result completed in the last 6 months and is <8.5%       Verify appointment has been completed or scheduled in the appropriate timeline. If so can send a 90 day supply with 1 refill per provider protocol.    Verify A1c has been completed within the last 6 months and is below 8.5%       Last completed office visit:11/28/23  Next scheduled Follow up: 04/10/24  Last A1c result: 8.3%

## 2024-03-18 NOTE — TELEPHONE ENCOUNTER
Pt called for refill:       Current Outpatient Medications:       Dulaglutide (TRULICITY) 3 MG/0.5ML Subcutaneous Solution Pen-injector, Inject 3 mg into the skin once a week., Disp: 6 mL, Rfl: 0    Continuous Blood Gluc Transmit (DEXCOM G6 TRANSMITTER) Does not apply Misc, Change sensor every 90 days, Disp: 1 each, Rfl: 0

## 2024-03-20 RX ORDER — INSULIN GLARGINE 100 [IU]/ML
INJECTION, SOLUTION SUBCUTANEOUS
Qty: 12 ML | Refills: 0 | Status: SHIPPED | OUTPATIENT
Start: 2024-03-20

## 2024-04-01 DIAGNOSIS — E11.40 TYPE 2 DIABETES MELLITUS WITH DIABETIC NEUROPATHY, WITH LONG-TERM CURRENT USE OF INSULIN (HCC): ICD-10-CM

## 2024-04-01 DIAGNOSIS — M1A.09X0 IDIOPATHIC CHRONIC GOUT OF MULTIPLE SITES WITHOUT TOPHUS: ICD-10-CM

## 2024-04-01 DIAGNOSIS — Z79.4 TYPE 2 DIABETES MELLITUS WITH DIABETIC NEUROPATHY, WITH LONG-TERM CURRENT USE OF INSULIN (HCC): ICD-10-CM

## 2024-04-01 DIAGNOSIS — I10 ESSENTIAL HYPERTENSION: ICD-10-CM

## 2024-04-01 DIAGNOSIS — F32.1 CURRENT MODERATE EPISODE OF MAJOR DEPRESSIVE DISORDER WITHOUT PRIOR EPISODE (HCC): ICD-10-CM

## 2024-04-01 NOTE — TELEPHONE ENCOUNTER
LOV: 6/13/23  Last Refilled:#1, 0rfs 3/11/24    ASSESSMENT/PLAN:      Bilateral Carpal tunnel  - EMG in the past did show evidence of carpal tunnel, right worse than left  - Both carpal tunnel region was injected with 0.5 cc of lidocaine and 40 mg of Depo-Medrol in sterile fashion.  Patient tolerated procedure well  - Advised patient if she continues to have symptoms she can see hand surgeon     Possible Seropositive rheumatoid arthritis (+ CCP, MRI of the left shoulder showing inflammatory synovitis)  - It was always questionable if she had inflammatory arthritis as she had a low titer CCP and no evidence of synovitis on exam.  MRI of the left shoulder now confirms rheumatoid arthritis  - s/p cortisone injections in L shoulder in the past   - Continues to have pain in her hands, wrists and ankles  - She was on Plaquenil in the past but did not help  - Was also on Leflunomide but caused s/e  - plan to repeat blood work  - Plan to also get MRI of the right hand for further evaluation     Pt will follow up after MRI R hand      Marge Angel MD  6/13/2023  3:00 PM           Please advise.

## 2024-04-04 RX ORDER — CHOLECALCIFEROL (VITAMIN D3) 125 MCG
2000 CAPSULE ORAL EVERY MORNING
Qty: 90 TABLET | Refills: 3 | OUTPATIENT
Start: 2024-04-04

## 2024-04-04 RX ORDER — GABAPENTIN 400 MG/1
400 CAPSULE ORAL 2 TIMES DAILY
Qty: 180 CAPSULE | Refills: 1 | OUTPATIENT
Start: 2024-04-04

## 2024-04-04 RX ORDER — LEVOTHYROXINE SODIUM 0.12 MG/1
125 TABLET ORAL EVERY MORNING
Qty: 90 TABLET | Refills: 3 | Status: SHIPPED | OUTPATIENT
Start: 2024-04-04

## 2024-04-04 RX ORDER — KETOCONAZOLE 20 MG/ML
SHAMPOO TOPICAL
Qty: 120 ML | Refills: 5 | OUTPATIENT
Start: 2024-04-04

## 2024-04-04 RX ORDER — TIZANIDINE 4 MG/1
4 TABLET ORAL EVERY 8 HOURS PRN
Qty: 90 TABLET | Refills: 1 | Status: SHIPPED | OUTPATIENT
Start: 2024-04-04

## 2024-04-04 RX ORDER — PANTOPRAZOLE SODIUM 40 MG/1
40 TABLET, DELAYED RELEASE ORAL
Qty: 90 TABLET | Refills: 1 | OUTPATIENT
Start: 2024-04-04

## 2024-04-04 NOTE — TELEPHONE ENCOUNTER
Refusing Ketoconazole Shampoo: 120mls with 5 refills sent to [facility] on 03/11/2024.    Refusing Cholecalciferol: 1 year supply sent to [facility] on 03/11/2024

## 2024-04-04 NOTE — TELEPHONE ENCOUNTER
Please review; protocol failed/No Protocol    LOV: 03/04/2024    Sent [facility] message to patient to complete labs from 03/04/2024.    Requested Prescriptions   Pending Prescriptions Disp Refills    levothyroxine 125 MCG Oral Tab 30 tablet 0     Sig: Take 1 tablet (125 mcg total) by mouth every morning.       Thyroid Medication Protocol Failed - 4/1/2024  2:25 PM        Failed - Last TSH value is normal     Lab Results   Component Value Date    TSH 0.173 (L) 11/28/2023                 Passed - TSH in past 12 months        Passed - In person appointment or virtual visit in the past 12 mos or appointment in next 3 mos     Recent Outpatient Visits              1 month ago [facility]    [facility]    4 months ago Type 2 diabetes mellitus with hyperglycemia, with long-term current use of insulin ([facility])    [facility] Veena Emery MD    Office Visit    6 months ago Type 2 diabetes mellitus with diabetic neuropathy, with long-term current use of insulin ([facility])    [facility] [place] Kandel, Ninel, MD    Office Visit    8 months ago Type 2 diabetes mellitus without retinopathy ([facility])    [facility] Bk Helm MD    Office Visit    9 months ago Polyarthralgia    [facility] Marge Angel MD    Office Visit          Future Appointments         Provider Department Appt Notes    In 6 days Veena Emery MD [facility] Diabetes    In 3 months Bk Helm MD [facility] ep dm ee                Refused Prescriptions Disp Refills    cholecalciferol 50 MCG (2000 UT) Oral Tab 90 tablet 3     Sig: Take 1 tablet (2,000 Units total) by mouth every morning.       There is no refill protocol  information for this order       ketoconazole 2 % External Shampoo 120 mL 5     Sig: Use to  Scalp twice a wekk       There is no refill protocol information for this order        Future Appointments         Provider Department Appt Notes    In 6 days [facility] Emery MD [facility] Diabetes    In 3 months Bk Helm MD [facility] ep dm ee          Recent Outpatient Visits              1 month ago Flank pain    [facility] [address], [place] Nikki Alcantara MD    Office Visit    4 months ago Type 2 diabetes mellitus with hyperglycemia, with long-term current use of insulin ([facility])    [facility] Veena Emery MD    Office Visit    6 months ago Type 2 diabetes mellitus with diabetic neuropathy, with long-term current use of insulin ([facility])    [facility] [address], [place] Nikki Alcantara MD    Office Visit    8 months ago Type 2 diabetes mellitus without retinopathy ([facility])    [facility] Bk Helm MD    Office Visit    9 months ago Polyarthralgia    [facility] Marge Angel MD    Office Visit

## 2024-04-04 NOTE — TELEPHONE ENCOUNTER
Please review; protocol failed/No Protocol    LOV: 03/04/2024    Short supply given     Requested Prescriptions   Pending Prescriptions Disp Refills    tiZANidine 4 MG Oral Tab 30 tablet 0     Sig: Take 1 tablet (4 mg total) by mouth every 8 (eight) hours as needed.       There is no refill protocol information for this order      Refused Prescriptions Disp Refills    pantoprazole 40 MG Oral Tab EC 90 tablet 1     Sig: Take 1 tablet (40 mg total) by mouth every morning before breakfast.       Gastrointestional Medication Protocol Passed - 4/1/2024  2:25 PM        Passed - In person appointment or virtual visit in the past 12 mos or appointment in next 3 mos     Recent Outpatient Visits              1 month ago Flank pain    [facility] [address], [place] Nikki Alcantara MD    Office Visit    4 months ago Type 2 diabetes mellitus with hyperglycemia, with long-term current use of insulin ([facility])    [facility] Veena Emery MD    Office Visit    6 months ago Type 2 diabetes mellitus with diabetic neuropathy, with long-term current use of insulin ([facility])    [facility] [place] Kandel, Ninel, MD    Office Visit    8 months ago Type 2 diabetes mellitus without retinopathy ([facility])    [facility] Bk Helm MD    Office Visit    9 months ago Polyarthralgia    [facility] Marge Angel MD    Office Visit          Future Appointments         Provider Department Appt Notes    In 6 days Veena Emery MD [facility] Diabetes    In 3 months Bk Helm MD [facility] ep dm ee                 gabapentin 400 MG Oral Cap 180 capsule 1     Sig: Take 1 capsule (400 mg total) by mouth 2 (two) times daily.       Neurology Medications Passed - 4/1/2024   2:25 PM        Passed - In person appointment or virtual visit in the past 6 mos or appointment in next 3 mos     Recent Outpatient Visits              1 month ago Flank pain    [facility] [place] Kandel, Ninel, MD    Office Visit    4 months ago Type 2 diabetes mellitus with hyperglycemia, with long-term current use of insulin ([facility])    [facility] Veena Emery MD    Office Visit    6 months ago Type 2 diabetes mellitus with diabetic neuropathy, with long-term current use of insulin ([facility])    [facility] [place] Kandel, Ninel, MD    Office Visit    8 months ago Type 2 diabetes mellitus without retinopathy ([facility])    [facility] Bk Helm MD    Office Visit    9 months ago Polyarthralgia    [facility] Marge Angel MD    Office Visit          Future Appointments         Provider Department Appt Notes    In 6 days [facility] MD [facility]    In 3 months Bk Helm MD [facility] [place] ep dm ee                  Future Appointments         Provider Department Appt Notes    In 6 days Veena Emery MD [facility] Diabetes    In 3 months [facility]          Recent Outpatient Visits              1 month ago [facility]    [facility] Visit    4 months ago Type 2 diabetes mellitus with hyperglycemia, with long-term current use of insulin ([facility]    Office Visit    6 months ago Type 2 diabetes mellitus with diabetic neuropathy, with long-term current  use of insulin ([facility])    [facility] [place] Kandel, Ninel, MD    Office Visit    8 months ago Type 2 diabetes mellitus without retinopathy ([facility])    [facility] [place]Sonal Robert, MD    Office Visit    9 months ago Polyarthralgia    [facility], [place] Marge Santiago MD    Office Visit

## 2024-04-04 NOTE — TELEPHONE ENCOUNTER
Endocrine Refill protocol for oral medications    Protocol Criteria:    -Appointment with Endocrinology completed in the last 6 months or scheduled in the next 3 months       Verify the above has been completed or scheduled in the appropriate timeline. If so can send a 90 day supply with 1 refill.      Last completed office visit: 11/28/23  Next scheduled Follow up: 4/10/24

## 2024-04-04 NOTE — TELEPHONE ENCOUNTER
Refusing Pantoprazole: 60 day supply sent on 03/04/2024 to [facility] [place].     Refusing Gabapentin: 6 month supply sent to [facility] on 03/04/2024

## 2024-04-05 RX ORDER — INSULIN GLARGINE 100 [IU]/ML
INJECTION, SOLUTION SUBCUTANEOUS
Qty: 72 ML | Refills: 1 | Status: SHIPPED | OUTPATIENT
Start: 2024-04-05

## 2024-04-05 RX ORDER — GLIPIZIDE 10 MG/1
10 TABLET ORAL
Qty: 180 TABLET | Refills: 1 | Status: SHIPPED | OUTPATIENT
Start: 2024-04-05 | End: 2024-10-02

## 2024-04-24 DIAGNOSIS — M1A.09X0 IDIOPATHIC CHRONIC GOUT OF MULTIPLE SITES WITHOUT TOPHUS: ICD-10-CM

## 2024-04-24 DIAGNOSIS — Z79.4 TYPE 2 DIABETES MELLITUS WITH DIABETIC NEUROPATHY, WITH LONG-TERM CURRENT USE OF INSULIN (HCC): ICD-10-CM

## 2024-04-24 DIAGNOSIS — E11.40 TYPE 2 DIABETES MELLITUS WITH DIABETIC NEUROPATHY, WITH LONG-TERM CURRENT USE OF INSULIN (HCC): ICD-10-CM

## 2024-04-24 DIAGNOSIS — I10 ESSENTIAL HYPERTENSION: ICD-10-CM

## 2024-04-24 DIAGNOSIS — F32.1 CURRENT MODERATE EPISODE OF MAJOR DEPRESSIVE DISORDER WITHOUT PRIOR EPISODE (HCC): ICD-10-CM

## 2024-04-24 DIAGNOSIS — G47.09 OTHER INSOMNIA: Primary | ICD-10-CM

## 2024-04-25 RX ORDER — LOSARTAN POTASSIUM 100 MG/1
100 TABLET ORAL DAILY
Qty: 90 TABLET | Refills: 3 | OUTPATIENT
Start: 2024-04-25

## 2024-04-25 RX ORDER — SIMVASTATIN 40 MG
40 TABLET ORAL NIGHTLY
Qty: 90 TABLET | Refills: 3 | OUTPATIENT
Start: 2024-04-25

## 2024-04-25 RX ORDER — POTASSIUM CHLORIDE 750 MG/1
CAPSULE, EXTENDED RELEASE ORAL
Qty: 180 CAPSULE | Refills: 3 | OUTPATIENT
Start: 2024-04-25

## 2024-04-25 RX ORDER — OMEPRAZOLE 40 MG/1
40 CAPSULE, DELAYED RELEASE ORAL DAILY
Qty: 90 CAPSULE | Refills: 3 | OUTPATIENT
Start: 2024-04-25

## 2024-04-25 RX ORDER — CARVEDILOL 12.5 MG/1
12.5 TABLET ORAL DAILY
Qty: 90 TABLET | Refills: 3 | OUTPATIENT
Start: 2024-04-25

## 2024-04-25 RX ORDER — SERTRALINE HYDROCHLORIDE 100 MG/1
100 TABLET, FILM COATED ORAL DAILY
Qty: 90 TABLET | Refills: 3 | OUTPATIENT
Start: 2024-04-25

## 2024-04-25 RX ORDER — ALLOPURINOL 100 MG/1
100 TABLET ORAL NIGHTLY
Qty: 90 TABLET | Refills: 3 | Status: SHIPPED | OUTPATIENT
Start: 2024-04-25

## 2024-04-25 RX ORDER — HYDROCHLOROTHIAZIDE 25 MG/1
TABLET ORAL
Qty: 180 TABLET | Refills: 3 | OUTPATIENT
Start: 2024-04-25

## 2024-04-25 RX ORDER — TRAZODONE HYDROCHLORIDE 100 MG/1
100 TABLET ORAL NIGHTLY
Qty: 90 TABLET | Refills: 3 | OUTPATIENT
Start: 2024-04-25

## 2024-04-25 RX ORDER — HYDROCORTISONE 25 MG/G
CREAM TOPICAL
Qty: 28 G | Refills: 5 | OUTPATIENT
Start: 2024-04-25

## 2024-04-25 RX ORDER — LEVOTHYROXINE SODIUM 0.12 MG/1
125 TABLET ORAL EVERY MORNING
Qty: 90 TABLET | Refills: 3 | OUTPATIENT
Start: 2024-04-25

## 2024-04-25 RX ORDER — ZOLPIDEM TARTRATE 10 MG/1
10 TABLET ORAL NIGHTLY
Qty: 30 TABLET | Refills: 1 | Status: SHIPPED | OUTPATIENT
Start: 2024-04-25

## 2024-04-25 NOTE — TELEPHONE ENCOUNTER
Please review. Protocol Failed; No Protocol    Requested Prescriptions   Pending Prescriptions Disp Refills    allopurinol 100 MG Oral Tab 90 tablet 1     Sig: Take 1 tablet (100 mg total) by mouth nightly.       There is no refill protocol information for this order            Future Appointments         Provider Department Appt Notes    In 1 month Veena Emery MD [facility] High low sugar    In 2 months Bk Helm MD [facility] ep dm ee          Recent Outpatient Visits              1 month ago Flank pain    [facility] [address], [place] Nikki Alcantara MD    Office Visit    4 months ago Type 2 diabetes mellitus with hyperglycemia, with long-term current use of insulin ([facility])    [facility] hospital Veena Emery MD    Office Visit    7 months ago Type 2 diabetes mellitus with diabetic neuropathy, with long-term current use of insulin ([facility])    [facility] [place] Kandel, Ninel, MD    Office Visit    9 months ago Type 2 diabetes mellitus without retinopathy ([facility])    [facility] Bk Helm MD    Office Visit    10 months ago Polyarthralgia    [facility] Marge Angel MD    Office Visit

## 2024-04-25 NOTE — TELEPHONE ENCOUNTER
Please review. Rx failed/no protocol.    Recent fills: 01/18/2024, and 11/29/2023  Last Rx written: 11/28/2023  LOV: 03/04/2024  Requested Prescriptions   Pending Prescriptions Disp Refills    zolpidem 10 MG Oral Tab 30 tablet 1     Sig: Take 1 tablet (10 mg total) by mouth nightly.       Controlled Substance Medication Failed - 4/24/2024  4:18 PM        Failed - This medication is a controlled substance - forward to provider to refill         Refused Prescriptions Disp Refills    Omeprazole 40 MG Oral Capsule Delayed Release 90 capsule 3     Sig: Take 1 capsule (40 mg total) by mouth daily.       Gastrointestional Medication Protocol Passed - 4/24/2024  4:18 PM        Passed - In person appointment or virtual visit in the past 12 mos or appointment in next 3 mos     Recent Outpatient Visits              1 month ago Flank pain    [facility] [address], [place] Nikki Alcantara MD    Office Visit    4 months ago Type 2 diabetes mellitus with hyperglycemia, with long-term current use of insulin ([facility])    [facility] Veena Emery MD    Office Visit    7 months ago Type 2 diabetes mellitus with diabetic neuropathy, with long-term current use of insulin ([facility])    [facility] [place] Kandel, Ninel, MD    Office Visit    9 months ago Type 2 diabetes mellitus without retinopathy ([facility])    [facility] Bk Helm MD    Office Visit    10 months ago Polyarthralgia    [facility] Marge Angel MD    Office Visit          Future Appointments         Provider Department Appt Notes    In 1 month [facility]    In 2 months Bk Helm MD [facility] ep dm ee                      hydroCHLOROthiazide 25 MG Oral Tab 180  tablet 3     Sig: TAKE 1 TABLET BY MOUTH TWICE DAILY       Hypertension Medications Protocol Passed - 4/24/2024  4:18 PM        Passed - CMP or BMP in past 12 months        Passed - Last BP reading less than 140/90     BP Readings from Last 1 Encounters:   03/04/24 96/64               Passed - In person appointment or virtual visit in the past 12 mos or appointment in next 3 mos     Recent Outpatient Visits              1 month ago Flank pain    [facility] [place] Kandel, Ninel, MD    Office Visit    4 months ago Type 2 diabetes mellitus with hyperglycemia, with long-term current use of insulin ([facility])    [facility] Veena Emery MD    Office Visit    7 months ago Type 2 diabetes mellitus with diabetic neuropathy, with long-term current use of insulin ([facility])    [facility] [place] Kandel, Ninel, MD    Office Visit    9 months ago Type 2 diabetes mellitus without retinopathy ([facility])    [facility] Bk Helm MD    Office Visit    10 months ago Polyarthralgia    [facility] Marge Angel MD    Office Visit          Future Appointments         Provider Department Appt Notes    In 1 month Veena Emery MD [facility] High low sugar    In 2 months Bk Helm MD [facility] ep dm ee                    Passed - EGFRCR or GFRNAA > 50     GFR Evaluation  EGFRCR: 80 , resulted on 11/28/2023            carvedilol 12.5 MG Oral Tab 90 tablet 3     Sig: Take 1 tablet (12.5 mg total) by mouth daily.       Hypertension Medications Protocol Passed - 4/24/2024  4:18 PM        Passed - CMP or BMP in past 12 months        Passed - Last BP reading less than 140/90     BP Readings from Last 1 Encounters:   03/04/24 96/64               Passed  - In person appointment or virtual visit in the past 12 mos or appointment in next 3 mos     Recent Outpatient Visits              1 month ago Flank pain    [facility] [place] Kandel, Ninel, MD    Office Visit    4 months ago Type 2 diabetes mellitus with hyperglycemia, with long-term current use of insulin ([facility])    [facility] Veena Emery MD    Office Visit    7 months ago Type 2 diabetes mellitus with diabetic neuropathy, with long-term current use of insulin ([facility])    [facility] [place] Kandel, Ninel, MD    Office Visit    9 months ago Type 2 diabetes mellitus without retinopathy ([facility])    [facility] Bk Helm MD    Office Visit    10 months ago Polyarthralgia    [facility] Marge Angel MD    Office Visit          Future Appointments         Provider Department Appt Notes    In 1 month Veena Emery MD [facility] High low sugar    In 2 months Bk Helm MD [facility] ep dm ee                    Passed - EGFRCR or GFRNAA > 50     GFR Evaluation  EGFRCR: 80 , resulted on 11/28/2023            simvastatin 40 MG Oral Tab 90 tablet 3     Sig: Take 1 tablet (40 mg total) by mouth nightly.       Cholesterol Medication Protocol Passed - 4/24/2024  4:18 PM        Passed - ALT < 80     Lab Results   Component Value Date    ALT 32 11/28/2023             Passed - ALT resulted within past year        Passed - Lipid panel within past 12 months     Lab Results   Component Value Date    CHOLEST 162 11/28/2023    TRIG 147 11/28/2023    HDL 42 11/28/2023    LDL 94 11/28/2023    VLDL 24 11/28/2023    NONHDLC 120 11/28/2023             Passed - In person appointment or virtual visit in the past 12 mos or appointment in next 3  mos     Recent Outpatient Visits              1 month ago Flank pain    [facility] [address], [place] Nikki Alcantara MD    Office Visit    4 months ago Type 2 diabetes mellitus with hyperglycemia, with long-term current use of insulin ([facility])    [facility] Veena Emery MD    Office Visit    7 months ago Type 2 diabetes mellitus with diabetic neuropathy, with long-term current use of insulin ([facility])    [facility] [address], [place] Nikki Alcantara MD    Office Visit    9 months ago Type 2 diabetes mellitus without retinopathy ([facility])    [facility] Bk Helm MD    Office Visit    10 months ago Polyarthralgia    [facility] Marge Angel MD    Office Visit          Future Appointments         Provider Department Appt Notes    In 1 month Veena Emery MD [facility] High low sugar    In 2 months Bk Helm MD [facility] ep dm ee                      sertraline 100 MG Oral Tab 90 tablet 3     Sig: Take 1 tablet (100 mg total) by mouth daily.       Psychiatric Non-Scheduled (Anti-Anxiety) Passed - 4/24/2024  4:18 PM        Passed - In person appointment or virtual visit in the past 6 mos or appointment in next 3 mos     Recent Outpatient Visits              1 month ago Flank pain    [facility] [address], [place] Nikki Alcantara MD    Office Visit    4 months ago Type 2 diabetes mellitus with hyperglycemia, with long-term current use of insulin ([facility])    [facility]    Office Visit    7 months ago Type 2 diabetes mellitus with diabetic neuropathy, with long-term current use of insulin ([facility])    [facility] [address], [place] Kandel  MD Nikki    Office Visit    9 months ago Type 2 diabetes mellitus without retinopathy (HCC)    [facility] Bk Helm MD    Office Visit    10 months ago Polyarthralgia    [facility] Marge Angel MD    Office Visit          Future Appointments         Provider Department Appt Notes    In 1 month Veena Emery MD [facility] High low sugar    In 2 months Bk Helm MD [facility] ep dm ee                    Passed - Depression Screening completed within the past 12 months          Potassium Chloride ER 10 MEQ Oral Cap  capsule 3     Sig: TAKE 1 CAPSULE BY MOUTH TWICE DAILY       There is no refill protocol information for this order       traZODone 100 MG Oral Tab 90 tablet 3     Sig: Take 1 tablet (100 mg total) by mouth nightly.       There is no refill protocol information for this order       hydrocortisone (PROCTOZONE-HC) 2.5 % External Cream 28 g 5     Sig: Apply to hemorrhoids for 7 to 10 days       Gastrointestional Medication Protocol Passed - 4/24/2024  4:18 PM        Passed - In person appointment or virtual visit in the past 12 mos or appointment in next 3 mos     Recent Outpatient Visits              1 month ago Flank pain    [facility] [address], [place] Nikki Alcantara MD    Office Visit    4 months ago Type 2 diabetes mellitus with hyperglycemia, with long-term current use of insulin ([facility])    [facility] Veena Emery MD    Office Visit    7 months ago Type 2 diabetes mellitus with diabetic neuropathy, with long-term current use of insulin ([facility])    [facility]    [facility] Visit    9 months ago Type 2 diabetes mellitus without retinopathy (HCC)    [facility] Bk Helm MD    Office Visit    10 months ago [facility]arthralgia    [facility]    [facility]          Future Appointments         Provider Department Appt Notes    In 1 month Veena Emery MD [facility] High low sugar    In 2 months Bk Helm MD [facility] ep dm ee                      losartan 100 MG Oral Tab 90 tablet 3     Sig: Take 1 tablet (100 mg total) by mouth daily.       Hypertension Medications Protocol Passed - 4/24/2024  4:18 PM        Passed - CMP or BMP in past 12 months        Passed - Last BP reading less than 140/90     BP Readings from Last 1 Encounters:   03/04/24 96/64               Passed - In person appointment or virtual visit in the past 12 mos or appointment in next 3 mos     Recent Outpatient Visits              1 month ago Flank pain    [facility] [address], [place] Nikki Alcantara MD    Office Visit    4 months ago Type 2 diabetes mellitus with hyperglycemia, with long-term current use of insulin ([facility])    [facility] Veena Emery MD    Office Visit    7 months ago Type 2 diabetes mellitus with diabetic neuropathy, with long-term current use of insulin ([facility]    [facility] Visit    9 months ago Type 2 diabetes mellitus without retinopathy ([facility])    [facility] Bk Helm MD    Office Visit    10 months ago Polyarthralgia    [facility]    [facility]          Future Appointments         Provider Department Appt Notes    In 1 month [facility]    In 2 months [facility]                    Passed - EGFRCR or GFRNAA > 50     GFR Evaluation  EGFRCR: 80 , resulted on 11/28/2023            levothyroxine 125 MCG Oral Tab 90 tablet 3     Sig: Take 1 tablet (125 mcg total) by mouth every morning.       Thyroid Medication Protocol Failed - 4/24/2024  4:18 PM        Failed - Last TSH value is normal     Lab Results   Component Value Date    TSH 0.173 (L) 11/28/2023                 Passed - TSH in past 12 months        Passed - In person appointment or virtual visit in the past 12 mos or appointment in next 3 mos     Recent Outpatient Visits              1 month ago Flank pain    [facility]    Office Visit    4 months ago Type 2 diabetes mellitus with hyperglycemia, with long-term current use of insulin ([facility])    [facility] Veena Emery MD    Office Visit    7 months ago Type 2 diabetes mellitus with diabetic neuropathy, with long-term current use of insulin ([facility])    [facility] [place] Kandel, Ninel, MD    Office Visit    9 months ago Type 2 diabetes mellitus without retinopathy ([facility])    [facility] Bk Helm MD    Office Visit    10 months ago Polyarthralgia    [facility]    Office Visit          Future Appointments         Provider Department Appt Notes    In 1 month Veena Emery MD [facility] High low sugar    In 2 months [facility]                         Future Appointments         Provider Department Appt Notes    In 1 month Veena Emery MD [facility] High low sugar    In 2 months Bk Helm  MD [facility] [place] ep dm ee          Recent Outpatient Visits              1 month ago Flank pain    [facility] [place] Kandel, Ninel, MD    Office Visit    4 months ago Type 2 diabetes mellitus with hyperglycemia, with long-term current use of insulin ([facility])    [facility] Veena Tom MD    Office Visit    7 months ago Type 2 diabetes mellitus with diabetic neuropathy, with long-term current use of insulin ([facility])    [facility] [place] Kandel, Ninel, MD    Office Visit    9 months ago Type 2 diabetes mellitus without retinopathy ([facility])    [facility] [place] [facility], Bk Erwin MD    Office Visit    10 months ago Polyarthralgia    [facility] [place] [facility], Marge Santiago MD    Office Visit

## 2024-04-25 NOTE — TELEPHONE ENCOUNTER
LOV: 6/13/23  Last Refilled:#1, 0rfs 4/1/24    ASSESSMENT/PLAN:      Bilateral Carpal tunnel  - EMG in the past did show evidence of carpal tunnel, right worse than left  - Both carpal tunnel region was injected with 0.5 cc of lidocaine and 40 mg of Depo-Medrol in sterile fashion.  Patient tolerated procedure well  - Advised patient if she continues to have symptoms she can see hand surgeon     Possible Seropositive rheumatoid arthritis (+ CCP, MRI of the left shoulder showing inflammatory synovitis)  - It was always questionable if she had inflammatory arthritis as she had a low titer CCP and no evidence of synovitis on exam.  MRI of the left shoulder now confirms rheumatoid arthritis  - s/p cortisone injections in L shoulder in the past   - Continues to have pain in her hands, wrists and ankles  - She was on Plaquenil in the past but did not help  - Was also on Leflunomide but caused s/e  - plan to repeat blood work  - Plan to also get MRI of the right hand for further evaluation     Pt will follow up after MRI R hand      Marge Angel MD  6/13/2023  3:00 PM           Please advise.

## 2024-04-26 NOTE — TELEPHONE ENCOUNTER
Endocrine Refill protocol for oral and injectable diabetic medications    Protocol Criteria:    -Appointment with Endocrinology completed in the last 6 months or scheduled in the next 3 months    -A1c result <8.5% in the past 6 months      Verify the above has been completed or scheduled in the appropriate timeline. If so can send a 90 day supply with 1 refill.     Last completed office visit: 11/28/23  Next scheduled Follow up: 6/5/24  Last A1c result: Last A1c value was 8.3% done 11/28/2023.

## 2024-04-27 RX ORDER — DULAGLUTIDE 3 MG/.5ML
3 INJECTION, SOLUTION SUBCUTANEOUS WEEKLY
Qty: 6 ML | Refills: 0 | Status: SHIPPED | OUTPATIENT
Start: 2024-04-27 | End: 2024-07-26

## 2024-05-03 DIAGNOSIS — E11.40 TYPE 2 DIABETES MELLITUS WITH DIABETIC NEUROPATHY, WITH LONG-TERM CURRENT USE OF INSULIN (HCC): ICD-10-CM

## 2024-05-03 DIAGNOSIS — I10 ESSENTIAL HYPERTENSION: ICD-10-CM

## 2024-05-03 DIAGNOSIS — M1A.09X0 IDIOPATHIC CHRONIC GOUT OF MULTIPLE SITES WITHOUT TOPHUS: ICD-10-CM

## 2024-05-03 DIAGNOSIS — F32.1 CURRENT MODERATE EPISODE OF MAJOR DEPRESSIVE DISORDER WITHOUT PRIOR EPISODE (HCC): ICD-10-CM

## 2024-05-03 DIAGNOSIS — Z79.4 TYPE 2 DIABETES MELLITUS WITH DIABETIC NEUROPATHY, WITH LONG-TERM CURRENT USE OF INSULIN (HCC): ICD-10-CM

## 2024-05-06 RX ORDER — HYDROCHLOROTHIAZIDE 25 MG/1
TABLET ORAL
Qty: 180 TABLET | Refills: 3 | OUTPATIENT
Start: 2024-05-06

## 2024-06-05 ENCOUNTER — OFFICE VISIT (OUTPATIENT)
Dept: ENDOCRINOLOGY CLINIC | Facility: CLINIC | Age: 59
End: 2024-06-05

## 2024-06-05 VITALS
HEART RATE: 96 BPM | DIASTOLIC BLOOD PRESSURE: 59 MMHG | BODY MASS INDEX: 33.31 KG/M2 | WEIGHT: 188 LBS | SYSTOLIC BLOOD PRESSURE: 94 MMHG | HEIGHT: 63 IN

## 2024-06-05 DIAGNOSIS — I10 ESSENTIAL HYPERTENSION: ICD-10-CM

## 2024-06-05 DIAGNOSIS — E04.9 GOITER: ICD-10-CM

## 2024-06-05 DIAGNOSIS — E78.5 DYSLIPIDEMIA: ICD-10-CM

## 2024-06-05 DIAGNOSIS — Z79.4 TYPE 2 DIABETES MELLITUS WITH DIABETIC NEUROPATHY, WITH LONG-TERM CURRENT USE OF INSULIN (HCC): Primary | ICD-10-CM

## 2024-06-05 DIAGNOSIS — E11.40 TYPE 2 DIABETES MELLITUS WITH DIABETIC NEUROPATHY, WITH LONG-TERM CURRENT USE OF INSULIN (HCC): Primary | ICD-10-CM

## 2024-06-05 DIAGNOSIS — E03.9 HYPOTHYROIDISM, UNSPECIFIED TYPE: ICD-10-CM

## 2024-06-05 LAB
CARTRIDGE EXPIRATION DATE: ABNORMAL DATE
GLUCOSE BLOOD: 144
HEMOGLOBIN A1C: 7.8 % (ref 4.3–5.6)
TEST STRIP LOT #: NORMAL NUMERIC

## 2024-06-05 PROCEDURE — 83036 HEMOGLOBIN GLYCOSYLATED A1C: CPT | Performed by: INTERNAL MEDICINE

## 2024-06-05 PROCEDURE — 82947 ASSAY GLUCOSE BLOOD QUANT: CPT | Performed by: INTERNAL MEDICINE

## 2024-06-05 PROCEDURE — 99214 OFFICE O/P EST MOD 30 MIN: CPT | Performed by: INTERNAL MEDICINE

## 2024-06-05 NOTE — PROGRESS NOTES
Name: Randi Lobo  Date: 6/05/24    Referring Physician: Nikki Alcantara    HISTORY OF PRESENT ILLNESS   Randi Lobo is a 58 year old female who presents for follow-up of management of uncontrolled T2D. She was diagnosed about 6-7 years ago, based upon blood tests.     At the last visit, more than a year ago, I had asked her to continue all of her medications that she is taking now. However, she often times does not take all of the insulin prescribed due to low blood sugars. Occasionally she will take the full dose of metformin and occasionally she will take half of the dose.     At the last visit, I had asked her to stop Victoza and start Trulicity 1.5mg qweekly. She is taking this as well as all of her other medications. She usually takes less of her insulin than what we have listed.     Blood Glucose Today: 144  HbA1C or glycohemoglobin is 7.8 today (and it was 8.7 on 11/28/23 (and it was 8.2 on 10/05/22 was 9.3 on 6/15/21 and was 10.4% on 10/26/20)  Type 1 or Type 2?: Type 2.  Checking twice a day:   Fasting- 150  Medications for DM : Glipizide 10mg PO bid; metformin 1g po bid, Trulicity 1.5; Basaglar 40 units twice a day; She definitely takes less than 40 units bid. She mostly takes 40 units once in the morning.  Episodes of hypoglycemia: none    Dietary compliance: not very good  Breakfast- brown bread two eggs, derick without sugar  Dinner- 1-2 roti, meat and pam and sabzi ; sweet    Exercise: none    Polyuria/polydipsia: none    Blurred vision: none    Flu Vaccine This Season: no    Covid Vaccine: yes    REVIEW OF SYSTEMS  Eyes: Diabetic retinopathy present: none            Most recent visit to eye doctor in last 12 months: checks regularly    CV: Cardiovascular disease present: no         Hypertension present: yes, better, PCP adjusted meds         Hyperlipidemia present: yes, at goal, HDL low (11/28/23)          Peripheral Vascular Disease present: no    : Nephropathy present: none (5/26/23);  creatinine- 0.85    Neuro: Neuropathy present: she has this    Skin: Infection or ulceration: no    Osteoporosis: no    Thyroid disease: yes; on levothyroxine 125mcg and TSH- o.173 on 11/28/23    Medications:     Current Outpatient Medications:     Dulaglutide (TRULICITY) 3 MG/0.5ML Subcutaneous Solution Pen-injector, Inject 3 mg into the skin once a week., Disp: 6 mL, Rfl: 0    diclofenac 1 % External Gel, Apply 2 g topically 4 (four) times daily., Disp: 1 each, Rfl: 2    allopurinol 100 MG Oral Tab, Take 1 tablet (100 mg total) by mouth nightly., Disp: 90 tablet, Rfl: 3    zolpidem 10 MG Oral Tab, Take 1 tablet (10 mg total) by mouth nightly., Disp: 30 tablet, Rfl: 1    glipiZIDE 10 MG Oral Tab, Take 1 tablet (10 mg total) by mouth 2 (two) times daily before meals., Disp: 180 tablet, Rfl: 1    metFORMIN 500 MG Oral Tab, TAKE 2 TABLETS BY MOUTH TWICE DAILY, Disp: 360 tablet, Rfl: 1    insulin glargine (BASAGLAR KWIKPEN) 100 UNIT/ML Subcutaneous Solution Pen-injector, ADMINISTER 40 UNITS UNDER THE SKIN EVERY DAY TWICE DAILY, Disp: 72 mL, Rfl: 1    levothyroxine 125 MCG Oral Tab, Take 1 tablet (125 mcg total) by mouth every morning., Disp: 90 tablet, Rfl: 3    tiZANidine 4 MG Oral Tab, Take 1 tablet (4 mg total) by mouth every 8 (eight) hours as needed., Disp: 90 tablet, Rfl: 1    Continuous Blood Gluc Sensor (DEXCOM G6 SENSOR) Does not apply Misc, Change sensor every 10 days, Disp: 9 each, Rfl: 0    Continuous Blood Gluc Transmit (DEXCOM G6 TRANSMITTER) Does not apply Misc, Change sensor every 90 days, Disp: 1 each, Rfl: 0    Continuous Blood Gluc  (DEXCOM G6 ) Does not apply Device, Use to monitor blood sugar level, Disp: 1 each, Rfl: 0    Glucose Blood (ONETOUCH VERIO) In Vitro Strip, USE TO CHECK BLOOD SUGAR TWICE DAILY, FASTING AND 2 HOURS AFTER A MEAL, Disp: 200 strip, Rfl: 1    Insulin Pen Needle (TRUEPLUS 5-BEVEL PEN NEEDLES) 32G X 4 MM Does not apply Misc, Use twice a day, Disp: 200 each,  Rfl: 3    cholecalciferol 50 MCG (2000 UT) Oral Tab, Take 1 tablet (2,000 Units total) by mouth every morning., Disp: 90 tablet, Rfl: 3    ketoconazole 2 % External Shampoo, Use to  Scalp twice a wekk, Disp: 120 mL, Rfl: 5    pantoprazole 40 MG Oral Tab EC, Take 1 tablet (40 mg total) by mouth every morning before breakfast., Disp: 90 tablet, Rfl: 1    gabapentin 400 MG Oral Cap, Take 1 capsule (400 mg total) by mouth 2 (two) times daily., Disp: 180 capsule, Rfl: 1    hydrocortisone (PROCTOZONE-HC) 2.5 % External Cream, Apply to hemorrhoids for 7 to 10 days, Disp: 28 g, Rfl: 5    losartan 100 MG Oral Tab, Take 1 tablet (100 mg total) by mouth daily., Disp: 90 tablet, Rfl: 3    simvastatin 40 MG Oral Tab, Take 1 tablet (40 mg total) by mouth nightly., Disp: 90 tablet, Rfl: 3    sertraline 100 MG Oral Tab, Take 1 tablet (100 mg total) by mouth daily., Disp: 90 tablet, Rfl: 3    Potassium Chloride ER 10 MEQ Oral Cap CR, TAKE 1 CAPSULE BY MOUTH TWICE DAILY, Disp: 180 capsule, Rfl: 3    traZODone 100 MG Oral Tab, Take 1 tablet (100 mg total) by mouth nightly., Disp: 90 tablet, Rfl: 3    aspirin 81 MG Oral Tab EC, Take 1 tablet (81 mg total) by mouth daily. Pt states 2x a day., Disp: 90 tablet, Rfl: 3    naproxen 500 MG Oral Tab EC, Take 1 tablet p.o. at bedtime as needed for severe pain, Disp: 30 tablet, Rfl: 1    hydroCHLOROthiazide 25 MG Oral Tab, TAKE 1 TABLET BY MOUTH TWICE DAILY, Disp: 180 tablet, Rfl: 3    carvedilol 12.5 MG Oral Tab, Take 1 tablet (12.5 mg total) by mouth daily., Disp: 90 tablet, Rfl: 3    Omeprazole 40 MG Oral Capsule Delayed Release, Take 1 capsule (40 mg total) by mouth daily., Disp: 90 capsule, Rfl: 3    Blood Glucose Monitoring Suppl (ONETOUCH VERIO) w/Device Does not apply Kit, 1 kit As Directed., Disp: 1 kit, Rfl: 0    Insulin Pen Needle (TRUEPLUS PEN NEEDLES) 32G X 4 MM Does not apply Misc, Use needles to inject insulin daily, Disp: 100 each, Rfl: 3    Ferrous Sulfate (IRON) 325 (65 Fe) MG  Oral Tab, Take 1 Dose by mouth daily., Disp: 30 tablet, Rfl: 0    Lancets (ONETOUCH DELICA PLUS [id number]) Does not apply Misc, 1 strip by In Vitro route 2 (two) times daily. FASTING AND 2 HOURS AFTER A MEAL, Disp: 200 each, Rfl: 3    HYDROcodone-acetaminophen 7.5-325 MG Oral Tab, Take 1-2 tablets by mouth every 4 (four) hours as needed for Pain., Disp: 15 tablet, Rfl: 0     Allergies:   No Known Allergies    Social History:   Social History     Socioeconomic History    Marital status:    Tobacco Use    Smoking status: Never    Smokeless tobacco: Never   Vaping Use    Vaping status: Never Used   Substance and Sexual Activity    Alcohol use: No    Drug use: No   Social History Narrative    ** Merged History Encounter **            Medical History:   Past Medical History:    Asthma (HCC)    Depression    Diabetes (HCC)    Diabetes type 2, controlled (HCC)    Disorder of thyroid    Diverticulitis    Esophageal reflux    Essential hypertension    Gout    Hepatic steatosis    Hepatomegaly    High blood pressure    High cholesterol    Hyperlipidemia    Thyroid disease       Surgical history:   Past Surgical History:   Procedure Laterality Date    Colonoscopy N/A 3/29/2017    Procedure: COLONOSCOPY;  Surgeon: Lenny Miranda MD;  Location: [facility] ENDOSCOPY    Colonoscopy N/A 1/8/2021    Procedure: COLONOSCOPY;  Surgeon: Arthur Roberts MD;  Location: [facility] ENDO         PHYSICAL EXAM  Vitals:    06/05/24 1625   BP: 94/59   Pulse: 96   Weight: 188 lb (85.3 kg)   Height: 5' 3\" (1.6 m)         General Appearance:  alert, well developed, in no acute distress  Eyes:  normal conjunctivae, sclera., normal sclera and normal pupils  Ears/Nose/Mouth/Throat/Neck:  no palpable thyroid nodules or cervical lymphadenopathy  Neck: Trachea midline: Normal  Back: no kyphosis or back tenderness  Psychiatric:  oriented to time, self, and place  Nutritional:  no abnormal weight gain or loss    Lab Data:   Lab Results   Component Value Date      (H) 11/28/2023    A1C 8.3 (H) 11/28/2023     Lab Results   Component Value Date     (H) 11/28/2023    BUN 10 11/28/2023    BUNCREA 11.8 11/28/2023    CREATSERUM 0.85 11/28/2023    ANIONGAP 7 11/28/2023    GFRNAA 57 (L) 07/06/2022    GFRAA 66 07/06/2022    CA 9.7 11/28/2023    OSMOCALC 289 11/28/2023    ALKPHO 90 11/28/2023    AST 24 11/28/2023    ALT 32 11/28/2023    BILT 0.5 11/28/2023    TP 7.3 11/28/2023    ALB 4.6 11/28/2023    GLOBULIN 2.7 (L) 11/28/2023     11/28/2023    K 4.3 11/28/2023     11/28/2023    CO2 26.0 11/28/2023     Lab Results   Component Value Date    CHOLEST 162 11/28/2023    TRIG 147 11/28/2023    HDL 42 11/28/2023    LDL 94 11/28/2023    VLDL 24 11/28/2023    NONHDLC 120 11/28/2023     Lab Results   Component Value Date    MALBP 1.69 05/26/2023    CREUR 132.00 05/26/2023         ASSESSMENT/PLAN:    This is a 58 year-old woman here for evaluation and management of uncontrolled type 2 diabetes. We discussed the ABCs of DM.     1.) Hyperglycemia Management- We discussed the importance of glycemic control to prevent complications of diabetes.   - Continue glipizide 10mg PO bid  - Continue metformin 1g PO bid  - Increase Trulicity from 1.5mg to 3.0mg qweekly   - Continue Basaglar but decrease from 40 units bid to 40 units qAM  - Check blood sugars fasting and two hours after biggest meal and send them to me in 2 weeks  - I will make adjustments in medications at that time    2.) Management of Diabetic Complications- We discussed the complications of diabetes include retinopathy, neuropathy, nephropathy and cardiovascular disease.   - Covid vaccine- up to date  - Neuropathy- she has this, will stop gabapentin and   - CAD- none  - HTN- on meds, managed by PCP  - Ophtho- gave referral for eye exam  - MAC- check in 3 months  - Lipids- check in 3 months  - CMP- check in 3 months    3.) Lifestyle Management for Diabetes- We discussed importance of a low CHO diet, and  recommend 45gm per meal or 135gm per day. We discussed the importance of trying to follow a Mediterranean diet, with an emphasis on vegetables at every meal, with lots whole grains, and protein from either plant-based sources, or poultry and fish.   - asked her to eat more vegetables and less red meat- she is trying to do this  - I have given them the site '[employer]' which has a lot of good advice for how to eat nutritious meals as a usama    4.) Hypothyroidism- TSH low in 11/2023 and on 125mcg of levothyroxine.   - Check again in 3 months and adjust dose at that time    She will return to clinic in 3 months    Prior to this encounter, I spent over 15 minutes with preparing for the visit, including reviewing documents from other specialties as well as from PCP and going over test results. During the face to face encounter, I spent an additional 15 minutes which were determined for follow-up. Greater than 50% of the time was spent in counseling, anticipatory guidance, and coordination of care. Patient concerns were answered to the best of my knowledge.       6/05/24  eVena Emery MD

## 2024-06-06 PROBLEM — E04.9 GOITER: Status: ACTIVE | Noted: 2024-06-06

## 2024-06-06 RX ORDER — DULAGLUTIDE 3 MG/.5ML
3 INJECTION, SOLUTION SUBCUTANEOUS WEEKLY
Qty: 6 ML | Refills: 0 | Status: SHIPPED | OUTPATIENT
Start: 2024-06-06 | End: 2024-09-04

## 2024-06-25 DIAGNOSIS — F32.1 CURRENT MODERATE EPISODE OF MAJOR DEPRESSIVE DISORDER WITHOUT PRIOR EPISODE (HCC): ICD-10-CM

## 2024-06-25 DIAGNOSIS — I10 ESSENTIAL HYPERTENSION: ICD-10-CM

## 2024-06-25 DIAGNOSIS — M1A.09X0 IDIOPATHIC CHRONIC GOUT OF MULTIPLE SITES WITHOUT TOPHUS: ICD-10-CM

## 2024-06-25 DIAGNOSIS — E11.40 TYPE 2 DIABETES MELLITUS WITH DIABETIC NEUROPATHY, WITH LONG-TERM CURRENT USE OF INSULIN (HCC): ICD-10-CM

## 2024-06-25 DIAGNOSIS — Z79.4 TYPE 2 DIABETES MELLITUS WITH DIABETIC NEUROPATHY, WITH LONG-TERM CURRENT USE OF INSULIN (HCC): ICD-10-CM

## 2024-06-25 DIAGNOSIS — G47.09 OTHER INSOMNIA: ICD-10-CM

## 2024-06-25 RX ORDER — PEN NEEDLE, DIABETIC 32GX 5/32"
NEEDLE, DISPOSABLE MISCELLANEOUS
Qty: 100 EACH | Refills: 3 | Status: CANCELLED | OUTPATIENT
Start: 2024-06-25

## 2024-06-25 RX ORDER — PROCHLORPERAZINE 25 MG/1
SUPPOSITORY RECTAL
Qty: 1 EACH | Refills: 0 | Status: CANCELLED | OUTPATIENT
Start: 2024-06-25

## 2024-06-25 RX ORDER — BLOOD SUGAR DIAGNOSTIC
STRIP MISCELLANEOUS
Qty: 200 STRIP | Refills: 1 | Status: CANCELLED | OUTPATIENT
Start: 2024-06-25

## 2024-06-25 RX ORDER — PROCHLORPERAZINE 25 MG/1
SUPPOSITORY RECTAL
Qty: 9 EACH | Refills: 0 | Status: CANCELLED | OUTPATIENT
Start: 2024-06-25

## 2024-06-25 RX ORDER — GLIPIZIDE 10 MG/1
10 TABLET ORAL
Qty: 180 TABLET | Refills: 1 | Status: CANCELLED | OUTPATIENT
Start: 2024-06-25 | End: 2024-12-22

## 2024-06-25 RX ORDER — INSULIN GLARGINE 100 [IU]/ML
INJECTION, SOLUTION SUBCUTANEOUS
Qty: 72 ML | Refills: 1 | Status: CANCELLED | OUTPATIENT
Start: 2024-06-25

## 2024-06-25 RX ORDER — PROCHLORPERAZINE 25 MG/1
SUPPOSITORY RECTAL
Qty: 1 EACH | Refills: 0 | Status: SHIPPED | OUTPATIENT
Start: 2024-06-25

## 2024-06-25 NOTE — TELEPHONE ENCOUNTER
Endocrine Refill protocol for Glucose testing supplies       Protocol Criteria: PASSED  Appointment with Endocrinology completed in the last 12 months or scheduled in the next 6 months     Verify appointment has been completed or scheduled in the appropriate timeline. If so can send a 90 day supply with 1 refill.        Last completed office visit: 6/5/2024 Veena Emery MD   Next scheduled Follow up: none

## 2024-06-28 RX ORDER — CARVEDILOL 12.5 MG/1
12.5 TABLET ORAL DAILY
Qty: 90 TABLET | Refills: 3 | Status: SHIPPED | OUTPATIENT
Start: 2024-06-28

## 2024-06-28 RX ORDER — LEVOTHYROXINE SODIUM 125 UG/1
125 TABLET ORAL EVERY MORNING
Qty: 90 TABLET | Refills: 3 | OUTPATIENT
Start: 2024-06-28

## 2024-06-28 RX ORDER — TRAZODONE HYDROCHLORIDE 100 MG/1
100 TABLET ORAL NIGHTLY
Qty: 90 TABLET | Refills: 3 | OUTPATIENT
Start: 2024-06-28

## 2024-06-28 RX ORDER — LOSARTAN POTASSIUM 100 MG/1
100 TABLET ORAL DAILY
Qty: 90 TABLET | Refills: 3 | OUTPATIENT
Start: 2024-06-28

## 2024-06-28 RX ORDER — SIMVASTATIN 40 MG
40 TABLET ORAL NIGHTLY
Qty: 90 TABLET | Refills: 3 | OUTPATIENT
Start: 2024-06-28

## 2024-06-28 RX ORDER — SERTRALINE HYDROCHLORIDE 100 MG/1
100 TABLET, FILM COATED ORAL DAILY
Qty: 90 TABLET | Refills: 3 | OUTPATIENT
Start: 2024-06-28

## 2024-06-28 RX ORDER — ZOLPIDEM TARTRATE 10 MG/1
10 TABLET ORAL NIGHTLY
Qty: 30 TABLET | Refills: 1 | OUTPATIENT
Start: 2024-06-28

## 2024-06-28 RX ORDER — POTASSIUM CHLORIDE 750 MG/1
CAPSULE, EXTENDED RELEASE ORAL
Qty: 180 CAPSULE | Refills: 3 | OUTPATIENT
Start: 2024-06-28

## 2024-06-28 RX ORDER — ALLOPURINOL 100 MG/1
100 TABLET ORAL NIGHTLY
Qty: 90 TABLET | Refills: 3 | OUTPATIENT
Start: 2024-06-28

## 2024-06-28 RX ORDER — HYDROCHLOROTHIAZIDE 25 MG/1
TABLET ORAL
Qty: 180 TABLET | Refills: 3 | Status: SHIPPED | OUTPATIENT
Start: 2024-06-28

## 2024-06-28 NOTE — TELEPHONE ENCOUNTER
Refill passed per [facility] protocol.  Requested Prescriptions   Pending Prescriptions Disp Refills    hydroCHLOROthiazide 25 MG Oral Tab 180 tablet 3     Sig: TAKE 1 TABLET BY MOUTH TWICE DAILY       Hypertension Medications Protocol Passed - 6/25/2024  8:08 AM        Passed - CMP or BMP in past 12 months        Passed - Last BP reading less than 140/90     BP Readings from Last 1 Encounters:   06/05/24 94/59               Passed - In person appointment or virtual visit in the past 12 mos or appointment in next 3 mos     Recent Outpatient Visits              3 weeks ago Type 2 diabetes mellitus with diabetic neuropathy, with long-term current use of insulin [facility]    [facility] Visit    3 months ago Flank pain    [facility] [address], [place] Nikki Alcantara MD    Office Visit    7 months ago Type 2 diabetes mellitus with hyperglycemia, with long-term current use of insulin ([facility])    [facility] Veena Tom MD    Office Visit    9 months ago Type 2 diabetes mellitus with diabetic neuropathy, with long-term current use of insulin ([facility])    [facility] [place] Kandel, Ninel, MD    Office Visit    11 months ago Type 2 diabetes mellitus without retinopathy ([facility])    [facility] Bk eHlm MD    Office Visit          Future Appointments         Provider Department Appt Notes    In 2 weeks Bk Helm MD [facility] ep dm ee    In 1 month Adriana Vega MD [facility] Stomach pain and constipation                    Passed - EGFRCR or GFRNAA > 50     GFR Evaluation  EGFRCR: 80 , resulted on 11/28/2023            carvedilol 12.5 MG Oral Tab 90 tablet 3     Sig: Take 1 tablet (12.5 mg  total) by mouth daily.       Hypertension Medications Protocol Passed - 6/25/2024  8:08 AM        Passed - CMP or BMP in past 12 months        Passed - Last BP reading less than 140/90     BP Readings from Last 1 Encounters:   06/05/24 94/59               Passed - In person appointment or virtual visit in the past 12 mos or appointment in next 3 mos     Recent Outpatient Visits              3 weeks ago Type 2 diabetes mellitus with diabetic neuropathy, with long-term current use of insulin ([facility])    [facility], Veena Tom MD    Office Visit    3 months ago Flank pain    [facility] [address], [place] Nikki Alcantara MD    Office Visit    7 months ago Type 2 diabetes mellitus with hyperglycemia, with long-term current use of insulin ([facility])    [facility], Veena Tom MD    Office Visit    9 months ago Type 2 diabetes mellitus with diabetic neuropathy, with long-term current use of insulin ([facility])    [facility] [address], [place] Nikki Alcantara MD    Office Visit    11 months ago Type 2 diabetes mellitus without retinopathy ([facility])    [facility]    Office Visit          Future Appointments         Provider Department Appt Notes    In 2 weeks Bk Helm MD [facility] ep dm ee    In 1 month Adriana Vega MD [facility] Stomach pain and constipation                    Passed - EGFRCR or GFRNAA > 50     GFR Evaluation  EGFRCR: 80 , resulted on 11/28/2023            simvastatin 40 MG Oral Tab 90 tablet 3     Sig: Take 1 tablet (40 mg total) by mouth nightly.       Cholesterol Medication Protocol Passed - 6/25/2024  8:08 AM        Passed - ALT < 80     Lab Results   Component Value Date    ALT 32 11/28/2023              Passed - ALT resulted within past year        Passed - Lipid panel within past 12 months     Lab Results   Component Value Date    CHOLEST 162 11/28/2023    TRIG 147 11/28/2023    HDL 42 11/28/2023    LDL 94 11/28/2023    VLDL 24 11/28/2023    NONHDLC 120 11/28/2023             Passed - In person appointment or virtual visit in the past 12 mos or appointment in next 3 mos     Recent Outpatient Visits              3 weeks ago Type 2 diabetes mellitus with diabetic neuropathy, with long-term current use of insulin ([facility])    [facility], Veena Tom MD    Office Visit    3 months ago Flank pain    [facility] [address], [place] Nikki Alcantara MD    Office Visit    7 months ago Type 2 diabetes mellitus with hyperglycemia, with long-term current use of insulin ([facility])    [facility], Veena Tom MD    Office Visit    9 months ago Type 2 diabetes mellitus with diabetic neuropathy, with long-term current use of insulin ([facility])    [facility] [place] Kandel, Ninel, MD    Office Visit    11 months ago Type 2 diabetes mellitus without retinopathy ([facility])    [facility]    Office Visit          Future Appointments         Provider Department Appt Notes    In 2 weeks Bk Helm MD [facility] ep dm ee    In 1 month [facility]                      sertraline 100 MG Oral Tab 90 tablet 3     Sig: Take 1 tablet (100 mg total) by mouth daily.       Psychiatric Non-Scheduled (Anti-Anxiety) Passed - 6/25/2024  8:08 AM        Passed - In person appointment or virtual visit in the past 6 mos or appointment in next 3 mos     Recent Outpatient Visits              3 weeks  ago Type 2 diabetes mellitus with diabetic neuropathy, with long-term current use of insulin ([facility])    [facility] Veena Emeyr MD    Office Visit    3 months ago Flank pain    [facility] [place] Kandel, Ninel, MD    Office Visit    7 months ago Type 2 diabetes mellitus with hyperglycemia, with long-term current use of insulin ([facility])    [facility]    Office Visit    9 months ago Type 2 diabetes mellitus with diabetic neuropathy, with long-term current use of insulin ([facility])    [facility] [address], [place] Nikki Alcantara MD    Office Visit    11 months ago Type 2 diabetes mellitus without retinopathy ([facility])    [facility] Bk Helm MD    Office Visit          Future Appointments         Provider Department Appt Notes    In 2 weeks Bk Helm MD [facility] ep dm ee    In 1 month Adriana Vega MD [facility] Stomach pain and constipation                    Passed - Depression Screening completed within the past 12 months          Potassium Chloride ER 10 MEQ Oral Cap  capsule 3     Sig: TAKE 1 CAPSULE BY MOUTH TWICE DAILY       There is no refill protocol information for this order       traZODone 100 MG Oral Tab 90 tablet 3     Sig: Take 1 tablet (100 mg total) by mouth nightly.       There is no refill protocol information for this order       losartan 100 MG Oral Tab 90 tablet 3     Sig: Take 1 tablet (100 mg total) by mouth daily.       Hypertension Medications Protocol Passed - 6/25/2024  8:08 AM        Passed - CMP or BMP in past 12 months        Passed - Last BP reading less than 140/90     BP Readings from Last 1 Encounters:   06/05/24 94/59               Passed - In person  appointment or virtual visit in the past 12 mos or appointment in next 3 mos     Recent Outpatient Visits              3 weeks ago Type 2 diabetes mellitus with diabetic neuropathy, with long-term current use of insulin ([facility])    [facility], Veena Tom MD    Office Visit    3 months ago Flank pain    [facility] [place] Nikik Alcantara MD    Office Visit    7 months ago Type 2 diabetes mellitus with hyperglycemia, with long-term current use of insulin ([facility])    [facility] Veena Tom MD    Office Visit    9 months ago Type 2 diabetes mellitus with diabetic neuropathy, with long-term current use of insulin ([facility])    [facility] [address], [place] Nikki Alcantara MD    Office Visit    11 months ago Type 2 diabetes mellitus without retinopathy ([facility])    [facility] [place] Bk Helm MD    Office Visit          Future Appointments         Provider Department Appt Notes    In 2 weeks Bk Helm MD [facility] [place] ep dm ee    In 1 month [facility] const[facility]                    Passed - EGFRCR or GFRNAA > 50     GFR Evaluation  EGFRCR: 80 , resulted on 11/28/2023            levothyroxine 125 MCG Oral Tab 90 tablet 3     Sig: Take 1 tablet (125 mcg total) by mouth every morning.       Thyroid Medication Protocol Failed - 6/25/2024  8:08 AM        Failed - Last TSH value is normal     Lab Results   Component Value Date    TSH 0.173 (L) 11/28/2023                 Passed - TSH in past 12 months        Passed - In person appointment or virtual visit in the past 12 mos or appointment in next 3 mos     Recent Outpatient Visits              3 weeks ago Type 2 diabetes mellitus with diabetic  neuropathy, with long-term current use of insulin ([facility])    [facility]    [facility] Visit    3 months ago Flank pain    [facility] [place] Kandel, Ninel, MD    Office Visit    7 months ago Type 2 diabetes mellitus with hyperglycemia, with long-term current use of insulin ([facility]    Office Visit    9 months ago Type 2 diabetes mellitus with diabetic neuropathy, with long-term current use of insulin ([facility]    Office Visit    11 months ago Type 2 diabetes mellitus without retinopathy ([facility])    [facility] Bk Helm MD    Office Visit          Future Appointments         Provider Department Appt Notes    In 2 weeks Bk Helm MD [facility] ep dm ee    In 1 month Adriana Vega MD [facility] Stomach pain and constipation                      allopurinol 100 MG Oral Tab 90 tablet 3     Sig: Take 1 tablet (100 mg total) by mouth nightly.       There is no refill protocol information for this order       zolpidem 10 MG Oral Tab 30 tablet 1     Sig: Take 1 tablet (10 mg total) by mouth nightly.       Controlled Substance Medication Failed - 6/25/2024  8:08 AM        Failed - This medication is a controlled substance - forward to provider to refill           Recent Outpatient Visits              3 weeks ago Type 2 diabetes mellitus with diabetic neuropathy, with long-term current use of insulin ([facility])    [facility] Veena Tom MD    Office Visit    3 months ago Flank pain    [facility] [place]    Office Visit    7 months  ago Type 2 diabetes mellitus with hyperglycemia, with long-term current use of insulin ([facility])    [facility] Veena Emery MD    Office Visit    9 months ago Type 2 diabetes mellitus with diabetic neuropathy, with long-term current use of insulin ([facility])    [facility] [place] Kandel, Ninel, MD    Office Visit    11 months ago Type 2 diabetes mellitus without retinopathy ([facility])    [facility] Bk Helm MD    Office Visit          Future Appointments         Provider Department Appt Notes    In 2 weeks Bk Helm MD [facility] ep dm ee    In 1 month Adriana Vega MD [facility] Stomach pain and constipation

## 2024-07-06 ENCOUNTER — TELEPHONE (OUTPATIENT)
Dept: INTERNAL MEDICINE CLINIC | Facility: CLINIC | Age: 59
End: 2024-07-06

## 2024-07-06 NOTE — TELEPHONE ENCOUNTER
Pharmacy requesting pa for       Omeprazole 40 MG Oral Capsule Delayed Release, Take 1 capsule (40 mg total) by mouth daily., Disp: 90 capsule, Rfl: 3    Please call [phone] to initiate. Patient ID is [id number]

## 2024-07-12 DIAGNOSIS — Z79.4 TYPE 2 DIABETES MELLITUS WITH DIABETIC NEUROPATHY, WITH LONG-TERM CURRENT USE OF INSULIN (HCC): ICD-10-CM

## 2024-07-12 DIAGNOSIS — E11.40 TYPE 2 DIABETES MELLITUS WITH DIABETIC NEUROPATHY, WITH LONG-TERM CURRENT USE OF INSULIN (HCC): ICD-10-CM

## 2024-07-12 NOTE — TELEPHONE ENCOUNTER
Endocrine Refill protocol for CGM supplies     Protocol Criteria:pass  Appointment with Endocrinology completed in the last 12 months or scheduled in the next 6 months     Verify appointment has been completed or scheduled in the appropriate timeline. If so can send a 90 day supply with 1 refill.     Last completed office visit:6/5/2024 Veena Emery MD   Next scheduled Follow up: no future appt

## 2024-07-16 ENCOUNTER — OFFICE VISIT (OUTPATIENT)
Dept: OPHTHALMOLOGY | Facility: CLINIC | Age: 59
End: 2024-07-16

## 2024-07-16 ENCOUNTER — LAB ENCOUNTER (OUTPATIENT)
Dept: LAB | Facility: HOSPITAL | Age: 59
End: 2024-07-16
Attending: INTERNAL MEDICINE
Payer: MEDICAID

## 2024-07-16 DIAGNOSIS — E11.9 TYPE 2 DIABETES MELLITUS WITHOUT RETINOPATHY (HCC): Primary | ICD-10-CM

## 2024-07-16 DIAGNOSIS — H25.13 AGE-RELATED NUCLEAR CATARACT OF BOTH EYES: ICD-10-CM

## 2024-07-16 DIAGNOSIS — E04.9 GOITER: ICD-10-CM

## 2024-07-16 DIAGNOSIS — E03.9 HYPOTHYROIDISM, UNSPECIFIED TYPE: ICD-10-CM

## 2024-07-16 LAB
ALBUMIN SERPL-MCNC: 4.7 G/DL (ref 3.2–4.8)
ALBUMIN/GLOB SERPL: 1.7 {RATIO} (ref 1–2)
ALP LIVER SERPL-CCNC: 87 U/L
ALT SERPL-CCNC: 27 U/L
ANION GAP SERPL CALC-SCNC: 11 MMOL/L (ref 0–18)
AST SERPL-CCNC: 23 U/L (ref ?–34)
BASOPHILS # BLD AUTO: 0.08 X10(3) UL (ref 0–0.2)
BASOPHILS NFR BLD AUTO: 0.7 %
BILIRUB SERPL-MCNC: 0.4 MG/DL (ref 0.3–1.2)
BILIRUB UR QL: NEGATIVE
BUN BLD-MCNC: 26 MG/DL (ref 9–23)
BUN/CREAT SERPL: 24.8 (ref 10–20)
CALCIUM BLD-MCNC: 10.2 MG/DL (ref 8.7–10.4)
CHLORIDE SERPL-SCNC: 102 MMOL/L (ref 98–112)
CLARITY UR: CLEAR
CO2 SERPL-SCNC: 24 MMOL/L (ref 21–32)
COLOR UR: YELLOW
CREAT BLD-MCNC: 1.05 MG/DL
DEPRECATED RDW RBC AUTO: 42.9 FL (ref 35.1–46.3)
EGFRCR SERPLBLD CKD-EPI 2021: 62 ML/MIN/1.73M2 (ref 60–?)
EOSINOPHIL # BLD AUTO: 0.34 X10(3) UL (ref 0–0.7)
EOSINOPHIL NFR BLD AUTO: 3.1 %
ERYTHROCYTE [DISTWIDTH] IN BLOOD BY AUTOMATED COUNT: 16.4 % (ref 11–15)
EST. AVERAGE GLUCOSE BLD GHB EST-MCNC: 169 MG/DL (ref 68–126)
FASTING STATUS PATIENT QL REPORTED: NO
GLOBULIN PLAS-MCNC: 2.7 G/DL (ref 2–3.5)
GLUCOSE BLD-MCNC: 184 MG/DL (ref 70–99)
GLUCOSE UR-MCNC: 150 MG/DL
HBA1C MFR BLD: 7.5 % (ref ?–5.7)
HCT VFR BLD AUTO: 35.9 %
HGB BLD-MCNC: 11.8 G/DL
HGB UR QL STRIP.AUTO: NEGATIVE
HYALINE CASTS #/AREA URNS AUTO: PRESENT /LPF
IMM GRANULOCYTES # BLD AUTO: 0.04 X10(3) UL (ref 0–1)
IMM GRANULOCYTES NFR BLD: 0.4 %
LEUKOCYTE ESTERASE UR QL STRIP.AUTO: 250
LYMPHOCYTES # BLD AUTO: 4.01 X10(3) UL (ref 1–4)
LYMPHOCYTES NFR BLD AUTO: 36.9 %
MCH RBC QN AUTO: 24.1 PG (ref 26–34)
MCHC RBC AUTO-ENTMCNC: 32.9 G/DL (ref 31–37)
MCV RBC AUTO: 73.3 FL
MONOCYTES # BLD AUTO: 0.69 X10(3) UL (ref 0.1–1)
MONOCYTES NFR BLD AUTO: 6.3 %
NEUTROPHILS # BLD AUTO: 5.72 X10 (3) UL (ref 1.5–7.7)
NEUTROPHILS # BLD AUTO: 5.72 X10(3) UL (ref 1.5–7.7)
NEUTROPHILS NFR BLD AUTO: 52.6 %
NITRITE UR QL STRIP.AUTO: NEGATIVE
OSMOLALITY SERPL CALC.SUM OF ELEC: 294 MOSM/KG (ref 275–295)
PH UR: 5.5 [PH] (ref 5–8)
PLATELET # BLD AUTO: 351 10(3)UL (ref 150–450)
POTASSIUM SERPL-SCNC: 3.8 MMOL/L (ref 3.5–5.1)
PROT SERPL-MCNC: 7.4 G/DL (ref 5.7–8.2)
PROT UR-MCNC: 20 MG/DL
RBC # BLD AUTO: 4.9 X10(6)UL
SODIUM SERPL-SCNC: 137 MMOL/L (ref 136–145)
SP GR UR STRIP: 1.03 (ref 1–1.03)
T4 FREE SERPL-MCNC: 1.3 NG/DL (ref 0.8–1.7)
TSI SER-ACNC: 0.78 MIU/ML (ref 0.55–4.78)
UROBILINOGEN UR STRIP-ACNC: NORMAL
WBC # BLD AUTO: 10.9 X10(3) UL (ref 4–11)

## 2024-07-16 PROCEDURE — 87086 URINE CULTURE/COLONY COUNT: CPT | Performed by: INTERNAL MEDICINE

## 2024-07-16 PROCEDURE — 83036 HEMOGLOBIN GLYCOSYLATED A1C: CPT | Performed by: INTERNAL MEDICINE

## 2024-07-16 PROCEDURE — 84439 ASSAY OF FREE THYROXINE: CPT

## 2024-07-16 PROCEDURE — 80053 COMPREHEN METABOLIC PANEL: CPT | Performed by: INTERNAL MEDICINE

## 2024-07-16 PROCEDURE — 85025 COMPLETE CBC W/AUTO DIFF WBC: CPT | Performed by: INTERNAL MEDICINE

## 2024-07-16 PROCEDURE — 36415 COLL VENOUS BLD VENIPUNCTURE: CPT | Performed by: INTERNAL MEDICINE

## 2024-07-16 PROCEDURE — 92014 COMPRE OPH EXAM EST PT 1/>: CPT | Performed by: OPHTHALMOLOGY

## 2024-07-16 PROCEDURE — 84443 ASSAY THYROID STIM HORMONE: CPT

## 2024-07-16 PROCEDURE — 81001 URINALYSIS AUTO W/SCOPE: CPT | Performed by: INTERNAL MEDICINE

## 2024-07-16 RX ORDER — BLOOD SUGAR DIAGNOSTIC
STRIP MISCELLANEOUS
Qty: 200 STRIP | Refills: 1 | Status: SHIPPED | OUTPATIENT
Start: 2024-07-16

## 2024-07-16 RX ORDER — PROCHLORPERAZINE 25 MG/1
SUPPOSITORY RECTAL
Qty: 9 EACH | Refills: 0 | Status: SHIPPED | OUTPATIENT
Start: 2024-07-16 | End: 2024-07-18

## 2024-07-16 RX ORDER — PROCHLORPERAZINE 25 MG/1
SUPPOSITORY RECTAL
Qty: 1 EACH | Refills: 0 | Status: SHIPPED | OUTPATIENT
Start: 2024-07-16 | End: 2024-07-18

## 2024-07-16 NOTE — PATIENT INSTRUCTIONS
Type 2 diabetes mellitus without retinopathy (HCC)  Diabetes type II: no background of retinopathy, no signs of neovascularization noted.  Discussed ocular and systemic benefits of blood sugar control.  Diagnosis and treatment discussed in detail with patient.    Will see patient in 1 year for a diabetic exam    Age-related nuclear cataract of both eyes  Discussed mild cataracts in both eyes that are not affecting vision and are not surgical at this time.

## 2024-07-16 NOTE — PROGRESS NOTES
Randi Lobo is a 58 year old female.    HPI:     HPI    Pt. Here for a DM eye exam.   C/O noticing blurry vision for about a year now, wearing OTC reading glasses that helps a little.     Pt has been a diabetic for 6 years       Pt's diabetes is currently controlled by pills, insulin and trulicity  Pt BS is monitored regularly.    Pt's last blood sugar was  203 mg/dl on 07/16/25  Last HA1C was 7.8 on 06/05/24  Endocrinologist: Elton         Last edited by Stacia Hernández OT on 7/16/2024  5:04 PM.        Patient History:  Past Medical History:    Asthma (HCC)    Depression    Diabetes (HCC)    Diabetes type 2, controlled (HCC)    Disorder of thyroid    Diverticulitis    Esophageal reflux    Essential hypertension    Gout    Hepatic steatosis    Hepatomegaly    High blood pressure    High cholesterol    Hyperlipidemia    Thyroid disease       Surgical History: Randi Lobo has a past surgical history that includes colonoscopy (N/A, 3/29/2017) (Procedure: COLONOSCOPY;  Surgeon: Lenny Miranda MD;  Location: [facility] ENDOSCOPY) and colonoscopy (N/A, 1/8/2021) (Procedure: COLONOSCOPY;  Surgeon: Arthur Roberts MD;  [facility]    Family History   Problem Relation Age of Onset    Cancer Father     Hypertension Father     Diabetes Mother     Cancer Mother     Hypertension Mother     Glaucoma Neg     Macular degeneration Neg        Social History:   Social History     Socioeconomic History    Marital status:    Tobacco Use    Smoking status: Never    Smokeless tobacco: Never   Vaping Use    Vaping status: Never Used   Substance and Sexual Activity    Alcohol use: No    Drug use: No   Social History Narrative    ** Merged History Encounter **            Medications:  Current Outpatient Medications   Medication Sig Dispense Refill    Continuous Glucose  (DEXCOM G6 ) Does not apply Device Use to monitor blood sugar level 1 each 0    Glucose Blood (ONETOUCH VERIO) In Vitro Strip USE TO  CHECK BLOOD SUGAR TWICE DAILY, FASTING AND 2 HOURS AFTER A MEAL 200 strip 1    Continuous Glucose Sensor (DEXCOM G6 SENSOR) Does not apply Misc Change sensor every 10 days 9 each 0    insulin glargine (BASAGLAR KWIKPEN) 100 UNIT/ML Subcutaneous Solution Pen-injector Inject 40 Units into the skin every morning. ADMINISTER 40 UNITS UNDER THE SKIN TWICE DAILY EVERY DAY 36 mL 2    hydroCHLOROthiazide 25 MG Oral Tab TAKE 1 TABLET BY MOUTH TWICE DAILY 180 tablet 3    carvedilol 12.5 MG Oral Tab Take 1 tablet (12.5 mg total) by mouth daily. 90 tablet 3    Continuous Glucose Transmitter (DEXCOM G6 TRANSMITTER) Does not apply Misc Change sensor every 90 days 1 each 0    Dulaglutide (TRULICITY) 3 MG/0.5ML Subcutaneous Solution Pen-injector Inject 3 mg into the skin once a week. 6 mL 0    Dulaglutide (TRULICITY) 3 MG/0.5ML Subcutaneous Solution Pen-injector Inject 3 mg into the skin once a week. 6 mL 0    diclofenac 1 % External Gel Apply 2 g topically 4 (four) times daily. 1 each 2    allopurinol 100 MG Oral Tab Take 1 tablet (100 mg total) by mouth nightly. 90 tablet 3    zolpidem 10 MG Oral Tab Take 1 tablet (10 mg total) by mouth nightly. 30 tablet 1    glipiZIDE 10 MG Oral Tab Take 1 tablet (10 mg total) by mouth 2 (two) times daily before meals. 180 tablet 1    metFORMIN 500 MG Oral Tab TAKE 2 TABLETS BY MOUTH TWICE DAILY 360 tablet 1    levothyroxine 125 MCG Oral Tab Take 1 tablet (125 mcg total) by mouth every morning. 90 tablet 3    tiZANidine 4 MG Oral Tab Take 1 tablet (4 mg total) by mouth every 8 (eight) hours as needed. 90 tablet 1    Insulin Pen Needle (TRUEPLUS 5-BEVEL PEN NEEDLES) 32G X 4 MM Does not apply Misc Use twice a day 200 each 3    cholecalciferol 50 MCG (2000 UT) Oral Tab Take 1 tablet (2,000 Units total) by mouth every morning. 90 tablet 3    ketoconazole 2 % External Shampoo Use to  Scalp twice a wekk 120 mL 5    pantoprazole 40 MG Oral Tab EC Take 1 tablet (40 mg total) by mouth every morning  before breakfast. 90 tablet 1    gabapentin 400 MG Oral Cap Take 1 capsule (400 mg total) by mouth 2 (two) times daily. 180 capsule 1    hydrocortisone (PROCTOZONE-HC) 2.5 % External Cream Apply to hemorrhoids for 7 to 10 days 28 g 5    losartan 100 MG Oral Tab Take 1 tablet (100 mg total) by mouth daily. 90 tablet 3    simvastatin 40 MG Oral Tab Take 1 tablet (40 mg total) by mouth nightly. 90 tablet 3    sertraline 100 MG Oral Tab Take 1 tablet (100 mg total) by mouth daily. 90 tablet 3    Potassium Chloride ER 10 MEQ Oral Cap CR TAKE 1 CAPSULE BY MOUTH TWICE DAILY 180 capsule 3    traZODone 100 MG Oral Tab Take 1 tablet (100 mg total) by mouth nightly. 90 tablet 3    aspirin 81 MG Oral Tab EC Take 1 tablet (81 mg total) by mouth daily. Pt states 2x a day. 90 tablet 3    naproxen 500 MG Oral Tab EC Take 1 tablet p.o. at bedtime as needed for severe pain 30 tablet 1    Omeprazole 40 MG Oral Capsule Delayed Release Take 1 capsule (40 mg total) by mouth daily. 90 capsule 3    Blood Glucose Monitoring Suppl (ONETOUCH VERIO) w/Device Does not apply Kit 1 kit As Directed. 1 kit 0    Insulin Pen Needle (TRUEPLUS PEN NEEDLES) 32G X 4 MM Does not apply Misc Use needles to inject insulin daily 100 each 3    Ferrous Sulfate (IRON) 325 (65 Fe) MG Oral Tab Take 1 Dose by mouth daily. 30 tablet 0    Lancets (ONETOUCH DELICA PLUS UVHMPD14O) Does not apply Misc 1 strip by In Vitro route 2 (two) times daily. FASTING AND 2 HOURS AFTER A MEAL 200 each 3    HYDROcodone-acetaminophen 7.5-325 MG Oral Tab Take 1-2 tablets by mouth every 4 (four) hours as needed for Pain. 15 tablet 0       Allergies:  No Known Allergies    ROS:       PHYSICAL EXAM:     Base Eye Exam       Visual Acuity (Snellen - Linear)         Right Left    Dist sc 20/50 20/30    Dist ph sc 20/30 20/25 -2    Near cc 20/20 20/20   NVA With +2.25 in the phoropter             Tonometry (Applanation, 5:05 PM)         Right Left    Pressure 12 13              Pupils          Pupils    Right PERRL    Left PERRL              Visual Fields         Left Right     Full               Extraocular Movement         Right Left     Full Full              Dilation       Both eyes: 1.0% Mydriacyl and 2.5% Joe Synephrine @ 4:51 PM              Dilation #2       Both eyes: 1.0% Mydriacyl and 2.5% Joe Synephrine @ 5:06 PM                  Slit Lamp and Fundus Exam       Slit Lamp Exam         Right Left    Lids/Lashes Dermatochalasis, Meibomian gland dysfunction, oily lids Dermatochalasis, Meibomian gland dysfunction, costa upper lid- no FB    Conjunctiva/Sclera oily tear film Nasal pinguecula, oily tear film    Cornea Clear Clear    Anterior Chamber Deep and quiet Deep and quiet    Iris Normal Normal    Lens 1+ Nuclear sclerosis, 1+ Posterior subcapsular cataract 1+ Nuclear sclerosis, Trace Posterior subcapsular cataract, Trace Cortical cataract    Vitreous Clear Clear              Fundus Exam         Right Left    Disc Good rim, Temporal crescent Good rim, Temporal crescent    C/D Ratio 0.4 0.4    Macula Normal- no BDR Normal- no BDR    Vessels Normal Normal    Periphery Normal Normal                  Refraction       Wearing Rx         Sphere Cylinder    Right +2.25 Sphere    Left +2.25 Sphere      Type: Forgot OTC reading only              Manifest Refraction (Auto)         Sphere Cylinder Axis    Right -1.00 +1.75 165    Left [place] +0.25 150   Declines refraction, states will see an optometrist for the glasses Rx.                     ASSESSMENT/PLAN:     Diagnoses and Plan:     Type 2 diabetes mellitus without retinopathy (HCC)  Diabetes type II: no background of retinopathy, no signs of neovascularization noted.  Discussed ocular and systemic benefits of blood sugar control.  Diagnosis and treatment discussed in detail with patient.    Will see patient in 1 year for a diabetic exam    Age-related nuclear cataract of both eyes  Discussed mild cataracts in both eyes that are not affecting vision  and are not surgical at this time.      No orders of the defined types were placed in this encounter.      Meds This Visit:  Requested Prescriptions      No prescriptions requested or ordered in this encounter        Follow up instructions:  Return in about 1 year (around 7/16/2025) for Diabetic eye exam.    7/16/2024  Scribed by: Bk Helm MD

## 2024-07-18 DIAGNOSIS — Z79.4 TYPE 2 DIABETES MELLITUS WITH DIABETIC NEUROPATHY, WITH LONG-TERM CURRENT USE OF INSULIN (HCC): ICD-10-CM

## 2024-07-18 DIAGNOSIS — E11.40 TYPE 2 DIABETES MELLITUS WITH DIABETIC NEUROPATHY, WITH LONG-TERM CURRENT USE OF INSULIN (HCC): ICD-10-CM

## 2024-07-19 ENCOUNTER — TELEPHONE (OUTPATIENT)
Dept: INTERNAL MEDICINE CLINIC | Facility: CLINIC | Age: 59
End: 2024-07-19

## 2024-07-19 NOTE — TELEPHONE ENCOUNTER
Pt  needs  to  be  see  in the office for  follow [facility] to  evaluate  for possible  UTI

## 2024-07-19 NOTE — TELEPHONE ENCOUNTER
Daughter contacted and patient present.  Reports difficulty urinating, pain in her left side.  Nurse recommended immediate care evaluation today, she agrees and will go.

## 2024-07-19 NOTE — TELEPHONE ENCOUNTER
I called dtr Sherrill with results below - she did tell me that patient is having some burning with urination and when she goes only a few drops come out at a time. Unsure if frequency of having to urinate.   Daughter feels she is drinking fluids, for last 1-2 months has had new back pain   Patient is sleeping right now but when she wakes up daughter is going to call us back so we can better assess symptoms   Advised if patient is not able to urinate or pass urine she needs to go to ER. Dtr states understanding but she is not sure and would like to check with patient and call us back.     Dtr notified of full results below and states understanding.   Assisted in scheduling follow up visit for below date and time.     Diabetes is better controlled than 7 months ago, we still should try to get hemoglobin A1c lower than 7.0, sugar was high on the day of the test.  Somewhat abnormal BUN and creatinine hopefully just sign of dehydration, make sure you drink at least 64 ounces of liquids a day and repeat BMP in 1 month.  Urine analysis points to possible urinary tract infection, if you have symptoms of UTI let me know, mild anemia again I added blood test to check an iron level.  I would like to see you for follow-up visit make appointment within the next month or so   Written by Nikki Alcantara MD on 7/17/2024 12:48 AM CDT    Future Appointments   Date Time Provider Department Center   7/29/2024  3:30 PM Adriana Vega MD [id number]   8/1/2024  3:30 PM L[facility] LMB US EM [place]   8/12/2024  4:20 PM Nikki Alcantara MD [id number] [place], [place] [address]

## 2024-07-21 RX ORDER — PROCHLORPERAZINE 25 MG/1
SUPPOSITORY RECTAL
Qty: 1 EACH | Refills: 0 | Status: SHIPPED | OUTPATIENT
Start: 2024-07-21

## 2024-07-21 RX ORDER — PROCHLORPERAZINE 25 MG/1
SUPPOSITORY RECTAL
Qty: 9 EACH | Refills: 0 | Status: SHIPPED | OUTPATIENT
Start: 2024-07-21

## 2024-08-05 ENCOUNTER — TELEPHONE (OUTPATIENT)
Facility: CLINIC | Age: 59
End: 2024-08-05

## 2024-08-05 DIAGNOSIS — F32.1 CURRENT MODERATE EPISODE OF MAJOR DEPRESSIVE DISORDER WITHOUT PRIOR EPISODE (HCC): ICD-10-CM

## 2024-08-05 DIAGNOSIS — M1A.09X0 IDIOPATHIC CHRONIC GOUT OF MULTIPLE SITES WITHOUT TOPHUS: ICD-10-CM

## 2024-08-05 DIAGNOSIS — Z79.4 TYPE 2 DIABETES MELLITUS WITH DIABETIC NEUROPATHY, WITH LONG-TERM CURRENT USE OF INSULIN (HCC): ICD-10-CM

## 2024-08-05 DIAGNOSIS — E11.40 TYPE 2 DIABETES MELLITUS WITH DIABETIC NEUROPATHY, WITH LONG-TERM CURRENT USE OF INSULIN (HCC): ICD-10-CM

## 2024-08-05 DIAGNOSIS — I10 ESSENTIAL HYPERTENSION: ICD-10-CM

## 2024-08-05 DIAGNOSIS — G47.09 OTHER INSOMNIA: ICD-10-CM

## 2024-08-05 NOTE — TELEPHONE ENCOUNTER
From: Randi Lobo  To: Adriana Vega  Sent: 8/2/2024  6:04 PM CDT  Subject: Reschedule    I missed my appointment I want to reschedule it as soon as possible

## 2024-08-05 NOTE — TELEPHONE ENCOUNTER
Requested Prescriptions     Pending Prescriptions Disp Refills    diclofenac 1 % External Gel 1 each 2     Sig: Apply 2 g topically 4 (four) times daily.     LOV: 6/13/23  Future Appointments   Date Time Provider Department Center   8/12/2024  4:20 PM Nikki Alcantara MD [id number] EC [place]     Labs:   Component      Latest Ref Rng 7/16/2024   WBC      4.0 - 11.0 x10(3) uL 10.9    RBC      3.80 - 5.30 x10(6)uL 4.90    Hemoglobin      12.0 - 16.0 g/dL 11.8 (L)    Hematocrit      35.0 - 48.0 % 35.9    MCV      80.0 - 100.0 fL 73.3 (L)    MCH      26.0 - 34.0 pg 24.1 (L)    MCHC      31.0 - 37.0 g/dL 32.9    RDW-SD      35.1 - 46.3 fL 42.9    RDW      11.0 - 15.0 % 16.4 (H)    Platelet Count      150.0 - 450.0 10(3)uL 351.0    Prelim Neutrophil Abs      1.50 - 7.70 x10 (3) uL 5.72    Neutrophils Absolute      1.50 - 7.70 x10(3) uL 5.72    Lymphocytes Absolute      1.00 - 4.00 x10(3) uL 4.01 (H)    Monocytes Absolute      0.10 - 1.00 x10(3) uL 0.69    Eosinophils Absolute      0.00 - 0.70 x10(3) uL 0.34    Basophils Absolute      0.00 - 0.20 x10(3) uL 0.08    Immature Granulocyte Absolute      0.00 - 1.00 x10(3) uL 0.04    Neutrophils %      % 52.6    Lymphocytes %      % 36.9    Monocytes %      % 6.3    Eosinophils %      % 3.1    Basophils %      % 0.7    Immature Granulocyte %      % 0.4    Glucose      70 - 99 mg/dL 184 (H)    Sodium      136 - 145 mmol/L 137    Potassium      3.5 - 5.1 mmol/L 3.8    Chloride      98 - 112 mmol/L 102    Carbon Dioxide, Total      21.0 - 32.0 mmol/L 24.0    ANION GAP      0 - 18 mmol/L 11    BUN      9 - 23 mg/dL 26 (H)    CREATININE      0.55 - 1.02 mg/dL 1.05 (H)    BUN/CREATININE RATIO      10.0 - 20.0  24.8 (H)    CALCIUM      8.7 - 10.4 mg/dL 10.2    CALCULATED OSMOLALITY      275 - 295 mOsm/kg 294    EGFR      >=60 mL/min/1.73m2 62    ALT (SGPT)      10 - 49 U/L 27    AST (SGOT)      <34 U/L 23    ALKALINE PHOSPHATASE      46 - 118 U/L 87    Total Bilirubin      0.3 - 1.2 mg/dL  0.4    PROTEIN, TOTAL      5.7 - 8.2 g/dL 7.4    Albumin      3.2 - 4.8 g/dL 4.7    Globulin      2.0 - 3.5 g/dL 2.7    A/G Ratio      1.0 - 2.0  1.7    Patient Fasting for CMP? No       Legend:  (L) Low  (H) High  Instructions    You were seen today for hand pain, numbness and tingling and joint pain  Blood work in the past showed possible rheumatoid arthritis  EMG/nerve test also showed carpal tunnel  We injected both wrist with cortisone  Plan to repeat blood work  Get MRI of the right hand  If you continue to have the numbness and tingling would recommend to see hand surgeon

## 2024-08-06 NOTE — TELEPHONE ENCOUNTER
Endocrine Refill protocol for oral and injectable diabetic medications    Protocol Criteria:  PASSED    -Appointment with Endocrinology completed in the last 6 months or scheduled in the next 3 months    -A1c result below 8.5% in the past 6 months      Verify the above has been completed or scheduled in the appropriate timeline. If so can send a 90 day supply with 1 refill.     Last completed office visit: 6/5/2024 Veena Emery MD   Next scheduled Follow up: no future appt     Last A1c result: Last A1c value was 7.5% done 7/16/2024.

## 2024-08-08 RX ORDER — KETOCONAZOLE 20 MG/ML
SHAMPOO TOPICAL
Qty: 120 ML | Refills: 5 | Status: SHIPPED | OUTPATIENT
Start: 2024-08-08

## 2024-08-08 NOTE — TELEPHONE ENCOUNTER
Please review. Protocol Failed; No Protocol    Requested Prescriptions   Pending Prescriptions Disp Refills    ketoconazole 2 % External Shampoo 120 mL 5     Sig: Use to  Scalp twice a wekk       There is no refill protocol information for this order            Future Appointments         Provider Department Appt Notes    In 4 days Nikki Alcantara MD [facility], [address], [place] follow up visit          Recent Outpatient Visits              3 weeks ago Type 2 diabetes mellitus without retinopathy ([facility])    [facility] Bk Helm MD    Office Visit    2 months ago Type 2 diabetes mellitus with diabetic neuropathy, with long-term current use of insulin ([facility])    [facility] Veena Tom MD    Office Visit    5 months ago Flank pain    [facility], [address], [place] Nikki Alcantara MD    Office Visit    8 months ago Type 2 diabetes mellitus with hyperglycemia, with long-term current use of insulin ([facility])    [facility] Veena Tom MD    Office Visit    10 months ago Type 2 diabetes mellitus with diabetic neuropathy, with long-term current use of insulin ([facility])    [facility], [address], [place] Nikki Alcantara MD    Office Visit

## 2024-08-09 RX ORDER — GABAPENTIN 400 MG/1
400 CAPSULE ORAL 2 TIMES DAILY
Qty: 180 CAPSULE | Refills: 1 | Status: SHIPPED | OUTPATIENT
Start: 2024-08-09

## 2024-08-09 RX ORDER — LOSARTAN POTASSIUM 100 MG/1
100 TABLET ORAL DAILY
Qty: 90 TABLET | Refills: 3 | OUTPATIENT
Start: 2024-08-09

## 2024-08-09 RX ORDER — TRAZODONE HYDROCHLORIDE 100 MG/1
100 TABLET ORAL NIGHTLY
Qty: 90 TABLET | Refills: 3 | OUTPATIENT
Start: 2024-08-09

## 2024-08-09 RX ORDER — CARVEDILOL 12.5 MG/1
TABLET ORAL
Qty: 90 TABLET | Refills: 3 | OUTPATIENT
Start: 2024-08-09

## 2024-08-09 RX ORDER — CARVEDILOL 12.5 MG/1
12.5 TABLET ORAL DAILY
Qty: 90 TABLET | Refills: 3 | OUTPATIENT
Start: 2024-08-09

## 2024-08-09 RX ORDER — TIZANIDINE 4 MG/1
4 TABLET ORAL EVERY 8 HOURS PRN
Qty: 90 TABLET | Refills: 1 | OUTPATIENT
Start: 2024-08-09

## 2024-08-09 RX ORDER — PANTOPRAZOLE SODIUM 40 MG/1
40 TABLET, DELAYED RELEASE ORAL
Qty: 90 TABLET | Refills: 1 | OUTPATIENT
Start: 2024-08-09

## 2024-08-09 RX ORDER — ALLOPURINOL 100 MG/1
100 TABLET ORAL NIGHTLY
Qty: 90 TABLET | Refills: 3 | OUTPATIENT
Start: 2024-08-09

## 2024-08-09 RX ORDER — HYDROCHLOROTHIAZIDE 25 MG/1
TABLET ORAL
Qty: 180 TABLET | Refills: 3 | OUTPATIENT
Start: 2024-08-09

## 2024-08-09 RX ORDER — ZOLPIDEM TARTRATE 10 MG/1
10 TABLET ORAL NIGHTLY
Qty: 30 TABLET | Refills: 1 | OUTPATIENT
Start: 2024-08-09

## 2024-08-09 RX ORDER — OMEPRAZOLE 40 MG/1
40 CAPSULE, DELAYED RELEASE ORAL DAILY
Qty: 90 CAPSULE | Refills: 3 | Status: SHIPPED | OUTPATIENT
Start: 2024-08-09

## 2024-08-09 RX ORDER — LEVOTHYROXINE SODIUM 0.12 MG/1
125 TABLET ORAL EVERY MORNING
Qty: 90 TABLET | Refills: 3 | OUTPATIENT
Start: 2024-08-09

## 2024-08-09 RX ORDER — HYDROCORTISONE 25 MG/G
CREAM TOPICAL
Qty: 28 G | Refills: 5 | OUTPATIENT
Start: 2024-08-09

## 2024-08-09 RX ORDER — SIMVASTATIN 40 MG
40 TABLET ORAL NIGHTLY
Qty: 90 TABLET | Refills: 3 | OUTPATIENT
Start: 2024-08-09

## 2024-08-09 RX ORDER — SERTRALINE HYDROCHLORIDE 100 MG/1
100 TABLET, FILM COATED ORAL DAILY
Qty: 90 TABLET | Refills: 3 | OUTPATIENT
Start: 2024-08-09

## 2024-08-09 RX ORDER — POTASSIUM CHLORIDE 750 MG/1
10 CAPSULE, EXTENDED RELEASE ORAL 2 TIMES DAILY
Qty: 180 CAPSULE | Refills: 3 | Status: SHIPPED | OUTPATIENT
Start: 2024-08-09

## 2024-08-09 NOTE — TELEPHONE ENCOUNTER
Refill passed per [facility] protocol.  Requested Prescriptions   Pending Prescriptions Disp Refills    Omeprazole 40 MG Oral Capsule Delayed Release 90 capsule 3     Sig: Take 1 capsule (40 mg total) by mouth daily.       Gastrointestional Medication Protocol Passed - 8/5/2024  9:24 AM        Passed - In person appointment or virtual visit in the past 12 mos or appointment in next 3 mos     Recent Outpatient Visits              3 weeks ago Type 2 diabetes mellitus without retinopathy (HCC)    [facility] Bk Helm MD    Office Visit    2 months ago Type 2 diabetes mellitus with diabetic neuropathy, with long-term current use of insulin ([facility])    [facility]    [facility] Visit    5 months ago Flank pain    [facility] [address], [place] Nikki Alcantara MD    Office Visit    8 months ago Type 2 diabetes mellitus with hyperglycemia, with long-term current use of insulin ([facility])    [facility] Veena Tom MD    Office Visit    10 months ago Type 2 diabetes mellitus with diabetic neuropathy, with long-term current use of insulin ([facility])    [facility] [address], [place] Nikki Alcantara MD    Office Visit          Future Appointments         Provider Department Appt Notes    In 3 days Nikki Alcantara MD [facility], [address], [place] follow up visit                      Potassium Chloride ER 10 MEQ Oral Cap  capsule 3     Sig: TAKE 1 CAPSULE BY MOUTH TWICE DAILY       There is no refill protocol information for this order       gabapentin 400 MG Oral Cap 180 capsule 1     Sig: Take 1 capsule (400 mg total) by mouth 2 (two) times daily.       Neurology Medications Passed - 8/5/2024  9:24 AM        Passed - In person appointment or virtual visit in the past 6 mos or appointment in next 3 mos      Recent Outpatient Visits              3 weeks ago Type 2 diabetes mellitus without retinopathy (HCC)    [facility] Bk Helm MD    Office Visit    2 months ago Type 2 diabetes mellitus with diabetic neuropathy, with long-term current use of insulin ([facility])    [facility]    Office Visit    5 months ago Flank pain    [facility] [address], [place] Nikki Alcantara MD    Office Visit    8 months ago Type 2 diabetes mellitus with hyperglycemia, with long-term current use of insulin ([facility])    [facility]    Office Visit    10 months ago Type 2 diabetes mellitus with diabetic neuropathy, with long-term current use of insulin ([facility])    [facility] [address], [place] Nikki Alcantara MD    Office Visit          Future Appointments         Provider Department Appt Notes    In 3 days Nikki Alcantara MD [facility], [address], [place] follow up visit                     Refused Prescriptions Disp Refills    simvastatin 40 MG Oral Tab 90 tablet 3     Sig: Take 1 tablet (40 mg total) by mouth nightly.       Cholesterol Medication Protocol Passed - 8/5/2024  9:24 AM        Passed - ALT < 80     Lab Results   Component Value Date    ALT 27 07/16/2024             Passed - ALT resulted within past year        Passed - Lipid panel within past 12 months     Lab Results   Component Value Date    CHOLEST 162 11/28/2023    TRIG 147 11/28/2023    HDL 42 11/28/2023    LDL 94 11/28/2023    VLDL 24 11/28/2023    NONHDLC 120 11/28/2023             Passed - In person appointment or virtual visit in the past 12 mos or appointment in next 3 mos     Recent Outpatient Visits              3 weeks ago Type 2 diabetes mellitus without retinopathy (HCC)    [facility]  Bk Helm MD    Office Visit    2 months ago Type 2 diabetes mellitus with diabetic neuropathy, with long-term current use of insulin ([facility])    [facility] Veena Tom MD    Office Visit    5 months ago Flank pain    [facility] [address], [place] Nikki Alcantara MD    Office Visit    8 months ago Type 2 diabetes mellitus with hyperglycemia, with long-term current use of insulin ([facility])    [facility] Veena Tom MD    Office Visit    10 months ago Type 2 diabetes mellitus with diabetic neuropathy, with long-term current use of insulin ([facility])    [facility] [address], [place] Nikki Alcantara MD    Office Visit          Future Appointments         Provider Department Appt Notes    In 3 days Nikki Alcantara MD [facility] [address], [place] follow up visit                      sertraline 100 MG Oral Tab 90 tablet 3     Sig: Take 1 tablet (100 mg total) by mouth daily.       Psychiatric Non-Scheduled (Anti-Anxiety) Passed - 8/5/2024  9:24 AM        Passed - In person appointment or virtual visit in the past 6 mos or appointment in next 3 mos     Recent Outpatient Visits              3 weeks ago Type 2 diabetes mellitus without retinopathy ([facility])    [facility] Bk Helm MD    Office Visit    2 months ago Type 2 diabetes mellitus with diabetic neuropathy, with long-term current use of insulin ([facility])    [facility] Veena Tom MD    Office Visit    5 months ago Flank pain    [facility] [address], [place] Nikki Alcantara MD    Office Visit    8 months ago Type 2 diabetes mellitus with hyperglycemia, with long-term current use of insulin ([facility])    [facility] Veena Tom MD     Office Visit    10 months ago Type 2 diabetes mellitus with diabetic neuropathy, with long-term current use of insulin ([facility])    [facility] [address], [place] Nikki Alcantara MD    Office Visit          Future Appointments         Provider Department Appt Notes    In 3 days Nikki Alcantara MD [facility] [address], [place] follow up visit                    Passed - Depression Screening completed within the past 12 months          traZODone 100 MG Oral Tab 90 tablet 3     Sig: Take 1 tablet (100 mg total) by mouth nightly.       There is no refill protocol information for this order       hydrocortisone (PROCTOZONE-HC) 2.5 % External Cream 28 g 5     Sig: Apply to hemorrhoids for 7 to 10 days       Gastrointestional Medication Protocol Passed - 8/5/2024  9:24 AM        Passed - In person appointment or virtual visit in the past 12 mos or appointment in next 3 mos     Recent Outpatient Visits              3 weeks ago Type 2 diabetes mellitus without retinopathy ([facility])    [facility] Bk Helm MD    Office Visit    2 months ago Type 2 diabetes mellitus with diabetic neuropathy, with long-term current use of insulin ([facility])    Counts include 234 beds at the [facility] Veena Tom MD    Office Visit    5 months ago Flank pain    [facility] [address], [place] Nikki Alcantara MD    Office Visit    8 months ago Type 2 diabetes mellitus with hyperglycemia, with long-term current use of insulin ([facility])    Counts include 234 beds at the [facility] Veena Tom MD    Office Visit    10 months ago Type 2 diabetes mellitus with diabetic neuropathy, with long-term current use of insulin ([facility]    [facility]          Future Appointments         Provider Department Appt Notes    In 3 days Nikki Alcantara MD  [facility] [address], [place] follow up visit                      losartan 100 MG Oral Tab 90 tablet 3     Sig: Take 1 tablet (100 mg total) by mouth daily.       Hypertension Medications Protocol Passed - 8/5/2024  9:24 AM        Passed - CMP or BMP in past 12 months        Passed - Last BP reading less than 140/90     BP Readings from Last 1 Encounters:   06/05/24 94/59               Passed - In person appointment or virtual visit in the past 12 mos or appointment in next 3 mos     Recent Outpatient Visits              3 weeks ago Type 2 diabetes mellitus without retinopathy ([facility])    [facility]    Office Visit    2 months ago Type 2 diabetes mellitus with diabetic neuropathy, with long-term current use of insulin ([facility])    [facility] Veena Tom MD    Office Visit    5 months ago Flank pain    [facility] [address], [place] Nikki Alcantara MD    Office Visit    8 months ago Type 2 diabetes mellitus with hyperglycemia, with long-term current use of insulin ([facility])    [facility] Veena Tom MD    Office Visit    10 months ago Type 2 diabetes mellitus with diabetic neuropathy, with long-term current use of insulin ([facility])    [facility] [address], [place] Nikki Alcantara MD    Office Visit          Future Appointments         Provider Department Appt Notes    In 3 days Nikki Alcantara MD [facility] [address], [place] follow up visit                    Passed - EGFRCR or GFRNAA > 50     GFR Evaluation  EGFRCR: 62 , resulted on 7/16/2024            pantoprazole 40 MG Oral Tab EC 90 tablet 1     Sig: Take 1 tablet (40 mg total) by mouth every morning before breakfast.       Gastrointestional Medication Protocol Passed - 8/5/2024  9:24 AM        Passed - In person  appointment or virtual visit in the past 12 mos or appointment in next 3 mos     Recent Outpatient Visits              3 weeks ago Type 2 diabetes mellitus without retinopathy ([facility]    [facility]    2 months ago Type 2 diabetes mellitus with diabetic neuropathy, with long-term current use of insulin ([facility])    Count includes the [facility], Veena Tom MD    Office Visit    5 months ago Flank pain    [facility] [address], [place] Nikki Alcantara MD    Office Visit    8 months ago Type 2 diabetes mellitus with hyperglycemia, with long-term current use of insulin ([facility])    [facility] Veena Tom MD    Office Visit    10 months ago Type 2 diabetes mellitus with diabetic neuropathy, with long-term current use of insulin ([facility])    [facility] [address], [place] Nikki Alcantara MD    Office Visit          Future Appointments         Provider Department Appt Notes    In 3 days Nikki Alcantara MD [facility] [address], [place] follow up visit                      levothyroxine 125 MCG Oral Tab 90 tablet 3     Sig: Take 1 tablet (125 mcg total) by mouth every morning.       Thyroid Medication Protocol Passed - 8/5/2024  9:24 AM        Passed - TSH in past 12 months        Passed - Last TSH value is normal     Lab Results   Component Value Date    TSH 0.783 07/16/2024                 Passed - In person appointment or virtual visit in the past 12 mos or appointment in next 3 mos     Recent Outpatient Visits              3 weeks ago Type 2 diabetes mellitus without retinopathy ([facility])    [facility]    [facility] Visit    2 months ago Type 2 diabetes mellitus with diabetic neuropathy, with long-term current use of insulin ([facility])    [facility]  [facility], [facility] Veena Tom MD    Office Visit    5 months ago Flank pain    [facility] [address], [place] Nikki Alcantara MD    Office Visit    8 months ago Type 2 diabetes mellitus with hyperglycemia, with long-term current use of insulin ([facility])    [facility] Veena Tom MD    Office Visit    10 months ago Type 2 diabetes mellitus with diabetic neuropathy, with long-term current use of insulin ([facility])    [facility] [address], [place] Nikki Alcantara MD    Office Visit          Future Appointments         Provider Department Appt Notes    In 3 days Nikki Alcantara MD [facility] [address], [place] follow up visit                      tiZANidine 4 MG Oral Tab 90 tablet 1     Sig: Take 1 tablet (4 mg total) by mouth every 8 (eight) hours as needed.       There is no refill protocol information for this order       allopurinol 100 MG Oral Tab 90 tablet 3     Sig: Take 1 tablet (100 mg total) by mouth nightly.       There is no refill protocol information for this order       zolpidem 10 MG Oral Tab 30 tablet 1     Sig: Take 1 tablet (10 mg total) by mouth nightly.       Controlled Substance Medication Failed - 8/5/2024  9:24 AM        Failed - This medication is a controlled substance - forward to provider to refill          hydroCHLOROthiazide 25 MG Oral Tab 180 tablet 3     Sig: TAKE 1 TABLET BY MOUTH TWICE DAILY       Hypertension Medications Protocol Passed - 8/5/2024  9:24 AM        Passed - CMP or BMP in past 12 months        Passed - Last BP reading less than 140/90     BP Readings from Last 1 Encounters:   06/05/24 94/59               Passed - In person appointment or virtual visit in the past 12 mos or appointment in next 3 mos     Recent Outpatient Visits              3 weeks ago Type 2 diabetes mellitus without retinopathy ([facility])    [facility]    Office Visit    2 months ago Type 2 diabetes mellitus with diabetic neuropathy, with long-term current use of insulin ([facility])    [facility] Veena Tom MD    Office Visit    5 months ago Flank pain    [facility] [address], [place] Nikki Alcantara MD    Office Visit    8 months ago Type 2 diabetes mellitus with hyperglycemia, with long-term current use of insulin ([facility])    [facility], Veena Tom MD    Office Visit    10 months ago Type 2 diabetes mellitus with diabetic neuropathy, with long-term current use of insulin ([facility])    [facility] [address], [place] Nikki Alcantara MD    Office Visit          Future Appointments         Provider Department Appt Notes    In 3 days iNkki Alcantara MD [facility], [address], [place] follow up visit                    Passed - EGFRCR or GFRNAA > 50     GFR Evaluation  EGFRCR: 62 , resulted on 7/16/2024            carvedilol 12.5 MG Oral Tab 90 tablet 3     Sig: Take 1 tablet (12.5 mg total) by mouth daily.       Hypertension Medications Protocol Passed - 8/5/2024  9:24 AM        Passed - CMP or BMP in past 12 months        Passed - Last BP reading less than 140/90     BP Readings from Last 1 Encounters:   06/05/24 94/59               Passed - In person appointment or virtual visit in the past 12 mos or appointment in next 3 mos     Recent Outpatient Visits              3 weeks ago Type 2 diabetes mellitus without retinopathy ([facility]    [facility] Visit    2 months ago Type 2 diabetes mellitus with diabetic neuropathy, with long-term current use of insulin ([facility])    [facility] Veena Tom MD    Office Visit    5 months ago Flank pain    [place]  [facility], [address], [place] Nikki Alcantara MD    Office Visit    8 months ago Type 2 diabetes mellitus with hyperglycemia, with long-term current use of insulin ([facility])    [facility] Veena Tom MD    Office Visit    10 months ago Type 2 diabetes mellitus with diabetic neuropathy, with long-term current use of insulin ([facility])    [facility] [address], [place] Nikki Alcantara MD    Office Visit          Future Appointments         Provider Department Appt Notes    In 3 days Nikki Alcantara MD [facility] [address], [place] follow up visit                    Passed - EGFRCR or GFRNAA > 50     GFR Evaluation  EGFRCR: 62 , resulted on 7/16/2024             Future Appointments         Provider Department Appt Notes    In 3 days Nikki Alcantara MD [facility] [address], [place] follow up visit          Recent Outpatient Visits              3 weeks ago Type 2 diabetes mellitus without retinopathy ([facility])    [facility]    Office Visit    2 months ago Type 2 diabetes mellitus with diabetic neuropathy, with long-term current use of insulin ([facility])    [facility], Veena Tom MD    Office Visit    5 months ago Flank pain    [facility]    Office Visit    8 months ago Type 2 diabetes mellitus with hyperglycemia, with long-term current use of insulin ([facility])    [facility] Veena Tom MD    Office Visit    10 months ago Type 2 diabetes mellitus with diabetic neuropathy, with long-term current use of insulin ([facility])    [facility]    [facility] Visit

## 2024-08-09 NOTE — TELEPHONE ENCOUNTER
Please review; protocol failed/No Protocol    Last Office Visit: 03/24/2024    Omeprazole 40mg: is patient on both Omeprazole and Pantoprazole 40mg-please advise if refill is appropriate.   No documentation.  Pantoprazole was written most recently on 03/04/2024, Omeprazole written 07/04/2023.    Requested Prescriptions   Pending Prescriptions Disp Refills    Omeprazole 40 MG Oral Capsule Delayed Release 90 capsule 3     Sig: Take 1 capsule (40 mg total) by mouth daily.       Gastrointestional Medication Protocol Passed - 8/5/2024  9:24 AM        Passed - In person appointment or virtual visit in the past 12 mos or appointment in next 3 mos     Recent Outpatient Visits              3 weeks ago Type 2 diabetes mellitus without retinopathy ([facility])    [facility]    Office Visit    2 months ago Type 2 diabetes mellitus with diabetic neuropathy, with long-term current use of insulin ([facility]    Office Visit    5 months ago Flank pain    [facility] [address], [place] Nikki Alcantara MD    Office Visit    8 months ago Type 2 diabetes mellitus with hyperglycemia, with long-term current use of insulin ([facility])    [facility] Veena Tom MD    Office Visit    10 months ago Type 2 diabetes mellitus with diabetic neuropathy, with long-term current use of insulin ([facility])    [facility] [address], [place] Nikki Alcantara MD    Office Visit          Future Appointments         Provider Department Appt Notes    In 3 days Nikki Alcantara MD [facility] [address], [place] follow up visit                      Potassium Chloride ER 10 MEQ Oral Cap  capsule 3     Sig: TAKE 1 CAPSULE BY MOUTH TWICE DAILY       There is no refill protocol information for this order      Signed  Prescriptions Disp Refills    gabapentin 400 MG Oral Cap 180 capsule 1     Sig: Take 1 capsule (400 mg total) by mouth 2 (two) times daily.       Neurology Medications Passed - 8/5/2024  9:24 AM        Passed - In person appointment or virtual visit in the past 6 mos or appointment in next 3 mos     Recent Outpatient Visits              3 weeks ago Type 2 diabetes mellitus without retinopathy ([facility]    Office Visit    2 months ago Type 2 diabetes mellitus with diabetic neuropathy, with long-term current use of insulin ([facility])    [facility] Veena Tom MD    Office Visit    5 months ago Flank pain    [facility] [address], [place] Nikki Alcantara MD    Office Visit    8 months ago Type 2 diabetes mellitus with hyperglycemia, with long-term current use of insulin ([facility])    [facility], Veena Tom MD    Office Visit    10 months ago Type 2 diabetes mellitus with diabetic neuropathy, with long-term current use of insulin ([facility])    [facility] [address], [place] Nikki Alcantara MD    Office Visit          Future Appointments         Provider Department Appt Notes    In 3 days Nikki Alcantara MD [facility] [address], [place] follow up visit                     Refused Prescriptions Disp Refills    simvastatin 40 MG Oral Tab 90 tablet 3     Sig: Take 1 tablet (40 mg total) by mouth nightly.       Cholesterol Medication Protocol Passed - 8/5/2024  9:24 AM        Passed - ALT < 80     Lab Results   Component Value Date    ALT 27 07/16/2024             Passed - ALT resulted within past year        Passed - Lipid panel within past 12 months     Lab Results   Component Value Date    CHOLEST 162 11/28/2023    TRIG 147 11/28/2023    HDL 42 11/28/2023    LDL 94 11/28/2023    VLDL 24 11/28/2023     NONHDL 120 11/28/2023             Passed - In person appointment or virtual visit in the past 12 mos or appointment in next 3 mos     Recent Outpatient Visits              3 weeks ago Type 2 diabetes mellitus without retinopathy [facility]    [facility] Visit    2 months ago Type 2 diabetes mellitus with diabetic neuropathy, with long-term current use of insulin ([facility])    [facility], Veena Tom MD    Office Visit    5 months ago Flank pain    [facility] [address], [place] Nikki Alcantara MD    Office Visit    8 months ago Type 2 diabetes mellitus with hyperglycemia, with long-term current use of insulin ([facility])    [facility]    Office Visit    10 months ago Type 2 diabetes mellitus with diabetic neuropathy, with long-term current use of insulin ([facility])    [facility] [address], [place] Nikki Alcantara MD    Office Visit          Future Appointments         Provider Department Appt Notes    In 3 days Nikki Alcantara MD [facility] [address], [place] follow up visit                      sertraline 100 MG Oral Tab 90 tablet 3     Sig: Take 1 tablet (100 mg total) by mouth daily.       Psychiatric Non-Scheduled (Anti-Anxiety) Passed - 8/5/2024  9:24 AM        Passed - In person appointment or virtual visit in the past 6 mos or appointment in next 3 mos     Recent Outpatient Visits              3 weeks ago Type 2 diabetes mellitus without retinopathy ([facility]    Office Visit    2 months ago Type 2 diabetes mellitus with diabetic neuropathy, with long-term current use of insulin ([facility])    [facility], Veena Tom MD    Office Visit    5 months ago  Flank pain    [facility] [address], [place] Nikki Alcantara MD    Office Visit    8 months ago Type 2 diabetes mellitus with hyperglycemia, with long-term current use of insulin ([facility])    [facility] Veena Tom MD    Office Visit    10 months ago Type 2 diabetes mellitus with diabetic neuropathy, with long-term current use of insulin ([facility])    [facility] [address], [place] Nikki Alcantara MD    Office Visit          Future Appointments         Provider Department Appt Notes    In 3 days Nikki Alcantara MD [facility] [address], [place] follow up visit                    Passed - Depression Screening completed within the past 12 months          traZODone 100 MG Oral Tab 90 tablet 3     Sig: Take 1 tablet (100 mg total) by mouth nightly.       There is no refill protocol information for this order       hydrocortisone (PROCTOZONE-HC) 2.5 % External Cream 28 g 5     Sig: Apply to hemorrhoids for 7 to 10 days       Gastrointestional Medication Protocol Passed - 8/5/2024  9:24 AM        Passed - In person appointment or virtual visit in the past 12 mos or appointment in next 3 mos     Recent Outpatient Visits              3 weeks ago Type 2 diabetes mellitus without retinopathy ([facility]    Office Visit    2 months ago Type 2 diabetes mellitus with diabetic neuropathy, with long-term current use of insulin ([facility])    [facility], Veena Tom MD    Office Visit    5 months ago Flank pain    [facility] [address], [place] Nikki Alcantara MD    Office Visit    8 months ago Type 2 diabetes mellitus with hyperglycemia, with long-term current use of insulin ([facility])    [facility] Veena Tom MD    Office Visit    10  months ago Type 2 diabetes mellitus with diabetic neuropathy, with long-term current use of insulin ([facility])    [facility] [address], [place] Nikki Alcantara MD    Office Visit          Future Appointments         Provider Department Appt Notes    In 3 days Nikki Alcantara MD [facility] [address], [place] follow up visit                      losartan 100 MG Oral Tab 90 tablet 3     Sig: Take 1 tablet (100 mg total) by mouth daily.       Hypertension Medications Protocol Passed - 8/5/2024  9:24 AM        Passed - CMP or BMP in past 12 months        Passed - Last BP reading less than 140/90     BP Readings from Last 1 Encounters:   06/05/24 94/59               Passed - In person appointment or virtual visit in the past 12 mos or appointment in next 3 mos     Recent Outpatient Visits              3 weeks ago Type 2 diabetes mellitus without retinopathy ([facility])    [facility] Bk Helm MD    Office Visit    2 months ago Type 2 diabetes mellitus with diabetic neuropathy, with long-term current use of insulin ([facility])    [facility] Veena Tom MD    Office Visit    5 months ago Flank pain    [facility] [address], [place] Nikki Alcantara MD    Office Visit    8 months ago Type 2 diabetes mellitus with hyperglycemia, with long-term current use of insulin ([facility])    [facility] Veena Tom MD    Office Visit    10 months ago Type 2 diabetes mellitus with diabetic neuropathy, with long-term current use of insulin ([facility])    [facility] [address], [place] Nikki Alcantara MD    Office Visit          Future Appointments         Provider Department Appt Notes    In 3 days Nikki Alcantara MD [facility] [address], [place] follow up visit                    Passed - EGFRCR or GFRNAA > 50      GFR Evaluation  EGFRCR: 62 , resulted on 7/16/2024            pantoprazole 40 MG Oral Tab EC 90 tablet 1     Sig: Take 1 tablet (40 mg total) by mouth every morning before breakfast.       Gastrointestional Medication Protocol Passed - 8/5/2024  9:24 AM        Passed - In person appointment or virtual visit in the past 12 mos or appointment in next 3 mos     Recent Outpatient Visits              3 weeks ago Type 2 diabetes mellitus without retinopathy ([facility])    [facility]    Office Visit    2 months ago Type 2 diabetes mellitus with diabetic neuropathy, with long-term current use of insulin ([facility])    [facility] Veena Tom MD    Office Visit    5 months ago Flank pain    [facility] [address], [place] Nikki Alcantara MD    Office Visit    8 months ago Type 2 diabetes mellitus with hyperglycemia, with long-term current use of insulin ([facility])    [facility] Veena Tom MD    Office Visit    10 months ago Type 2 diabetes mellitus with diabetic neuropathy, with long-term current use of insulin ([facility])    [facility] [address], [place] Nikki Alcantara MD    Office Visit          Future Appointments         Provider Department Appt Notes    In 3 days Nikki Alcantara MD [facility] [address], [place] follow up visit                      levothyroxine 125 MCG Oral Tab 90 tablet 3     Sig: Take 1 tablet (125 mcg total) by mouth every morning.       Thyroid Medication Protocol Passed - 8/5/2024  9:24 AM        Passed - TSH in past 12 months        Passed - Last TSH value is normal     Lab Results   Component Value Date    TSH 0.783 07/16/2024                 Passed - In person appointment or virtual visit in the past 12 mos or appointment in next 3 mos     Recent Outpatient Visits              3  weeks ago Type 2 diabetes mellitus without retinopathy ([facility])    [facility] Bk Helm MD    Office Visit    2 months ago Type 2 diabetes mellitus with diabetic neuropathy, with long-term current use of insulin ([facility])    [facility], Veena Tom MD    Office Visit    5 months ago Flank pain    [facility] [address], [place] Nikki Alcantara MD    Office Visit    8 months ago Type 2 diabetes mellitus with hyperglycemia, with long-term current use of insulin ([facility])    [facility] Veena Tom MD    Office Visit    10 months ago Type 2 diabetes mellitus with diabetic neuropathy, with long-term current use of insulin ([facility])    [facility] [address], [place] Nikki Alcantara MD    Office Visit          Future Appointments         Provider Department Appt Notes    In 3 days Nikki Alcantara MD [facility] [address], [place] follow up visit                      tiZANidine 4 MG Oral Tab 90 tablet 1     Sig: Take 1 tablet (4 mg total) by mouth every 8 (eight) hours as needed.       There is no refill protocol information for this order       allopurinol 100 MG Oral Tab 90 tablet 3     Sig: Take 1 tablet (100 mg total) by mouth nightly.       There is no refill protocol information for this order       zolpidem 10 MG Oral Tab 30 tablet 1     Sig: Take 1 tablet (10 mg total) by mouth nightly.       Controlled Substance Medication Failed - 8/5/2024  9:24 AM        Failed - This medication is a controlled substance - forward to provider to refill          hydroCHLOROthiazide 25 MG Oral Tab 180 tablet 3     Sig: TAKE 1 TABLET BY MOUTH TWICE DAILY       Hypertension Medications Protocol Passed - 8/5/2024  9:24 AM        Passed - CMP or BMP in past 12 months        Passed - Last BP reading less than 140/90     BP Readings from  Last 1 Encounters:   06/05/24 94/59               Passed - In person appointment or virtual visit in the past 12 mos or appointment in next 3 mos     Recent Outpatient Visits              3 weeks ago Type 2 diabetes mellitus without retinopathy ([facility])    [facility] Bk Helm MD    Office Visit    2 months ago Type 2 diabetes mellitus with diabetic neuropathy, with long-term current use of insulin ([facility])    [facility]    Office Visit    5 months ago Flank pain    [facility] [address], [place] Nikki Alcantara MD    Office Visit    8 months ago Type 2 diabetes mellitus with hyperglycemia, with long-term current use of insulin ([facility])    [facility]    Office Visit    10 months ago Type 2 diabetes mellitus with diabetic neuropathy, with long-term current use of insulin ([facility])    [facility] [address], [place] Nikki Alcantara MD    Office Visit          Future Appointments         Provider Department Appt Notes    In 3 days Nikki Alcantara MD [facility] [address], [place] follow up visit                    Passed - EGFRCR or GFRNAA > 50     GFR Evaluation  EGFRCR: 62 , resulted on 7/16/2024            carvedilol 12.5 MG Oral Tab 90 tablet 3     Sig: Take 1 tablet (12.5 mg total) by mouth daily.       Hypertension Medications Protocol Passed - 8/5/2024  9:24 AM        Passed - CMP or BMP in past 12 months        Passed - Last BP reading less than 140/90     BP Readings from Last 1 Encounters:   06/05/24 94/59               Passed - In person appointment or virtual visit in the past 12 mos or appointment in next 3 mos     Recent Outpatient Visits              3 weeks ago Type 2 diabetes mellitus without retinopathy ([facility])    [facility]  Bk Helm MD    Office Visit    2 months ago Type 2 diabetes mellitus with diabetic neuropathy, with long-term current use of insulin ([facility])    [facility], Veena Tom MD    Office Visit    5 months ago Flank pain    [facility] [address], [place] Nikki Alcantara MD    Office Visit    8 months ago Type 2 diabetes mellitus with hyperglycemia, with long-term current use of insulin ([facility])    [facility] Veena Tom MD    Office Visit    10 months ago Type 2 diabetes mellitus with diabetic neuropathy, with long-term current use of insulin ([facility])    [facility] [address], [place] Nikki Alcantara MD    Office Visit          Future Appointments         Provider Department Appt Notes    In 3 days Nikki Alcantara MD [facility] [address], [place] follow up visit                    Passed - EGFRCR or GFRNAA > 50     GFR Evaluation  EGFRCR: 62 , resulted on 7/16/2024             Future Appointments         Provider Department Appt Notes    In 3 days Nikki Alcantara MD [facility] [address], [place] follow up visit          Recent Outpatient Visits              3 weeks ago Type 2 diabetes mellitus without retinopathy ([facility])    [facility] Bk Helm MD    Office Visit    2 months ago Type 2 diabetes mellitus with diabetic neuropathy, with long-term current use of insulin ([facility])    [facility], Veena Tom MD    Office Visit    5 months ago Flank pain    [facility] [address], [place] Nikki Alcantara MD    Office Visit    8 months ago Type 2 diabetes mellitus with hyperglycemia, with long-term current use of insulin ([facility])    [facility] Veena Tom MD    Office Visit     10 months ago Type 2 diabetes mellitus with diabetic neuropathy, with long-term current use of insulin (HCC)    [facility] [address], [place] Nikki Alcantara MD    Office Visit

## 2024-08-09 NOTE — TELEPHONE ENCOUNTER
Losartan 100m year supply sent to [facility] on 2023- patient just filled 90 day on 2024    Hydrocortisone-Proctozone: 28 g with 5 refills sent to [facility] on 2023-patient still filling from 2023 prescription     Trazodone 100m year supply was sent to [facility] on 10/04/2023- patient just filled 90 day on 2024    Sertraline 100m year supply sent to [facility] on 10/04/2023- patient still filling from Script dated 2023- patient just filled 90 day on 2024.    Simvastatin 40m year supply sent to [facility] on 10/04/2023-patient just filled 90 day on 2024    Carvedilol 12.5mg, hydrochlorothiazide 25m year supply sent to [facility] on 2024    Allopurinol 100m year supply sent to [facility] on 2024    Tizanidine 4m day supply sent to [facility] on 2024- patient has only filled once.     Levothyroxine 125mc year supply sent to [facility] on 2024    Zolpidem 10m month supply sent to [facility] on 2024- has only filled once, should have refill on file.     Pantoprazole 40m month supply sent to [facility] on 2024- patient has only filled quantity 30 from prescription

## 2024-08-10 RX ORDER — DULAGLUTIDE 3 MG/.5ML
3 INJECTION, SOLUTION SUBCUTANEOUS WEEKLY
Qty: 6 ML | Refills: 0 | Status: SHIPPED | OUTPATIENT
Start: 2024-08-10 | End: 2024-11-08

## 2024-08-10 RX ORDER — GLIPIZIDE 10 MG/1
10 TABLET ORAL
Qty: 180 TABLET | Refills: 1 | Status: SHIPPED | OUTPATIENT
Start: 2024-08-10 | End: 2025-02-06

## 2024-08-11 ENCOUNTER — PATIENT MESSAGE (OUTPATIENT)
Dept: INTERNAL MEDICINE CLINIC | Facility: CLINIC | Age: 59
End: 2024-08-11

## 2024-08-11 DIAGNOSIS — F32.1 CURRENT MODERATE EPISODE OF MAJOR DEPRESSIVE DISORDER WITHOUT PRIOR EPISODE (HCC): ICD-10-CM

## 2024-08-11 DIAGNOSIS — Z79.4 TYPE 2 DIABETES MELLITUS WITH DIABETIC NEUROPATHY, WITH LONG-TERM CURRENT USE OF INSULIN (HCC): ICD-10-CM

## 2024-08-11 DIAGNOSIS — E11.40 TYPE 2 DIABETES MELLITUS WITH DIABETIC NEUROPATHY, WITH LONG-TERM CURRENT USE OF INSULIN (HCC): ICD-10-CM

## 2024-08-11 DIAGNOSIS — M1A.09X0 IDIOPATHIC CHRONIC GOUT OF MULTIPLE SITES WITHOUT TOPHUS: ICD-10-CM

## 2024-08-11 DIAGNOSIS — I10 ESSENTIAL HYPERTENSION: ICD-10-CM

## 2024-08-12 NOTE — TELEPHONE ENCOUNTER
From: Randi Lobo  To: Nikki Alcantara  Sent: 8/11/2024 11:29 PM CDT  Subject: Medication     Can you please order omeprazole 40 mg for my mother she’s suffering from stomach burning and acidity she’s in [place]

## 2024-08-12 NOTE — TELEPHONE ENCOUNTER
Endocrine Refill protocol for oral and injectable diabetic medications    Protocol Criteria:  PASSED    -Appointment with Endocrinology completed in the last 6 months or scheduled in the next 3 months    -A1c result below 8.5% in the past 6 months      Verify the above has been completed or scheduled in the appropriate timeline. If so can send a 90 day supply with 1 refill.     Last completed office visit: 6/5/2024 Veena Emery MD     Next scheduled Follow up: No follow up appointment scheduled - Called patient to help schedule a follow up appointment, no answer, left detailed message to call back. [employer] message sent.      Last A1c result: Last A1c value was 7.5% done 7/16/2024.      Quality 110: Preventive Care And Screening: Influenza Immunization: Influenza Immunization not Administered for Documented Reasons. Detail Level: Detailed Quality 265: Biopsy Follow-Up: Biopsy results reviewed, communicated, tracked, and documented Quality 111:Pneumonia Vaccination Status For Older Adults: Pneumococcal Vaccination Previously Received

## 2024-08-13 RX ORDER — GLIPIZIDE 10 MG/1
10 TABLET ORAL
Qty: 180 TABLET | Refills: 0 | Status: SHIPPED | OUTPATIENT
Start: 2024-08-13 | End: 2024-11-11

## 2024-08-15 RX ORDER — OMEPRAZOLE 40 MG/1
40 CAPSULE, DELAYED RELEASE ORAL DAILY
Qty: 90 CAPSULE | Refills: 3 | OUTPATIENT
Start: 2024-08-15

## 2024-08-15 RX ORDER — ALLOPURINOL 100 MG/1
100 TABLET ORAL NIGHTLY
Qty: 90 TABLET | Refills: 3 | OUTPATIENT
Start: 2024-08-15

## 2024-09-09 ENCOUNTER — PATIENT MESSAGE (OUTPATIENT)
Dept: GASTROENTEROLOGY | Facility: CLINIC | Age: 59
End: 2024-09-09

## 2024-09-09 DIAGNOSIS — F32.1 CURRENT MODERATE EPISODE OF MAJOR DEPRESSIVE DISORDER WITHOUT PRIOR EPISODE (HCC): ICD-10-CM

## 2024-09-09 DIAGNOSIS — E11.40 TYPE 2 DIABETES MELLITUS WITH DIABETIC NEUROPATHY, WITH LONG-TERM CURRENT USE OF INSULIN (HCC): ICD-10-CM

## 2024-09-09 DIAGNOSIS — I10 ESSENTIAL HYPERTENSION: ICD-10-CM

## 2024-09-09 DIAGNOSIS — Z79.4 TYPE 2 DIABETES MELLITUS WITH DIABETIC NEUROPATHY, WITH LONG-TERM CURRENT USE OF INSULIN (HCC): ICD-10-CM

## 2024-09-09 DIAGNOSIS — M1A.09X0 IDIOPATHIC CHRONIC GOUT OF MULTIPLE SITES WITHOUT TOPHUS: ICD-10-CM

## 2024-09-09 DIAGNOSIS — G47.09 OTHER INSOMNIA: ICD-10-CM

## 2024-09-09 RX ORDER — GLIPIZIDE 10 MG/1
10 TABLET ORAL
Qty: 180 TABLET | Refills: 0 | Status: SHIPPED | OUTPATIENT
Start: 2024-09-09 | End: 2024-12-08

## 2024-09-09 RX ORDER — INSULIN GLARGINE 100 [IU]/ML
40 INJECTION, SOLUTION SUBCUTANEOUS EVERY MORNING
Qty: 36 ML | Refills: 2 | Status: SHIPPED | OUTPATIENT
Start: 2024-09-09

## 2024-09-09 RX ORDER — DULAGLUTIDE 3 MG/.5ML
3 INJECTION, SOLUTION SUBCUTANEOUS WEEKLY
Qty: 6 ML | Refills: 0 | Status: SHIPPED | OUTPATIENT
Start: 2024-09-09 | End: 2024-12-08

## 2024-09-09 NOTE — TELEPHONE ENCOUNTER
Endocrine refill protocol for basal insulins     Protocol Criteria: PASSED Reason: N/A  - Appointment with Endocrinology completed in the last 6 months or scheduled in the next 3 months    - A1c result completed in the last 6 months and is below 8.5%     Verify appointment has been completed or scheduled in the appropriate timeline. If so can send a 90 day supply with 1 refill per provider protocol.    Verify A1c has been completed within the last 6 months and is below 8.5%     Last completed office visit:6/5/2024 Veena Emery MD   Next scheduled Follow up: No future appointments.   Last A1c result: Last A1c value was 7.5% done 7/16/2024.

## 2024-09-10 NOTE — TELEPHONE ENCOUNTER
From: Randi Lobo  To: Arthur Roberts  Sent: 9/9/2024 4:29 PM CDT  Subject: Appointment     Hello I wanna make appointment and want to do annual check up

## 2024-09-12 RX ORDER — GABAPENTIN 400 MG/1
400 CAPSULE ORAL 2 TIMES DAILY
Qty: 180 CAPSULE | Refills: 1 | OUTPATIENT
Start: 2024-09-12

## 2024-09-12 RX ORDER — ZOLPIDEM TARTRATE 10 MG/1
10 TABLET ORAL NIGHTLY
Qty: 30 TABLET | Refills: 0 | Status: SHIPPED | OUTPATIENT
Start: 2024-09-12

## 2024-09-12 RX ORDER — LEVOTHYROXINE SODIUM 125 UG/1
125 TABLET ORAL EVERY MORNING
Qty: 90 TABLET | Refills: 3 | OUTPATIENT
Start: 2024-09-12

## 2024-09-12 RX ORDER — CARVEDILOL 12.5 MG/1
12.5 TABLET ORAL DAILY
Qty: 90 TABLET | Refills: 3 | OUTPATIENT
Start: 2024-09-12

## 2024-09-12 RX ORDER — HYDROCHLOROTHIAZIDE 25 MG/1
TABLET ORAL
Qty: 180 TABLET | Refills: 3 | OUTPATIENT
Start: 2024-09-12

## 2024-09-12 RX ORDER — ALLOPURINOL 100 MG/1
100 TABLET ORAL NIGHTLY
Qty: 90 TABLET | Refills: 3 | OUTPATIENT
Start: 2024-09-12

## 2024-09-12 NOTE — TELEPHONE ENCOUNTER
Please review; protocol failed    LAST OFFICE VISIT: 3/4/2024    Recent fill dates: 4/25/24     For qty of: #30 each for a 30 day supply  Date of  last written prescription:      Date: 4/25/24       Requested Prescriptions   Pending Prescriptions Disp Refills    zolpidem 10 MG Oral Tab 30 tablet 1     Sig: Take 1 tablet (10 mg total) by mouth nightly.       Controlled Substance Medication Failed - 9/9/2024  4:25 PM        Failed - This medication is a controlled substance - forward to provider to refill         Refused Prescriptions Disp Refills    levothyroxine 125 MCG Oral Tab 90 tablet 3     Sig: Take 1 tablet (125 mcg total) by mouth every morning.       Thyroid Medication Protocol Passed - 9/9/2024  4:25 PM        Passed - TSH in past 12 months        Passed - Last TSH value is normal     Lab Results   Component Value Date    TSH 0.783 07/16/2024                 Passed - In person appointment or virtual visit in the past 12 mos or appointment in next 3 mos     Recent Outpatient Visits              1 month ago Type 2 diabetes mellitus without retinopathy ([facility]    3 months ago Type 2 diabetes mellitus with diabetic neuropathy, with long-term current use of insulin ([facility])    [facility] Veena Tom MD    Office Visit    6 months ago Flank pain    [facility] [place] Kandel, Ninel, MD    Office Visit    9 months ago Type 2 diabetes mellitus with hyperglycemia, with long-term current use of insulin ([facility])    [facility] Veena Tom MD    Office Visit    11 months ago Type 2 diabetes mellitus with diabetic neuropathy, with long-term current use of insulin ([facility])    [facility] [place] Kandel, Ninel, MD    Office Visit          Future Appointments          Provider Department Appt Notes    In 2 weeks Adriana Vega MD [facility] Constipation and Burning senstaions in Stomach with Anal Pain                      allopurinol 100 MG Oral Tab 90 tablet 3     Sig: Take 1 tablet (100 mg total) by mouth nightly.       There is no refill protocol information for this order       hydroCHLOROthiazide 25 MG Oral Tab 180 tablet 3     Sig: TAKE 1 TABLET BY MOUTH TWICE DAILY       Hypertension Medications Protocol Passed - 9/9/2024  4:25 PM        Passed - CMP or BMP in past 12 months        Passed - Last BP reading less than 140/90     BP Readings from Last 1 Encounters:   06/05/24 94/59               Passed - In person appointment or virtual visit in the past 12 mos or appointment in next 3 mos     Recent Outpatient Visits              1 month ago Type 2 diabetes mellitus without retinopathy ([facility])    [facility] Bk Helm MD    Office Visit    3 months ago Type 2 diabetes mellitus with diabetic neuropathy, with long-term current use of insulin ([facility])    [facility]    [facility]    6 months [facility]    [facility] Visit    9 months ago Type 2 diabetes mellitus with hyperglycemia, with long-term current use of insulin ([facility])    [facility] Veena Tom MD    Office Visit    11 months ago Type 2 diabetes mellitus with diabetic neuropathy, with long-term current use of insulin ([facility])    [facility] [place] Kandel, Ninel, MD    Office Visit          Future Appointments         Provider Department Appt Notes    In 2 weeks Adriana Vega MD [facility] Constipation and Burning senstaions in Stomach with  Anal Pain                    Passed - EGFRCR or GFRNAA > 50     GFR Evaluation  EGFRCR: 62 , resulted on 7/16/2024            carvedilol 12.5 MG Oral Tab 90 tablet 3     Sig: Take 1 tablet (12.5 mg total) by mouth daily.       Hypertension Medications Protocol Passed - 9/9/2024  4:25 PM        Passed - CMP or BMP in past 12 months        Passed - Last BP reading less than 140/90     BP Readings from Last 1 Encounters:   06/05/24 94/59               Passed - In person appointment or virtual visit in the past 12 mos or appointment in next 3 mos     Recent Outpatient Visits              1 month ago Type 2 diabetes mellitus without retinopathy ([facility])    [facility]    Office Visit    3 months ago Type 2 diabetes mellitus with diabetic neuropathy, with long-term current use of insulin ([facility])    [facility] Veena Tom MD    Office Visit    6 months ago Flank pain    [facility] [place] Kandel, Ninel, MD    Office Visit    9 months ago Type 2 diabetes mellitus with hyperglycemia, with long-term current use of insulin ([facility])    [facility] Veena Tom MD    Office Visit    11 months ago Type 2 diabetes mellitus with diabetic neuropathy, with long-term current use of insulin ([facility])    [facility] [place] Kandel, Ninel, MD    Office Visit          Future Appointments         Provider Department Appt Notes    In 2 weeks [facility] Constipation and Burning senstaions in Stomach with Anal Pain                    Passed - EGFRCR or GFRNAA > 50     GFR Evaluation  EGFRCR: 62 , resulted on 7/16/2024            gabapentin 400 MG Oral Cap 180 capsule 1     Sig: Take 1 capsule (400 mg total) by mouth 2 (two) times daily.        Neurology Medications Failed - 9/9/2024  4:25 PM        Failed - In person appointment or virtual visit in the past 6 mos or appointment in next 3 mos     Recent Outpatient Visits              1 month ago Type 2 diabetes mellitus without retinopathy ([facility] [place] [facility]    [facility] Visit    3 months ago Type 2 diabetes mellitus with diabetic neuropathy, with long-term current use of insulin ([facility] [place] [facility])    [facility], Veena Tom MD    Office Visit    6 months ago Flank [facility]    Office Visit    9 months ago Type 2 diabetes mellitus with hyperglycemia, with long-term current use of insulin ([facility] [place] [facility])    [facility] [place], Veena Tom MD    Office Visit    11 months ago Type 2 diabetes mellitus with diabetic neuropathy, with long-term current use of insulin ([facility] [place] [facility])    [facility] [place] Kandel, Ninel, MD    Office Visit          Future Appointments         Provider Department Appt Notes    In 2 weeks Adriana Vega MD [facility] [place] Constipation and Burning senstaions in Stomach with Anal Pain                         Future Appointments         Provider Department Appt Notes    In 2 weeks Adriana Vega MD [facility] [place] Constipation and Burning senstaions in Stomach with Anal Pain          Recent Outpatient Visits              1 month ago Type 2 diabetes mellitus without retinopathy ([facility] [place] [facility])    [facility] Bk Erwin MD    Office Visit    3 months ago Type 2 diabetes mellitus with diabetic neuropathy, with long-term current use of insulin ([facility] [place] [facility])    [facility] [place], Sonal Emery  Veena SANTOS MD    Office Visit    6 months ago Flank pain    [facility] [address] [place] Nikki Alcantara MD    Office Visit    9 months ago Type 2 diabetes mellitus with hyperglycemia, with long-term current use of insulin ([facility])    [facility] Veena Emery MD    Office Visit    11 months ago Type 2 diabetes mellitus with diabetic neuropathy, with long-term current use of insulin ([facility])    [facility]    Office Visit

## 2024-09-12 NOTE — TELEPHONE ENCOUNTER
Please call patient and her family she is due for follow-up visit last visit was 6 months ago, cannot continue refilling medication without follow-up

## 2024-10-01 ENCOUNTER — TELEPHONE (OUTPATIENT)
Facility: CLINIC | Age: 59
End: 2024-10-01

## 2024-10-01 ENCOUNTER — TELEPHONE (OUTPATIENT)
Dept: GASTROENTEROLOGY | Facility: CLINIC | Age: 59
End: 2024-10-01

## 2024-10-01 ENCOUNTER — OFFICE VISIT (OUTPATIENT)
Dept: GASTROENTEROLOGY | Facility: CLINIC | Age: 59
End: 2024-10-01

## 2024-10-01 VITALS
HEIGHT: 63 IN | DIASTOLIC BLOOD PRESSURE: 94 MMHG | SYSTOLIC BLOOD PRESSURE: 150 MMHG | WEIGHT: 188 LBS | BODY MASS INDEX: 33.31 KG/M2

## 2024-10-01 DIAGNOSIS — K62.5 RECTAL BLEEDING: ICD-10-CM

## 2024-10-01 DIAGNOSIS — R10.9 LEFT SIDED ABDOMINAL PAIN: ICD-10-CM

## 2024-10-01 DIAGNOSIS — R10.12 LUQ PAIN: ICD-10-CM

## 2024-10-01 DIAGNOSIS — K62.89 RECTAL PAIN: ICD-10-CM

## 2024-10-01 DIAGNOSIS — A04.8 H. PYLORI INFECTION: Primary | ICD-10-CM

## 2024-10-01 DIAGNOSIS — Z12.11 SCREEN FOR COLON CANCER: Primary | ICD-10-CM

## 2024-10-01 DIAGNOSIS — K60.2 ANAL FISSURE: ICD-10-CM

## 2024-10-01 DIAGNOSIS — R10.32 LLQ PAIN: ICD-10-CM

## 2024-10-01 DIAGNOSIS — K59.09 CHRONIC CONSTIPATION: ICD-10-CM

## 2024-10-01 DIAGNOSIS — K21.9 GASTROESOPHAGEAL REFLUX DISEASE WITHOUT ESOPHAGITIS: ICD-10-CM

## 2024-10-01 PROCEDURE — 99205 OFFICE O/P NEW HI 60 MIN: CPT | Performed by: INTERNAL MEDICINE

## 2024-10-01 RX ORDER — GABAPENTIN 400 MG/1
400 CAPSULE ORAL 2 TIMES DAILY
Qty: 180 CAPSULE | Refills: 1 | OUTPATIENT
Start: 2024-10-01

## 2024-10-01 NOTE — TELEPHONE ENCOUNTER
GI staff: please prescribe nifedipine as below to [place] [facility].     Nifedipine 0.3% ointment three times a day topical to anus as directed (ONLY GLOVED FINGER to avoid dizziness, headaches).     Address: [address], [place] [address]  Closes 8PM  Phone: [phone]    Additionally, she needs insulin orders prior to procedure 10/10. Thanks!

## 2024-10-01 NOTE — PATIENT INSTRUCTIONS
For anal fissure:  1. Buy SITZ bath - warm water twice a day; no salt. Can also use wipes to wipe anus.   2. Nifedipine 0.3% ointment three times a day topical to anus as directed--->  at [place] [facility] (ONLY GLOVED FINGER to avoid dizziness, headaches).     Address: [address], [place] [address]  Closes 8PM  Phone: [phone]    3. Stop topical steroid therapy.  4. Use a donut to sit on.    For constipation:   Start taking metamucil 1 scoop a day every morning.   Start taking 1 capful of miralax at night before bed.   Drink at least 2 liters of water a day.     For stomach pain:   Hold omeprazole and pantoprazole for at least 2 weeks.  Use pepcid (famotidine) instead of omeprazole/pantoprazole. You can buy this over-the-counter. Hold pepcid (famotidine) for at least 3 days before your test.   Complete an h pylori breath test after you have held omeprazole/pantoprazole for 2 weeks and pepcid (famotidine) for 3 days.     For colonoscopy:   1. Schedule colonoscopy with [facility] [Diagnosis: CRC screening, L-sided abdominal pain].    2.  bowel prep from pharmacy (split dose golytely). If your prescription is not available and/or is cost-prohibitive, please contact the office as soon as possible to ensure you receive a bowel prep before your procedure.     3. Medication adjustments:    We will discuss how to dose your insulin with your diabetes doctor.       A. SEVEN DAYS BEFORE colonoscopy: HOLD trulicity     B. DAY BEFORE colonoscopy: HOLD metformin, glipizide      C. Continue ALL other medications as usual    4. Read all bowel prep instructions carefully. You will take all of the first bottle of golytely on Tuesday, October 8 at 5PM. You will take 1/2 of the second bottle of golytely on Wednesday, October 9 at 5PM. You will take the other 1/2 of the second bottle on Thursday, October 10 4-5 hours before you have to leave your house to come for your procedure.    5. AVOID seeds, nuts, popcorn, and raw  fruits and vegetables (cooked is okay) for SEVEN days before procedure.    6. If you start any NEW medication after your visit today, please notify us. Certain medications will need to be held before the procedure or the procedure cannot be performed.     7. You will need a ride home from your procedure since you are receiving sedation. Please ensure you will have an available ride home or the procedure cannot be performed.

## 2024-10-01 NOTE — TELEPHONE ENCOUNTER
PPD PA -    Patient is scheduled next week, 10/10/24, for colonoscopy. Please check for PA. Thank you!

## 2024-10-01 NOTE — TELEPHONE ENCOUNTER
Patient scheduled & prep explained with patient's daughter on the phone.    Scheduled for:  Colonoscopy [address]  Provider Name:  Dr. Vega  Date:  10/10/2024  Location:  [facility]  Sedation:  [facility]  Time: Patient is aware he/she will receive a phone call the day before with the arrival time.  Prep:  Golytely x 2    You will take all of the first bottle of golytely on Tuesday, October 8 at 5PM. You will take 1/2 of the second bottle of golytely on Wednesday, October 9 at 5PM. You will take the other 1/2 of the second bottle on Thursday, October 10 4-5 hours before you have to leave your house to come for your procedure.   Meds/Allergies Reconciled?:  Physician reviewed  Diagnosis with codes:  Screening for colon cancer Z12.11; Left sided abdominal pain R10.9  Was patient informed to call insurance with codes (Y/N):  Yes  Referral sent?:  Referral was sent at the time of electronic surgical scheduling.  [facility] or [facility] notified?:  I sent an electronic request to Endo Scheduling and received a confirmation today.    Medication Orders:  We will discuss how to dose your insulin with your diabetes doctor.        A. SEVEN DAYS BEFORE colonoscopy: HOLD trulicity     B. DAY BEFORE colonoscopy: HOLD metformin, glipizide      C. Continue ALL other medications as usual  Misc Orders:  N/A     Further instructions given by staff:  Prep instructions were given to pt in the office, pt verbalized understanding.

## 2024-10-01 NOTE — TELEPHONE ENCOUNTER
Disp Refills Start End    gabapentin 400 MG Oral Cap 180 capsule 1 8/9/2024 --    Sig - Route: Take 1 capsule (400 mg total) by mouth 2 (two) times daily. - Oral    Sent to pharmacy as: Gabapentin 400 MG Oral Capsule (Neurontin)    E-Prescribing Status: Receipt confirmed by pharmacy (8/9/2024 11:47 AM CDT)      Pharmacy    [id number] - [place], [place] - [address] AT [facility], [phone], [phone]

## 2024-10-01 NOTE — H&P
[facility] - Gastroenterology                                                                                                               Reason for consult: multiple complaints     Requesting physician or provider: Nikki Alcantara MD    Chief Complaint   Patient presents with    Consult     Abdominal pain & constipation        HPI:   Randi Lobo is a 58 year old woman with history of BMI 33.30, asthma, depression, diabetes, thyroid disorder, diverticulitis, GERD, HTN, HLD, and fatty liver presenting for multiple GI complaints.     Previously saw Dr. Roberts in 2020 for the following:   + anemia  + weight loss  + constipation  + upper abdominal pain     Recommend:  - Omeprazole 20 mg daily  - colace daily   - stop meloxicam  - EGD/colonoscopy for above symptoms with MAC sedation  -Prep: Split dose Colyte or equivalent  -Anti-platelets and anti-coagulants: ok to continue ASA 81 mg  -Diabetes meds: Hold metformin day of procedure and day before procedure. If patient is on other diabetic medications/insulin please ask primary or endocrine to manage pre-procedure and day of procedure    CTAP 2020:  Impression   CONCLUSION:  1. No acute intra-abdominal process.  2. Uncomplicated sigmoid colonic diverticulosis.  3. Lesser incidental findings as above.        Underwent EGD/colonoscopy with Dr. Roberts 01/2021:   EGD findings:    1. Esophagus: The squamocolumnar junction was noted at 40 cm and appeared regular. The GE junction was noted at 40 cm from the incisors. No significant hiatal hernia. The esophageal mucosa appeared normal. There was no evidence of esophagitis, stricture or endoscopic evidence of Armas's esophagus.  2. Stomach: The stomach distended normally. Normal rugal folds were seen. The pylorus was patent. The gastric mucosa appeared normal. Retroflexion revealed a normal fundus and a normal cardia.   3. Duodenum:  The duodenal mucosa appeared normal in the 1st and 2nd portion of the duodenum. Biopsied to rule out celiac given anemia.     Colonoscopy findings:  1. YUMIKO: normal rectal tone, no masses palpated.   2. Fair to poor prep-- small polyps could have been missed and unable to visualize the cecal base or rectum   3. Terminal ileum: the visualized mucosa appeared normal.  4. The colonic mucosa throughout the colon showed normal vascular pattern, without evidence of angioectasias or inflammation.   5. Diverticulosis: left sided.  6. Poor prep so could not see anything on retroflexion.     Impression:  Poor colon prep     Recommend:  Await pathology.   Recall colonoscopy within 1 year given small polyps could have been missed.   High fiber diet.  Monitor for blood in the stool. If having more than just tinge of blood, call office or go to the ER.    Final Diagnosis:      Duodenum; biopsy:  Duodenal mucosa without significant histopathology  Preserved villous architecture  No increase in intraepithelial lymphocytes     H pylori was positive on stool testing 07/2022.     KUB 2022:   Impression   CONCLUSION: Moderate amount of stool in the colon, which could reflect constipation.     Today's visit:   She presents with multiple complaints, most similar to previous visit in 2020. She notes burning stomach pain. Pain is from LUQ to LLQ. She notes that the pain is worse with eating meat. She has been taking omeprazole which helps significantly with symptoms. When she doesn't take omeprazole, she will have the burning pain and reflux. She has not been taking omeprazole daily. She notes that pain is also worse when she is constipated. She has been taking metamucil as needed, not daily. She notes that when she does move her bowels, she has a lot of rectal pain and bleeding. She has been using hydrocortisone cream to the external anus when she has this pain. If she eats oily or spicy foods, she will have diarrhea.     She does not  think she took the treatment for h pylori. No eradication testing.     She is leaving for [facility] at the end of this month for unknown time period.     Wt Readings from Last 6 Encounters:   10/01/24 188 lb (85.3 kg)   06/05/24 188 lb (85.3 kg)   03/04/24 189 lb (85.7 kg)   11/28/23 192 lb 12.8 oz (87.5 kg)   09/27/23 192 lb 8 oz (87.3 kg)   06/13/23 191 lb (86.6 kg)        History, Medications, Allergies, ROS:      Past Medical History:    Asthma (HCC)    Depression    Diabetes (HCC)    Diabetes type 2, controlled (HCC)    Disorder of thyroid    Diverticulitis    Esophageal reflux    Essential hypertension    Gout    Hepatic steatosis    Hepatomegaly    High blood pressure    High cholesterol    Hyperlipidemia    Thyroid disease      Past Surgical History:   Procedure Laterality Date    Colonoscopy N/A 3/29/2017    Procedure: COLONOSCOPY;  Surgeon: Lenny Miranda MD;  Location: [facility] ENDOSCOPY    Colonoscopy N/A 1/8/2021    Procedure: COLONOSCOPY;  Surgeon: Arthur Roberts MD;  Location: [facility] ENDO      Family Hx:   Family History   Problem Relation Age of Onset    Cancer Father     Hypertension Father     Diabetes Mother     Cancer Mother     Hypertension Mother     Glaucoma Neg     Macular degeneration Neg       Social History:   Social History     Socioeconomic History    Marital status:    Tobacco Use    Smoking status: Never    Smokeless tobacco: Never   Vaping Use    Vaping status: Never Used   Substance and Sexual Activity    Alcohol use: No    Drug use: No   Social History Narrative    ** Merged History Encounter **             Medications (Active prior to today's visit):  Current Outpatient Medications   Medication Sig Dispense Refill    polyethylene glycol, PEG 3350-KCl-NaBcb-NaCl-NaSulf, 236 g Oral Recon Soln Take 4,000 mL by mouth As Directed. Take 2,000 mL the night before your procedure and 2,000 mL the morning of your procedure. 1 each 0    polyethylene glycol, PEG 3350-KCl-NaBcb-NaCl-NaSulf,  236 g Oral Recon Soln Take 4,000 mL by mouth As Directed. Take 2,000 mL the night before your procedure and 2,000 mL the morning of your procedure. 1 each 0    zolpidem 10 MG Oral Tab Take 1 tablet (10 mg total) by mouth nightly. 30 tablet 0    insulin glargine (BASAGLAR KWIKPEN) 100 UNIT/ML Subcutaneous Solution Pen-injector Inject 40 Units into the skin every morning. ADMINISTER 40 UNITS UNDER THE SKIN TWICE DAILY EVERY DAY 36 mL 2    metFORMIN 500 MG Oral Tab TAKE 2 TABLETS BY MOUTH TWICE DAILY 360 tablet 1    Dulaglutide (TRULICITY) 3 MG/0.5ML Subcutaneous Solution Pen-injector Inject 3 mg into the skin once a week. 6 mL 0    glipiZIDE 10 MG Oral Tab Take 1 tablet (10 mg total) by mouth 2 (two) times daily before meals. 180 tablet 0    Omeprazole 40 MG Oral Capsule Delayed Release Take 1 capsule (40 mg total) by mouth daily. 90 capsule 3    Potassium Chloride ER 10 MEQ Oral Cap CR Take 1 capsule (10 mEq total) by mouth 2 (two) times daily. 180 capsule 3    gabapentin 400 MG Oral Cap Take 1 capsule (400 mg total) by mouth 2 (two) times daily. 180 capsule 1    ketoconazole 2 % External Shampoo Use to  Scalp twice a wekk 120 mL 5    diclofenac 1 % External Gel Apply 2 g topically 4 (four) times daily. 1 each 2    Continuous Glucose Transmitter (DEXCOM G6 TRANSMITTER) Does not apply Misc Change sensor every 90 days 1 each 0    Continuous Glucose  (DEXCOM G6 ) Does not apply Device Use to monitor blood sugar level 1 each 0    Continuous Glucose Sensor (DEXCOM G6 SENSOR) Does not apply Misc Change sensor every 10 days 9 each 0    Glucose Blood (ONETOUCH VERIO) In Vitro Strip USE TO CHECK BLOOD SUGAR TWICE DAILY, FASTING AND 2 HOURS AFTER A MEAL 200 strip 1    hydroCHLOROthiazide 25 MG Oral Tab TAKE 1 TABLET BY MOUTH TWICE DAILY 180 tablet 3    carvedilol 12.5 MG Oral Tab Take 1 tablet (12.5 mg total) by mouth daily. 90 tablet 3    allopurinol 100 MG Oral Tab Take 1 tablet (100 mg total) by mouth  nightly. 90 tablet 3    levothyroxine 125 MCG Oral Tab Take 1 tablet (125 mcg total) by mouth every morning. 90 tablet 3    tiZANidine 4 MG Oral Tab Take 1 tablet (4 mg total) by mouth every 8 (eight) hours as needed. 90 tablet 1    Insulin Pen Needle (TRUEPLUS 5-BEVEL PEN NEEDLES) 32G X 4 MM Does not apply Misc Use twice a day 200 each 3    cholecalciferol 50 MCG (2000 UT) Oral Tab Take 1 tablet (2,000 Units total) by mouth every morning. 90 tablet 3    pantoprazole 40 MG Oral Tab EC Take 1 tablet (40 mg total) by mouth every morning before breakfast. 90 tablet 1    hydrocortisone (PROCTOZONE-HC) 2.5 % External Cream Apply to hemorrhoids for 7 to 10 days 28 g 5    losartan 100 MG Oral Tab Take 1 tablet (100 mg total) by mouth daily. 90 tablet 3    simvastatin 40 MG Oral Tab Take 1 tablet (40 mg total) by mouth nightly. 90 tablet 3    sertraline 100 MG Oral Tab Take 1 tablet (100 mg total) by mouth daily. 90 tablet 3    traZODone 100 MG Oral Tab Take 1 tablet (100 mg total) by mouth nightly. 90 tablet 3    aspirin 81 MG Oral Tab EC Take 1 tablet (81 mg total) by mouth daily. Pt states 2x a day. 90 tablet 3    naproxen 500 MG Oral Tab EC Take 1 tablet p.o. at bedtime as needed for severe pain 30 tablet 1    Blood Glucose Monitoring Suppl (ONETOUCH VERIO) w/Device Does not apply Kit 1 kit As Directed. 1 kit 0    Insulin Pen Needle (TRUEPLUS PEN NEEDLES) 32G X 4 MM Does not apply Misc Use needles to inject insulin daily 100 each 3    Ferrous Sulfate (IRON) 325 (65 Fe) MG Oral Tab Take 1 Dose by mouth daily. 30 tablet 0    Lancets (ONETOUCH DELICA PLUS UQEQDY64E) Does not apply Misc 1 strip by In Vitro route 2 (two) times daily. FASTING AND 2 HOURS AFTER A MEAL 200 each 3    HYDROcodone-acetaminophen 7.5-325 MG Oral Tab Take 1-2 tablets by mouth every 4 (four) hours as needed for Pain. 15 tablet 0       Allergies:  No Known Allergies    ROS:   CONSTITUTIONAL:  negative for fevers, rigors  EYES:  negative for diplopia    RESPIRATORY:  negative for severe shortness of breath  CARDIOVASCULAR:  negative for crushing sub-sternal chest pain  GASTROINTESTINAL:  see HPI  GENITOURINARY:  negative for dysuria or gross hematuria  INTEGUMENT/BREAST:  SKIN:  negative for jaundice   ALLERGIC/IMMUNOLOGIC:  negative for hay fever  ENDOCRINE:  negative for cold intolerance and heat intolerance  MUSCULOSKELETAL:  negative for joint effusion/severe erythema  BEHAVIOR/PSYCH:  negative for psychotic behavior    PHYSICAL EXAM:   Blood pressure (!) 150/94, height 5' 3\" (1.6 m), weight 188 lb (85.3 kg), not currently breastfeeding.    A chaperone (RN) was offered and present during today's sensitive exam.     Gen: appears comfortable and in no acute distress  HEENT: sclera appear anicteric, mucus membranes appear moist   Lung: no increased work of breathing, no conversational dyspnea   Abd: soft, mild TTP to L-side of abdomen without rigidity or guarding, non-distended, no masses palpated  YUMIKO: posterior anal fissure, internal rectal exam deferred due to pain   Skin: no jaundice, no apparent rashes   Neuro: alert and interactive, no focal neuro deficits  Psych: cooperative, normal affect     Labs/Imaging:     CTAP 2020:  Impression   CONCLUSION:  1. No acute intra-abdominal process.  2. Uncomplicated sigmoid colonic diverticulosis.  3. Lesser incidental findings as above.        Underwent EGD/colonoscopy with Dr. Roberts 01/2021:   EGD findings:    1. Esophagus: The squamocolumnar junction was noted at 40 cm and appeared regular. The GE junction was noted at 40 cm from the incisors. No significant hiatal hernia. The esophageal mucosa appeared normal. There was no evidence of esophagitis, stricture or endoscopic evidence of Armas's esophagus.  2. Stomach: The stomach distended normally. Normal rugal folds were seen. The pylorus was patent. The gastric mucosa appeared normal. Retroflexion revealed a normal fundus and a normal cardia.   3. Duodenum: The  duodenal mucosa appeared normal in the 1st and 2nd portion of the duodenum. Biopsied to rule out celiac given anemia.     Colonoscopy findings:  1. YUMIKO: normal rectal tone, no masses palpated.   2. Fair to poor prep-- small polyps could have been missed and unable to visualize the cecal base or rectum   3. Terminal ileum: the visualized mucosa appeared normal.  4. The colonic mucosa throughout the colon showed normal vascular pattern, without evidence of angioectasias or inflammation.   5. Diverticulosis: left sided.  6. Poor prep so could not see anything on retroflexion.     Impression:  Poor colon prep     Recommend:  Await pathology.   Recall colonoscopy within 1 year given small polyps could have been missed.   High fiber diet.  Monitor for blood in the stool. If having more than just tinge of blood, call office or go to the ER.    Final Diagnosis:      Duodenum; biopsy:  Duodenal mucosa without significant histopathology  Preserved villous architecture  No increase in intraepithelial lymphocytes     KUB 2022:   Impression   CONCLUSION: Moderate amount of stool in the colon, which could reflect constipation.       ASSESSMENT/PLAN:   Randi Lobo is a 58 year old woman with history of BMI 33.30, asthma, depression, diabetes, thyroid disorder, diverticulitis, GERD, HTN, HLD, and fatty liver presenting for multiple GI complaints.     She presents for evaluation of L-sided abdominal pain, acid reflux, constipation and rectal pain with bleeding.     She has a history of h pylori diagnosed on stool Ag testing in 2022. She does not think she took the treatment for this. Previously had an EGD in 2021 that was normal. She takes NSAIDs (naproxen) intermittently. She notes burning abdominal pain and reflux if she does not take omeprazole. She does not take omeprazole daily. She denies dysphagia. Recommended she hold PPI and repeat h pylori testing. If positive, will treat with quad therapy. If negative, recommend she  take PPI daily and not skip this medication as she has full resolution of her LUQ pain and reflux with PPI.     She also notes LLQ pain that is worse when she is constipated. She notes rectal pain with bleeding when she has a BM. YUMIKO with posterior anal fissure. Recommended she stop hydrocortisone cream and use nifedipine ointment to the anus TID. Additionally, discussed fiber supplementation and miralax to help with constipation. She drinks minimal water - recommended 2L a day. She previously had a colonoscopy in 2021 but prep was poor and she was recommended to repeat this in 1 year. Recommended she repeat this now. She would like this done before the end of the month as she is leaving for [facility] for an unknown period of time. We discussed completing colonoscopy next week but this may irritate anal fissure. Because she previously had a poor prep, recommended low fiber diet x 7 days followed by two days of CLD and two days of prep.     Recommendations:  For anal fissure:  1. Buy SITZ bath - warm water twice a day; no salt. Can also use wipes to wipe anus.   2. Nifedipine 0.3% ointment three times a day topical to anus as directed--->  at [facility] (ONLY GLOVED FINGER to avoid dizziness, headaches).     Address: [address], [place] [address]  Closes 8PM  Phone: [phone]    3. Stop topical steroid therapy.  4. Use a donut to sit on.    For constipation:   Start taking metamucil 1 scoop a day every morning.   Start taking 1 capful of miralax at night before bed.   Drink at least 2 liters of water a day.     For stomach pain:   Hold omeprazole and pantoprazole for at least 2 weeks.  Use pepcid (famotidine) instead of omeprazole/pantoprazole. You can buy this over-the-counter. Hold pepcid (famotidine) for at least 3 days before your test.   Complete an h pylori breath test after you have held omeprazole/pantoprazole for 2 weeks and pepcid (famotidine) for 3 days.     For colonoscopy:   1.  Schedule colonoscopy with MAC [Diagnosis: CRC screening, L-sided abdominal pain].    2.  bowel prep from pharmacy (split dose golytely). If your prescription is not available and/or is cost-prohibitive, please contact the office as soon as possible to ensure you receive a bowel prep before your procedure.     3. Medication adjustments:    We will discuss how to dose your insulin with your diabetes doctor.       A. SEVEN DAYS BEFORE colonoscopy: HOLD trulicity     B. DAY BEFORE colonoscopy: HOLD metformin, glipizide      C. Continue ALL other medications as usual    4. Read all bowel prep instructions carefully. You will take all of the first bottle of golytely on Tuesday, October 8 at 5PM. You will take 1/2 of the second bottle of golytely on Wednesday, October 9 at 5PM. You will take the other 1/2 of the second bottle on Thursday, October 10 4-5 hours before you have to leave your house to come for your procedure.    5. AVOID seeds, nuts, popcorn, and raw fruits and vegetables (cooked is okay) for SEVEN days before procedure.    6. If you start any NEW medication after your visit today, please notify us. Certain medications will need to be held before the procedure or the procedure cannot be performed.     7. You will need a ride home from your procedure since you are receiving sedation. Please ensure you will have an available ride home or the procedure cannot be performed.             Orders This Visit:  Orders Placed This Encounter   Procedures    Helicobacter Pylori Breath Test, Adult       Meds This Visit:  Requested Prescriptions     Signed Prescriptions Disp Refills    polyethylene glycol, PEG 3350-KCl-NaBcb-NaCl-NaSulf, 236 g Oral Recon Soln 1 each 0     Sig: Take 4,000 mL by mouth As Directed. Take 2,000 mL the night before your procedure and 2,000 mL the morning of your procedure.    polyethylene glycol, PEG 3350-KCl-NaBcb-NaCl-NaSulf, 236 g Oral Recon Soln 1 each 0     Sig: Take 4,000 mL by mouth  As Directed. Take 2,000 mL the night before your procedure and 2,000 mL the morning of your procedure.       Imaging & Referrals:  None       Adriana Vega MD  [facility]  10/1/2024

## 2024-10-03 ENCOUNTER — TELEPHONE (OUTPATIENT)
Dept: RHEUMATOLOGY | Facility: CLINIC | Age: 59
End: 2024-10-03

## 2024-10-03 NOTE — TELEPHONE ENCOUNTER
Offered appointment for today, but want unable to make it. Is unable to make appointment next Thursday due to scheduled colonoscopy. No other appointments currently available. Will try to add her if we have any cancellations.

## 2024-10-03 NOTE — TELEPHONE ENCOUNTER
Patient is calling to advise she will be traveling out of the [place] and requesting a sooner appointment with provider in month of October 2024    Patient is scheduled for follow up with provider on 12/4/24 and on the wait list.    Patient mentions she gets hand injections at the follow up appointment    Last office visit 6/2023.    Is Provider able to see patient in timeframe requested.  Please advise.

## 2024-10-03 NOTE — TELEPHONE ENCOUNTER
Dr. Emery    Patient is undergoing colonoscopy on 10/10/2024  She will be on a clear liquid diet the day before    Can you please assist with insulin dosing orders?    Thank you!

## 2024-10-04 NOTE — TELEPHONE ENCOUNTER
Appointment 10/8/24 @ 6:20pm is on hold for patient. Left message to call back and confirm available.     Future Appointments   Date Time Provider Department Center   10/8/2024  6:20 PM Marge Angel MD [facility]   10/10/2024 10:20 AM BRANDT GERARDO E[facility] None   12/4/2024 10:00 AM Marge Angel MD [facility]   12/16/2024  3:00 PM [facility]

## 2024-10-08 ENCOUNTER — OFFICE VISIT (OUTPATIENT)
Dept: RHEUMATOLOGY | Facility: CLINIC | Age: 59
End: 2024-10-08

## 2024-10-08 VITALS
BODY MASS INDEX: 31.92 KG/M2 | HEART RATE: 81 BPM | SYSTOLIC BLOOD PRESSURE: 172 MMHG | HEIGHT: 64 IN | WEIGHT: 187 LBS | DIASTOLIC BLOOD PRESSURE: 102 MMHG

## 2024-10-08 DIAGNOSIS — M79.642 BILATERAL HAND PAIN: ICD-10-CM

## 2024-10-08 DIAGNOSIS — M25.50 POLYARTHRALGIA: ICD-10-CM

## 2024-10-08 DIAGNOSIS — M79.641 BILATERAL HAND PAIN: ICD-10-CM

## 2024-10-08 DIAGNOSIS — G56.03 BILATERAL CARPAL TUNNEL SYNDROME: Primary | ICD-10-CM

## 2024-10-08 PROCEDURE — 99214 OFFICE O/P EST MOD 30 MIN: CPT | Performed by: INTERNAL MEDICINE

## 2024-10-08 PROCEDURE — 20526 THER INJECTION CARP TUNNEL: CPT | Performed by: INTERNAL MEDICINE

## 2024-10-08 RX ORDER — METHYLPREDNISOLONE ACETATE 80 MG/ML
40 INJECTION, SUSPENSION INTRA-ARTICULAR; INTRALESIONAL; INTRAMUSCULAR; SOFT TISSUE ONCE
Status: COMPLETED | OUTPATIENT
Start: 2024-10-08 | End: 2024-10-08

## 2024-10-08 NOTE — PROGRESS NOTES
Randi Lobo is a 58 year old female.    HPI:     Chief Complaint   Patient presents with    Follow - Up    Hand Pain     Slim Hand Mild swelling        I had the pleasure of seeing Randi Lobo on 10/8/2024 for follow up polyarthralgia's partically b/l hand, from CTS vs possible RA     Current Medications:  Gabapentin 400 mg bid  Naproxen as needed   Previous medication:   mg daily- started 3/2021- only took for 1 week  Leflunomide 20 mg daily- started 7/2022-9/2022, did not feel well on it, caused high blood sugars and blood pressure  Blood work:  Neg MILADYS, ESR, RF  CCP 10.4--> 12.2, CRP 1.11  US R hand: no synovitis, median nerve slightly swollen  EMG: b/l median neuropathy, R worse then L    Interval History:  This is a 55 yo F with hx of HTN, HLD, DM-2, Hypothyroid, Fatty Liver (normal LFTs), Asthma, Anxiety/Depression presents with polyarthralgias.  She reports that her symptoms started about 6 months ago.  She reports pain in her hands and wrists with difficulty making a fist in the morning.  She also reports diffuse pain and TTP in her elbows, knees and ankles.  She reports swelling but on examination there is no evidence of swelling on exam.  She also reports chronic neck and lower back pain with left-sided radiculopathy symptoms.  She states that the back pain is not new.  She is not able to stand for more than 5 minutes because then she develops lower back pain.  She takes ibuprofen for her pain which helps minimally.  She also reports myalgias.  Denies any rash, psoriasis, enthesitis, dactylitis, uveitis, GI or  symptoms    8/10/2021:  For follow-up of polyarthralgia's mostly the hands  She had bilateral carpal tunnel injections last year, it helped for short while but her symptoms returned  She was also found to have a slightly elevated CCP but ultrasound of the right hand showed no evidence of synovitis.  It did show that the median nerve was slightly swollen.  Continues to report  bilateral hand pain  Also reports pain in her neck, trapezius region, shoulders, deltoids, throughout her muscles  She was given prednisone at one point but did not help.  She was also on Plaquenil but did not help  Reports worsening left shoulder pain, abduction is limited to 90 degrees.  She had the EMGs done her hands, it is not resulted yet    10/19/2021:   Presents for follow-up of left shoulder pain  She was seen about 2 months ago due to left shoulder pain/tendinitis.  She received a cortisone injection and it helped significantly, symptoms have been coming back over the past couple of weeks  Reports her hand pain, numbness and tingling have now resolved.  EMG did show evidence of carpal tunnel, R worse than L  Does not take any pain medications  Also having some pain in the left hip, mostly in the trochanteric bursa region    2/18/2022:   Presents for follow-up of left shoulder pain  Has been having pain for the last 2 mos. reports difficulty raising her left shoulder  No pain in hands or numbness or tingling  No pain in R shoulder  Some pain in both knees   Did PT didn't help     7/6/2022:  Presents for f/u to discuss MRI L shoulder  Last visit L shoulder was injected with cortisone and helped  Having a lot of pain in hands, wrists, L shoulder, R ankle  Taking naproxen twice a day, helps a little  Discussed MRI findings of the left shoulder, showing small to moderate GH joint effusion and synovial thickening compatible with inflammatory synovitis, also a small low-grade partial supraspinatus tendon tear    11/18/2022:   Presents for follow-up of bilateral hand pain with numbness and tingling  During her last visit we discussed her MRI L shoulder  findings and finding of possible inflammatory synovitis.  We started her on leflunomide for rheumatoid arthritis but she stopped it as she had side effects and did not like the way it felt  Now having worsening pain in her hands, numbness and tingling.  EMG did  show carpal tunnel in both hands, right was worse than left  Has minimal pain in her shoulders  She was hospitalized in September for syncope.  Work-up was essentially negative.  She had a Holter monitor and will be seeing cardiology     6/13/2023:   Presents for follow-up of bilateral wrist pain  She was seen back in November and had both carpal tunnels injected with cortisone  Having pain in both hands and wrists, also numbness and tingling in the hands  Pain is now going up the arms  Also has pain in all joints     10/8/2024:   Presents for follow-up of bilateral wrist pain  Having significant pain in both hands, has been soaking hands in tumeric and salt water  Also having numbness and tingling in the hands, left thumb is numb  Pain can be 10/10  When holding her phone she has more numbness  Having some pain in left medial epicondyle and also the shoulders  No rash or psoriasis   She takes the naproxen as needed for pain        HISTORY:  Past Medical History:    Asthma (HCC)    Depression    Diabetes (HCC)    Diabetes type 2, controlled (HCC)    Disorder of thyroid    Diverticulitis    Esophageal reflux    Essential hypertension    Gout    Hepatic steatosis    Hepatomegaly    High blood pressure    High cholesterol    Hyperlipidemia    Thyroid disease      Social Hx Reviewed   Family Hx Reviewed     Medications (Active prior to today's visit):  Current Outpatient Medications   Medication Sig Dispense Refill    polyethylene glycol, PEG 3350-KCl-NaBcb-NaCl-NaSulf, 236 g Oral Recon Soln Take 4,000 mL by mouth As Directed. Take 2,000 mL the night before your procedure and 2,000 mL the morning of your procedure. 1 each 0    zolpidem 10 MG Oral Tab Take 1 tablet (10 mg total) by mouth nightly. 30 tablet 0    metFORMIN 500 MG Oral Tab TAKE 2 TABLETS BY MOUTH TWICE DAILY 360 tablet 1    Dulaglutide (TRULICITY) 3 MG/0.5ML Subcutaneous Solution Pen-injector Inject 3 mg into the skin once a week. 6 mL 0    glipiZIDE 10 MG  Oral Tab Take 1 tablet (10 mg total) by mouth 2 (two) times daily before meals. 180 tablet 0    Omeprazole 40 MG Oral Capsule Delayed Release Take 1 capsule (40 mg total) by mouth daily. 90 capsule 3    gabapentin 400 MG Oral Cap Take 1 capsule (400 mg total) by mouth 2 (two) times daily. 180 capsule 1    ketoconazole 2 % External Shampoo Use to  Scalp twice a wekk 120 mL 5    diclofenac 1 % External Gel Apply 2 g topically 4 (four) times daily. 1 each 2    Continuous Glucose Sensor (DEXCOM G6 SENSOR) Does not apply Misc Change sensor every 10 days 9 each 0    Glucose Blood (ONETOUCH VERIO) In Vitro Strip USE TO CHECK BLOOD SUGAR TWICE DAILY, FASTING AND 2 HOURS AFTER A MEAL 200 strip 1    hydroCHLOROthiazide 25 MG Oral Tab TAKE 1 TABLET BY MOUTH TWICE DAILY 180 tablet 3    carvedilol 12.5 MG Oral Tab Take 1 tablet (12.5 mg total) by mouth daily. 90 tablet 3    allopurinol 100 MG Oral Tab Take 1 tablet (100 mg total) by mouth nightly. 90 tablet 3    levothyroxine 125 MCG Oral Tab Take 1 tablet (125 mcg total) by mouth every morning. 90 tablet 3    tiZANidine 4 MG Oral Tab Take 1 tablet (4 mg total) by mouth every 8 (eight) hours as needed. 90 tablet 1    cholecalciferol 50 MCG (2000 UT) Oral Tab Take 1 tablet (2,000 Units total) by mouth every morning. 90 tablet 3    hydrocortisone (PROCTOZONE-HC) 2.5 % External Cream Apply to hemorrhoids for 7 to 10 days 28 g 5    losartan 100 MG Oral Tab Take 1 tablet (100 mg total) by mouth daily. 90 tablet 3    simvastatin 40 MG Oral Tab Take 1 tablet (40 mg total) by mouth nightly. 90 tablet 3    sertraline 100 MG Oral Tab Take 1 tablet (100 mg total) by mouth daily. 90 tablet 3    traZODone 100 MG Oral Tab Take 1 tablet (100 mg total) by mouth nightly. 90 tablet 3    aspirin 81 MG Oral Tab EC Take 1 tablet (81 mg total) by mouth daily. Pt states 2x a day. 90 tablet 3    naproxen 500 MG Oral Tab EC Take 1 tablet p.o. at bedtime as needed for severe pain 30 tablet 1    Blood  Glucose Monitoring Suppl (ONETOUCH VERIO) w/Device Does not apply Kit 1 kit As Directed. 1 kit 0    Ferrous Sulfate (IRON) 325 (65 Fe) MG Oral Tab Take 1 Dose by mouth daily. 30 tablet 0    Lancets (ONETOUCH DELICA PLUS [id number]) Does not apply Misc 1 strip by In Vitro route 2 (two) times daily. FASTING AND 2 HOURS AFTER A MEAL 200 each 3    polyethylene glycol, PEG 3350-KCl-NaBcb-NaCl-NaSulf, 236 g Oral Recon Soln Take 4,000 mL by mouth As Directed. Take 2,000 mL the night before your procedure and 2,000 mL the morning of your procedure. (Patient not taking: Reported on 10/8/2024) 1 each 0    insulin glargine (BASAGLAR KWIKPEN) 100 UNIT/ML Subcutaneous Solution Pen-injector Inject 40 Units into the skin every morning. ADMINISTER 40 UNITS UNDER THE SKIN TWICE DAILY EVERY DAY 36 mL 2    Potassium Chloride ER 10 MEQ Oral Cap CR Take 1 capsule (10 mEq total) by mouth 2 (two) times daily. (Patient not taking: Reported on 10/8/2024) 180 capsule 3    Continuous Glucose Transmitter (DEXCOM G6 TRANSMITTER) Does not apply Misc Change sensor every 90 days (Patient not taking: Reported on 10/8/2024) 1 each 0    Continuous Glucose  (DEXCOM G6 ) Does not apply Device Use to monitor blood sugar level (Patient not taking: Reported on 10/8/2024) 1 each 0    Insulin Pen Needle (TRUEPLUS 5-BEVEL PEN NEEDLES) 32G X 4 MM Does not apply Misc Use twice a day (Patient not taking: Reported on 10/8/2024) 200 each 3    pantoprazole 40 MG Oral Tab EC Take 1 tablet (40 mg total) by mouth every morning before breakfast. (Patient not taking: Reported on 10/8/2024) 90 tablet 1    Insulin Pen Needle (TRUEPLUS PEN NEEDLES) 32G X 4 MM Does not apply Misc Use needles to inject insulin daily (Patient not taking: Reported on 10/8/2024) 100 each 3    HYDROcodone-acetaminophen 7.5-325 MG Oral Tab Take 1-2 tablets by mouth every 4 (four) hours as needed for Pain. (Patient not taking: Reported on 10/8/2024) 15 tablet 0      .cmed  Allergies:  No Known Allergies      ROS:   All other ROS are negative.     PHYSICAL EXAM:   GEN: AAOx3, NAD  HEENT: EOMI, PERRLA, no injection or icterus, oral mucosa moist, no oral lesions. No lymphadenopathy. No facial rash  CVS: RRR, no murmurs rubs or gallops. Equal 2+ distal pulses.   LUNGS: CTAB, no increased work of breathing  ABDOMEN:  soft NT/ND, +BS, no HSM  SKIN: No rashes or skin lesions. No nail findings  MSK:  Cervical spine: FROM  Hands: TTP in MCPs and wrists, limited extension due to pain in her wrists  Elbow: FROM, TTP in b/l elbows  Shoulders: FROM in b/l shoulders   Hip: normal log roll, no lateral hip pain, KIMBERLEY test negative b/l  Knees: FROM, no warmth or effusion present. No pain with ROM.   Ankles: FROM, no pain or swelling or warmth on palpation  Feet: no pain with MTP squeeze, no toe swelling or pain or warmth on palpation with FROM  Spine: TTP in b/l SI joints  NEURO: Cranial nerves II-XII intact grossly. 5/5 strength throughout in both upper and lower extremities, sensation intact.  PSYCH: normal mood       LABS:     Component      Latest Ref Rng & Units 4/28/2020   CK      26 - 192 U/L 68   SED RATE      0 - 30 mm/Hr 16   C-REACTIVE PROTEIN      <0.30 mg/dL 1.11 (H)   URIC ACID      2.6 - 6.0 mg/dL 4.7   MILADYS SCREEN      Negative Negative   Anti Double Strand DNA      <10 <10   RHEUMATOID FACTOR      <15 IU/mL <10   C-Citrullinated Peptide IgG AB      0.0 - 6.9 U/mL 10.4 (H)     Imaging:     EMG 2021:  Impression:  1.  Abnormal study  2.  Electrodiagnostic evidence is consistent with bilateral median neuropathy at the wrist, right worse than left.  The right side is characterized by demyelination and chronic sensory and motor axon loss.  The left side is characterized by demyelination without significant axon loss.  3.  There is no electrodiagnostic evidence of cervical radiculopathy bilaterally       MRI L shoulder 3/2022:  CONCLUSION:   1. Small-moderate glenohumeral joint  effusion with synovial thickening and enhancement compatible with a inflammatory synovitis.   2. Small low-grade partial articular surface supraspinatus tendon tear.   3. Mild nonspecific edema in infraspinatus muscle    US R hand 7/2020:  CONCLUSION:   1. Mild synovial thickening throughout the dorsal aspect of the right wrist, which is nonspecific given the absence of hyperemia to indicate active synovitis.  2. Otherwise no evidence of an inflammatory arthritis involving the right hand or wrist.  3. Triangular fibrocartilage complex is slightly heterogeneous in echotexture, which could indicate underlying chronic degeneration.  4. Median nerve appears slightly swollen at the level of the carpus with questionable mild surrounding hyperemia.  However, there was no discrete deviation of the nerve at the level of the carpal tunnel.  Correlate clinically for symptoms suggestive of   carpal tunnel syndrome.    ASSESSMENT/PLAN:     Bilateral Carpal tunnel  - EMG in the past did show evidence of carpal tunnel, right worse than left  - Both carpal tunnel region was injected with 0.5 cc of lidocaine and 40 mg of Depo-Medrol in sterile fashion.  Patient tolerated procedure well  - Advised patient if she continues to have symptoms she can see hand surgeon    Possible Seropositive rheumatoid arthritis (+ CCP, MRI of the left shoulder showing inflammatory synovitis)  - It was always questionable if she had inflammatory arthritis as she had a low titer CCP and no evidence of synovitis on exam.    - s/p cortisone injections in L shoulder in the past   - no improvement on prednisone   - Continues to have pain in her hands, wrists and ankles  - She was on Plaquenil in the past but did not help  - Was also on Leflunomide but caused s/e  - MRI Right hand normal 7/2023  - She does not want to go on medication right now.  Advised we can try TNF inhibitor like Humira or Enbrel.  [place] is [place] out of the [place] months.  When  she comes back advised that we can discuss this further    Pt will follow up in 6 mos    There is a longitudinal care relationship with me, the care plan reflects the ongoing nature of the continuous relationship of care, and the medical record indicates that there is ongoing treatment of a serious/complex medical condition which I am currently managing.  is Applicable.     Marge Angel MD  10/8/2024  5:10 PM

## 2024-10-09 ENCOUNTER — TELEPHONE (OUTPATIENT)
Dept: ENDOCRINOLOGY CLINIC | Facility: CLINIC | Age: 59
End: 2024-10-09

## 2024-10-09 NOTE — TELEPHONE ENCOUNTER
Medication PA Requested:   Dulaglutide (TRULICITY) 3 MG/0.5ML Subcutaneous Solution Pen-injector                                                        CoverMyMeds Used:  Key:  Quantity: 2ml  Day Supply: 28  Sig: Inject 3 mg into the skin once a week.   DX Code:   E11.40                                  CPT code (if applicable):   Case Number/Pending Ref#:

## 2024-10-10 ENCOUNTER — HOSPITAL ENCOUNTER (OUTPATIENT)
Facility: HOSPITAL | Age: 59
Setting detail: HOSPITAL OUTPATIENT SURGERY
Discharge: HOME | End: 2024-10-10
Attending: INTERNAL MEDICINE | Admitting: INTERNAL MEDICINE
Payer: MEDICAID

## 2024-10-10 ENCOUNTER — ANESTHESIA EVENT (OUTPATIENT)
Dept: ENDOSCOPY | Facility: HOSPITAL | Age: 59
End: 2024-10-10
Payer: MEDICAID

## 2024-10-10 ENCOUNTER — ANESTHESIA (OUTPATIENT)
Dept: ENDOSCOPY | Facility: HOSPITAL | Age: 59
End: 2024-10-10
Payer: MEDICAID

## 2024-10-10 VITALS
BODY MASS INDEX: 31.99 KG/M2 | HEART RATE: 67 BPM | OXYGEN SATURATION: 91 % | SYSTOLIC BLOOD PRESSURE: 171 MMHG | WEIGHT: 187.38 LBS | RESPIRATION RATE: 15 BRPM | HEIGHT: 64 IN | DIASTOLIC BLOOD PRESSURE: 90 MMHG

## 2024-10-10 DIAGNOSIS — Z12.11 SCREEN FOR COLON CANCER: ICD-10-CM

## 2024-10-10 DIAGNOSIS — R10.9 LEFT SIDED ABDOMINAL PAIN: ICD-10-CM

## 2024-10-10 LAB — GLUCOSE BLDC GLUCOMTR-MCNC: 153 MG/DL (ref 70–99)

## 2024-10-10 PROCEDURE — 0DBH8ZX EXCISION OF CECUM, VIA NATURAL OR ARTIFICIAL OPENING ENDOSCOPIC, DIAGNOSTIC: ICD-10-PCS | Performed by: INTERNAL MEDICINE

## 2024-10-10 PROCEDURE — 0DBN8ZX EXCISION OF SIGMOID COLON, VIA NATURAL OR ARTIFICIAL OPENING ENDOSCOPIC, DIAGNOSTIC: ICD-10-PCS | Performed by: INTERNAL MEDICINE

## 2024-10-10 PROCEDURE — 45385 COLONOSCOPY W/LESION REMOVAL: CPT | Performed by: INTERNAL MEDICINE

## 2024-10-10 RX ORDER — METOPROLOL TARTRATE 1 MG/ML
INJECTION, SOLUTION INTRAVENOUS AS NEEDED
Status: DISCONTINUED | OUTPATIENT
Start: 2024-10-10 | End: 2024-10-10 | Stop reason: SURG

## 2024-10-10 RX ORDER — SODIUM CHLORIDE, SODIUM LACTATE, POTASSIUM CHLORIDE, CALCIUM CHLORIDE 600; 310; 30; 20 MG/100ML; MG/100ML; MG/100ML; MG/100ML
INJECTION, SOLUTION INTRAVENOUS CONTINUOUS
Status: DISCONTINUED | OUTPATIENT
Start: 2024-10-10 | End: 2024-10-10

## 2024-10-10 RX ORDER — LIDOCAINE HYDROCHLORIDE 10 MG/ML
INJECTION, SOLUTION EPIDURAL; INFILTRATION; INTRACAUDAL; PERINEURAL AS NEEDED
Status: DISCONTINUED | OUTPATIENT
Start: 2024-10-10 | End: 2024-10-10 | Stop reason: SURG

## 2024-10-10 RX ORDER — ONDANSETRON 2 MG/ML
INJECTION INTRAMUSCULAR; INTRAVENOUS AS NEEDED
Status: DISCONTINUED | OUTPATIENT
Start: 2024-10-10 | End: 2024-10-10 | Stop reason: SURG

## 2024-10-10 RX ADMIN — LIDOCAINE HYDROCHLORIDE 25 MG: 10 INJECTION, SOLUTION EPIDURAL; INFILTRATION; INTRACAUDAL; PERINEURAL at 10:33:00

## 2024-10-10 RX ADMIN — ONDANSETRON 4 MG: 2 INJECTION INTRAMUSCULAR; INTRAVENOUS at 10:46:00

## 2024-10-10 RX ADMIN — METOPROLOL TARTRATE 2 MG: 1 INJECTION, SOLUTION INTRAVENOUS at 10:36:00

## 2024-10-10 NOTE — ANESTHESIA PREPROCEDURE EVALUATION
Anesthesia PreOp Note    HPI:     Randi Lobo is a 58 year old female who presents for preoperative consultation requested by: Adriana Vega MD    Date of Surgery: 10/10/2024    Procedure(s):  COLONOSCOPY  Indication: Screen for colon cancer/ Left sided abdominal pain    Relevant Problems   No relevant active problems       NPO:                         History Review:  Patient Active Problem List    Diagnosis Date Noted    Goiter 06/06/2024    Type 2 diabetes mellitus with diabetic neuropathy, with long-term current use of insulin (HCC) 11/28/2023    Dyslipidemia 08/10/2021    Type 2 diabetes mellitus without retinopathy (HCC) 01/21/2021    Presbyopia of both eyes 01/21/2021    Age-related nuclear cataract of both eyes 01/21/2021    Meibomian gland dysfunction (MGD) of both eyes 01/21/2021    Current episode of major depressive disorder without prior episode 07/21/2020    Fibromyalgia 07/21/2020    Type 2 diabetes mellitus with hyperglycemia, with long-term current use of insulin (HCC) 04/28/2020    Essential hypertension 04/28/2020    Chronic gout of multiple sites 04/28/2020    Hypothyroidism 04/28/2020       Past Medical History:    Asthma (HCC)    Depression    Diabetes (HCC)    Diabetes type 2, controlled (HCC)    Disorder of thyroid    Diverticulitis    Esophageal reflux    Essential hypertension    Gout    Hepatic steatosis    Hepatomegaly    High blood pressure    High cholesterol    Hyperlipidemia    Thyroid disease       Past Surgical History:   Procedure Laterality Date    Colonoscopy N/A 3/29/2017    Procedure: COLONOSCOPY;  Surgeon: Lenny Miranda MD;  Location: [facility] ENDOSCOPY    Colonoscopy N/A 1/8/2021    Procedure: COLONOSCOPY;  Surgeon: Arthur Roberts MD;  Location: [facility] ENDO       Prescriptions Prior to Admission[1]  Current Medications and Prescriptions Ordered in Epic[2]    Allergies[3]    Family History   Problem Relation Age of Onset    Cancer Father     Hypertension  Father     Diabetes Mother     Cancer Mother     Hypertension Mother     Glaucoma Neg     Macular degeneration Neg      Social History     Socioeconomic History    Marital status:    Tobacco Use    Smoking status: Never    Smokeless tobacco: Never   Vaping Use    Vaping status: Never Used   Substance and Sexual Activity    Alcohol use: No    Drug use: No       Available pre-op labs reviewed.  Lab Results   Component Value Date    WBC 10.9 07/16/2024    RBC 4.90 07/16/2024    HGB 11.8 (L) 07/16/2024    HCT 35.9 07/16/2024    MCV 73.3 (L) 07/16/2024    MCH 24.1 (L) 07/16/2024    MCHC 32.9 07/16/2024    RDW 16.4 (H) 07/16/2024    .0 07/16/2024     Lab Results   Component Value Date     07/16/2024    K 3.8 07/16/2024     07/16/2024    CO2 24.0 07/16/2024    BUN 26 (H) 07/16/2024    CREATSERUM 1.05 (H) 07/16/2024     (H) 07/16/2024    CA 10.2 07/16/2024          Vital Signs:  Body mass index is 32.17 kg/m².   height is 1.626 m (5' 4\") and weight is 85 kg (187 lb 6.3 oz).   Vitals:    10/07/24 1451   Weight: 85 kg (187 lb 6.3 oz)   Height: 1.626 m (5' 4\")        Anesthesia Evaluation     Patient summary reviewed    Airway   Mallampati: II  TM distance: >3 FB  Neck ROM: full  Dental      Pulmonary    (+) asthma  Cardiovascular   (+) hypertension well controlled    Neuro/Psych    (+)  neuromuscular disease,  depression      GI/Hepatic/Renal    (+) GERD, liver disease    Endo/Other    (+) diabetes mellitus  Abdominal                  Anesthesia Plan:   ASA:  3  Plan:   MAC  Informed Consent Plan and Risks Discussed With:  Patient  Consent Comment: GUERDA     ID [id number]    DEMARCO EMERY have informed Randi Lobo and/or legal guardian or family member of the nature of the anesthetic plan, benefits, risks including possible dental damage if relevant, major complications, and any alternative forms of anesthetic management.   All of the patient's questions were answered to the best of  my ability. The patient desires the anesthetic management as planned.  Eric Hall MD  10/10/2024 9:52 AM  Present on Admission:  **None**           [1]   Facility-Administered Medications Prior to Admission   Medication Dose Route Frequency Provider Last Rate Last Admin    [COMPLETED] methylPREDNISolone acetate (DEPO-medrol) 80 MG/ML injection 40 mg  40 mg Intra-articular Once Marge Angel MD   40 mg at 10/08/24 1734    [COMPLETED] methylPREDNISolone acetate (DEPO-medrol) 80 MG/ML injection 40 mg  40 mg Intra-articular Once Marge Angel MD   40 mg at 10/08/24 1730     Medications Prior to Admission   Medication Sig Dispense Refill Last Dose/Taking    zolpidem 10 MG Oral Tab Take 1 tablet (10 mg total) by mouth nightly. 30 tablet 0 Taking    insulin glargine (BASAGLAR KWIKPEN) 100 UNIT/ML Subcutaneous Solution Pen-injector Inject 40 Units into the skin every morning. ADMINISTER 40 UNITS UNDER THE SKIN TWICE DAILY EVERY DAY 36 mL 2 Taking    metFORMIN 500 MG Oral Tab TAKE 2 TABLETS BY MOUTH TWICE DAILY 360 tablet 1 Taking    Dulaglutide (TRULICITY) 3 MG/0.5ML Subcutaneous Solution Pen-injector Inject 3 mg into the skin once a week. 6 mL 0 9/28/2024    glipiZIDE 10 MG Oral Tab Take 1 tablet (10 mg total) by mouth 2 (two) times daily before meals. 180 tablet 0 Taking    Omeprazole 40 MG Oral Capsule Delayed Release Take 1 capsule (40 mg total) by mouth daily. 90 capsule 3 Taking    gabapentin 400 MG Oral Cap Take 1 capsule (400 mg total) by mouth 2 (two) times daily. 180 capsule 1 Taking    hydroCHLOROthiazide 25 MG Oral Tab TAKE 1 TABLET BY MOUTH TWICE DAILY 180 tablet 3 Taking    carvedilol 12.5 MG Oral Tab Take 1 tablet (12.5 mg total) by mouth daily. 90 tablet 3 Taking    allopurinol 100 MG Oral Tab Take 1 tablet (100 mg total) by mouth nightly. 90 tablet 3 Taking    levothyroxine 125 MCG Oral Tab Take 1 tablet (125 mcg total) by mouth every morning. 90 tablet 3 Taking    cholecalciferol 50 MCG (2000 UT) Oral  Tab Take 1 tablet (2,000 Units total) by mouth every morning. 90 tablet 3 Taking    losartan 100 MG Oral Tab Take 1 tablet (100 mg total) by mouth daily. 90 tablet 3 Taking    simvastatin 40 MG Oral Tab Take 1 tablet (40 mg total) by mouth nightly. 90 tablet 3 Taking    sertraline 100 MG Oral Tab Take 1 tablet (100 mg total) by mouth daily. 90 tablet 3 Taking    traZODone 100 MG Oral Tab Take 1 tablet (100 mg total) by mouth nightly. 90 tablet 3 Taking    aspirin 81 MG Oral Tab EC Take 1 tablet (81 mg total) by mouth daily. Pt states 2x a day. 90 tablet 3 Taking    polyethylene glycol, PEG 3350-KCl-NaBcb-NaCl-NaSulf, 236 g Oral Recon Soln Take 4,000 mL by mouth As Directed. Take 2,000 mL the night before your procedure and 2,000 mL the morning of your procedure. 1 each 0     polyethylene glycol, PEG 3350-KCl-NaBcb-NaCl-NaSulf, 236 g Oral Recon Soln Take 4,000 mL by mouth As Directed. Take 2,000 mL the night before your procedure and 2,000 mL the morning of your procedure. (Patient not taking: Reported on 10/8/2024) 1 each 0     Potassium Chloride ER 10 MEQ Oral Cap CR Take 1 capsule (10 mEq total) by mouth 2 (two) times daily. (Patient not taking: Reported on 10/8/2024) 180 capsule 3     ketoconazole 2 % External Shampoo Use to  Scalp twice a wekk 120 mL 5     diclofenac 1 % External Gel Apply 2 g topically 4 (four) times daily. 1 each 2     Continuous Glucose Transmitter (DEXCOM G6 TRANSMITTER) Does not apply Misc Change sensor every 90 days (Patient not taking: Reported on 10/8/2024) 1 each 0     Continuous Glucose  (DEXCOM G6 ) Does not apply Device Use to monitor blood sugar level (Patient not taking: Reported on 10/8/2024) 1 each 0     Continuous Glucose Sensor (DEXCOM G6 SENSOR) Does not apply Misc Change sensor every 10 days 9 each 0     Glucose Blood (ONETOUCH VERIO) In Vitro Strip USE TO CHECK BLOOD SUGAR TWICE DAILY, FASTING AND 2 HOURS AFTER A MEAL 200 strip 1     [] Dulaglutide  (TRULICITY) 3 MG/0.5ML Subcutaneous Solution Pen-injector Inject 3 mg into the skin once a week. 6 mL 0     tiZANidine 4 MG Oral Tab Take 1 tablet (4 mg total) by mouth every 8 (eight) hours as needed. 90 tablet 1     Insulin Pen Needle (TRUEPLUS 5-BEVEL PEN NEEDLES) 32G X 4 MM Does not apply Misc Use twice a day (Patient not taking: Reported on 10/8/2024) 200 each 3     pantoprazole 40 MG Oral Tab EC Take 1 tablet (40 mg total) by mouth every morning before breakfast. (Patient not taking: Reported on 10/8/2024) 90 tablet 1     hydrocortisone (PROCTOZONE-HC) 2.5 % External Cream Apply to hemorrhoids for 7 to 10 days 28 g 5     naproxen 500 MG Oral Tab EC Take 1 tablet p.o. at bedtime as needed for severe pain 30 tablet 1     Blood Glucose Monitoring Suppl (ONETOUCH VERIO) w/Device Does not apply Kit 1 kit As Directed. 1 kit 0     Insulin Pen Needle (TRUEPLUS PEN NEEDLES) 32G X 4 MM Does not apply Misc Use needles to inject insulin daily (Patient not taking: Reported on 10/8/2024) 100 each 3     Ferrous Sulfate (IRON) 325 (65 Fe) MG Oral Tab Take 1 Dose by mouth daily. 30 tablet 0     Lancets (ONETOUCH DELICA PLUS [id number]) Does not apply Misc 1 strip by In Vitro route 2 (two) times daily. FASTING AND 2 HOURS AFTER A MEAL 200 each 3     HYDROcodone-acetaminophen 7.5-325 MG Oral Tab Take 1-2 tablets by mouth every 4 (four) hours as needed for Pain. (Patient not taking: Reported on 10/8/2024) 15 tablet 0    [2]   No current Epic-ordered facility-administered medications on file.     No current [facility]-ordered outpatient medications on file.   [3] No Known Allergies

## 2024-10-10 NOTE — OPERATIVE REPORT
COLONOSCOPY REPORT    Randi Lobo     1965 Age 58 year old   PCP Nikki Alcantara MD Endoscopist Adriana Vega MD       Date of procedure: 10/10/24    Procedure: Colonoscopy w/ cold snare polypectomy    Pre-operative diagnosis: CRC screening, L-sided abd pain     Post-operative diagnosis: colon polyps, diverticulosis, hemorrhoids     Medications: MAC    Withdrawal time: 21 minutes    Procedure:  Informed consent was obtained from the patient after the risks of the procedure were discussed, including but not limited to bleeding, perforation, aspiration, infection, or possibility of a missed lesion. After discussions of the risks/benefits and alternatives to this procedure, as well as the planned sedation, the patient was placed in the left lateral decubitus position and begun on continuous blood pressure pulse oximetry and EKG monitoring and this was maintained throughout the procedure. Once an adequate level of sedation was obtained a digital rectal exam was completed. Then the lubricated tip of the [id number] diagnostic video colonoscope was inserted and advanced without difficulty to the cecum using water immersion and CO2 insufflation technique. The cecum was identified by localizing the trifold, the appendix and the ileocecal valve. Withdrawal was begun with thorough washing and careful examination of the colonic walls and folds. A routine second examination of the cecum/ascending colon was performed. Photodocumentation was obtained. The bowel prep was good. Views of the colon were good with washing. I then carefully withdrew the instrument from the patient who tolerated the procedure well.     Complications: none.    Findings:   1. Two polyps noted as follows:      A. 5 mm polyp in the cecum of the colon; flat morphology; cold snare polypectomy performed, polyp retrieved.      B. 4 mm polyp in the sigmoid colon; sessile morphology; cold snare polypectomy performed, polyp retrieved.    2.  Diverticulosis: mild on the right, moderate on the left.    3. Ileocecal valve appeared healthy and normal. The examined portion of the terminal ileum appeared normal.     4. The colonic mucosa throughout the colon showed normal vascular pattern, without evidence of angioectasias or inflammation.     5. A retroflexed view of the rectum revealed small internal hemorrhoids.    6. YUMIKO: normal rectal tone, no masses palpated.     Impression:   Two sub-centimeter polyps removed.   Pan-diverticulosis.   Small internal hemorrhoids.   The colon was otherwise normal with glistening mucosa and intact vascular pattern.    Recommend:  Await pathology. The interval for the next colonoscopy will be determined after reviewing pathology. If new signs or symptoms develop, colonoscopy may need to be repeated sooner.   High fiber diet.  Monitor for blood in the stool. If having more than just tinge of blood, call office or go to the ER.  Avoid NSAIDs (motrin, ibuprofen, aleve, advil, naproxen, midol, naprosyn, excedrin) for 14 days.   Continue to use nifedipine ointment for management of anal fissure.   Continue to take metamucil 1 scoop in the morning and miralax 1 capful at night.  Drink at least 2 liters of water a day.      >>>If tissue was obtained and you have not received your pathology results either by phone or letter within 2 weeks, please call our office at [phone].    Specimens: colon polyps     Blood loss: <1 ml

## 2024-10-10 NOTE — ANESTHESIA POSTPROCEDURE EVALUATION
Patient: Randi Lobo    Procedure Summary       Date: 10/10/24 Room / Location: [facility] ENDOSCOPY 05 / [facility] ENDOSCOPY    Anesthesia Start: 1027 Anesthesia Stop:     Procedure: COLONOSCOPY with polypectomy Diagnosis:       Screen for colon cancer      Left sided abdominal pain      (Polyps, diverticulosis, hemorrhoids)    Surgeons: Adriana Vega MD Anesthesiologist: Eric Hall MD    Anesthesia Type: MAC ASA Status: 3            Anesthesia Type: No value filed.    Vitals Value Taken Time   /77 10/10/24 1106   Temp 36.8 10/10/24 1109   Pulse 68 10/10/24 1108   Resp 13 10/10/24 1108   SpO2 94 % 10/10/24 1108   Vitals shown include unfiled device data.    [facility] AN Post Evaluation:   Patient Evaluated in PACU  Level of Consciousness: awake  Pain Score: 0  Pain Management: adequate  Airway Patency:patent  Yes    Nausea/Vomiting: none  Cardiovascular Status: acceptable  Respiratory Status: acceptable  Postoperative Hydration acceptable      Eric Hall MD  10/10/2024 11:09 AM

## 2024-10-10 NOTE — H&P
History & Physical Examination    Patient Name: Randi Lobo  MRN: [id number]  CSN: [id number]  YOB: 1965    Diagnosis: screening for colon cancer, L-sided abd pain     Prescriptions Prior to Admission[1]  No current facility-administered medications for this encounter.       Allergies: Allergies[2]    Past Medical History:    Asthma (HCC)    Depression    Diabetes (HCC)    Diabetes type 2, controlled (HCC)    Disorder of thyroid    Diverticulitis    Esophageal reflux    Essential hypertension    Gout    Hepatic steatosis    Hepatomegaly    High blood pressure    High cholesterol    Hyperlipidemia    Thyroid disease     Past Surgical History:   Procedure Laterality Date    Colonoscopy N/A 3/29/2017    Procedure: COLONOSCOPY;  Surgeon: Lenny Miranda MD;  Location: [facility] ENDOSCOPY    Colonoscopy N/A 1/8/2021    Procedure: COLONOSCOPY;  Surgeon: Arthur Roberts MD;  Location: [facility] ENDO     Family History   Problem Relation Age of Onset    Cancer Father     Hypertension Father     Diabetes Mother     Cancer Mother     Hypertension Mother     Glaucoma Neg     Macular degeneration Neg      Social History     Tobacco Use    Smoking status: Never    Smokeless tobacco: Never   Substance Use Topics    Alcohol use: No       SYSTEM Check if Review is Normal Check if Physical Exam is Normal If not normal, please explain:   HEENT [X ] [ X]    NECK  [X ] [ X]    HEART [X ] [ X]    LUNGS [X ] [ X]    ABDOMEN [X ] [ X]    EXTREMITIES [X ] [ X]    OTHER        I have discussed the risks and benefits and alternatives of the procedure with the patient/family.  They understand and agree to proceed with plan of care.   I have reviewed the History and Physical done within the last 30 days.  Any changes noted above.    Adriana Vega MD  [facility]                   [1]   Facility-Administered Medications Prior to Admission   Medication Dose Route Frequency Provider Last Rate Last Admin     [COMPLETED] methylPREDNISolone acetate (DEPO-medrol) 80 MG/ML injection 40 mg  40 mg Intra-articular Once Marge Angel MD   40 mg at 10/08/24 1734    [COMPLETED] methylPREDNISolone acetate (DEPO-medrol) 80 MG/ML injection 40 mg  40 mg Intra-articular Once Marge Angel MD   40 mg at 10/08/24 1730     Medications Prior to Admission   Medication Sig Dispense Refill Last Dose/Taking    zolpidem 10 MG Oral Tab Take 1 tablet (10 mg total) by mouth nightly. 30 tablet 0 Taking    insulin glargine (BASAGLAR KWIKPEN) 100 UNIT/ML Subcutaneous Solution Pen-injector Inject 40 Units into the skin every morning. ADMINISTER 40 UNITS UNDER THE SKIN TWICE DAILY EVERY DAY 36 mL 2 Taking    metFORMIN 500 MG Oral Tab TAKE 2 TABLETS BY MOUTH TWICE DAILY 360 tablet 1 Taking    Dulaglutide (TRULICITY) 3 MG/0.5ML Subcutaneous Solution Pen-injector Inject 3 mg into the skin once a week. 6 mL 0 9/28/2024    glipiZIDE 10 MG Oral Tab Take 1 tablet (10 mg total) by mouth 2 (two) times daily before meals. 180 tablet 0 Taking    Omeprazole 40 MG Oral Capsule Delayed Release Take 1 capsule (40 mg total) by mouth daily. 90 capsule 3 Taking    gabapentin 400 MG Oral Cap Take 1 capsule (400 mg total) by mouth 2 (two) times daily. 180 capsule 1 Taking    hydroCHLOROthiazide 25 MG Oral Tab TAKE 1 TABLET BY MOUTH TWICE DAILY 180 tablet 3 Taking    carvedilol 12.5 MG Oral Tab Take 1 tablet (12.5 mg total) by mouth daily. 90 tablet 3 Taking    allopurinol 100 MG Oral Tab Take 1 tablet (100 mg total) by mouth nightly. 90 tablet 3 Taking    levothyroxine 125 MCG Oral Tab Take 1 tablet (125 mcg total) by mouth every morning. 90 tablet 3 Taking    cholecalciferol 50 MCG (2000 UT) Oral Tab Take 1 tablet (2,000 Units total) by mouth every morning. 90 tablet 3 Taking    losartan 100 MG Oral Tab Take 1 tablet (100 mg total) by mouth daily. 90 tablet 3 Taking    simvastatin 40 MG Oral Tab Take 1 tablet (40 mg total) by mouth nightly. 90 tablet 3 Taking     sertraline 100 MG Oral Tab Take 1 tablet (100 mg total) by mouth daily. 90 tablet 3 Taking    traZODone 100 MG Oral Tab Take 1 tablet (100 mg total) by mouth nightly. 90 tablet 3 Taking    aspirin 81 MG Oral Tab EC Take 1 tablet (81 mg total) by mouth daily. Pt states 2x a day. 90 tablet 3 Taking    polyethylene glycol, PEG 3350-KCl-NaBcb-NaCl-NaSulf, 236 g Oral Recon Soln Take 4,000 mL by mouth As Directed. Take 2,000 mL the night before your procedure and 2,000 mL the morning of your procedure. 1 each 0     polyethylene glycol, PEG 3350-KCl-NaBcb-NaCl-NaSulf, 236 g Oral Recon Soln Take 4,000 mL by mouth As Directed. Take 2,000 mL the night before your procedure and 2,000 mL the morning of your procedure. (Patient not taking: Reported on 10/8/2024) 1 each 0     Potassium Chloride ER 10 MEQ Oral Cap CR Take 1 capsule (10 mEq total) by mouth 2 (two) times daily. (Patient not taking: Reported on 10/8/2024) 180 capsule 3     ketoconazole 2 % External Shampoo Use to  Scalp twice a wekk 120 mL 5     diclofenac 1 % External Gel Apply 2 g topically 4 (four) times daily. 1 each 2     Continuous Glucose Transmitter (DEXCOM G6 TRANSMITTER) Does not apply Misc Change sensor every 90 days (Patient not taking: Reported on 10/8/2024) 1 each 0     Continuous Glucose  (DEXCOM G6 ) Does not apply Device Use to monitor blood sugar level (Patient not taking: Reported on 10/8/2024) 1 each 0     Continuous Glucose Sensor (DEXCOM G6 SENSOR) Does not apply Misc Change sensor every 10 days 9 each 0     Glucose Blood (ONETOUCH VERIO) In Vitro Strip USE TO CHECK BLOOD SUGAR TWICE DAILY, FASTING AND 2 HOURS AFTER A MEAL 200 strip 1     [] Dulaglutide (TRULICITY) 3 MG/0.5ML Subcutaneous Solution Pen-injector Inject 3 mg into the skin once a week. 6 mL 0     tiZANidine 4 MG Oral Tab Take 1 tablet (4 mg total) by mouth every 8 (eight) hours as needed. 90 tablet 1     Insulin Pen Needle (TRUEPLUS 5-BEVEL PEN NEEDLES) 32G X  4 MM Does not apply Misc Use twice a day (Patient not taking: Reported on 10/8/2024) 200 each 3     pantoprazole 40 MG Oral Tab EC Take 1 tablet (40 mg total) by mouth every morning before breakfast. (Patient not taking: Reported on 10/8/2024) 90 tablet 1     hydrocortisone (PROCTOZONE-HC) 2.5 % External Cream Apply to hemorrhoids for 7 to 10 days 28 g 5     naproxen 500 MG Oral Tab EC Take 1 tablet p.o. at bedtime as needed for severe pain 30 tablet 1     Blood Glucose Monitoring Suppl (ONETOUCH VERIO) w/Device Does not apply Kit 1 kit As Directed. 1 kit 0     Insulin Pen Needle (TRUEPLUS PEN NEEDLES) 32G X 4 MM Does not apply Misc Use needles to inject insulin daily (Patient not taking: Reported on 10/8/2024) 100 each 3     Ferrous Sulfate (IRON) 325 (65 Fe) MG Oral Tab Take 1 Dose by mouth daily. 30 tablet 0     Lancets (ONETOUCH DELICA PLUS [id number]) Does not apply Misc 1 strip by In Vitro route 2 (two) times daily. FASTING AND 2 HOURS AFTER A MEAL 200 each 3     HYDROcodone-acetaminophen 7.5-325 MG Oral Tab Take 1-2 tablets by mouth every 4 (four) hours as needed for Pain. (Patient not taking: Reported on 10/8/2024) 15 tablet 0    [2] No Known Allergies

## 2024-10-10 NOTE — DISCHARGE INSTRUCTIONS
Home Care Instructions for Colonoscopy and/or Gastroscopy with Sedation    Diet:  - Resume your regular diet as tolerated unless otherwise instructed.  - Start with light meals to minimize bloating.  - Do not drink alcohol today.    Medication:  - If you have questions about resuming your normal medications, please contact your Primary Care Physician.    Activities:  - Take it easy today. Do not return to work today.  - Do not drive today.  - Do not operate any machinery today (including kitchen equipment).    Colonoscopy:  - You may notice some rectal \"spotting\" (a little blood on the toilet tissue) for a day or two after the exam. This is normal.  - If you experience any rectal bleeding (not spotting), persistent tenderness or sharp severe abdominal pains, oral temperature over 100 degrees Fahrenheit, light-headedness or dizziness, or any other problems, contact your doctor.      **If unable to reach your doctor, please go to the [facility] Emergency Room**    - Your referring physician will receive a full report of your examination.  - If you do not hear from your doctor's office within two weeks of your biopsy, please call them for your results.    You may be able to see your laboratory results in [employer] between 4 and 7 business days.  In some cases, your physician may not have viewed the results before they are released to [employer].  If you have questions regarding your results contact the physician who ordered the test/exam by phone or via [employer] by choosing \"Ask a Medical Question.\"

## 2024-10-14 NOTE — TELEPHONE ENCOUNTER
[facility] Please call       Case #: [id number]

## 2024-10-14 NOTE — TELEPHONE ENCOUNTER
Medication PA Requested: Dulaglutide (TRULICITY) 3 MG/0.5ML Subcutaneous Solution Pen-injector                                                        CoverMyMeds Used: No  Key:  Quantity: 2ml  Day Supply: 28  Sig: Inject 3 mg into the skin once a week.   DX Code:   E11.40            Electronic prior authorization submitted, last office visit 6/5 and A1C 3/4  Awaiting determination

## 2024-10-15 NOTE — PROGRESS NOTES
Called pt to discuss results with [place] .     GI Staff:  Can you please place recall for this patient to have a colonoscopy in 5 years.  Thank you,  Adriana

## 2024-10-15 NOTE — TELEPHONE ENCOUNTER
Spoke to Sandrita at [facility] is approved for 12 months (4 pens for 28 days) - fax sent to OB   sent updating patient

## 2024-10-16 NOTE — TELEPHONE ENCOUNTER
Received fax from [facility] dated on 10/14/24 stating the trulicity 3mg/0.5ml solution auto-inj has been approved up to 4 pens per 28 days. Approval effective 7/16/24 ends 10/14/25.    CASE#[id number]

## 2024-10-17 ENCOUNTER — TELEPHONE (OUTPATIENT)
Facility: CLINIC | Age: 59
End: 2024-10-17

## 2024-10-17 NOTE — TELEPHONE ENCOUNTER
Health Maintenance Updated.    5 Year colonoscopy recall entered into patient outreach in [facility].  Next colonoscopy will be due 10/10/2029.

## 2024-10-17 NOTE — TELEPHONE ENCOUNTER
----- Message from Adriana Vega sent at 10/15/2024  4:09 PM CDT -----  Called pt to discuss results with Meghann .     GI Staff:  Can you please place recall for this patient to have a colonoscopy in 5 years.  Thank you,  Adriana

## 2024-10-24 ENCOUNTER — TELEPHONE (OUTPATIENT)
Dept: INTERNAL MEDICINE CLINIC | Facility: CLINIC | Age: 59
End: 2024-10-24

## 2024-10-24 DIAGNOSIS — E11.40 TYPE 2 DIABETES MELLITUS WITH DIABETIC NEUROPATHY, WITH LONG-TERM CURRENT USE OF INSULIN (HCC): ICD-10-CM

## 2024-10-24 DIAGNOSIS — M1A.09X0 IDIOPATHIC CHRONIC GOUT OF MULTIPLE SITES WITHOUT TOPHUS: ICD-10-CM

## 2024-10-24 DIAGNOSIS — F32.1 CURRENT MODERATE EPISODE OF MAJOR DEPRESSIVE DISORDER WITHOUT PRIOR EPISODE (HCC): ICD-10-CM

## 2024-10-24 DIAGNOSIS — Z79.4 TYPE 2 DIABETES MELLITUS WITH DIABETIC NEUROPATHY, WITH LONG-TERM CURRENT USE OF INSULIN (HCC): ICD-10-CM

## 2024-10-24 DIAGNOSIS — I10 ESSENTIAL HYPERTENSION: ICD-10-CM

## 2024-10-24 DIAGNOSIS — G47.09 OTHER INSOMNIA: ICD-10-CM

## 2024-10-24 RX ORDER — POTASSIUM CHLORIDE 750 MG/1
10 CAPSULE, EXTENDED RELEASE ORAL 2 TIMES DAILY
Qty: 180 CAPSULE | Refills: 3 | Status: CANCELLED | OUTPATIENT
Start: 2024-10-24

## 2024-10-24 RX ORDER — ALLOPURINOL 100 MG/1
100 TABLET ORAL NIGHTLY
Qty: 90 TABLET | Refills: 3 | Status: CANCELLED | OUTPATIENT
Start: 2024-10-24

## 2024-10-24 RX ORDER — PANTOPRAZOLE SODIUM 40 MG/1
40 TABLET, DELAYED RELEASE ORAL
Qty: 90 TABLET | Refills: 1 | Status: CANCELLED | OUTPATIENT
Start: 2024-10-24

## 2024-10-24 RX ORDER — OMEPRAZOLE 40 MG/1
40 CAPSULE, DELAYED RELEASE ORAL DAILY
Qty: 90 CAPSULE | Refills: 3 | Status: CANCELLED | OUTPATIENT
Start: 2024-10-24

## 2024-10-24 RX ORDER — HYDROCHLOROTHIAZIDE 25 MG/1
TABLET ORAL
Qty: 180 TABLET | Refills: 3 | Status: CANCELLED | OUTPATIENT
Start: 2024-10-24

## 2024-10-24 RX ORDER — GABAPENTIN 400 MG/1
400 CAPSULE ORAL 2 TIMES DAILY
Qty: 180 CAPSULE | Refills: 1 | Status: CANCELLED | OUTPATIENT
Start: 2024-10-24

## 2024-10-24 RX ORDER — LEVOTHYROXINE SODIUM 125 UG/1
125 TABLET ORAL EVERY MORNING
Qty: 90 TABLET | Refills: 3 | Status: CANCELLED | OUTPATIENT
Start: 2024-10-24

## 2024-10-24 RX ORDER — CARVEDILOL 12.5 MG/1
12.5 TABLET ORAL DAILY
Qty: 90 TABLET | Refills: 3 | Status: CANCELLED | OUTPATIENT
Start: 2024-10-24

## 2024-10-24 NOTE — TELEPHONE ENCOUNTER
Endocrine refill protocol for basal insulins     Protocol Criteria: PASSED Reason: N/A    If all below requirements are met, send a 90-day supply with 1 refill per provider protocol.       Verify Appointment with Endocrinology completed in the last 6 months or scheduled in the next 3 months.  Verify A1C has been completed within the last 6 months and is below 8.5%     Last completed office visit:6/5/2024 Veena Emery MD   Next scheduled Follow up: no future appt     Last A1c result: Last A1c value was 7.5% done 7/16/2024.

## 2024-10-24 NOTE — TELEPHONE ENCOUNTER
Requested Prescriptions     Pending Prescriptions Disp Refills    diclofenac 1 % External Gel 1 each 2     Sig: Apply 2 g topically 4 (four) times daily.     LOV: 10/8/24  Future Appointments   Date Time Provider Department [place]   12/4/2024 10:00 AM Marge Angel MD [facility]   12/16/2024  3:00 PM [facility]   2/11/2025  5:20 PM [facility]     Labs:   Component      Latest Ref Rng 7/16/2024   WBC      4.0 - 11.0 x10(3) uL 10.9    RBC      3.80 - 5.30 x10(6)uL 4.90    Hemoglobin      12.0 - 16.0 g/dL 11.8 (L)    Hematocrit      35.0 - 48.0 % 35.9    MCV      80.0 - 100.0 fL 73.3 (L)    MCH      26.0 - 34.0 pg 24.1 (L)    MCHC      31.0 - 37.0 g/dL 32.9    RDW-SD      35.1 - 46.3 fL 42.9    RDW      11.0 - 15.0 % 16.4 (H)    Platelet Count      150.0 - 450.0 10(3)uL 351.0    Prelim Neutrophil Abs      1.50 - 7.70 x10 (3) uL 5.72    Neutrophils Absolute      1.50 - 7.70 x10(3) uL 5.72    Lymphocytes Absolute      1.00 - 4.00 x10(3) uL 4.01 (H)    Monocytes Absolute      0.10 - 1.00 x10(3) uL 0.69    Eosinophils Absolute      0.00 - 0.70 x10(3) uL 0.34    Basophils Absolute      0.00 - 0.20 x10(3) uL 0.08    Immature Granulocyte Absolute      0.00 - 1.00 x10(3) uL 0.04    Neutrophils %      % 52.6    Lymphocytes %      % 36.9    Monocytes %      % 6.3    Eosinophils %      % 3.1    Basophils %      % 0.7    Immature Granulocyte %      % 0.4    Glucose      70 - 99 mg/dL 184 (H)    Sodium      136 - 145 mmol/L 137    Potassium      3.5 - 5.1 mmol/L 3.8    Chloride      98 - 112 mmol/L 102    Carbon Dioxide, Total      21.0 - 32.0 mmol/L 24.0    ANION GAP      0 - 18 mmol/L 11    BUN      9 - 23 mg/dL 26 (H)    CREATININE      0.55 - 1.02 mg/dL 1.05 (H)    BUN/CREATININE RATIO      10.0 - 20.0  24.8 (H)    CALCIUM      8.7 - 10.4 mg/dL 10.2    CALCULATED OSMOLALITY      275 - 295 mOsm/kg 294    EGFR      >=60 mL/min/1.73m2 62    ALT (SGPT)      10  - 49 U/L 27    AST (SGOT)      <34 U/L 23    ALKALINE PHOSPHATASE      46 - 118 U/L 87    Total Bilirubin      0.3 - 1.2 mg/dL 0.4    PROTEIN, TOTAL      5.7 - 8.2 g/dL 7.4    Albumin      3.2 - 4.8 g/dL 4.7    Globulin      2.0 - 3.5 g/dL 2.7    A/G Ratio      1.0 - 2.0  1.7    Patient Fasting for CMP? No       Legend:  (L) Low  (H) High      ASSESSMENT/PLAN:      Bilateral Carpal tunnel  - EMG in the past did show evidence of carpal tunnel, right worse than left  - Both carpal tunnel region was injected with 0.5 cc of lidocaine and 40 mg of Depo-Medrol in sterile fashion.  Patient tolerated procedure well  - Advised patient if she continues to have symptoms she can see hand surgeon     Possible Seropositive rheumatoid arthritis (+ CCP, MRI of the left shoulder showing inflammatory synovitis)  - It was always questionable if she had inflammatory arthritis as she had a low titer CCP and no evidence of synovitis on exam.    - s/p cortisone injections in L shoulder in the past   - no improvement on prednisone   - Continues to have pain in her hands, wrists and ankles  - She was on Plaquenil in the past but did not help  - Was also on Leflunomide but caused s/e  - MRI Right hand normal 7/2023  - She does not want to go on medication right now.  Advised we can try TNF inhibitor like Humira or Enbrel.  [place] out of [place].  When she comes back advised that we can discuss this further     Pt will follow up in 6 mos     There is a longitudinal care relationship with me, the care plan reflects the ongoing nature of the continuous relationship of care, and the medical record indicates that there is ongoing treatment of a serious/complex medical condition which I am currently managing.  is Applicable.      Marge Angel MD  10/8/2024  5:10 PM

## 2024-10-25 RX ORDER — KETOCONAZOLE 20 MG/ML
SHAMPOO TOPICAL
Qty: 120 ML | Refills: 5 | OUTPATIENT
Start: 2024-10-25

## 2024-10-25 RX ORDER — LOSARTAN POTASSIUM 100 MG/1
100 TABLET ORAL DAILY
Qty: 90 TABLET | Refills: 3 | Status: SHIPPED | OUTPATIENT
Start: 2024-10-25

## 2024-10-25 RX ORDER — ZOLPIDEM TARTRATE 10 MG/1
10 TABLET ORAL NIGHTLY
Qty: 30 TABLET | Refills: 0 | Status: SHIPPED | OUTPATIENT
Start: 2024-10-25

## 2024-10-25 RX ORDER — TRAZODONE HYDROCHLORIDE 100 MG/1
100 TABLET ORAL NIGHTLY
Qty: 90 TABLET | Refills: 0 | Status: SHIPPED | OUTPATIENT
Start: 2024-10-25

## 2024-10-25 RX ORDER — HYDROCORTISONE 25 MG/G
CREAM TOPICAL
Qty: 28 G | Refills: 5 | Status: SHIPPED | OUTPATIENT
Start: 2024-10-25

## 2024-10-25 RX ORDER — SERTRALINE HYDROCHLORIDE 100 MG/1
100 TABLET, FILM COATED ORAL DAILY
Qty: 90 TABLET | Refills: 0 | Status: SHIPPED | OUTPATIENT
Start: 2024-10-25

## 2024-10-25 RX ORDER — SIMVASTATIN 40 MG
40 TABLET ORAL NIGHTLY
Qty: 90 TABLET | Refills: 3 | Status: SHIPPED | OUTPATIENT
Start: 2024-10-25

## 2024-10-25 NOTE — TELEPHONE ENCOUNTER
Please review.  Protocol failed / Has no protocol.    [employer] message sent to patient to schedule an office visit with Primary Care Provider.   Will also route to Call Center to make a phone attempt.    Zolpidem Recent fill # 30 : 9/12/24  Last prescription written: 9/12/24  Last office visit: 3/4/24     Requested Prescriptions   Pending Prescriptions Disp Refills    simvastatin 40 MG Oral Tab 90 tablet 3     Sig: Take 1 tablet (40 mg total) by mouth nightly.       Cholesterol Medication Protocol Passed - 10/25/2024  4:52 PM        Passed - ALT < 80     Lab Results   Component Value Date    ALT 27 07/16/2024             Passed - ALT resulted within past year        Passed - Lipid panel within past 12 months     Lab Results   Component Value Date    CHOLEST 162 11/28/2023    TRIG 147 11/28/2023    HDL 42 11/28/2023    LDL 94 11/28/2023    VLDL 24 11/28/2023    NONHDLC 120 11/28/2023             Passed - In person appointment or virtual visit in the past 12 mos or appointment in next 3 mos     Recent Outpatient Visits              2 weeks ago Bilateral carpal tunnel syndrome    [facility] Marge Angel MD    Office Visit    3 weeks ago H. pylori infection    [facility] Adriana Vega MD    Office Visit    3 months ago Type 2 diabetes mellitus without retinopathy ([facility])    [facility] Bk Helm MD    Office Visit    4 months ago Type 2 diabetes mellitus with diabetic neuropathy, with long-term current use of insulin ([facility])    [facility] Veena Emery MD    Office Visit    7 months ago Flank pain    [facility] [place] Kandel, Ninel, MD    Office Visit          Future Appointments         Provider Department Appt Notes    In 1 month Marge Angel MD [facility]  [place] For hand injections  LOV 6/2023   Policy advised    In 1 month Adriana Vega MD [facility] f/u    In 3 months Marge Angel MD [facility]                       sertraline 100 MG Oral Tab 90 tablet 3     Sig: Take 1 tablet (100 mg total) by mouth daily.       Psychiatric Non-Scheduled (Anti-Anxiety) Failed - 10/25/2024  4:52 PM        Failed - In person appointment or virtual visit in the past 6 mos or appointment in next 3 mos     Recent Outpatient Visits              2 weeks ago Bilateral carpal tunnel syndrome    [facility] Marge Angel MD    Office Visit    3 weeks ago H. pylori infection    [facility] Adriana Vega MD    Office Visit    3 months ago Type 2 diabetes mellitus without retinopathy ([facility])    [facility] Bk Helm MD    Office Visit    4 months ago Type 2 diabetes mellitus with diabetic neuropathy, with long-term current use of insulin ([facility])    [facility] Veena Emery MD    Office Visit    7 months ago Flank pain    [facility] [place] Kandel, Ninel, MD    Office Visit          Future Appointments         Provider Department Appt Notes    In 1 month Marge Angel MD [facility] For hand injections  LOV 6/2023   Policy advised    In 1 month [facility]    In 3 months Marge Angel MD [facility]                     Passed - Depression Screening completed within the past 12 months          traZODone 100 MG Oral Tab 90 tablet 3     Sig: Take 1 tablet (100 mg total) by mouth nightly.       There is no refill  protocol information for this order       hydrocortisone (PROCTOZONE-HC) 2.5 % External Cream 28 g 5     Sig: Apply to hemorrhoids for 7 to 10 days       Gastrointestional Medication Protocol Passed - 10/25/2024  4:52 PM        Passed - In person appointment or virtual visit in the past 12 mos or appointment in next 3 mos     Recent Outpatient Visits              2 weeks ago Bilateral carpal tunnel syndrome    [facility] Marge Angel MD    Office Visit    3 weeks ago H. pylori infection    [facility] Adriana Vega MD    Office Visit    3 months ago Type 2 diabetes mellitus without retinopathy ([facility])    [facility] Bk Helm MD    Office Visit    4 months ago Type 2 diabetes mellitus with diabetic neuropathy, with long-term current use of insulin ([facility])    [facility] Veena Emery MD    Office Visit    7 months ago Flank pain    [facility] [place] Kandel, Ninel, MD    Office Visit          Future Appointments         Provider Department Appt Notes    In 1 month Marge Angel MD [facility] For hand injections  LOV 6/2023   Policy advised    In 1 month Adriana Vega MD [facility] f/u    In 3 months Marge Angel MD [facility]                       losartan 100 MG Oral Tab 90 tablet 3     Sig: Take 1 tablet (100 mg total) by mouth daily.       Hypertension Medications Protocol Failed - 10/25/2024  4:52 PM        Failed - Last BP reading less than 140/90     BP Readings from Last 1 Encounters:   10/10/24 (!) 171/90               Passed - CMP or BMP in past 12 months        Passed - In person appointment or virtual visit in the past 12 mos or  appointment in next 3 mos     Recent Outpatient Visits              2 weeks ago Bilateral carpal tunnel syndrome    [facility] Marge Angel MD    Office Visit    3 weeks ago H. pylori infection    [facility] Adriana eVga MD    Office Visit    3 months ago Type 2 diabetes mellitus without retinopathy ([facility])    [facility] Bk Helm MD    Office Visit    4 months ago Type 2 diabetes mellitus with diabetic neuropathy, with long-term current use of insulin ([facility])    [facility] Veena Emery MD    Office Visit    7 months ago Flank pain    [facility] [place] Kandel, Ninel, MD    Office Visit          Future Appointments         Provider Department Appt Notes    In 1 month Marge Angel MD [facility] For hand injections  LOV 6/2023   Policy advised    In 1 month Adriana Vega MD [facility] f/u    In 3 months Marge Angel MD [facility]                     Passed - EGFRCR or GFRNAA > 50     GFR Evaluation  EGFRCR: 62 , resulted on 7/16/2024            tiZANidine 4 MG Oral Tab 90 tablet 1     Sig: Take 1 tablet (4 mg total) by mouth every 8 (eight) hours as needed.       There is no refill protocol information for this order       zolpidem 10 MG Oral Tab 30 tablet 0     Sig: Take 1 tablet (10 mg total) by mouth nightly.       Controlled Substance Medication Failed - 10/25/2024  4:52 PM        Failed - This medication is a controlled substance - forward to provider to refill         Refused Prescriptions Disp Refills    ketoconazole 2 % External Shampoo 120 mL 5     Sig: Use to  Scalp twice a wekk       There is no refill protocol information for this  order        Future Appointments         Provider Department Appt Notes    In 1 month Marge Angel MD [facility] For hand injections  LOV 6/2023   Policy advised    In 1 month Adriana Vega MD [facility] f/u    In 3 months Marge Angel MD [facility]           Recent Outpatient Visits              2 weeks ago Bilateral carpal tunnel syndrome    [facility] Marge Angel MD    Office Visit    3 weeks ago H. pylori infection    [facility] Adriana Vega MD    Office Visit    3 months ago Type 2 diabetes mellitus without retinopathy ([facility])    [facility] Bk Helm MD    Office Visit    4 months ago Type 2 diabetes mellitus with diabetic neuropathy, with long-term current use of insulin ([facility])    [facility] Veena Emery MD    Office Visit    7 months ago Flank pain    [facility] [place] Kandel, Ninel, MD    Office Visit

## 2024-10-25 NOTE — TELEPHONE ENCOUNTER
[employer] Message sent to patient that refills remain.    Allopurinol - Year supply of refills good until 5/2025  Carvedilol 12.5 - Year supply of refills good until 7/2025  Gabapentin - 6-month supply sent 8/9/24, last until 2/2025  Hydrochlorothiazide -Year supply of refills good until 7/2025  Ketoconazole shampoo - 8/8/24 with 5 refills  Levothyroxine - Year supply of refills good until 4/2025  Omeprazole - Year supply of refills good until 8/2025  *Pantoprazole marked as Patient not taking, changed to omeprazole  Potassium - Year supply of refills good until 8/2025

## 2024-10-25 NOTE — TELEPHONE ENCOUNTER
Called patient using language line. Per patient she doesn't need a .    Reports compliance with:     glipizide 10mg PO bid  metformin 1g PO bid  Trulicity from 3.0mg qweekly - not taking since August  Basaglar  40 units am    10/25 two hours after breakfast 189    Has been <200 after meals and 140-150 before brekfast    Last dose of Trulicity in August. BG got higher when she stopped it.    Has taking basaglar 15 units in the afternoon when her BG is >250     Per pharmacist Trulicity is going through and will be ready after Sunday at  8 am     Called patient to notify her. RN advised patient to call if BG <80

## 2024-10-28 RX ORDER — CHOLECALCIFEROL (VITAMIN D3) 50 MCG
2000 TABLET ORAL EVERY MORNING
Qty: 90 TABLET | Refills: 3 | OUTPATIENT
Start: 2024-10-28

## 2024-10-28 NOTE — TELEPHONE ENCOUNTER
Disp Refills Start End    cholecalciferol 50 MCG (2000 UT) Oral Tab 90 tablet 3 3/11/2024 --    Sig - Route: Take 1 tablet (2,000 Units total) by mouth every morning. - Oral    Sent to pharmacy as: Vitamin D3 50 MCG (2000 UT) Oral Tablet    E-Prescribing Status: Receipt confirmed by pharmacy (3/11/2024  7:28 PM CDT)      Pharmacy    [id number] - [place], [place] - [address] AT [facility], [phone], [phone]

## 2024-11-03 DIAGNOSIS — F32.1 CURRENT MODERATE EPISODE OF MAJOR DEPRESSIVE DISORDER WITHOUT PRIOR EPISODE (HCC): ICD-10-CM

## 2024-11-03 DIAGNOSIS — I10 ESSENTIAL HYPERTENSION: ICD-10-CM

## 2024-11-03 DIAGNOSIS — Z79.4 TYPE 2 DIABETES MELLITUS WITH DIABETIC NEUROPATHY, WITH LONG-TERM CURRENT USE OF INSULIN (HCC): ICD-10-CM

## 2024-11-03 DIAGNOSIS — M1A.09X0 IDIOPATHIC CHRONIC GOUT OF MULTIPLE SITES WITHOUT TOPHUS: ICD-10-CM

## 2024-11-03 DIAGNOSIS — E11.40 TYPE 2 DIABETES MELLITUS WITH DIABETIC NEUROPATHY, WITH LONG-TERM CURRENT USE OF INSULIN (HCC): ICD-10-CM

## 2024-11-03 RX ORDER — INSULIN GLARGINE 100 [IU]/ML
40 INJECTION, SOLUTION SUBCUTANEOUS EVERY MORNING
Qty: 36 ML | Refills: 2 | Status: SHIPPED | OUTPATIENT
Start: 2024-11-03

## 2024-11-03 RX ORDER — BLOOD SUGAR DIAGNOSTIC
STRIP MISCELLANEOUS
Qty: 200 STRIP | Refills: 1 | Status: SHIPPED | OUTPATIENT
Start: 2024-11-03

## 2024-11-03 RX ORDER — GLIPIZIDE 10 MG/1
10 TABLET ORAL
Qty: 180 TABLET | Refills: 0 | Status: SHIPPED | OUTPATIENT
Start: 2024-11-03 | End: 2025-02-01

## 2024-11-04 RX ORDER — GLIPIZIDE 10 MG/1
10 TABLET ORAL
Qty: 180 TABLET | Refills: 1 | Status: SHIPPED | OUTPATIENT
Start: 2024-11-04

## 2024-11-04 NOTE — TELEPHONE ENCOUNTER
Endocrine Refill protocol for metformin    Protocol Criteria:  PASSED  Reason: N/A    If all below requirements are met, send a 90-day supply with 1 refill per provider protocol.     Verify appointment with Endocrinology completed in the last 6 months or scheduled in the next 3 months.  Verify A1C has been completed within the last 6 months and is below 8.5%  Verify last GFR is greater than or equal to 40 in the past 12 months    Last completed office visit:6/5/2024 Veena Emery MD   Next scheduled Follow up:   Future Appointments   Date Time Provider Department Center   11/8/2024  3:20 PM Nikki Alcantara MD [email or web address] [facility] [place]   12/4/2024 10:00 AM Marge Angel MD [facility]IO[facility]   12/16/2024  3:00 PM Adriana Vega MD [facility]STRO [facility]   2/11/2025  5:20 PM Marge Angel MD [facility]       Last GFR result:    Lab Results   Component Value Date    EGFRCR 62 07/16/2024     Last A1c result: Last A1C result: 7.5% done 7/16/2024.     Endocrine Refill protocol for oral and injectable diabetic medications    Protocol Criteria:  PASSED  Reason: N/A    If all below requirements are met, send a 90-day supply with 1 refill per provider protocol.    Verify appointment with Endocrinology completed in the last 6 months or scheduled in the next 3 months.  Verify A1C has been completed within the last 6 months and is below 8.5%     Last completed office visit: 6/5/2024 Veena Emery MD   Next scheduled Follow up:   Future Appointments   Date Time Provider Department Center   11/8/2024  3:20 PM Nikki Alcantara MD [id number] [place]   12/4/2024 10:00 AM Marge Angel MD [facility]DEMETRICE [facility]   12/16/2024  3:00 PM Adriana Vega MD [facility]   2/11/2025  5:20 PM Marge Angel MD [facility]ONIDOM [facility]      Last A1c result: Last A1c value was 7.5% done 7/16/2024.      sent advising patient to schedule apt

## 2024-11-06 RX ORDER — LEVOTHYROXINE SODIUM 125 UG/1
125 TABLET ORAL EVERY MORNING
Qty: 90 TABLET | Refills: 3 | OUTPATIENT
Start: 2024-11-06

## 2024-11-06 RX ORDER — CARVEDILOL 12.5 MG/1
12.5 TABLET ORAL DAILY
Qty: 90 TABLET | Refills: 3 | OUTPATIENT
Start: 2024-11-06

## 2024-11-06 RX ORDER — HYDROCHLOROTHIAZIDE 25 MG/1
TABLET ORAL
Qty: 180 TABLET | Refills: 3 | OUTPATIENT
Start: 2024-11-06

## 2024-11-08 ENCOUNTER — LAB ENCOUNTER (OUTPATIENT)
Dept: LAB | Age: 59
End: 2024-11-08
Attending: INTERNAL MEDICINE
Payer: MEDICAID

## 2024-11-08 ENCOUNTER — OFFICE VISIT (OUTPATIENT)
Dept: INTERNAL MEDICINE CLINIC | Facility: CLINIC | Age: 59
End: 2024-11-08

## 2024-11-08 VITALS
HEART RATE: 94 BPM | BODY MASS INDEX: 32.37 KG/M2 | WEIGHT: 189.63 LBS | SYSTOLIC BLOOD PRESSURE: 168 MMHG | DIASTOLIC BLOOD PRESSURE: 93 MMHG | HEIGHT: 64 IN

## 2024-11-08 DIAGNOSIS — E61.1 IRON DEFICIENCY: ICD-10-CM

## 2024-11-08 DIAGNOSIS — I10 ESSENTIAL HYPERTENSION: ICD-10-CM

## 2024-11-08 DIAGNOSIS — Z79.4 TYPE 2 DIABETES MELLITUS WITH DIABETIC NEUROPATHY, WITH LONG-TERM CURRENT USE OF INSULIN (HCC): ICD-10-CM

## 2024-11-08 DIAGNOSIS — R10.9 FLANK PAIN: ICD-10-CM

## 2024-11-08 DIAGNOSIS — E11.40 TYPE 2 DIABETES MELLITUS WITH DIABETIC NEUROPATHY, WITH LONG-TERM CURRENT USE OF INSULIN (HCC): ICD-10-CM

## 2024-11-08 DIAGNOSIS — Z79.4 TYPE 2 DIABETES MELLITUS WITH HYPERGLYCEMIA, WITH LONG-TERM CURRENT USE OF INSULIN (HCC): ICD-10-CM

## 2024-11-08 DIAGNOSIS — E11.65 TYPE 2 DIABETES MELLITUS WITH HYPERGLYCEMIA, WITH LONG-TERM CURRENT USE OF INSULIN (HCC): ICD-10-CM

## 2024-11-08 DIAGNOSIS — F32.1 CURRENT MODERATE EPISODE OF MAJOR DEPRESSIVE DISORDER WITHOUT PRIOR EPISODE (HCC): ICD-10-CM

## 2024-11-08 DIAGNOSIS — R10.12 LEFT UPPER QUADRANT ABDOMINAL PAIN: Primary | ICD-10-CM

## 2024-11-08 DIAGNOSIS — M54.16 LUMBAR RADICULOPATHY: ICD-10-CM

## 2024-11-08 DIAGNOSIS — E78.5 DYSLIPIDEMIA: ICD-10-CM

## 2024-11-08 DIAGNOSIS — M1A.09X0 IDIOPATHIC CHRONIC GOUT OF MULTIPLE SITES WITHOUT TOPHUS: ICD-10-CM

## 2024-11-08 LAB
ALBUMIN SERPL-MCNC: 5.1 G/DL (ref 3.2–4.8)
ALBUMIN/GLOB SERPL: 1.8 {RATIO} (ref 1–2)
ALP LIVER SERPL-CCNC: 97 U/L
ALT SERPL-CCNC: 24 U/L
ANION GAP SERPL CALC-SCNC: 9 MMOL/L (ref 0–18)
AST SERPL-CCNC: 23 U/L (ref ?–34)
BASOPHILS # BLD AUTO: 0.08 X10(3) UL (ref 0–0.2)
BASOPHILS NFR BLD AUTO: 0.8 %
BILIRUB SERPL-MCNC: 0.5 MG/DL (ref 0.3–1.2)
BILIRUB UR QL: NEGATIVE
BUN BLD-MCNC: 13 MG/DL (ref 9–23)
BUN/CREAT SERPL: 13.7 (ref 10–20)
CALCIUM BLD-MCNC: 10.6 MG/DL (ref 8.7–10.4)
CHLORIDE SERPL-SCNC: 104 MMOL/L (ref 98–112)
CHOLEST SERPL-MCNC: 185 MG/DL (ref ?–200)
CLARITY UR: CLEAR
CO2 SERPL-SCNC: 26 MMOL/L (ref 21–32)
COLOR UR: YELLOW
CREAT BLD-MCNC: 0.95 MG/DL
CREAT UR-SCNC: 153.4 MG/DL
DEPRECATED HBV CORE AB SER IA-ACNC: 7 NG/ML
DEPRECATED RDW RBC AUTO: 42.8 FL (ref 35.1–46.3)
EGFRCR SERPLBLD CKD-EPI 2021: 70 ML/MIN/1.73M2 (ref 60–?)
EOSINOPHIL # BLD AUTO: 0.28 X10(3) UL (ref 0–0.7)
EOSINOPHIL NFR BLD AUTO: 2.9 %
ERYTHROCYTE [DISTWIDTH] IN BLOOD BY AUTOMATED COUNT: 16.1 % (ref 11–15)
EST. AVERAGE GLUCOSE BLD GHB EST-MCNC: 183 MG/DL (ref 68–126)
FASTING PATIENT LIPID ANSWER: YES
FASTING STATUS PATIENT QL REPORTED: YES
GLOBULIN PLAS-MCNC: 2.8 G/DL (ref 2–3.5)
GLUCOSE BLD-MCNC: 202 MG/DL (ref 70–99)
GLUCOSE UR-MCNC: 100 MG/DL
HBA1C MFR BLD: 8 % (ref ?–5.7)
HCT VFR BLD AUTO: 38.9 %
HDLC SERPL-MCNC: 47 MG/DL (ref 40–59)
HGB BLD-MCNC: 12.4 G/DL
HGB UR QL STRIP.AUTO: NEGATIVE
IMM GRANULOCYTES # BLD AUTO: 0.02 X10(3) UL (ref 0–1)
IMM GRANULOCYTES NFR BLD: 0.2 %
IRON SATN MFR SERPL: 6 %
IRON SERPL-MCNC: 34 UG/DL
KETONES UR-MCNC: NEGATIVE MG/DL
LDLC SERPL CALC-MCNC: 102 MG/DL (ref ?–100)
LEUKOCYTE ESTERASE UR QL STRIP.AUTO: NEGATIVE
LYMPHOCYTES # BLD AUTO: 3.15 X10(3) UL (ref 1–4)
LYMPHOCYTES NFR BLD AUTO: 32.6 %
MCH RBC QN AUTO: 23.5 PG (ref 26–34)
MCHC RBC AUTO-ENTMCNC: 31.9 G/DL (ref 31–37)
MCV RBC AUTO: 73.8 FL
MICROALBUMIN UR-MCNC: 15.3 MG/DL
MICROALBUMIN/CREAT 24H UR-RTO: 99.7 UG/MG (ref ?–30)
MONOCYTES # BLD AUTO: 0.6 X10(3) UL (ref 0.1–1)
MONOCYTES NFR BLD AUTO: 6.2 %
NEUTROPHILS # BLD AUTO: 5.52 X10 (3) UL (ref 1.5–7.7)
NEUTROPHILS # BLD AUTO: 5.52 X10(3) UL (ref 1.5–7.7)
NEUTROPHILS NFR BLD AUTO: 57.3 %
NITRITE UR QL STRIP.AUTO: NEGATIVE
NONHDLC SERPL-MCNC: 138 MG/DL (ref ?–130)
OSMOLALITY SERPL CALC.SUM OF ELEC: 294 MOSM/KG (ref 275–295)
PH UR: 5.5 [PH] (ref 5–8)
PLATELET # BLD AUTO: 373 10(3)UL (ref 150–450)
POTASSIUM SERPL-SCNC: 4.3 MMOL/L (ref 3.5–5.1)
PROT SERPL-MCNC: 7.9 G/DL (ref 5.7–8.2)
PROT UR-MCNC: 30 MG/DL
RBC # BLD AUTO: 5.27 X10(6)UL
SODIUM SERPL-SCNC: 139 MMOL/L (ref 136–145)
SP GR UR STRIP: 1.02 (ref 1–1.03)
TIBC SERPL-MCNC: 548 UG/DL (ref 250–425)
TRANSFERRIN SERPL-MCNC: 368 MG/DL (ref 250–380)
TRIGL SERPL-MCNC: 210 MG/DL (ref 30–149)
UROBILINOGEN UR STRIP-ACNC: NORMAL
VLDLC SERPL CALC-MCNC: 35 MG/DL (ref 0–30)
WBC # BLD AUTO: 9.7 X10(3) UL (ref 4–11)

## 2024-11-08 PROCEDURE — 82728 ASSAY OF FERRITIN: CPT | Performed by: INTERNAL MEDICINE

## 2024-11-08 PROCEDURE — 83036 HEMOGLOBIN GLYCOSYLATED A1C: CPT | Performed by: INTERNAL MEDICINE

## 2024-11-08 PROCEDURE — 80053 COMPREHEN METABOLIC PANEL: CPT

## 2024-11-08 PROCEDURE — 36415 COLL VENOUS BLD VENIPUNCTURE: CPT | Performed by: INTERNAL MEDICINE

## 2024-11-08 PROCEDURE — 82043 UR ALBUMIN QUANTITATIVE: CPT

## 2024-11-08 PROCEDURE — 81001 URINALYSIS AUTO W/SCOPE: CPT | Performed by: INTERNAL MEDICINE

## 2024-11-08 PROCEDURE — 99214 OFFICE O/P EST MOD 30 MIN: CPT | Performed by: INTERNAL MEDICINE

## 2024-11-08 PROCEDURE — 80061 LIPID PANEL: CPT

## 2024-11-08 PROCEDURE — 83540 ASSAY OF IRON: CPT | Performed by: INTERNAL MEDICINE

## 2024-11-08 PROCEDURE — 84466 ASSAY OF TRANSFERRIN: CPT | Performed by: INTERNAL MEDICINE

## 2024-11-08 PROCEDURE — 85025 COMPLETE CBC W/AUTO DIFF WBC: CPT | Performed by: INTERNAL MEDICINE

## 2024-11-08 PROCEDURE — 82570 ASSAY OF URINE CREATININE: CPT

## 2024-11-08 RX ORDER — LEVOTHYROXINE SODIUM 125 UG/1
125 TABLET ORAL EVERY MORNING
Qty: 90 TABLET | Refills: 3 | Status: SHIPPED | OUTPATIENT
Start: 2024-11-08

## 2024-11-08 RX ORDER — GLIPIZIDE 10 MG/1
10 TABLET ORAL
Qty: 180 TABLET | Refills: 1 | Status: SHIPPED | OUTPATIENT
Start: 2024-11-08

## 2024-11-08 RX ORDER — HYDROCODONE BITARTRATE AND ACETAMINOPHEN 5; 325 MG/1; MG/1
1 TABLET ORAL EVERY 8 HOURS PRN
Qty: 21 TABLET | Refills: 0 | Status: SHIPPED | OUTPATIENT
Start: 2024-11-08

## 2024-11-08 RX ORDER — GABAPENTIN 400 MG/1
400 CAPSULE ORAL 2 TIMES DAILY
Qty: 180 CAPSULE | Refills: 1 | Status: SHIPPED | OUTPATIENT
Start: 2024-11-08

## 2024-11-08 RX ORDER — HYDROCHLOROTHIAZIDE 25 MG/1
TABLET ORAL
Qty: 180 TABLET | Refills: 1 | Status: SHIPPED | OUTPATIENT
Start: 2024-11-08

## 2024-11-08 RX ORDER — CARVEDILOL 12.5 MG/1
12.5 TABLET ORAL 2 TIMES DAILY WITH MEALS
Qty: 180 TABLET | Refills: 3 | Status: SHIPPED | OUTPATIENT
Start: 2024-11-08

## 2024-11-08 RX ORDER — DULAGLUTIDE 3 MG/.5ML
3 INJECTION, SOLUTION SUBCUTANEOUS
Qty: 6 ML | Refills: 1 | Status: SHIPPED | OUTPATIENT
Start: 2024-11-08

## 2024-11-08 RX ORDER — TRAZODONE HYDROCHLORIDE 100 MG/1
100 TABLET ORAL NIGHTLY
Qty: 90 TABLET | Refills: 0 | Status: SHIPPED | OUTPATIENT
Start: 2024-11-08

## 2024-11-08 RX ORDER — SERTRALINE HYDROCHLORIDE 100 MG/1
100 TABLET, FILM COATED ORAL DAILY
Qty: 90 TABLET | Refills: 1 | Status: SHIPPED | OUTPATIENT
Start: 2024-11-08

## 2024-11-08 RX ORDER — GLIPIZIDE 10 MG/1
10 TABLET ORAL
Qty: 180 TABLET | Refills: 0 | Status: SHIPPED | OUTPATIENT
Start: 2024-11-08 | End: 2025-02-06

## 2024-11-08 RX ORDER — ALLOPURINOL 100 MG/1
100 TABLET ORAL NIGHTLY
Qty: 90 TABLET | Refills: 3 | Status: SHIPPED | OUTPATIENT
Start: 2024-11-08

## 2024-11-08 RX ORDER — SIMVASTATIN 40 MG
40 TABLET ORAL NIGHTLY
Qty: 90 TABLET | Refills: 3 | Status: SHIPPED | OUTPATIENT
Start: 2024-11-08

## 2024-11-08 RX ORDER — ASPIRIN 81 MG/1
81 TABLET ORAL DAILY
Qty: 90 TABLET | Refills: 3 | Status: SHIPPED | OUTPATIENT
Start: 2024-11-08

## 2024-11-09 ENCOUNTER — HOSPITAL ENCOUNTER (OUTPATIENT)
Dept: CT IMAGING | Age: 59
Discharge: HOME | End: 2024-11-09
Attending: INTERNAL MEDICINE
Payer: MEDICAID

## 2024-11-09 ENCOUNTER — TELEPHONE (OUTPATIENT)
Dept: INTERNAL MEDICINE CLINIC | Facility: CLINIC | Age: 59
End: 2024-11-09

## 2024-11-09 DIAGNOSIS — R10.12 LEFT UPPER QUADRANT ABDOMINAL PAIN: ICD-10-CM

## 2024-11-09 PROCEDURE — 74177 CT ABD & PELVIS W/CONTRAST: CPT | Performed by: INTERNAL MEDICINE

## 2024-11-09 RX ORDER — METHOCARBAMOL 750 MG/1
750 TABLET, FILM COATED ORAL 3 TIMES DAILY
Qty: 270 TABLET | Refills: 1 | Status: SHIPPED | OUTPATIENT
Start: 2024-11-09

## 2024-11-09 NOTE — PROGRESS NOTES
Subjective:     Patient ID: Randi Lobo is a 58 year old female.  Presents for follow-up on multiple medical concerns    HPI  Visit was conducted with the help of his son by phone who was translating for him to do.  Patient complains of left upper quadrant abdominal pain left flank pain radiating down to the left leg.  Left upper quadrant mid abdominal pain is chronic but more intense right now, she underwent colonoscopy on 10/15/2024 2 polyps were found with recommendation to have follow-up colonoscopy in 5 years.  Patient is prone to constipation .  No obvious injury to the back, x-ray of the thoracic and lumbar spine in 2023 showed mild scoliosis mild degenerative changes.  Patient gives history of kidney stones years ago denies frequency of urination or blood in the urine.  Patient is planning to travel to [facility] in 3 days.  She reports that she did not take morning medication for blood pressure for some time because she does not have them, looking at the file she has refills on all her medications.  She reports that her symptoms acceptable, she has been taking insulin Lantus between 30 and 40 units every morning, takes Trulicity 3 mg every 7 days and metformin 1000 mg twice a day.  Patient is known to be noncompliant with medication intake and follow-ups.  Family member assist her with getting to appointments.    Current Outpatient Medications   Medication Sig Dispense Refill    allopurinol 100 MG Oral Tab Take 1 tablet (100 mg total) by mouth nightly. 90 tablet 3    aspirin 81 MG Oral Tab EC Take 1 tablet (81 mg total) by mouth daily. Pt states 2x a day. 90 tablet 3    carvedilol 12.5 MG Oral Tab Take 1 tablet (12.5 mg total) by mouth 2 (two) times daily with meals. 180 tablet 3    gabapentin 400 MG Oral Cap Take 1 capsule (400 mg total) by mouth 2 (two) times daily. 180 capsule 1    glipiZIDE 10 MG Oral Tab Take 1 tablet (10 mg total) by mouth 2 (two) times daily before meals. 180 tablet 0    glipiZIDE  10 MG Oral Tab Take 1 tablet (10 mg total) by mouth 2 (two) times daily before meals. 180 tablet 1    levothyroxine 125 MCG Oral Tab Take 1 tablet (125 mcg total) by mouth every morning. 90 tablet 3    metFORMIN 500 MG Oral Tab TAKE 2 TABLETS BY MOUTH TWICE DAILY 360 tablet 1    simvastatin 40 MG Oral Tab Take 1 tablet (40 mg total) by mouth nightly. 90 tablet 3    traZODone 100 MG Oral Tab Take 1 tablet (100 mg total) by mouth nightly. 90 tablet 0    sertraline 100 MG Oral Tab Take 1 tablet (100 mg total) by mouth daily. 90 tablet 1    HYDROcodone-acetaminophen (NORCO) 5-325 MG Oral Tab Take 1 tablet by mouth every 8 (eight) hours as needed for Pain. Can cause  constipations 21 tablet 0    Dulaglutide (TRULICITY) 3 MG/0.5ML Subcutaneous Solution Auto-injector Inject 3 mg into the skin every 7 days. 6 mL 1    hydroCHLOROthiazide 25 MG Oral Tab TAKE 1 TABLET BY MOUTH TWICE DAILY 180 tablet 1    Glucose Blood (ONETOUCH VERIO) In Vitro Strip USE TO CHECK BLOOD SUGAR TWICE DAILY, FASTING AND 2 HOURS AFTER A MEAL 200 strip 1    insulin glargine (BASAGLAR KWIKPEN) 100 UNIT/ML Subcutaneous Solution Pen-injector Inject 40 Units into the skin every morning. ADMINISTER 40 UNITS UNDER THE SKIN TWICE DAILY EVERY DAY 36 mL 2    metFORMIN 500 MG Oral Tab TAKE 2 TABLETS BY MOUTH TWICE DAILY 360 tablet 1    diclofenac 1 % External Gel Apply 2 g topically 4 (four) times daily. 1 each 2    hydrocortisone (PROCTOZONE-HC) 2.5 % External Cream Apply to hemorrhoids for 7 to 10 days 28 g 5    losartan 100 MG Oral Tab Take 1 tablet (100 mg total) by mouth daily. 90 tablet 3    tiZANidine 4 MG Oral Tab Take 1 tablet (4 mg total) by mouth every 8 (eight) hours as needed. 90 tablet 1    zolpidem 10 MG Oral Tab Take 1 tablet (10 mg total) by mouth nightly. 30 tablet 0    Dulaglutide (TRULICITY) 3 MG/0.5ML Subcutaneous Solution Pen-injector Inject 3 mg into the skin once a week. 6 mL 0    Omeprazole 40 MG Oral Capsule Delayed Release Take 1  capsule (40 mg total) by mouth daily. 90 capsule 3    Potassium Chloride ER 10 MEQ Oral Cap CR Take 1 capsule (10 mEq total) by mouth 2 (two) times daily. 180 capsule 3    ketoconazole 2 % External Shampoo Use to  Scalp twice a wekk 120 mL 5    cholecalciferol 50 MCG (2000 UT) Oral Tab Take 1 tablet (2,000 Units total) by mouth every morning. 90 tablet 3    pantoprazole 40 MG Oral Tab EC Take 1 tablet (40 mg total) by mouth every morning before breakfast. 90 tablet 1    naproxen 500 MG Oral Tab EC Take 1 tablet p.o. at bedtime as needed for severe pain 30 tablet 1    Blood Glucose Monitoring Suppl (ONETOUCH VERIO) w/Device Does not apply Kit 1 kit As Directed. 1 kit 0    Ferrous Sulfate (IRON) 325 (65 Fe) MG Oral Tab Take 1 Dose by mouth daily. 30 tablet 0    Continuous Glucose Transmitter (DEXCOM G6 TRANSMITTER) Does not apply Misc Change sensor every 90 days (Patient not taking: Reported on 11/8/2024) 1 each 0    Continuous Glucose  (DEXCOM G6 ) Does not apply Device Use to monitor blood sugar level (Patient not taking: Reported on 11/8/2024) 1 each 0    Continuous Glucose Sensor (DEXCOM G6 SENSOR) Does not apply Misc Change sensor every 10 days (Patient not taking: Reported on 11/8/2024) 9 each 0    Insulin Pen Needle (TRUEPLUS 5-BEVEL PEN NEEDLES) 32G X 4 MM Does not apply Misc Use twice a day (Patient not taking: Reported on 11/8/2024) 200 each 3    Insulin Pen Needle (TRUEPLUS PEN NEEDLES) 32G X 4 MM Does not apply Misc Use needles to inject insulin daily (Patient not taking: Reported on 11/8/2024) 100 each 3    Lancets (ONETOUCH DELICA PLUS [id number]) Does not apply Misc 1 strip by In Vitro route 2 (two) times daily. FASTING AND 2 HOURS AFTER A MEAL (Patient not taking: Reported on 11/8/2024) 200 each 3     Allergies:Allergies[1]    Past Medical History:    Asthma (HCC)    Depression    Diabetes (HCC)    Diabetes type 2, controlled (HCC)    Disorder of thyroid    Diverticulitis    Esophageal  reflux    Essential hypertension    Gout    Hepatic steatosis    Hepatomegaly    High blood pressure    High cholesterol    Hyperlipidemia    Thyroid disease      Past Surgical History:   Procedure Laterality Date    Colonoscopy N/A 3/29/2017    Procedure: COLONOSCOPY;  Surgeon: Lenny Miranda MD;  Location: [facility] ENDOSCOPY    Colonoscopy N/A 1/8/2021    Procedure: COLONOSCOPY;  Surgeon: Arthur Roberts MD;  Location: [facility] ENDO    Colonoscopy N/A 10/10/2024    Procedure: COLONOSCOPY with polypectomy;  Surgeon: Adriana Vega MD;  Location: [facility] ENDOSCOPY      Family History   Problem Relation Age of Onset    Cancer Father     Hypertension Father     Diabetes Mother     Cancer Mother     Hypertension Mother     Glaucoma Neg     Macular degeneration Neg       Social History:   Social History     Socioeconomic History    Marital status:    Tobacco Use    Smoking status: Never    Smokeless tobacco: Never   Vaping Use    Vaping status: Never Used   Substance and Sexual Activity    Alcohol use: No    Drug use: No   Social History Narrative    ** Merged History Encounter **             BP (!) 168/93 (BP Location: Right arm, Patient Position: Sitting, Cuff Size: adult)   Pulse 94   Ht 5' 4\" (1.626 m)   Wt 189 lb 9.6 oz (86 kg)   BMI 32.54 kg/m²    Physical Exam  Constitutional:       Appearance: Normal appearance.   HENT:      Head: Normocephalic and atraumatic.   Eyes:      General: No scleral icterus.     Extraocular Movements: Extraocular movements intact.      Conjunctiva/sclera: Conjunctivae normal.      Pupils: Pupils are equal, round, and reactive to light.   Cardiovascular:      Rate and Rhythm: Normal rate and regular rhythm.      Heart sounds: No murmur heard.  Pulmonary:      Effort: Pulmonary effort is normal. No respiratory distress.      Breath sounds: No wheezing or rhonchi.   Abdominal:      Palpations: Abdomen is soft. There is no mass.      Tenderness: There is abdominal  tenderness in the left upper quadrant and left lower quadrant. There is rebound. There is no guarding.      Comments: Intense tenderness on palpation left upper left mid abdomen   Musculoskeletal:         General: Normal range of motion.      Cervical back: Normal range of motion and neck supple.      Right lower leg: No edema.      Left lower leg: No edema.   Skin:     General: Skin is warm.      Coloration: Skin is not jaundiced.   Neurological:      General: No focal deficit present.      Mental Status: She is alert and oriented to person, place, and time. Mental status is at baseline.   Psychiatric:         Mood and Affect: Mood normal.         Behavior: Behavior normal.         Thought Content: Thought content normal.         Assessment & Plan:   1. Left upper quadrant abdominal pain etiology to be determined rule out nephrolithiasis /diverticulitis advised patient bland diet avoid fresh fruits and vegetables, continue to take MiraLAX to assure regular bowel movement, 64 ounces of liquids every day ordered CT scan of the abdomen and pelvis with IV contrast stat   2. Type 2 diabetes mellitus with diabetic neuropathy, with long-term current use of insulin (HCC) will check hemoglobin A1c, CMP continue current medication for now low carbohydrate diet   3. Essential hypertension uncontrolled due to patient noncompliance   4. Iron deficiency will check CBC, TIBC ferritin level, CMP   5. Flank pain rule out nephrotic lithiasis nephrolithiasis, will check urine analysis CT scan of the abdomen and pelvis stat ordered   6. Current moderate episode of major depressive disorder without prior episode (HCC) stable continue current medication   7. Idiopathic chronic gout of multiple sites without tophus    8. Lumbar radiculopathy patient would benefit from physical therapy but she is leaving country, may need MRI of the lumbar spine in the future       Orders Placed This Encounter   Procedures    CBC With Differential With  Platelet    Ferritin    Iron And Tibc    Hemoglobin A1C    Urinalysis with Culture Reflex [E]       Meds This Visit:  Requested Prescriptions     Signed Prescriptions Disp Refills    allopurinol 100 MG Oral Tab 90 tablet 3     Sig: Take 1 tablet (100 mg total) by mouth nightly.    aspirin 81 MG Oral Tab EC 90 tablet 3     Sig: Take 1 tablet (81 mg total) by mouth daily. Pt states 2x a day.    carvedilol 12.5 MG Oral Tab 180 tablet 3     Sig: Take 1 tablet (12.5 mg total) by mouth 2 (two) times daily with meals.    gabapentin 400 MG Oral Cap 180 capsule 1     Sig: Take 1 capsule (400 mg total) by mouth 2 (two) times daily.    glipiZIDE 10 MG Oral Tab 180 tablet 0     Sig: Take 1 tablet (10 mg total) by mouth 2 (two) times daily before meals.    glipiZIDE 10 MG Oral Tab 180 tablet 1     Sig: Take 1 tablet (10 mg total) by mouth 2 (two) times daily before meals.    levothyroxine 125 MCG Oral Tab 90 tablet 3     Sig: Take 1 tablet (125 mcg total) by mouth every morning.    metFORMIN 500 MG Oral Tab 360 tablet 1     Sig: TAKE 2 TABLETS BY MOUTH TWICE DAILY    simvastatin 40 MG Oral Tab 90 tablet 3     Sig: Take 1 tablet (40 mg total) by mouth nightly.    traZODone 100 MG Oral Tab 90 tablet 0     Sig: Take 1 tablet (100 mg total) by mouth nightly.    sertraline 100 MG Oral Tab 90 tablet 1     Sig: Take 1 tablet (100 mg total) by mouth daily.    HYDROcodone-acetaminophen (NORCO) 5-325 MG Oral Tab 21 tablet 0     Sig: Take 1 tablet by mouth every 8 (eight) hours as needed for Pain. Can cause  constipations    Dulaglutide (TRULICITY) 3 MG/0.5ML Subcutaneous Solution Auto-injector 6 mL 1     Sig: Inject 3 mg into the skin every 7 days.    hydroCHLOROthiazide 25 MG Oral Tab 180 tablet 1     Sig: TAKE 1 TABLET BY MOUTH TWICE DAILY       Imaging & Referrals:  CT ABDOMEN+PELVIS(CONTRAST ONLY)(CPT=74177)            [1] No Known Allergies

## 2024-11-09 NOTE — TELEPHONE ENCOUNTER
Spoke to daughter-in-law Eunice who takes care of the patient and takes care of her medications.  She reports that patient does not take Trulicity last 2 months problem getting from the pharmacy?  Patient does not take omeprazole on regular basis it is covered only 120 days/year and they are buying over-the-counter medication.  I advised to take omeprazole 20 mg twice a day for 6 to 8 weeks extremely important, work on relieving constipation and take something every day to prevent constipation that contribute to patient's symptoms.  Reviewed findings on CT scan with daughter-in-law and patient's sonAhmed advised that patient should not travel and she should have endoscopy completed.  Also advised that diabetes is not controlled sounds like patient not taking medication as prescribed.  Important to get back on all medications, advised that they send everything to the pharmacy yesterday and she had refills from previous encounters for several months so it is [facility] problem that patient cannot get medication on time, family was told that probably his insurance, doctors office etc.

## 2024-11-13 ENCOUNTER — TELEPHONE (OUTPATIENT)
Dept: FAMILY MEDICINE CLINIC | Facility: CLINIC | Age: 59
End: 2024-11-13

## 2024-11-13 NOTE — TELEPHONE ENCOUNTER
A prior authorization has been started for marilyn wheatleyiq ( : 1965) diclofenac sodium 1% Gel prescription by the pharmacy.    Key: [id number]

## 2024-11-13 NOTE — TELEPHONE ENCOUNTER
[facility] called to clarify quantity requiested. Is ordered as 2g 4x daily = 240g q 30 days. Advised approval request is for 300mg per 30 days as they come in 100mg tubes.

## 2024-11-15 ENCOUNTER — TELEPHONE (OUTPATIENT)
Facility: CLINIC | Age: 59
End: 2024-11-15

## 2024-11-15 NOTE — TELEPHONE ENCOUNTER
1st reminder mailed out to patient about:    Helicobacter Pylori Breath Test, Adult (Order #[id number]) on 10/1/24

## 2024-11-20 ENCOUNTER — MED REC SCAN ONLY (OUTPATIENT)
Dept: RHEUMATOLOGY | Facility: CLINIC | Age: 59
End: 2024-11-20

## 2025-01-12 ENCOUNTER — TELEPHONE (OUTPATIENT)
Dept: ENDOCRINOLOGY CLINIC | Facility: CLINIC | Age: 60
End: 2025-01-12

## 2025-01-13 NOTE — TELEPHONE ENCOUNTER
Called patient, spoke to daughter (HIPAA verified), patient is currently out of the country and will return back in February. Appointment scheduled for 2/19/25

## 2025-02-04 DIAGNOSIS — Z79.4 TYPE 2 DIABETES MELLITUS WITH DIABETIC NEUROPATHY, WITH LONG-TERM CURRENT USE OF INSULIN (HCC): ICD-10-CM

## 2025-02-04 DIAGNOSIS — I10 ESSENTIAL HYPERTENSION: ICD-10-CM

## 2025-02-04 DIAGNOSIS — F32.1 CURRENT MODERATE EPISODE OF MAJOR DEPRESSIVE DISORDER WITHOUT PRIOR EPISODE (HCC): ICD-10-CM

## 2025-02-04 DIAGNOSIS — M1A.09X0 IDIOPATHIC CHRONIC GOUT OF MULTIPLE SITES WITHOUT TOPHUS: ICD-10-CM

## 2025-02-04 DIAGNOSIS — E11.40 TYPE 2 DIABETES MELLITUS WITH DIABETIC NEUROPATHY, WITH LONG-TERM CURRENT USE OF INSULIN (HCC): ICD-10-CM

## 2025-02-07 RX ORDER — SERTRALINE HYDROCHLORIDE 100 MG/1
100 TABLET, FILM COATED ORAL DAILY
Qty: 90 TABLET | Refills: 1 | OUTPATIENT
Start: 2025-02-07

## 2025-02-18 DIAGNOSIS — I10 ESSENTIAL HYPERTENSION: ICD-10-CM

## 2025-02-18 DIAGNOSIS — M1A.09X0 IDIOPATHIC CHRONIC GOUT OF MULTIPLE SITES WITHOUT TOPHUS: ICD-10-CM

## 2025-02-18 DIAGNOSIS — G47.09 OTHER INSOMNIA: ICD-10-CM

## 2025-02-18 DIAGNOSIS — E11.40 TYPE 2 DIABETES MELLITUS WITH DIABETIC NEUROPATHY, WITH LONG-TERM CURRENT USE OF INSULIN (HCC): ICD-10-CM

## 2025-02-18 DIAGNOSIS — Z79.4 TYPE 2 DIABETES MELLITUS WITH DIABETIC NEUROPATHY, WITH LONG-TERM CURRENT USE OF INSULIN (HCC): ICD-10-CM

## 2025-02-18 DIAGNOSIS — F32.1 CURRENT MODERATE EPISODE OF MAJOR DEPRESSIVE DISORDER WITHOUT PRIOR EPISODE (HCC): ICD-10-CM

## 2025-02-19 ENCOUNTER — TELEPHONE (OUTPATIENT)
Dept: RHEUMATOLOGY | Facility: CLINIC | Age: 60
End: 2025-02-19

## 2025-02-19 ENCOUNTER — OFFICE VISIT (OUTPATIENT)
Dept: ENDOCRINOLOGY CLINIC | Facility: CLINIC | Age: 60
End: 2025-02-19
Payer: MEDICAID

## 2025-02-19 VITALS
WEIGHT: 195 LBS | DIASTOLIC BLOOD PRESSURE: 77 MMHG | BODY MASS INDEX: 33.29 KG/M2 | HEIGHT: 64 IN | SYSTOLIC BLOOD PRESSURE: 132 MMHG | HEART RATE: 94 BPM

## 2025-02-19 DIAGNOSIS — E03.9 HYPOTHYROIDISM, UNSPECIFIED TYPE: ICD-10-CM

## 2025-02-19 DIAGNOSIS — E11.40 TYPE 2 DIABETES MELLITUS WITH DIABETIC NEUROPATHY, WITH LONG-TERM CURRENT USE OF INSULIN (HCC): Primary | ICD-10-CM

## 2025-02-19 DIAGNOSIS — E78.5 DYSLIPIDEMIA: ICD-10-CM

## 2025-02-19 DIAGNOSIS — Z79.4 TYPE 2 DIABETES MELLITUS WITH DIABETIC NEUROPATHY, WITH LONG-TERM CURRENT USE OF INSULIN (HCC): Primary | ICD-10-CM

## 2025-02-19 DIAGNOSIS — E04.9 GOITER: ICD-10-CM

## 2025-02-19 DIAGNOSIS — I10 ESSENTIAL HYPERTENSION: ICD-10-CM

## 2025-02-19 LAB
GLUCOSE BLOOD: 244
HEMOGLOBIN A1C: 8.7 % (ref 4.3–5.6)
TEST STRIP EXPIRATION DATE: NORMAL DATE
TEST STRIP LOT #: NORMAL NUMERIC

## 2025-02-19 PROCEDURE — 82947 ASSAY GLUCOSE BLOOD QUANT: CPT | Performed by: INTERNAL MEDICINE

## 2025-02-19 PROCEDURE — 99214 OFFICE O/P EST MOD 30 MIN: CPT | Performed by: INTERNAL MEDICINE

## 2025-02-19 PROCEDURE — 83036 HEMOGLOBIN GLYCOSYLATED A1C: CPT | Performed by: INTERNAL MEDICINE

## 2025-02-19 RX ORDER — DULAGLUTIDE 1.5 MG/.5ML
1.5 INJECTION, SOLUTION SUBCUTANEOUS WEEKLY
Qty: 6 ML | Refills: 1 | Status: SHIPPED | OUTPATIENT
Start: 2025-02-19 | End: 2025-05-20

## 2025-02-19 RX ORDER — INSULIN GLARGINE 100 [IU]/ML
40 INJECTION, SOLUTION SUBCUTANEOUS EVERY MORNING
Qty: 36 ML | Refills: 2 | Status: SHIPPED | OUTPATIENT
Start: 2025-02-19

## 2025-02-19 RX ORDER — GLIPIZIDE 10 MG/1
10 TABLET ORAL
Qty: 180 TABLET | Refills: 3 | Status: SHIPPED | OUTPATIENT
Start: 2025-02-19

## 2025-02-19 NOTE — PROGRESS NOTES
Name: Randi Lobo  Date: 2/19/25    Referring Physician: No ref. provider found    HISTORY OF PRESENT ILLNESS   Randi Lobo is a 59 year old female who presents for follow-up of management of uncontrolled T2D. She was diagnosed many years ago, based upon blood tests.     At the last visit, more than a year ago, I had asked her to continue all of her medications that she is taking now. However, she often times does not take all of the insulin prescribed due to low blood sugars. Occasionally she will take the full dose of metformin and occasionally she will take half of the dose.     At the last visit, I had asked her to stop Victoza and start Trulicity qweekly. She is taking this as well as all of her other medications. She usually takes less of her insulin than what we have listed.     Blood Glucose Today: 244  HbA1C or glycohemoglobin is 8.7 today (and it was 7.8 on 6/05/24 and it was 8.7 on 11/28/23 (and it was 8.2 on 10/05/22 was 9.3 on 6/15/21 and was 10.4% on 10/26/20)  Type 1 or Type 2?: Type 2.  Checking twice a day:   Fasting- 150  Medications for DM : Glipizide 10mg PO bid; metformin 1g po bid, Trulicity 1.5; Basaglar 40 units qam; She definitely takes less than 40 units qam. .  Episodes of hypoglycemia: none    Dietary compliance: not very good  Breakfast- brown bread two eggs, derick without sugar  Dinner- 1-2 roti, meat and pam and sabzi ; sweet    Exercise: none    Polyuria/polydipsia: none    Blurred vision: none    Flu Vaccine This Season: no    Covid Vaccine: yes    REVIEW OF SYSTEMS  Eyes: Diabetic retinopathy present: none            Most recent visit to eye doctor in last 12 months: checks regularly    CV: Cardiovascular disease present: no         Hypertension present: yes, better, PCP adjusted meds         Hyperlipidemia present: yes, not at goal (11/08/24)          Peripheral Vascular Disease present: no    : Nephropathy present: 99.7 (11/08/24); creatinine- 0.95    Neuro: Neuropathy  present: she has this    Skin: Infection or ulceration: no    Osteoporosis: no    Thyroid disease: yes; on levothyroxine 125mcg and TSH- 0.783 on 7/16/24    Medications:     Current Outpatient Medications:     glipiZIDE 10 MG Oral Tab, Take 1 tablet (10 mg total) by mouth 2 (two) times daily before meals., Disp: 180 tablet, Rfl: 1    Dulaglutide (TRULICITY) 3 MG/0.5ML Subcutaneous Solution Auto-injector, Inject 3 mg into the skin every 7 days., Disp: 6 mL, Rfl: 1    insulin glargine (BASAGLAR KWIKPEN) 100 UNIT/ML Subcutaneous Solution Pen-injector, Inject 40 Units into the skin every morning. ADMINISTER 40 UNITS UNDER THE SKIN TWICE DAILY EVERY DAY, Disp: 36 mL, Rfl: 2    metFORMIN 500 MG Oral Tab, TAKE 2 TABLETS BY MOUTH TWICE DAILY, Disp: 360 tablet, Rfl: 1    diclofenac 1 % External Gel, Apply 2 g topically 4 (four) times daily., Disp: 1 each, Rfl: 2    methocarbamol 750 MG Oral Tab, Take 1 tablet (750 mg total) by mouth 3 (three) times daily., Disp: 270 tablet, Rfl: 1    allopurinol 100 MG Oral Tab, Take 1 tablet (100 mg total) by mouth nightly., Disp: 90 tablet, Rfl: 3    aspirin 81 MG Oral Tab EC, Take 1 tablet (81 mg total) by mouth daily. Pt states 2x a day., Disp: 90 tablet, Rfl: 3    carvedilol 12.5 MG Oral Tab, Take 1 tablet (12.5 mg total) by mouth 2 (two) times daily with meals., Disp: 180 tablet, Rfl: 3    gabapentin 400 MG Oral Cap, Take 1 capsule (400 mg total) by mouth 2 (two) times daily., Disp: 180 capsule, Rfl: 1    levothyroxine 125 MCG Oral Tab, Take 1 tablet (125 mcg total) by mouth every morning., Disp: 90 tablet, Rfl: 3    metFORMIN 500 MG Oral Tab, TAKE 2 TABLETS BY MOUTH TWICE DAILY, Disp: 360 tablet, Rfl: 1    simvastatin 40 MG Oral Tab, Take 1 tablet (40 mg total) by mouth nightly., Disp: 90 tablet, Rfl: 3    traZODone 100 MG Oral Tab, Take 1 tablet (100 mg total) by mouth nightly., Disp: 90 tablet, Rfl: 0    sertraline 100 MG Oral Tab, Take 1 tablet (100 mg total) by mouth daily., Disp:  90 tablet, Rfl: 1    HYDROcodone-acetaminophen (NORCO) 5-325 MG Oral Tab, Take 1 tablet by mouth every 8 (eight) hours as needed for Pain. Can cause  constipations, Disp: 21 tablet, Rfl: 0    hydroCHLOROthiazide 25 MG Oral Tab, TAKE 1 TABLET BY MOUTH TWICE DAILY, Disp: 180 tablet, Rfl: 1    Glucose Blood (ONETOUCH VERIO) In Vitro Strip, USE TO CHECK BLOOD SUGAR TWICE DAILY, FASTING AND 2 HOURS AFTER A MEAL, Disp: 200 strip, Rfl: 1    hydrocortisone (PROCTOZONE-HC) 2.5 % External Cream, Apply to hemorrhoids for 7 to 10 days, Disp: 28 g, Rfl: 5    losartan 100 MG Oral Tab, Take 1 tablet (100 mg total) by mouth daily., Disp: 90 tablet, Rfl: 3    zolpidem 10 MG Oral Tab, Take 1 tablet (10 mg total) by mouth nightly., Disp: 30 tablet, Rfl: 0    Omeprazole 40 MG Oral Capsule Delayed Release, Take 1 capsule (40 mg total) by mouth daily., Disp: 90 capsule, Rfl: 3    Potassium Chloride ER 10 MEQ Oral Cap CR, Take 1 capsule (10 mEq total) by mouth 2 (two) times daily., Disp: 180 capsule, Rfl: 3    ketoconazole 2 % External Shampoo, Use to  Scalp twice a wekk, Disp: 120 mL, Rfl: 5    Continuous Glucose Transmitter (DEXCOM G6 TRANSMITTER) Does not apply Misc, Change sensor every 90 days (Patient not taking: Reported on 11/8/2024), Disp: 1 each, Rfl: 0    Continuous Glucose  (DEXCOM G6 ) Does not apply Device, Use to monitor blood sugar level (Patient not taking: Reported on 11/8/2024), Disp: 1 each, Rfl: 0    Continuous Glucose Sensor (DEXCOM G6 SENSOR) Does not apply Misc, Change sensor every 10 days (Patient not taking: Reported on 11/8/2024), Disp: 9 each, Rfl: 0    Insulin Pen Needle (TRUEPLUS 5-BEVEL PEN NEEDLES) 32G X 4 MM Does not apply Misc, Use twice a day (Patient not taking: Reported on 11/8/2024), Disp: 200 each, Rfl: 3    cholecalciferol 50 MCG (2000 UT) Oral Tab, Take 1 tablet (2,000 Units total) by mouth every morning., Disp: 90 tablet, Rfl: 3    pantoprazole 40 MG Oral Tab EC, Take 1 tablet (40  mg total) by mouth every morning before breakfast., Disp: 90 tablet, Rfl: 1    Blood Glucose Monitoring Suppl (ONETOUCH VERIO) w/Device Does not apply Kit, 1 kit As Directed., Disp: 1 kit, Rfl: 0    Insulin Pen Needle (TRUEPLUS PEN NEEDLES) 32G X 4 MM Does not apply Misc, Use needles to inject insulin daily (Patient not taking: Reported on 11/8/2024), Disp: 100 each, Rfl: 3    Ferrous Sulfate (IRON) 325 (65 Fe) MG Oral Tab, Take 1 Dose by mouth daily., Disp: 30 tablet, Rfl: 0    Lancets (ONETOUCH DELICA PLUS [id number]) Does not apply Misc, 1 strip by In Vitro route 2 (two) times daily. FASTING AND 2 HOURS AFTER A MEAL (Patient not taking: Reported on 11/8/2024), Disp: 200 each, Rfl: 3     Allergies:   No Known Allergies    Social History:   Social History     Socioeconomic History    Marital status:    Tobacco Use    Smoking status: Never    Smokeless tobacco: Never   Vaping Use    Vaping status: Never Used   Substance and Sexual Activity    Alcohol use: No    Drug use: No   Social History Narrative    ** Merged History Encounter **            Medical History:   Past Medical History:    Asthma (HCC)    Depression    Diabetes (HCC)    Diabetes type 2, controlled (HCC)    Disorder of thyroid    Diverticulitis    Esophageal reflux    Essential hypertension    Gout    Hepatic steatosis    Hepatomegaly    High blood pressure    High cholesterol    Hyperlipidemia    Thyroid disease       Surgical history:   Past Surgical History:   Procedure Laterality Date    Colonoscopy N/A 3/29/2017    Procedure: COLONOSCOPY;  Surgeon: Lenny Miranda MD;  Location: [facility] ENDOSCOPY    Colonoscopy N/A 1/8/2021    Procedure: COLONOSCOPY;  Surgeon: Arthur Roberts MD;  Location: [facility] ENDO    Colonoscopy N/A 10/10/2024    Procedure: COLONOSCOPY with polypectomy;  Surgeon: Adriana Vega MD;  Location: [facility] ENDOSCOPY         PHYSICAL EXAM  Vitals:    02/19/25 1616   BP: 132/77   Pulse: 94   Weight: 195 lb (88.5 kg)    Height: 5' 4\" (1.626 m)         General Appearance:  alert, well developed, in no acute distress  Eyes:  normal conjunctivae, sclera., normal sclera and normal pupils  Ears/Nose/Mouth/Throat/Neck:  no palpable thyroid nodules or cervical lymphadenopathy  Neck: Trachea midline: Normal  Back: no kyphosis or back tenderness  Psychiatric:  oriented to time, self, and place  Nutritional:  no abnormal weight gain or loss    Lab Data:   Lab Results   Component Value Date     (H) 11/08/2024    A1C 8.0 (H) 11/08/2024     Lab Results   Component Value Date     (H) 11/08/2024    BUN 13 11/08/2024    BUNCREA 13.7 11/08/2024    CREATSERUM 0.95 11/08/2024    ANIONGAP 9 11/08/2024    GFRNAA 57 (L) 07/06/2022    GFRAA 66 07/06/2022    CA 10.6 (H) 11/08/2024    OSMOCALC 294 11/08/2024    ALKPHO 97 11/08/2024    AST 23 11/08/2024    ALT 24 11/08/2024    BILT 0.5 11/08/2024    TP 7.9 11/08/2024    ALB 5.1 (H) 11/08/2024    GLOBULIN 2.8 11/08/2024     11/08/2024    K 4.3 11/08/2024     11/08/2024    CO2 26.0 11/08/2024     Lab Results   Component Value Date    CHOLEST 185 11/08/2024    TRIG 210 (H) 11/08/2024    HDL 47 11/08/2024     (H) 11/08/2024    VLDL 35 (H) 11/08/2024    NONHDLC 138 (H) 11/08/2024     Lab Results   Component Value Date    MALBP 15.30 11/08/2024    CREUR 153.40 11/08/2024         ASSESSMENT/PLAN:    This is a 59 year-old woman here for evaluation and management of uncontrolled type 2 diabetes. We discussed the ABCs of DM.     1.) Hyperglycemia Management- We discussed the importance of glycemic control to prevent complications of diabetes.   - Continue glipizide 10mg PO bid  - Stop metformin 1g PO bid  - Start Trulicity 1.5mg qweekly   - Continue Basaglar 40  units qAM  - Check blood sugars fasting and two hours after biggest meal and send them to me in 2 weeks  - I will make adjustments in medications at that time    2.) Management of Diabetic Complications- We discussed the  complications of diabetes include retinopathy, neuropathy, nephropathy and cardiovascular disease.   - Covid vaccine- up to date  - Neuropathy- she has this, will stop gabapentin and   - CAD- none  - HTN- on meds, managed by PCP  - Ophtho- gave referral for eye exam  - MAC- check in 6 weeks  - Lipids- check in 6 weeks  - CMP- check in 6 weeks    3.) Lifestyle Management for Diabetes- We discussed importance of a low CHO diet, and recommend 45gm per meal or 135gm per day. We discussed the importance of trying to follow a Mediterranean diet, with an emphasis on vegetables at every meal, with lots whole grains, and protein from either plant-based sources, or poultry and fish.   - asked her to eat more vegetables and less red meat- she is trying to do this  - I have given them the site '[employer]' which has a lot of good advice for how to eat nutritious meals as a usama    4.) Hypothyroidism- on 125mcg of levothyroxine.   - Check again in 6 weeks and adjust dose at that time    She will return to clinic in 6 weeks    Prior to this encounter, I spent over 15 minutes with preparing for the visit, including reviewing documents from other specialties as well as from PCP and going over test results. During the face to face encounter, I spent an additional 15 minutes which were determined for follow-up. Greater than 50% of the time was spent in counseling, anticipatory guidance, and coordination of care. Patient concerns were answered to the best of my knowledge.       2/19/25  Veena Emery MD

## 2025-02-19 NOTE — PATIENT INSTRUCTIONS
Stop Metformin    Continue Glipizide 10mg twice daily    Restart Trulicity 1,5mg every week    Continue Basaglar 40 units every morning    Check blood sugars fasting and two hours after biggest meal and send to us after 1 week    Call immediately if blood sugars are <90 or >220

## 2025-02-19 NOTE — TELEPHONE ENCOUNTER
1st no show. Dr. Marge Angel. [facility]/rheum 02/11/29. Sent 1st no show letter via [employer].     [facility] to r/s

## 2025-02-19 NOTE — TELEPHONE ENCOUNTER
Requested Prescriptions     Pending Prescriptions Disp Refills    diclofenac 1 % External Gel 1 each 2     Sig: Apply 2 g topically 4 (four) times daily.       Future Appointments   Date Time Provider Department Center   2/19/2025  3:40 PM Veena Emery MD [facility]     LOV: 10/8/24   Last Refilled:10/28/24 #1E 3RF           ASSESSMENT/PLAN:      Bilateral Carpal tunnel  - EMG in the past did show evidence of carpal tunnel, right worse than left  - Both carpal tunnel region was injected with 0.5 cc of lidocaine and 40 mg of Depo-Medrol in sterile fashion.  Patient tolerated procedure well  - Advised patient if she continues to have symptoms she can see hand surgeon     Possible Seropositive rheumatoid arthritis (+ CCP, MRI of the left shoulder showing inflammatory synovitis)  - It was always questionable if she had inflammatory arthritis as she had a low titer CCP and no evidence of synovitis on exam.    - s/p cortisone injections in L shoulder in the past   - no improvement on prednisone   - Continues to have pain in her hands, wrists and ankles  - She was on Plaquenil in the past but did not help  - Was also on Leflunomide but caused s/e  - MRI Right hand normal 7/2023  - She does not want to go on medication right now.  Advised we can try TNF inhibitor like Humira or Enbrel.  She is leaving out of the country for a few months.  When she comes back advised that we can discuss this further     Pt will follow up in 6 mos     There is a longitudinal care relationship with me, the care plan reflects the ongoing nature of the continuous relationship of care, and the medical record indicates that there is ongoing treatment of a serious/complex medical condition which I am currently managing.  is Applicable.      Marge Angel MD  10/8/2024  5:10 PM

## 2025-02-20 ENCOUNTER — TELEPHONE (OUTPATIENT)
Dept: ENDOCRINOLOGY CLINIC | Facility: CLINIC | Age: 60
End: 2025-02-20

## 2025-02-20 NOTE — TELEPHONE ENCOUNTER
Current Outpatient Medications   Medication Sig Dispense Refill                         insulin glargine (BASAGLAR KWIKPEN) 100 UNIT/ML Subcutaneous Solution Pen-injector Inject 40 Units into the skin every morning. ADMINISTER 40 UNITS UNDER THE SKIN TWICE DAILY EVERY DAY 36 mL 2     Refill requested:    Pharmacy Message:   \"ONCE every day OR BID?\"

## 2025-02-21 DIAGNOSIS — Z79.4 TYPE 2 DIABETES MELLITUS WITH DIABETIC NEUROPATHY, WITH LONG-TERM CURRENT USE OF INSULIN (HCC): ICD-10-CM

## 2025-02-21 DIAGNOSIS — I10 ESSENTIAL HYPERTENSION: ICD-10-CM

## 2025-02-21 DIAGNOSIS — M1A.09X0 IDIOPATHIC CHRONIC GOUT OF MULTIPLE SITES WITHOUT TOPHUS: ICD-10-CM

## 2025-02-21 DIAGNOSIS — E11.40 TYPE 2 DIABETES MELLITUS WITH DIABETIC NEUROPATHY, WITH LONG-TERM CURRENT USE OF INSULIN (HCC): ICD-10-CM

## 2025-02-21 DIAGNOSIS — F32.1 CURRENT MODERATE EPISODE OF MAJOR DEPRESSIVE DISORDER WITHOUT PRIOR EPISODE (HCC): ICD-10-CM

## 2025-02-21 DIAGNOSIS — M54.16 LUMBAR RADICULOPATHY: ICD-10-CM

## 2025-02-21 RX ORDER — PEN NEEDLE, DIABETIC 32GX 5/32"
NEEDLE, DISPOSABLE MISCELLANEOUS
Qty: 100 EACH | Refills: 3 | Status: CANCELLED | OUTPATIENT
Start: 2025-02-21

## 2025-02-21 RX ORDER — DULAGLUTIDE 1.5 MG/.5ML
1.5 INJECTION, SOLUTION SUBCUTANEOUS WEEKLY
Qty: 6 ML | Refills: 1 | Status: CANCELLED | OUTPATIENT
Start: 2025-02-21 | End: 2025-05-22

## 2025-02-21 NOTE — TELEPHONE ENCOUNTER
Called pt pharmacy on file to confirm pt dosage & sig per LOV pt is injecting 40 units \"qam\" whas on hold for 40 mins lvm

## 2025-02-24 RX ORDER — LOSARTAN POTASSIUM 100 MG/1
100 TABLET ORAL DAILY
Qty: 90 TABLET | Refills: 3 | OUTPATIENT
Start: 2025-02-24

## 2025-02-24 RX ORDER — METHOCARBAMOL 750 MG/1
750 TABLET, FILM COATED ORAL 3 TIMES DAILY
Qty: 270 TABLET | Refills: 1 | OUTPATIENT
Start: 2025-02-24

## 2025-02-24 RX ORDER — LEVOTHYROXINE SODIUM 125 UG/1
125 TABLET ORAL EVERY MORNING
Qty: 90 TABLET | Refills: 3 | OUTPATIENT
Start: 2025-02-24

## 2025-02-24 RX ORDER — CHOLECALCIFEROL (VITAMIN D3) 50 MCG
2000 TABLET ORAL EVERY MORNING
Qty: 90 TABLET | Refills: 3 | OUTPATIENT
Start: 2025-02-24

## 2025-02-24 RX ORDER — HYDROCORTISONE 25 MG/G
CREAM TOPICAL
Qty: 28 G | Refills: 5 | OUTPATIENT
Start: 2025-02-24

## 2025-02-24 RX ORDER — GLIPIZIDE 10 MG/1
10 TABLET ORAL
Qty: 180 TABLET | Refills: 1 | OUTPATIENT
Start: 2025-02-24

## 2025-02-24 RX ORDER — ALLOPURINOL 100 MG/1
100 TABLET ORAL NIGHTLY
Qty: 90 TABLET | Refills: 3 | OUTPATIENT
Start: 2025-02-24

## 2025-02-24 NOTE — TELEPHONE ENCOUNTER
Losartan 100m year supply sent to [facility] on 10/25/2024    Allopurinol 100m year supply sent to [facility] on 2024    Levothyroxine 125mc year supply sent to [facility] on 2024    Metformin 500m month supply sent to [facility] on 2024    Methocarbamol 750m month supply sent to [facility] on 2024    Glipizide 10m year supply sent to [facility] on 2025 by Dr. Emery    Proctozone HC: 6 month supply sent to [facility] on 10/25/2024

## 2025-02-24 NOTE — TELEPHONE ENCOUNTER
Refill passed per Clinic protocol.  Requested Prescriptions   Pending Prescriptions Disp Refills    pantoprazole 40 MG Oral Tab EC 90 tablet 1     Sig: Take 1 tablet (40 mg total) by mouth every morning before breakfast.       Gastrointestional Medication Protocol Passed - 2/24/2025  9:18 AM        Passed - In person appointment or virtual visit in the past 12 mos or appointment in next 3 mos     Recent Outpatient Visits              5 days ago Type 2 diabetes mellitus with diabetic neuropathy, with long-term current use of insulin ([facility])    [facility], [place] Veena Emery MD    Office Visit    3 months ago Left upper quadrant abdominal pain    [facility], [place] Kandel, N[place], MD    Office Visit    4 months ago Bilateral carpal tunnel syndrome    [facility], [place] Marge Angel MD    Office Visit    4 months ago H. pylori infection    [facility], [place] Adriana Vega MD    Office Visit    7 months ago Type 2 diabetes mellitus without retinopathy ([facility])    [facility], [place] Bk Helm MD    Office Visit                      Passed - Medication is active on med list          zolpidem 10 MG Oral Tab 30 tablet 0     Sig: Take 1 tablet (10 mg total) by mouth nightly.       Controlled Substance Medication Failed - 2/24/2025  9:18 AM        Failed - This medication is a controlled substance - forward to provider to refill        Passed - Medication is active on med list         Refused Prescriptions Disp Refills    hydrocortisone (PROCTOZONE-HC) 2.5 % External Cream 28 g 5     Sig: Apply to hemorrhoids for 7 to 10 days       Gastrointestional Medication Protocol Passed - 2/24/2025  9:18 AM        Passed - In person appointment or virtual visit in the past 12 mos or appointment in next 3 mos     Recent Outpatient Visits               5 days ago Type 2 diabetes mellitus with diabetic neuropathy, with long-term current use of insulin ([facility])    [facility], Veena Tom MD    Office Visit    3 months ago Left upper quadrant abdominal pain    [facility] [place] Kandel, Ninel, MD    Office Visit    4 months ago Bilateral carpal tunnel syndrome    [facility], Marge Santiago MD    Office Visit    4 months ago [facility]. pylori [facility]    Office Visit    7 months ago Type 2 diabetes mellitus without retinopathy ([facility])    [facility], Bk Erwin MD    Office Visit                      Passed - Medication is active on med list          losartan 100 MG Oral Tab 90 tablet 3     Sig: Take 1 tablet (100 mg total) by mouth daily.       Hypertension Medications Protocol Passed - 2/24/2025  9:18 AM        Passed - CMP or BMP in past 12 months        Passed - Last BP reading less than 140/90     BP Readings from Last 1 Encounters:   02/19/25 132/77               Passed - In person appointment or virtual visit in the past 12 mos or appointment in next 3 mos     Recent Outpatient Visits              5 days ago Type 2 diabetes mellitus with diabetic neuropathy, with long-term current use of insulin ([facility])    [facility], Veena Tom MD    Office Visit    3 months ago Left upper quadrant abdominal pain    [facility] [place] Kandel, Ninel, MD    Office Visit    4 months ago Bilateral carpal tunnel syndrome    [facility] Marge Santiago MD    Office Visit    4 months ago H. pylori infection    [facility] [place]Adriana Scott MD     Office Visit    7 months ago Type 2 diabetes mellitus without retinopathy (HCC)    [facility] [place]    Office Visit                      Passed - EGFRCR or GFRNAA > 50     GFR Evaluation  EGFRCR: 70 , resulted on 11/8/2024          Passed - Medication is active on med list          allopurinol 100 MG Oral Tab 90 tablet 3     Sig: Take 1 tablet (100 mg total) by mouth nightly.       There is no refill protocol information for this order       glipiZIDE 10 MG Oral Tab 180 tablet 1     Sig: Take 1 tablet (10 mg total) by mouth 2 (two) times daily before meals.       Diabetes Medication Protocol Failed - 2/24/2025  9:18 AM        Failed - Last A1C < 7.5 and within past 6 months     Lab Results   Component Value Date    A1C 8.7 (A) 02/19/2025             Passed - In person appointment or virtual visit in the past 6 mos or appointment in next 3 mos     Recent Outpatient Visits              5 days ago Type 2 diabetes mellitus with diabetic neuropathy, with long-term current use of insulin ([facility])    [facility] [place]    Office Visit    3 months ago Left upper quadrant abdominal pain    [facility], [place] Kandel, Ninel, MD    Office Visit    4 months ago Bilateral carpal tunnel syndrome    [facility] [place]    Office Visit    4 months ago H. pylori infection    [facility] [place]    Office Visit    7 months ago Type 2 diabetes mellitus without retinopathy ([facility])    [facility] [place]    Office Visit                      Passed - Microalbumin procedure in past 12 months or taking ACE/ARB        Passed - EGFRCR or GFRNAA > 50     GFR Evaluation  EGFRCR: 70 , resulted on 11/8/2024          Passed - GFR in  the past 12 months        Passed - Medication is active on med list          levothyroxine 125 MCG Oral Tab 90 tablet 3     Sig: Take 1 tablet (125 mcg total) by mouth every morning.       Thyroid Medication Protocol Passed - 2/24/2025  9:18 AM        Passed - TSH in past 12 months        Passed - Last TSH value is normal     Lab Results   Component Value Date    TSH 0.783 07/16/2024                 Passed - In person appointment or virtual visit in the past 12 mos or appointment in next 3 mos     Recent Outpatient Visits              5 days ago Type 2 diabetes mellitus with diabetic neuropathy, with long-term current use of insulin ([facility])    [facility], [place] Veena Emery MD    Office Visit    3 months ago Left upper quadrant abdominal pain    [facility], [place] Kandel, Ninel, MD    Office Visit    4 months ago Bilateral carpal tunnel syndrome    [facility], Marge Santiago MD    Office Visit    4 months ago H. pylori infection    [facility], [place]    Office Visit    7 months ago Type 2 diabetes mellitus without retinopathy ([facility])    [facility] [place]    Office Visit                      Passed - Medication is active on med list          metFORMIN 500 MG Oral Tab 360 tablet 1     Sig: TAKE 2 TABLETS BY MOUTH TWICE DAILY       Diabetes Medication Protocol Failed - 2/24/2025  9:18 AM        Failed - Last A1C < 7.5 and within past 6 months     Lab Results   Component Value Date    A1C 8.7 (A) 02/19/2025             Passed - In person appointment or virtual visit in the past 6 mos or appointment in next 3 mos     Recent Outpatient Visits              5 days ago Type 2 diabetes mellitus with diabetic neuropathy, with long-term current use of insulin ([facility])    [facility], [place] Veena Emery MD    Office Visit    3 months ago Left upper quadrant abdominal pain    [facility], [place] Kandel, Ninel, MD    Office Visit    4 months ago Bilateral carpal tunnel syndrome    [facility] Marge Angel MD    Office Visit    4 months ago H. pylori infection    [facility] Adriana Vega MD    Office Visit    7 months ago Type 2 diabetes mellitus without retinopathy (HCC)    [facility] Bk Helm MD    Office Visit                      Passed - Microalbumin procedure in past 12 months or taking ACE/ARB        Passed - EGFRCR or GFRNAA > 50     GFR Evaluation  EGFRCR: 70 , resulted on 11/8/2024          Passed - GFR in the past 12 months        Passed - Medication is active on med list          methocarbamol 750 MG Oral Tab 270 tablet 1     Sig: Take 1 tablet (750 mg total) by mouth 3 (three) times daily.       There is no refill protocol information for this order

## 2025-02-24 NOTE — TELEPHONE ENCOUNTER
Please review; protocol failed/No Protocol    Routing to covering provider Dr. Alcantara is out of office.     Last Office Visit: 11/08/2024

## 2025-02-26 RX ORDER — SERTRALINE HYDROCHLORIDE 100 MG/1
100 TABLET, FILM COATED ORAL DAILY
Qty: 90 TABLET | Refills: 1 | OUTPATIENT
Start: 2025-02-26

## 2025-02-26 RX ORDER — SIMVASTATIN 40 MG
40 TABLET ORAL NIGHTLY
Qty: 90 TABLET | Refills: 3 | OUTPATIENT
Start: 2025-02-26

## 2025-02-26 RX ORDER — TRAZODONE HYDROCHLORIDE 100 MG/1
100 TABLET ORAL NIGHTLY
Qty: 90 TABLET | Refills: 0 | OUTPATIENT
Start: 2025-02-26

## 2025-02-26 RX ORDER — KETOCONAZOLE 20 MG/ML
SHAMPOO, SUSPENSION TOPICAL
Qty: 120 ML | Refills: 5 | Status: SHIPPED | OUTPATIENT
Start: 2025-02-26

## 2025-02-26 RX ORDER — LANCETS 33 GAUGE
1 EACH MISCELLANEOUS 2 TIMES DAILY
Qty: 200 EACH | Refills: 3 | Status: SHIPPED | OUTPATIENT
Start: 2025-02-26

## 2025-02-26 RX ORDER — GABAPENTIN 400 MG/1
400 CAPSULE ORAL 2 TIMES DAILY
Qty: 180 CAPSULE | Refills: 1 | OUTPATIENT
Start: 2025-02-26

## 2025-02-26 RX ORDER — HYDROCHLOROTHIAZIDE 25 MG/1
TABLET ORAL
Qty: 180 TABLET | Refills: 1 | OUTPATIENT
Start: 2025-02-26

## 2025-02-26 RX ORDER — HYDROCODONE BITARTRATE AND ACETAMINOPHEN 5; 325 MG/1; MG/1
1 TABLET ORAL EVERY 8 HOURS PRN
Qty: 21 TABLET | Refills: 0 | OUTPATIENT
Start: 2025-02-26

## 2025-02-26 RX ORDER — POTASSIUM CHLORIDE 750 MG/1
10 CAPSULE, EXTENDED RELEASE ORAL 2 TIMES DAILY
Qty: 180 CAPSULE | Refills: 3 | OUTPATIENT
Start: 2025-02-26

## 2025-02-26 RX ORDER — OMEPRAZOLE 40 MG/1
40 CAPSULE, DELAYED RELEASE ORAL DAILY
Qty: 90 CAPSULE | Refills: 3 | OUTPATIENT
Start: 2025-02-26

## 2025-02-26 NOTE — TELEPHONE ENCOUNTER
Please call patient to go over medications.  Is patient taking Omeprazole and Pantoprazole? There is no dispense history of either medication.       Both are active on current medication list but no documentation from provider.

## 2025-02-26 NOTE — TELEPHONE ENCOUNTER
[facility].  Lacents One touch & Ketoconazole 2% shampoo.    Duplicate refill request / refill too soon.   -Sertraline, gabapentin, traZODone & hydroCHLOROthiazide due     -Omeprazole, Potassium Chloride due 2025    -Simvastatin due 2025    -HYDROcodone-acetaminophen never picked up, confirmed with Massiel [facility] [place]     Sent [employer] message to patient to reach out to pharmacy.   Requested Prescriptions     Signed Prescriptions Disp Refills    Lancets (ONETOUCH DELICA PLUS [id number]) Does not apply Misc 200 each 3     Si strip by In Vitro route 2 (two) times daily. FASTING AND 2 HOURS AFTER A MEAL     Authorizing Provider: SILVERIO JULIO     Ordering User: JUNIOR DESAI    ketoconazole 2 % External Shampoo 120 mL 5     Sig: Use to  Scalp twice a wekk     Authorizing Provider: SILVERIO JULIO     Ordering User: JUNIOR DESAI     Refused Prescriptions Disp Refills    Omeprazole 40 MG Oral Capsule Delayed Release 90 capsule 3     Sig: Take 1 capsule (40 mg total) by mouth daily.     Refused By: JUNIOR DESAI     Reason for Refusal: Patient has requested refill too soon    Potassium Chloride ER 10 MEQ Oral Cap  capsule 3     Sig: Take 1 capsule (10 mEq total) by mouth 2 (two) times daily.     Refused By: JUNIOR DESAI     Reason for Refusal: Patient has requested refill too soon    gabapentin 400 MG Oral Cap 180 capsule 1     Sig: Take 1 capsule (400 mg total) by mouth 2 (two) times daily.     Refused By: JUNIOR DESAI     Reason for Refusal: Patient has requested refill too soon    simvastatin 40 MG Oral Tab 90 tablet 3     Sig: Take 1 tablet (40 mg total) by mouth nightly.     Refused By: JUNIOR DESAI     Reason for Refusal: Patient has requested refill too soon    traZODone 100 MG Oral Tab 90 tablet 0     Sig: Take 1 tablet (100 mg total) by mouth nightly.     Refused By: JUNIOR DESAI     Reason for Refusal: Patient has  requested refill too soon    sertraline 100 MG Oral Tab 90 tablet 1     Sig: Take 1 tablet (100 mg total) by mouth daily.     Refused By: JUNIOR DESAI     Reason for Refusal: Patient has requested refill too soon    HYDROcodone-acetaminophen (NORCO) 5-325 MG Oral Tab 21 tablet 0     Sig: Take 1 tablet by mouth every 8 (eight) hours as needed for Pain. Can cause  constipations     Refused By: JUNIOR DESAI     Reason for Refusal: Request already responded to by other means (e.g. phone or fax)    hydroCHLOROthiazide 25 MG Oral Tab 180 tablet 1     Sig: TAKE 1 TABLET BY MOUTH TWICE DAILY     Refused By: JUNIOR DESAI     Reason for Refusal: Patient has requested refill too soon

## 2025-02-27 ENCOUNTER — OFFICE VISIT (OUTPATIENT)
Dept: INTERNAL MEDICINE CLINIC | Facility: CLINIC | Age: 60
End: 2025-02-27

## 2025-02-27 ENCOUNTER — APPOINTMENT (OUTPATIENT)
Dept: CT IMAGING | Facility: HOSPITAL | Age: 60
End: 2025-02-27
Attending: EMERGENCY MEDICINE
Payer: MEDICAID

## 2025-02-27 ENCOUNTER — APPOINTMENT (OUTPATIENT)
Dept: GENERAL RADIOLOGY | Facility: HOSPITAL | Age: 60
End: 2025-02-27
Payer: MEDICAID

## 2025-02-27 ENCOUNTER — HOSPITAL ENCOUNTER (EMERGENCY)
Facility: HOSPITAL | Age: 60
Discharge: HOME | End: 2025-02-27
Attending: EMERGENCY MEDICINE
Payer: MEDICAID

## 2025-02-27 ENCOUNTER — NURSE TRIAGE (OUTPATIENT)
Dept: INTERNAL MEDICINE CLINIC | Facility: CLINIC | Age: 60
End: 2025-02-27

## 2025-02-27 VITALS
OXYGEN SATURATION: 94 % | HEART RATE: 90 BPM | DIASTOLIC BLOOD PRESSURE: 76 MMHG | RESPIRATION RATE: 16 BRPM | TEMPERATURE: 97 F | SYSTOLIC BLOOD PRESSURE: 137 MMHG | WEIGHT: 190.06 LBS | BODY MASS INDEX: 33 KG/M2

## 2025-02-27 VITALS
OXYGEN SATURATION: 95 % | HEART RATE: 99 BPM | DIASTOLIC BLOOD PRESSURE: 95 MMHG | HEIGHT: 64 IN | BODY MASS INDEX: 32.44 KG/M2 | WEIGHT: 190 LBS | SYSTOLIC BLOOD PRESSURE: 154 MMHG

## 2025-02-27 DIAGNOSIS — R07.81 PLEURITIC CHEST PAIN: ICD-10-CM

## 2025-02-27 DIAGNOSIS — E87.6 HYPOKALEMIA: ICD-10-CM

## 2025-02-27 DIAGNOSIS — R06.00 DYSPNEA, UNSPECIFIED TYPE: Primary | ICD-10-CM

## 2025-02-27 DIAGNOSIS — R07.9 CHEST PAIN, UNSPECIFIED TYPE: Primary | ICD-10-CM

## 2025-02-27 LAB
ALBUMIN SERPL-MCNC: 5.1 G/DL (ref 3.2–4.8)
ALP LIVER SERPL-CCNC: 96 U/L
ALT SERPL-CCNC: 35 U/L
ANION GAP SERPL CALC-SCNC: 12 MMOL/L (ref 0–18)
AST SERPL-CCNC: 26 U/L (ref ?–34)
BASOPHILS # BLD AUTO: 0.06 X10(3) UL (ref 0–0.2)
BASOPHILS NFR BLD AUTO: 0.6 %
BILIRUB DIRECT SERPL-MCNC: 0.1 MG/DL (ref ?–0.3)
BILIRUB SERPL-MCNC: 0.5 MG/DL (ref 0.3–1.2)
BUN BLD-MCNC: 15 MG/DL (ref 9–23)
BUN/CREAT SERPL: 15.5 (ref 10–20)
CALCIUM BLD-MCNC: 9.9 MG/DL (ref 8.7–10.4)
CHLORIDE SERPL-SCNC: 98 MMOL/L (ref 98–112)
CO2 SERPL-SCNC: 27 MMOL/L (ref 21–32)
CREAT BLD-MCNC: 0.97 MG/DL
DEPRECATED RDW RBC AUTO: 39 FL (ref 35.1–46.3)
EGFRCR SERPLBLD CKD-EPI 2021: 68 ML/MIN/1.73M2 (ref 60–?)
EOSINOPHIL # BLD AUTO: 0.32 X10(3) UL (ref 0–0.7)
EOSINOPHIL NFR BLD AUTO: 3.2 %
ERYTHROCYTE [DISTWIDTH] IN BLOOD BY AUTOMATED COUNT: 15.1 % (ref 11–15)
GLUCOSE BLD-MCNC: 140 MG/DL (ref 70–99)
HCT VFR BLD AUTO: 39.5 %
HGB BLD-MCNC: 13.1 G/DL
IMM GRANULOCYTES # BLD AUTO: 0.02 X10(3) UL (ref 0–1)
IMM GRANULOCYTES NFR BLD: 0.2 %
LIPASE SERPL-CCNC: 34 U/L (ref 12–53)
LYMPHOCYTES # BLD AUTO: 4.13 X10(3) UL (ref 1–4)
LYMPHOCYTES NFR BLD AUTO: 41.5 %
MCH RBC QN AUTO: 24.1 PG (ref 26–34)
MCHC RBC AUTO-ENTMCNC: 33.2 G/DL (ref 31–37)
MCV RBC AUTO: 72.6 FL
MONOCYTES # BLD AUTO: 0.7 X10(3) UL (ref 0.1–1)
MONOCYTES NFR BLD AUTO: 7 %
NEUTROPHILS # BLD AUTO: 4.72 X10 (3) UL (ref 1.5–7.7)
NEUTROPHILS # BLD AUTO: 4.72 X10(3) UL (ref 1.5–7.7)
NEUTROPHILS NFR BLD AUTO: 47.5 %
OSMOLALITY SERPL CALC.SUM OF ELEC: 287 MOSM/KG (ref 275–295)
PLATELET # BLD AUTO: 418 10(3)UL (ref 150–450)
POTASSIUM SERPL-SCNC: 3.2 MMOL/L (ref 3.5–5.1)
PROT SERPL-MCNC: 8.2 G/DL (ref 5.7–8.2)
RBC # BLD AUTO: 5.44 X10(6)UL
SODIUM SERPL-SCNC: 137 MMOL/L (ref 136–145)
TROPONIN I SERPL HS-MCNC: 3 NG/L
TROPONIN I SERPL HS-MCNC: 3 NG/L
WBC # BLD AUTO: 10 X10(3) UL (ref 4–11)

## 2025-02-27 PROCEDURE — 93005 ELECTROCARDIOGRAM TRACING: CPT

## 2025-02-27 PROCEDURE — 93010 ELECTROCARDIOGRAM REPORT: CPT

## 2025-02-27 PROCEDURE — 80048 BASIC METABOLIC PNL TOTAL CA: CPT | Performed by: EMERGENCY MEDICINE

## 2025-02-27 PROCEDURE — 84484 ASSAY OF TROPONIN QUANT: CPT | Performed by: EMERGENCY MEDICINE

## 2025-02-27 PROCEDURE — 71260 CT THORAX DX C+: CPT | Performed by: EMERGENCY MEDICINE

## 2025-02-27 PROCEDURE — 96374 THER/PROPH/DIAG INJ IV PUSH: CPT

## 2025-02-27 PROCEDURE — 99284 EMERGENCY DEPT VISIT MOD MDM: CPT

## 2025-02-27 PROCEDURE — 99214 OFFICE O/P EST MOD 30 MIN: CPT | Performed by: NURSE PRACTITIONER

## 2025-02-27 PROCEDURE — 80076 HEPATIC FUNCTION PANEL: CPT | Performed by: EMERGENCY MEDICINE

## 2025-02-27 PROCEDURE — 71045 X-RAY EXAM CHEST 1 VIEW: CPT | Performed by: EMERGENCY MEDICINE

## 2025-02-27 PROCEDURE — 99285 EMERGENCY DEPT VISIT HI MDM: CPT

## 2025-02-27 PROCEDURE — 83690 ASSAY OF LIPASE: CPT | Performed by: EMERGENCY MEDICINE

## 2025-02-27 PROCEDURE — 85025 COMPLETE CBC W/AUTO DIFF WBC: CPT | Performed by: EMERGENCY MEDICINE

## 2025-02-27 RX ORDER — KETOROLAC TROMETHAMINE 15 MG/ML
15 INJECTION, SOLUTION INTRAMUSCULAR; INTRAVENOUS ONCE
Status: COMPLETED | OUTPATIENT
Start: 2025-02-27 | End: 2025-02-27

## 2025-02-27 RX ORDER — ALBUTEROL SULFATE 90 UG/1
2 INHALANT RESPIRATORY (INHALATION) EVERY 4 HOURS PRN
Qty: 1 EACH | Refills: 0 | Status: SHIPPED | OUTPATIENT
Start: 2025-02-27 | End: 2025-03-29

## 2025-02-27 RX ORDER — POTASSIUM CHLORIDE 1500 MG/1
40 TABLET, EXTENDED RELEASE ORAL ONCE
Status: COMPLETED | OUTPATIENT
Start: 2025-02-27 | End: 2025-02-27

## 2025-02-27 NOTE — TELEPHONE ENCOUNTER
[facility] the following [facility]:    High heart beat

## 2025-02-27 NOTE — TELEPHONE ENCOUNTER
Action Requested: Summary for Provider     []  Critical Lab, Recommendations Needed  [] Need Additional Advice  []   FYI    []   Need Orders  [] Need Medications Sent to Pharmacy  []  Other     SUMMARY: states she feels like her heart is racing and she cannot catch her breath with exertion. O2 96-98%, HR in the 90's. Booked appointment for this evening.     Reason for call: Acute and Tachycardia  Onset: one week      Called the patient, spoke with her daughter.     She states her mom came home from [place] recently, she flew home on the plane. She had difficulty breathing on the plane and they had to place her on oxygen and her heart rate was very elevated. She states the same thing is happening to her now, when she gets up her heart rate becomes elevated. She states if she is sitting or laying down she feels ok. On the pulse ox her heart rate is in the 90's and her O2 is 96-98%. She states when she gets up she feels her heart is beating very fast and she cannot catch her breath. She is not complaining of any chest pain and she is not feeling that way now. Booked an appointment for today at 7 pm.     Future Appointments   Date Time Provider Department Center   2/27/2025  7:00 PM Sas, Kathryn E., APRN [id number] [place]   4/11/2025  3:20 PM Kandel, Ninel, MD [id number] [place]       Reason for Disposition   Skipped or extra beat(s) and increases with exercise or exertion    Protocols used: Heart Rate and Heartbeat [id number]

## 2025-02-27 NOTE — TELEPHONE ENCOUNTER
Patient has not yet read the [employer] message.   Medication Renewal Request  Message [phone]  From  Matt Romano CPhT To  Yamil Delbertram Sent and Delivered  2/24/2025  9:34 AM   Last Read in [employer]  Not Read           RN left a complete message on the patient's voicemail (verbal release of information) regarding the clarification of her medication - pantoprazole or omeprazole - second attempt.

## 2025-02-28 LAB
ATRIAL RATE: 92 BPM
ATRIAL RATE: 96 BPM
P AXIS: 26 DEGREES
P AXIS: 36 DEGREES
P-R INTERVAL: 154 MS
P-R INTERVAL: 168 MS
Q-T INTERVAL: 370 MS
Q-T INTERVAL: 390 MS
QRS DURATION: 94 MS
QRS DURATION: 96 MS
QTC CALCULATION (BEZET): 467 MS
QTC CALCULATION (BEZET): 482 MS
R AXIS: -21 DEGREES
R AXIS: -6 DEGREES
T AXIS: 25 DEGREES
T AXIS: 31 DEGREES
VENTRICULAR RATE: 92 BPM
VENTRICULAR RATE: 96 BPM

## 2025-02-28 NOTE — ED QUICK NOTES
Discharge paperwork discussed and given to patient. Patient verbalized understanding of paperwork. Respirations even and unlabored, patient is alert and oriented x4, warm, pink and dry, no distress noted. Discussed with patient s/s of worsening condition and when to seek medical attention, and encouraged pt to follow up with PCP. Pt verbalized understanding. No further questions or other needs verbalized by pt.

## 2025-02-28 NOTE — PROGRESS NOTES
Randi Lobo is a 59 year old female.  HPI:   Patient is here with daughter, history of type 2 diabetes with long-term current use of insulin, hypertension, dyslipidemia, hypothyroidism, fibromyalgia.  She reports she was recently on a flight and felt fatigued, palpitations, short of breath.  She was administered oxygen on the flight and was told that she had a low heart rate.  She reports afterwards feeling better but over the past couple of days patient reports feeling short of breath again and palpitations.  Patient reports that she has been experiencing some chest pain that has been radiating up into her left shoulder.  She denies any headache, dizziness, swelling, appetite or weight changes, vision changes, nausea or vomiting.  Blood pressure is elevated today in clinic and borderline tachycardic with heart rate of 99 bpm.   Current Outpatient Medications   Medication Sig Dispense Refill    Lancets (ONETOUCH DELICA PLUS [id number]) Does not apply Misc 1 strip by In Vitro route 2 (two) times daily. FASTING AND 2 HOURS AFTER A MEAL 200 each 3    ketoconazole 2 % External Shampoo Use to  Scalp twice a wekk 120 mL 5    diclofenac 1 % External Gel Apply 2 g topically 4 (four) times daily. 1 each 2    Dulaglutide (TRULICITY) 1.5 MG/0.5ML Subcutaneous Solution Auto-injector Inject 1.5 mg into the skin once a week. 6 mL 1    glipiZIDE 10 MG Oral Tab Take 1 tablet (10 mg total) by mouth 2 (two) times daily before meals. 180 tablet 3    insulin glargine (BASAGLAR KWIKPEN) 100 UNIT/ML Subcutaneous Solution Pen-injector Inject 40 Units into the skin every morning. ADMINISTER 40 UNITS UNDER THE SKIN TWICE DAILY EVERY DAY 36 mL 2    methocarbamol 750 MG Oral Tab Take 1 tablet (750 mg total) by mouth 3 (three) times daily. 270 tablet 1    allopurinol 100 MG Oral Tab Take 1 tablet (100 mg total) by mouth nightly. 90 tablet 3    aspirin 81 MG Oral Tab EC Take 1 tablet (81 mg total) by mouth daily. Pt states 2x a day. 90  tablet 3    carvedilol 12.5 MG Oral Tab Take 1 tablet (12.5 mg total) by mouth 2 (two) times daily with meals. 180 tablet 3    gabapentin 400 MG Oral Cap Take 1 capsule (400 mg total) by mouth 2 (two) times daily. 180 capsule 1    levothyroxine 125 MCG Oral Tab Take 1 tablet (125 mcg total) by mouth every morning. 90 tablet 3    metFORMIN 500 MG Oral Tab TAKE 2 TABLETS BY MOUTH TWICE DAILY 360 tablet 1    simvastatin 40 MG Oral Tab Take 1 tablet (40 mg total) by mouth nightly. 90 tablet 3    traZODone 100 MG Oral Tab Take 1 tablet (100 mg total) by mouth nightly. 90 tablet 0    sertraline 100 MG Oral Tab Take 1 tablet (100 mg total) by mouth daily. 90 tablet 1    HYDROcodone-acetaminophen (NORCO) 5-325 MG Oral Tab Take 1 tablet by mouth every 8 (eight) hours as needed for Pain. Can cause  constipations 21 tablet 0    hydroCHLOROthiazide 25 MG Oral Tab TAKE 1 TABLET BY MOUTH TWICE DAILY 180 tablet 1    Glucose Blood (ONETOUCH VERIO) In Vitro Strip USE TO CHECK BLOOD SUGAR TWICE DAILY, FASTING AND 2 HOURS AFTER A MEAL 200 strip 1    metFORMIN 500 MG Oral Tab TAKE 2 TABLETS BY MOUTH TWICE DAILY 360 tablet 1    hydrocortisone (PROCTOZONE-HC) 2.5 % External Cream Apply to hemorrhoids for 7 to 10 days 28 g 5    losartan 100 MG Oral Tab Take 1 tablet (100 mg total) by mouth daily. 90 tablet 3    zolpidem 10 MG Oral Tab Take 1 tablet (10 mg total) by mouth nightly. 30 tablet 0    Omeprazole 40 MG Oral Capsule Delayed Release Take 1 capsule (40 mg total) by mouth daily. 90 capsule 3    Potassium Chloride ER 10 MEQ Oral Cap CR Take 1 capsule (10 mEq total) by mouth 2 (two) times daily. 180 capsule 3    Continuous Glucose Transmitter (DEXCOM G6 TRANSMITTER) Does not apply Misc Change sensor every 90 days 1 each 0    Continuous Glucose  (DEXCOM G6 ) Does not apply Device Use to monitor blood sugar level 1 each 0    Continuous Glucose Sensor (DEXCOM G6 SENSOR) Does not apply Misc Change sensor every 10 days 9  each 0    Insulin Pen Needle (TRUEPLUS 5-BEVEL PEN NEEDLES) 32G X 4 MM Does not apply Misc Use twice a day 200 each 3    cholecalciferol 50 MCG (2000 UT) Oral Tab Take 1 tablet (2,000 Units total) by mouth every morning. 90 tablet 3    pantoprazole 40 MG Oral Tab EC Take 1 tablet (40 mg total) by mouth every morning before breakfast. 90 tablet 1    Blood Glucose Monitoring Suppl (ONETOUCH VERIO) w/Device Does not apply Kit 1 kit As Directed. 1 kit 0    Insulin Pen Needle (TRUEPLUS PEN NEEDLES) 32G X 4 MM Does not apply Misc Use needles to inject insulin daily 100 each 3    Ferrous Sulfate (IRON) 325 (65 Fe) MG Oral Tab Take 1 Dose by mouth daily. 30 tablet 0      Past Medical History:    Asthma (HCC)    Depression    Diabetes (HCC)    Diabetes type 2, controlled (HCC)    Disorder of thyroid    Diverticulitis    Esophageal reflux    Essential hypertension    Gout    Hepatic steatosis    Hepatomegaly    High blood pressure    High cholesterol    Hyperlipidemia    Thyroid disease      Social History:  Social History     Socioeconomic History    Marital status:    Tobacco Use    Smoking status: Never    Smokeless tobacco: Never   Vaping Use    Vaping status: Never Used   Substance and Sexual Activity    Alcohol use: No    Drug use: No   Social History Narrative    ** Merged History Encounter **             REVIEW OF SYSTEMS:   Review of Systems   Constitutional:  Positive for fatigue. Negative for activity change, appetite change, chills, diaphoresis, fever and unexpected weight change.   HENT:  Negative for congestion.    Eyes:  Negative for visual disturbance.   Respiratory:  Positive for shortness of breath. Negative for cough, chest tightness and wheezing.    Cardiovascular:  Positive for chest pain and palpitations. Negative for leg swelling.   Gastrointestinal:  Negative for abdominal pain, constipation, diarrhea, nausea and vomiting.   Endocrine: Negative.    Genitourinary:  Negative for difficulty  urinating and vaginal bleeding.   Musculoskeletal:  Negative for arthralgias and back pain.   Skin: Negative.    Neurological:  Negative for dizziness, tremors, seizures, syncope, facial asymmetry, speech difficulty, weakness, light-headedness, numbness and headaches.   Hematological: Negative.    Psychiatric/Behavioral: Negative.            EXAM:   BP (!) 154/95 (BP Location: Right arm, Patient Position: Sitting, Cuff Size: large)   Pulse 99   Ht 5' 4\" (1.626 m)   Wt 190 lb (86.2 kg)   SpO2 95%   BMI 32.61 kg/m²     Physical Exam  Vitals reviewed.   Constitutional:       General: She is not in acute distress.  HENT:      Head: Normocephalic.   Eyes:      General: No scleral icterus.     Conjunctiva/sclera: Conjunctivae normal.   Cardiovascular:      Rate and Rhythm: Regular rhythm. Tachycardia present.      Heart sounds: Normal heart sounds.   Pulmonary:      Effort: Pulmonary effort is normal. No respiratory distress.      Breath sounds: Normal breath sounds.   Musculoskeletal:      Right lower leg: No edema.      Left lower leg: No edema.   Skin:     General: Skin is warm.      Coloration: Skin is not jaundiced or pale.   Neurological:      General: No focal deficit present.      Mental Status: She is oriented to person, place, and time.      Cranial Nerves: No cranial nerve deficit.      Motor: No weakness.      Gait: Gait normal.   Psychiatric:         Mood and Affect: Mood normal.         Behavior: Behavior normal.            ASSESSMENT AND PLAN:     Assessment & Plan  Chest pain, unspecified type  -Pt c/o left sided CP radiating to left shoulder, palpitations and SOB. Advised ER evaluation. Pt is agreeable. Pt requests ambulance, called 911, [facility].             The patient indicates understanding of these issues and agrees to the plan.  The patient is asked to return in 2-3 days after discharge from ER.     The above note was creating using Dragon speech recognition  technology. Please excuse any typos.

## 2025-02-28 NOTE — ED QUICK NOTES
Son at bedside, updated on the plan of care.Rounding Completed    Plan of Care reviewed. Waiting for CT PE.  Elimination needs assessed.  No needs / concern at the moment. .    Bed is locked and in lowest position. Call light within reach.

## 2025-02-28 NOTE — ED INITIAL ASSESSMENT (HPI)
Brought in by EMS from Urgent care with complaint of chest pain radiating to the back for 3 days.

## 2025-02-28 NOTE — ED PROVIDER NOTES
Patient Seen in: [facility] Emergency Department      History     Chief Complaint   Patient presents with    Chest Pain Angina     Stated Complaint: CP    Subjective:   59-year-old female with history of diabetes, hypertension, hyperlipidemia coming in from an urgent care for chief complaint of shortness of breath.  We used an video  because daughter has not arrived yet.  I did speak to EMS.  Patient is telling us she has 3 days of feeling short of breath which is worse when she is in the house.  She said when she is in open spaces she feels much better.  She also feels a tightness in her chest and back when she takes a deep breath.  Very mild discomfort if she is not taking the deep breath.  She said something happened like this 15 days ago when she was coming home from [place] on a plane.  She did see her endocrinologist on February 19 but did not see her actual doctor for follow-up in regards to the episode she had on the plane.  Patient speaking in full sentences and in no respiratory distress while conversing with the .              Objective:     Past Medical History:    Asthma (HCC)    Depression    Diabetes (HCC)    Diabetes type 2, controlled (HCC)    Disorder of thyroid    Diverticulitis    Esophageal reflux    Essential hypertension    Gout    Hepatic steatosis    Hepatomegaly    High blood pressure    High cholesterol    Hyperlipidemia    Thyroid disease              Past Surgical History:   Procedure Laterality Date    Colonoscopy N/A 3/29/2017    Procedure: COLONOSCOPY;  Surgeon: Lenny Miranda MD;  Location: [facility] ENDOSCOPY    Colonoscopy N/A 1/8/2021    Procedure: COLONOSCOPY;  Surgeon: Arthur Roberts MD;  Location: [facility] ENDO    Colonoscopy N/A 10/10/2024    Procedure: COLONOSCOPY with polypectomy;  Surgeon: Adriana Vega MD;  Location: [facility] ENDOSCOPY                Social History     Socioeconomic History    Marital status:    Tobacco Use     Smoking status: Never    Smokeless tobacco: Never   Vaping Use    Vaping status: Never Used   Substance and Sexual Activity    Alcohol use: No    Drug use: No   Social History Narrative    ** Merged History Encounter **                       Physical Exam     ED Triage Vitals   BP 02/27/25 2115 154/81   Pulse 02/27/25 2015 98   Resp 02/27/25 2015 18   Temp 02/27/25 2015 97.1 °F (36.2 °C)   Temp src 02/27/25 2015 Temporal   SpO2 02/27/25 2015 100 %   O2 Device 02/27/25 2015 None (Room air)       Current Vitals:   Vital Signs  BP: 137/76  Pulse: 90  Resp: 16  Temp: 97.1 °F (36.2 °C)  Temp src: Temporal  MAP (mmHg): 95    Oxygen Therapy  SpO2: 94 %  O2 Device: None (Room air)  O2 Flow Rate (L/min): 1 L/min        Physical Exam  HENT:      Head: Normocephalic.      Mouth/Throat:      Mouth: Mucous membranes are moist.      Pharynx: Oropharynx is clear.   Eyes:      Extraocular Movements: Extraocular movements intact.      Pupils: Pupils are equal, round, and reactive to light.   Cardiovascular:      Rate and Rhythm: Normal rate and regular rhythm.      Heart sounds: Normal heart sounds.   Pulmonary:      Effort: Pulmonary effort is normal.      Breath sounds: Normal breath sounds.   Abdominal:      Palpations: Abdomen is soft.      Tenderness: There is no abdominal tenderness.   Musculoskeletal:         General: No swelling or tenderness. Normal range of motion.      Cervical back: Normal range of motion.      Comments: Peripheral pulses strong and symmetric   Lymphadenopathy:      Cervical: No cervical adenopathy.   Skin:     General: Skin is warm.      Capillary Refill: Capillary refill takes less than 2 seconds.   Neurological:      General: No focal deficit present.      Mental Status: She is alert.             ED Course     Labs Reviewed   CBC WITH DIFFERENTIAL WITH PLATELET - Abnormal; Notable for the following components:       Result Value    RBC 5.44 (*)     MCV 72.6 (*)     MCH 24.1 (*)     RDW 15.1 (*)      Lymphocyte Absolute 4.13 (*)     All other components within normal limits   HEPATIC FUNCTION PANEL (7) - Abnormal; Notable for the following components:    Albumin 5.1 (*)     All other components within normal limits   BASIC METABOLIC PANEL (8) - Abnormal; Notable for the following components:    Glucose 140 (*)     Potassium 3.2 (*)     All other components within normal limits   TROPONIN I HIGH SENSITIVITY - Normal   TROPONIN I HIGH SENSITIVITY - Normal   LIPASE - Normal   RAINBOW DRAW LAVENDER   RAINBOW DRAW LIGHT GREEN   RAINBOW DRAW GOLD   RAINBOW DRAW BLUE     EKG    Rate, intervals and axes as noted on EKG Report.  Rate: 96  Rhythm: Sinus Rhythm  Reading: Inferior infarct, possible anterolateral infarct.  No ST elevation.  QTc 477.  Abnormal EKG read by myself.  EKG from February 2023 does have a similar morphology in lead III as well as V2.  There was more of an R wave in V3 V4 and V5.  Otherwise no significant morphology changes.  Unclear of the significance of slightly more taller R waves in the old EKG    EKG #2, sinus rhythm with occasional PVC.  Rate 92, possible inferior infarct seen on priors.  Poor anterior R wave progression seen on the prior.  No ST elevation and no acute changes.  Abnormal EKG       ED Course as of 02/28/25 0019  ------------------------------------------------------------  Time: 02/27 2133  Comment: CT chest shows no acute PE and no acute changes.  Chest x-ray clear.  CBC showed a low MCV.  CMP shows hypokalemia, troponin normal and lipase normal.  Awaiting troponin #2.  ------------------------------------------------------------  Time: 02/27 2224  Comment: Son is now in the room.  He agrees with the history she gave through the  where she is having dyspnea particularly in the enclosed spaces.  He also agrees that the chest and back pain is pleuritic.  She also pointed out to a tender spot in her left parasternal area.  I palpated it and she had significant pain  there.  I will give some Toradol.  Oral potassium being given.  I explained the results to the son who translated to the patient.  She is comfortable going home with the second troponin is normal.  I have recommended follow-up with cardiology and pulmonology in addition to primary care.  ------------------------------------------------------------  Time: 02/27 2301  Comment: Repeat troponin negative.  Repeat EKG unchanged.  Family comfortable taking her home as is the patient.  They are asking me if I would prescribe an MDI for home              MDM      CT CHEST PE AORTA (IV ONLY) (CPT=71260)    Result Date: 2/27/2025  CONCLUSION:  1. No evidence of acute pulmonary embolism to the proximal segmental pulmonary artery level.  More distal evaluation is not possible secondary to respiratory motion artifact. 2. No evidence of pneumonia. 3. Borderline enlarged right greater than left hilar lymph nodes appear unchanged since comparison CT from 2019 and are therefore likely reactive. 4. Fatty liver.   elm-remote  Dictated by (CST): Luis Oquendo MD on 2/27/2025 at 9:21 PM     Finalized by (CST): Luis Oquendo MD on 2/27/2025 at 9:27 PM          XR CHEST AP PORTABLE  (CPT=71045)    Result Date: 2/27/2025  CONCLUSION:   Negative for radiographically evident acute intrathoracic process.   elm-remote  Dictated by (CST): Luis Oquendo MD on 2/27/2025 at 8:38 PM     Finalized by (CST): Luis Oquendo MD on 2/27/2025 at 8:39 PM                 Medical Decision Making  Patient here with shortness of breath for 3 days and she also had the same feeling 15 days ago when she was on an airplane coming home from [place].  Now she has pleuritic chest and pleuritic back pain as well.  Differential is vast could include but is not limited to ACS, PE, dissection.  EKG here shows an inferior infarct but no ST elevation.  This has been going on for 3 days so certainly troponin should be elevated if this is an acute coronary syndrome.   The sense that it is pleuritic speaks against it.  Pericarditis also in the differential.  [facility] stress test was normal in 2022.    Amount and/or Complexity of Data Reviewed  External Data Reviewed: radiology and notes.     Details: Stress test and echo reviewed from 2022  Labs: ordered. Decision-making details documented in ED Course.  Radiology: ordered and independent interpretation performed. Decision-making details documented in ED Course.  ECG/medicine tests: ordered and independent interpretation performed. Decision-making details documented in ED Course.  Discussion of management or test interpretation with external provider(s): Repeat troponin was normal.  EKG #2 unchanged.    Risk  Prescription drug management.  Risk Details: Diabetes, asthma, depression, hypertension, hyperlipidemia    Diabetes    Disposition and Plan     Clinical Impression:  1. Dyspnea, unspecified type    2. Hypokalemia    3. Pleuritic chest pain         Disposition:  Discharge  2/27/2025 11:03 pm    Follow-up:  Nikki Alcantara MD  [address] [place] [address]  [phone]    Follow up      Austin Eller MD  [address] [facility]  [address]  [facility] [address]  [phone]    Follow up      Jovon Dowell MD  [address] [place] [address]  [address]  [facility] [address]  [phone]    Follow up            Medications Prescribed:  Discharge Medication List as of 2/27/2025 11:06 PM        START taking these medications    Details   albuterol 108 (90 Base) MCG/ACT Inhalation Aero Soln Inhale 2 puffs into the lungs every 4 (four) hours as needed for Wheezing., Normal, Disp-1 each, R-0                 Supplementary Documentation:

## 2025-02-28 NOTE — ED QUICK NOTES
Rounding Completed    Plan of Care reviewed. Waiting for 2nd troponin to be drawn at 2230  Elimination needs assessed.    Bed is locked and in lowest position. Call light within reach.

## 2025-02-28 NOTE — DISCHARGE INSTRUCTIONS
Please follow-up with your primary doctor.  Follow-up with the pulmonologist for further evaluation of your shortness of breath.  Follow-up with cardiology for further evaluation.  Return to the ER for changes worsening.  Continue normal medication.  Albuterol if needed to see if it helps with the shortness of breath.  Eat bananas and citrus fruits and have your potassium retested in the week.  Read all instructions.

## 2025-03-03 ENCOUNTER — TELEPHONE (OUTPATIENT)
Dept: RHEUMATOLOGY | Facility: CLINIC | Age: 60
End: 2025-03-03

## 2025-03-03 ENCOUNTER — NURSE TRIAGE (OUTPATIENT)
Dept: INTERNAL MEDICINE CLINIC | Facility: CLINIC | Age: 60
End: 2025-03-03

## 2025-03-03 RX ORDER — BLOOD SUGAR DIAGNOSTIC
STRIP MISCELLANEOUS
Qty: 200 STRIP | Refills: 1 | Status: SHIPPED | OUTPATIENT
Start: 2025-03-03

## 2025-03-03 RX ORDER — PROCHLORPERAZINE 25 MG/1
SUPPOSITORY RECTAL
Qty: 1 EACH | Refills: 0 | Status: SHIPPED | OUTPATIENT
Start: 2025-03-03

## 2025-03-03 RX ORDER — ZOLPIDEM TARTRATE 10 MG/1
10 TABLET ORAL NIGHTLY
Qty: 30 TABLET | Refills: 0 | Status: SHIPPED | OUTPATIENT
Start: 2025-03-03

## 2025-03-03 RX ORDER — PEN NEEDLE, DIABETIC 32GX 5/32"
NEEDLE, DISPOSABLE MISCELLANEOUS
Qty: 200 EACH | Refills: 3 | Status: SHIPPED | OUTPATIENT
Start: 2025-03-03

## 2025-03-03 RX ORDER — PANTOPRAZOLE SODIUM 40 MG/1
40 TABLET, DELAYED RELEASE ORAL
Qty: 90 TABLET | Refills: 3 | Status: SHIPPED | OUTPATIENT
Start: 2025-03-03

## 2025-03-03 RX ORDER — PROCHLORPERAZINE 25 MG/1
SUPPOSITORY RECTAL
Qty: 9 EACH | Refills: 0 | Status: SHIPPED | OUTPATIENT
Start: 2025-03-03

## 2025-03-03 NOTE — TELEPHONE ENCOUNTER
Endocrine Refill protocol for Glucose testing supplies     Protocol Criteria: PASSED Reason: N/A    If below requirement is met, send a 90-day supply with 1 refill per provider protocol.    Verify appointment with Endocrinology completed in the last 6 months or scheduled in the next 3 months.    Last completed office visit: 2/19/2025 Veena Emery MD   Next scheduled Follow up:   Future Appointments   Date Time Provider Department Center   3/4/2025  5:00 PM Marianne Reyes APRN [id number] [place]   4/11/2025  3:20 PM Nikki Alcantara MD [id number] [place]

## 2025-03-03 NOTE — TELEPHONE ENCOUNTER
Current Outpatient Medications   Medication Sig Dispense Refill    diclofenac 1 % External Gel Apply 2 g topically 4 (four) times daily. 1 each 2       Key: [id number]

## 2025-03-03 NOTE — TELEPHONE ENCOUNTER
Action Requested: Summary for Provider     []  Critical Lab, Recommendations Needed  [] Need Additional Advice  []   FYI    []   Need Orders  [] Need Medications Sent to Pharmacy  []  Other     SUMMARY: patient advised she's been vomiting with each meal and it's difficult to even drink water. I asked her if she is eating. She stated, \"very small amounts and bland foods and sips of water.\" Per patient this problem started last month while she was in [place]. She advised she typically eats spicy, greasy foods. She notes she is feeling burning to her upper abdomen along with excessive \"burping.\" She is in the process of fasting due to her [employer] so she goes for long periods of time without eating. Advised this can worsen the stomach pain. She was seen recently in the office with BRETT ARMAS and I asked her if she mentioned this problem then. She didn't. She advised she takes Pantoprazole and is using over the counter Pepto Bismal. Advised per protocol and schedule a visit with BRETT ARMAS for tomorrow at 5 pm.     Patient also mentioned that her pharmacy told her they do not have any of her medications. Advised her to talk with Marianne at her visit tomorrow regarding any medications she may need. This RN sent her Omeprazole today.     Reason for call: Abdominal Pain and Vomiting  Onset: Data Unavailable                   Reason for Disposition   MODERATE pain that comes and goes (cramps) lasts > 24 hours   MILD to MODERATE vomiting (e.g., 1-5 times/day) and lasts > 48 hours (2 days)    Protocols used: Abdominal Pain - Upper-A-OH, Vomiting-A-OH

## 2025-03-03 NOTE — TELEPHONE ENCOUNTER
Per chart medication list Omeprazole 40 mg sent to pharmacy by Dr. Alcantara for year supply on 8/9/2024    Refill passed per [facility] protocol.   Last office visit with BRETT COATS on 2/27/2025    Spoke to patient and she advised she is not taking Pantoprazole only taking Omeprazole.     She started talking about symptoms of abdominal pain. See Telephone encounter created 3/3/25    Marianne: see pended (patient is scheduled with you for tomorrow)

## 2025-03-03 NOTE — TELEPHONE ENCOUNTER
Please review; protocol failed/No Protocol      Recent Fills: 09/12/2024, 10/26/2024    Last Rx Written: 10/25/2024    Last Office Visit: 02/27/2025    No documentation in chart if patient is on Omeprazole or Pantoprazole or both, No dispense history and patient has been called and SeeFuture message sent to verify with patient.  Please advise on refill.

## 2025-03-03 NOTE — TELEPHONE ENCOUNTER
Endocrine Refill protocol for CGM supplies     Protocol Criteria:  PASSED Reason: N/A    If below requirement is met, send a 90-day supply with 1 refill per provider protocol.     Verify appointment with Endocrinology completed in the last 12 months or scheduled in the next 6 months     Last completed office visit:2/19/2025 Veena Emery MD   Next scheduled Follow up:   Future Appointments   Date Time Provider Department Center   3/4/2025  5:00 PM Marianne Reyes APRN [id number] [place]   4/11/2025  3:20 PM Nikki Alcantara MD [id number] [place]

## 2025-03-11 ENCOUNTER — TELEPHONE (OUTPATIENT)
Dept: ENDOCRINOLOGY CLINIC | Facility: CLINIC | Age: 60
End: 2025-03-11

## 2025-03-11 NOTE — TELEPHONE ENCOUNTER
Medication Quantity Refills Start End   Continuous Glucose  (DEXCOM G6 ) Does not apply Device 1 each 0 3/3/2025 --   Sig:   Use to monitor blood sugar level     Route:   (none)     Order #:   [id number]         Prior Authorization Needed    KEY: [id number]

## 2025-03-14 NOTE — TELEPHONE ENCOUNTER
Current Outpatient Medications   Medication Sig Dispense Refill                                Continuous Glucose  (DEXCOM G6 ) Does not apply Device Use to monitor blood sugar level 1 each 0                                                                                                     Pharmacy message: Plan limitation of ( max qty of 1 in 274 days) exceeded.Please call plan at [phone] to initiate PA or call store at [phone] r fax [phone] to chance medication.Patient ID # is [id number]

## 2025-03-17 RX ORDER — METHOCARBAMOL 500 MG/1
500 TABLET, FILM COATED ORAL 3 TIMES DAILY
Qty: 90 TABLET | Refills: 2 | OUTPATIENT
Start: 2025-03-17

## 2025-03-17 NOTE — TELEPHONE ENCOUNTER
Dose Changed    Methocarbamol 750 mg : was sent on 11/09/2024 for 6 month supply to [facility] in [place].

## 2025-03-20 NOTE — TELEPHONE ENCOUNTER
Shonna has questions regarding prior authorization request.  Please call with case # [id number] - [id number].  Thank you.

## 2025-03-20 NOTE — TELEPHONE ENCOUNTER
PA denied for diclofenac sodium 1% gel. Pt will have to purchase over the counter equivalent per pt's plan. Pharmacy contacted.

## 2025-03-20 NOTE — TELEPHONE ENCOUNTER
There was error with previous PA, new PA submitted via [employer].

## 2025-03-20 NOTE — TELEPHONE ENCOUNTER
Called [employer]: [place] approved for Dexcom G6 sensor,  and transmitter until 3/30/2026. Patient notified.

## 2025-04-15 ENCOUNTER — TELEPHONE (OUTPATIENT)
Dept: INTERNAL MEDICINE CLINIC | Facility: CLINIC | Age: 60
End: 2025-04-15

## 2025-04-15 NOTE — TELEPHONE ENCOUNTER
Patient scheduled a mychart appointment noting the following    Stomach ache vomiting burning     Left message to call back and mychart message sent

## 2025-04-15 NOTE — TELEPHONE ENCOUNTER
Patient's daughter called back (on Release of Information), verified patient's Name and . Daughter reports patient has been having midabdominal pain for more than 1 week now, occasionally pain is in the left side of the abdomen. Pain is associated with vomiting. Patient taking omeprazole and pantoprazole, and wants to know if there is something stronger she can take.    States patient was seen by GI last year, advised to do colonoscopy but did not do the test as patient went to [facility] at that time. Patient came back 3 weeks ago. Relayed H.pylori breath test was ordered by GI as well so patient needs to get that done still. Daughter then states patient cannot do the labs since they are currently in [place].     Relayed that if patient is having symptoms at this time, she needs to be treated locally. Daughter wants to schedule GI follow-up appointment, asked to call GI. Verbalized understanding and had no further questions at this time.

## 2025-05-06 ENCOUNTER — OFFICE VISIT (OUTPATIENT)
Facility: CLINIC | Age: 60
End: 2025-05-06
Payer: MEDICAID

## 2025-05-06 VITALS
BODY MASS INDEX: 32.27 KG/M2 | HEIGHT: 64 IN | DIASTOLIC BLOOD PRESSURE: 75 MMHG | HEART RATE: 86 BPM | SYSTOLIC BLOOD PRESSURE: 124 MMHG | WEIGHT: 189 LBS

## 2025-05-06 DIAGNOSIS — K86.9 PANCREATIC LESION (HCC): ICD-10-CM

## 2025-05-06 DIAGNOSIS — R10.84 GENERALIZED ABDOMINAL PAIN: ICD-10-CM

## 2025-05-06 DIAGNOSIS — Z86.19 HISTORY OF HELICOBACTER PYLORI INFECTION: Primary | ICD-10-CM

## 2025-05-06 DIAGNOSIS — R11.0 NAUSEA: ICD-10-CM

## 2025-05-06 PROCEDURE — 99215 OFFICE O/P EST HI 40 MIN: CPT | Performed by: NURSE PRACTITIONER

## 2025-05-06 RX ORDER — HYDROCORTISONE 25 MG/G
CREAM TOPICAL
Qty: 28 G | Refills: 5 | Status: SHIPPED | OUTPATIENT
Start: 2025-05-06

## 2025-05-06 RX ORDER — FAMOTIDINE 20 MG/1
20 TABLET, FILM COATED ORAL 2 TIMES DAILY
Qty: 60 TABLET | Refills: 0 | Status: SHIPPED | OUTPATIENT
Start: 2025-05-06 | End: 2025-06-05

## 2025-05-06 NOTE — PROGRESS NOTES
[facility] - Gastroenterology                                                                                                      Clinic Follow-up Visit    Chief Complaint   Patient presents with    Abdominal Pain     Burning pain in epigastric pain, bloating,loser stools, nausea/vomiting per patient         Subjective/HPI:   Randi Lobo is a 59 year old year old female with a past medical history of BMI 33.30, asthma, depression, diabetes, thyroid disorder, diverticulitis, GERD, HTN, HLD, and fatty liver presenting for multiple GI complaints     She is here today with daughter for German translation for worsening of upper GI symptoms.  Currently taking both Pantoprazole and Omeprazole daily.  When she takes a sip of water she vomits per patient/family.  Vomit looks like bile.   Glucose has been in the 300s.   Taking Miralax to have a bowel movement. History of chronic constipation at baseline.   Saw PCP after last visit here, had abnormal CT, did not follow up for MRI, had returned to [facility]. Possible duodenitis and 9mm pancreatic lesion.         Last seen by Dr. Vega 10/2024:  Previously saw Dr. Roberts in 2020 for the following:   + anemia  + weight loss  + constipation  + upper abdominal pain     Recommend:  - Omeprazole 20 mg daily  - colace daily   - stop meloxicam  - EGD/colonoscopy for above symptoms with MAC sedation  -Prep: Split dose Colyte or equivalent  -Anti-platelets and anti-coagulants: ok to continue ASA 81 mg  -Diabetes meds: Hold metformin day of procedure and day before procedure. If patient is on other diabetic medications/insulin please ask primary or endocrine to manage pre-procedure and day of procedure     PRIOR GI WORK UP:   CT AP 11/2024:  Impression   CONCLUSION:     Diffuse wall thickening with mucosal hyperenhancement throughout the duodenum, which may reflect duodenitis in the  appropriate clinical setting.  This appearance can also be seen in the setting of recent vomiting, particularly given the fluid within the  stomach.     No hydronephrosis or obstructing renal stones.     Curvilinear bands of opacity at the lung bases may represent subsegmental atelectasis.  Superimposed pneumonia is a differential in the appropriate clinical setting.     9 mm hypodense lesion of the anterior body of the pancreas.  Recommend further assessment with contrast enhanced abdominal MRI/MRCP on a nonemergent basis for follow-up.     Diffuse hepatic steatosis.     Diffuse urinary bladder wall thickening, can be artifactual from underdistention.  Differential is cystitis in the appropriate clinical setting.              CLN DR. Vega 10/2024:  Findings:   1. Two polyps noted as follows:      A. 5 mm polyp in the cecum of the colon; flat morphology; cold snare polypectomy performed, polyp retrieved.      B. 4 mm polyp in the sigmoid colon; sessile morphology; cold snare polypectomy performed, polyp retrieved.     2. Diverticulosis: mild on the right, moderate on the left.     3. Ileocecal valve appeared healthy and normal. The examined portion of the terminal ileum appeared normal.      4. The colonic mucosa throughout the colon showed normal vascular pattern, without evidence of angioectasias or inflammation.      5. A retroflexed view of the rectum revealed small internal hemorrhoids.     6. YUMIKO: normal rectal tone, no masses palpated.      Impression:   Two sub-centimeter polyps removed.   Pan-diverticulosis.   Small internal hemorrhoids.   The colon was otherwise normal with glistening mucosa and intact vascular pattern.     Final Diagnosis:      A. Cecal polyp; polypectomy:  Fragments of tubular adenoma and adjacent unremarkable colonic mucosa (1.5 cm in maximum dimension in aggregate).  No evidence of severe dysplasia/carcinoma in situ or infiltrating carcinoma identified.     B. Sigmoid colon  polyp; polypectomy:  Fragment of hyperplastic polyp and adjacent unremarkable colonic mucosa (0.7 cm in maximum dimension).    No evidence of adenomatous polyps, epithelial dysplasia, or malignancy identified.            CTAP 2020:  Impression   CONCLUSION:  1. No acute intra-abdominal process.  2. Uncomplicated sigmoid colonic diverticulosis.  3. Lesser incidental findings as above.         Underwent EGD/colonoscopy with Dr. Roberts 01/2021:   EGD findings:    1. Esophagus: The squamocolumnar junction was noted at 40 cm and appeared regular. The GE junction was noted at 40 cm from the incisors. No significant hiatal hernia. The esophageal mucosa appeared normal. There was no evidence of esophagitis, stricture or endoscopic evidence of Armas's esophagus.  2. Stomach: The stomach distended normally. Normal rugal folds were seen. The pylorus was patent. The gastric mucosa appeared normal. Retroflexion revealed a normal fundus and a normal cardia.   3. Duodenum: The duodenal mucosa appeared normal in the 1st and 2nd portion of the duodenum. Biopsied to rule out celiac given anemia.     Colonoscopy findings:  1. YUMIKO: normal rectal tone, no masses palpated.   2. Fair to poor prep-- small polyps could have been missed and unable to visualize the cecal base or rectum   3. Terminal ileum: the visualized mucosa appeared normal.  4. The colonic mucosa throughout the colon showed normal vascular pattern, without evidence of angioectasias or inflammation.   5. Diverticulosis: left sided.  6. Poor prep so could not see anything on retroflexion.     Impression:  Poor colon prep     Recommend:  Await pathology.   Recall colonoscopy within 1 year given small polyps could have been missed.   High fiber diet.  Monitor for blood in the stool. If having more than just tinge of blood, call office or go to the ER.     Final Diagnosis:      Duodenum; biopsy:  Duodenal mucosa without significant histopathology  Preserved villous  architecture  No increase in intraepithelial lymphocytes      H pylori was positive on stool testing 07/2022.      KUB 2022:   Impression   CONCLUSION: Moderate amount of stool in the colon, which could reflect constipation.      Dr. Vega 10/2024:  She presents with multiple complaints, most similar to previous visit in 2020. She notes burning stomach pain. Pain is from LUQ to LLQ. She notes that the pain is worse with eating meat. She has been taking omeprazole which helps significantly with symptoms. When she doesn't take omeprazole, she will have the burning pain and reflux. She has not been taking omeprazole daily. She notes that pain is also worse when she is constipated. She has been taking metamucil as needed, not daily. She notes that when she does move her bowels, she has a lot of rectal pain and bleeding. She has been using hydrocortisone cream to the external anus when she has this pain. If she eats oily or spicy foods, she will have diarrhea.      She does not think she took the treatment for h pylori. No eradication testing.            PRIOR GI WORK UP:   Endoscopies:     Wt Readings from Last 6 Encounters:   05/06/25 189 lb (85.7 kg)   02/27/25 190 lb 0.6 oz (86.2 kg)   02/27/25 190 lb (86.2 kg)   02/19/25 195 lb (88.5 kg)   11/08/24 189 lb 9.6 oz (86 kg)   10/07/24 187 lb 6.3 oz (85 kg)        History, Medications, Allergies, ROS:      Past Medical History[1]   Past Surgical History[2]   Family History[3]   Social History: Short Social Hx on File[4]     Medications (Active prior to today's visit):  Current Medications[5]    Allergies:  Allergies[6]    ROS:   CONSTITUTIONAL: negative for fevers, chills, sweats  EYES Negative for scleral icterus or redness, and diplopia  HEENT: Negative for hoarseness  RESPIRATORY: Negative for cough and severe shortness of breath  CARDIOVASCULAR: Negative for crushing sub-sternal chest pain  GASTROINTESTINAL: See HPI  GENITOURINARY: Negative for  dysuria  MUSCULOSKELETAL: Negative for arthralgias and myalgias  SKIN: Negative for jaundice, rash or pruritus  NEUROLOGICAL: Negative for dizziness and headaches  BEHAVIOR/PSYCH: Negative for psychotic behavior    PHYSICAL EXAM:   Blood pressure 124/75, pulse 86, height 5' 4\" (1.626 m), weight 189 lb (85.7 kg), not currently breastfeeding.    Gen- alert, no acute distress, well-nourished  HEENT: anicteric sclera, neck supple, trachea midline, MMM, no palpable or tender neck or supraclavicular lymph nodes  CV- RRR, the extremities are warm and well perfused   Lungs- No increased work of breathing. CTAB   Abdomen- Soft, symmetrical, +moderately TTP over epigastric and left upper quadrant   MSK: no erythema, warmth, no swelling of joints  Skin- No jaundice, erythema, rashes or lesions  Hematologic- no bleeding or bruising  Neuro- Alert and interactive, CARUSO   Psych - appropriate mood & affect    Labs/Imaging:     Patient's labs and imaging were reviewed and discussed with patient today.     .  ASSESSMENT/PLAN:   Randi Lobo is a 59 year old year old female with a past medical history of BMI 33.30, asthma, depression, diabetes, thyroid disorder, diverticulitis, GERD, HTN, HLD, and fatty liver presenting for multiple GI complaints     1. History of Helicobacter pylori infection  - Helicobacter Pylori Breath Test, Adult    2. Pancreatic lesion (HCC)  - MRI MRCP (W+WO) (CPT=74183); Future    3. Generalized abdominal pain  - MRI MRCP (W+WO) (CPT=74183); Future    4. Nausea  - MRI MRCP (W+WO) (CPT=74183); Future     Discussed that symptoms could be related to untreated H pylori from 2022. Needs to be off PPI- can start Pepcid BID and complete H pylori testing in 2 weeks.   Follow up MRI/MRCP ordered. Likely needs EGD as well.   Advised patient/daughter that if unable to tolerate any fluids she needs to go to ER for urgent evaluation. Unable to determine how long she has been vomiting, patient appears to be a bit of a  ofelia historian.   Requesting refill for annusol, rx sent.     Recommendations:  Next colonoscopy 10/2031    Patient Instructions   -stop Omeprazole and Pantoprazole for 2 weeks  -Complete h pylori testing 2 weeks after stopping medication  -Can take Famotidine 20mg twice daily in the meantime           Orders This Visit:  Orders Placed This Encounter   Procedures    Helicobacter Pylori Breath Test, Adult       Meds This Visit:  Requested Prescriptions     Signed Prescriptions Disp Refills    famotidine 20 MG Oral Tab 60 tablet 0     Sig: Take 1 tablet (20 mg total) by mouth 2 (two) times daily.    hydrocortisone (PROCTOZONE-HC) 2.5 % External Cream 28 g 5     Sig: Apply to hemorrhoids for 7 to 10 days       Imaging & Referrals:  MRI MRCP (W+WO) (BHC=83596)     JORGE L Fraire    [facility] Gastroenterology  5/6/2025    The dictation was partially prepared using Dragon Medical voice recognition software. As a result, errors may occur. When identified, these errors have been corrected. While every attempt is made to correct errors during dictation, discrepancies may still exist.            [1]   Past Medical History:   Asthma (HCC)    Depression    Diabetes (HCC)    Diabetes type 2, controlled (HCC)    Disorder of thyroid    Diverticulitis    Esophageal reflux    Essential hypertension    Gout    Hepatic steatosis    Hepatomegaly    High blood pressure    High cholesterol    Hyperlipidemia    Thyroid disease   [2]   Past Surgical History:  Procedure Laterality Date    Colonoscopy N/A 3/29/2017    Procedure: COLONOSCOPY;  Surgeon: Lenny Miranda MD;  Location: [facility] ENDOSCOPY    Colonoscopy N/A 1/8/2021    Procedure: COLONOSCOPY;  Surgeon: Arthur Roberts MD;  Location: [facility] ENDO    Colonoscopy N/A 10/10/2024    Procedure: COLONOSCOPY with polypectomy;  Surgeon: Adriana Vega MD;  Location: [facility] ENDOSCOPY   [3]   Family History  Problem Relation Age of Onset    Other (hepatitis c) Father      Hypertension Father     Diabetes Mother     Hypertension Mother     Glaucoma Neg     Macular degeneration Neg    [4]   Social History  Socioeconomic History    Marital status:    Tobacco Use    Smoking status: Never    Smokeless tobacco: Never   Vaping Use    Vaping status: Never Used   Substance and Sexual Activity    Alcohol use: No    Drug use: No   Social History Narrative    ** Merged History Encounter **        [5]   Current Outpatient Medications   Medication Sig Dispense Refill    famotidine 20 MG Oral Tab Take 1 tablet (20 mg total) by mouth 2 (two) times daily. 60 tablet 0    hydrocortisone (PROCTOZONE-HC) 2.5 % External Cream Apply to hemorrhoids for 7 to 10 days 28 g 5    pantoprazole 40 MG Oral Tab EC Take 1 tablet (40 mg total) by mouth every morning before breakfast. 90 tablet 3    zolpidem 10 MG Oral Tab Take 1 tablet (10 mg total) by mouth nightly. 30 tablet 0    Insulin Pen Needle (TRUEPLUS 5-BEVEL PEN NEEDLES) 32G X 4 MM Does not apply Misc Use twice a day 200 each 3    Continuous Glucose Transmitter (DEXCOM G6 TRANSMITTER) Does not apply Misc Change sensor every 90 days 1 each 0    Continuous Glucose  (DEXCOM G6 ) Does not apply Device Use to monitor blood sugar level 1 each 0    Continuous Glucose Sensor (DEXCOM G6 SENSOR) Does not apply Misc Change sensor every 10 days 9 each 0    Glucose Blood (ONETOUCH VERIO) In Vitro Strip USE TO CHECK BLOOD SUGAR TWICE DAILY, FASTING AND 2 HOURS AFTER A MEAL 200 strip 1    Lancets (ONETOUCH DELICA PLUS YDOCDO44E) Does not apply Misc 1 strip by In Vitro route 2 (two) times daily. FASTING AND 2 HOURS AFTER A MEAL 200 each 3    ketoconazole 2 % External Shampoo Use to  Scalp twice a wekk 120 mL 5    diclofenac 1 % External Gel Apply 2 g topically 4 (four) times daily. 1 each 2    Dulaglutide (TRULICITY) 1.5 MG/0.5ML Subcutaneous Solution Auto-injector Inject 1.5 mg into the skin once a week. 6 mL 1    glipiZIDE 10 MG Oral Tab Take 1  tablet (10 mg total) by mouth 2 (two) times daily before meals. 180 tablet 3    insulin glargine (BASAGLAR KWIKPEN) 100 UNIT/ML Subcutaneous Solution Pen-injector Inject 40 Units into the skin every morning. ADMINISTER 40 UNITS UNDER THE SKIN TWICE DAILY EVERY DAY 36 mL 2    methocarbamol 750 MG Oral Tab Take 1 tablet (750 mg total) by mouth 3 (three) times daily. 270 tablet 1    allopurinol 100 MG Oral Tab Take 1 tablet (100 mg total) by mouth nightly. 90 tablet 3    aspirin 81 MG Oral Tab EC Take 1 tablet (81 mg total) by mouth daily. Pt states 2x a day. 90 tablet 3    carvedilol 12.5 MG Oral Tab Take 1 tablet (12.5 mg total) by mouth 2 (two) times daily with meals. 180 tablet 3    gabapentin 400 MG Oral Cap Take 1 capsule (400 mg total) by mouth 2 (two) times daily. 180 capsule 1    levothyroxine 125 MCG Oral Tab Take 1 tablet (125 mcg total) by mouth every morning. 90 tablet 3    metFORMIN 500 MG Oral Tab TAKE 2 TABLETS BY MOUTH TWICE DAILY 360 tablet 1    simvastatin 40 MG Oral Tab Take 1 tablet (40 mg total) by mouth nightly. 90 tablet 3    traZODone 100 MG Oral Tab Take 1 tablet (100 mg total) by mouth nightly. 90 tablet 0    sertraline 100 MG Oral Tab Take 1 tablet (100 mg total) by mouth daily. 90 tablet 1    hydroCHLOROthiazide 25 MG Oral Tab TAKE 1 TABLET BY MOUTH TWICE DAILY 180 tablet 1    metFORMIN 500 MG Oral Tab TAKE 2 TABLETS BY MOUTH TWICE DAILY 360 tablet 1    losartan 100 MG Oral Tab Take 1 tablet (100 mg total) by mouth daily. 90 tablet 3    Potassium Chloride ER 10 MEQ Oral Cap CR Take 1 capsule (10 mEq total) by mouth 2 (two) times daily. 180 capsule 3    Blood Glucose Monitoring Suppl (ONETOUCH VERIO) w/Device Does not apply Kit 1 kit As Directed. 1 kit 0    Insulin Pen Needle (TRUEPLUS PEN NEEDLES) 32G X 4 MM Does not apply Misc Use needles to inject insulin daily 100 each 3    HYDROcodone-acetaminophen (NORCO) 5-325 MG Oral Tab Take 1 tablet by mouth every 8 (eight) hours as needed for  Pain. Can cause  constipations (Patient not taking: Reported on 5/6/2025) 21 tablet 0    cholecalciferol 50 MCG (2000 UT) Oral Tab Take 1 tablet (2,000 Units total) by mouth every morning. (Patient not taking: Reported on 5/6/2025) 90 tablet 3    Ferrous Sulfate (IRON) 325 (65 Fe) MG Oral Tab Take 1 Dose by mouth daily. (Patient not taking: Reported on 5/6/2025) 30 tablet 0   [6] No Known Allergies

## 2025-05-06 NOTE — PATIENT INSTRUCTIONS
-stop Omeprazole and Pantoprazole for 2 weeks  -Complete h pylori testing 2 weeks after stopping medication  -Can take Famotidine 20mg twice daily in the meantime

## 2025-05-08 DIAGNOSIS — Z79.4 TYPE 2 DIABETES MELLITUS WITH DIABETIC NEUROPATHY, WITH LONG-TERM CURRENT USE OF INSULIN (HCC): ICD-10-CM

## 2025-05-08 DIAGNOSIS — I10 ESSENTIAL HYPERTENSION: ICD-10-CM

## 2025-05-08 DIAGNOSIS — M1A.09X0 IDIOPATHIC CHRONIC GOUT OF MULTIPLE SITES WITHOUT TOPHUS: ICD-10-CM

## 2025-05-08 DIAGNOSIS — E11.40 TYPE 2 DIABETES MELLITUS WITH DIABETIC NEUROPATHY, WITH LONG-TERM CURRENT USE OF INSULIN (HCC): ICD-10-CM

## 2025-05-08 DIAGNOSIS — F32.1 CURRENT MODERATE EPISODE OF MAJOR DEPRESSIVE DISORDER WITHOUT PRIOR EPISODE (HCC): ICD-10-CM

## 2025-05-08 RX ORDER — HYDROCHLOROTHIAZIDE 25 MG/1
25 TABLET ORAL 2 TIMES DAILY
Qty: 180 TABLET | Refills: 3 | Status: SHIPPED | OUTPATIENT
Start: 2025-05-08

## 2025-05-08 RX ORDER — TRAZODONE HYDROCHLORIDE 100 MG/1
100 TABLET ORAL NIGHTLY
Qty: 90 TABLET | Refills: 1 | Status: SHIPPED | OUTPATIENT
Start: 2025-05-08

## 2025-05-08 NOTE — TELEPHONE ENCOUNTER
Please review: medication fails/has no protocol attached.    Future Appointments   Date Time Provider Department Center   5/27/2025  5:20 PM Nikki Alcantara MD [facility] [place]

## 2025-05-12 DIAGNOSIS — Z79.4 TYPE 2 DIABETES MELLITUS WITH DIABETIC NEUROPATHY, WITH LONG-TERM CURRENT USE OF INSULIN (HCC): ICD-10-CM

## 2025-05-12 DIAGNOSIS — F32.1 CURRENT MODERATE EPISODE OF MAJOR DEPRESSIVE DISORDER WITHOUT PRIOR EPISODE (HCC): ICD-10-CM

## 2025-05-12 DIAGNOSIS — M1A.09X0 IDIOPATHIC CHRONIC GOUT OF MULTIPLE SITES WITHOUT TOPHUS: ICD-10-CM

## 2025-05-12 DIAGNOSIS — E11.40 TYPE 2 DIABETES MELLITUS WITH DIABETIC NEUROPATHY, WITH LONG-TERM CURRENT USE OF INSULIN (HCC): ICD-10-CM

## 2025-05-12 DIAGNOSIS — I10 ESSENTIAL HYPERTENSION: ICD-10-CM

## 2025-05-12 RX ORDER — CHOLECALCIFEROL (VITAMIN D3) 50 MCG
2000 TABLET ORAL EVERY MORNING
Qty: 90 TABLET | Refills: 3 | OUTPATIENT
Start: 2025-05-12

## 2025-05-12 NOTE — TELEPHONE ENCOUNTER
LOV: 10/8/24  Future Appointments   Date Time Provider Department Center   5/27/2025  5:20 PM Nikki Alcantara MD [id number]  [place]       ASSESSMENT/PLAN:      Bilateral Carpal tunnel  - EMG in the past did show evidence of carpal tunnel, right worse than left  - Both carpal tunnel region was injected with 0.5 cc of lidocaine and 40 mg of Depo-Medrol in sterile fashion.  Patient tolerated procedure well  - Advised patient if she continues to have symptoms she can see hand surgeon     Possible Seropositive rheumatoid arthritis (+ CCP, MRI of the left shoulder showing inflammatory synovitis)  - It was always questionable if she had inflammatory arthritis as she had a low titer CCP and no evidence of synovitis on exam.    - s/p cortisone injections in L shoulder in the past   - no improvement on prednisone   - Continues to have pain in her hands, wrists and ankles  - She was on Plaquenil in the past but did not help  - Was also on Leflunomide but caused s/e  - MRI Right hand normal 7/2023  - She does not want to go on medication right now.  Advised we can try TNF inhibitor like Humira or Enbrel.  [place] out of the [place].  When she comes back advised that we can discuss this further     Pt will follow up in 6 mos     There is a longitudinal care relationship with me, the care plan reflects the ongoing nature of the continuous relationship of care, and the medical record indicates that there is ongoing treatment of a serious/complex medical condition which I am currently managing.  is Applicable.      Marge Angel MD  10/8/2024  5:10 PM

## 2025-05-13 RX ORDER — METHOCARBAMOL 750 MG/1
750 TABLET, FILM COATED ORAL 3 TIMES DAILY
Qty: 270 TABLET | Refills: 1 | Status: SHIPPED | OUTPATIENT
Start: 2025-05-13

## 2025-05-13 RX ORDER — SERTRALINE HYDROCHLORIDE 100 MG/1
100 TABLET, FILM COATED ORAL DAILY
Qty: 90 TABLET | Refills: 3 | Status: SHIPPED | OUTPATIENT
Start: 2025-05-13

## 2025-05-13 RX ORDER — KETOCONAZOLE 20 MG/ML
SHAMPOO, SUSPENSION TOPICAL
Qty: 120 ML | Refills: 5 | OUTPATIENT
Start: 2025-05-13

## 2025-05-13 RX ORDER — GABAPENTIN 400 MG/1
400 CAPSULE ORAL 2 TIMES DAILY
Qty: 180 CAPSULE | Refills: 3 | Status: SHIPPED | OUTPATIENT
Start: 2025-05-13

## 2025-05-13 RX ORDER — LOSARTAN POTASSIUM 100 MG/1
100 TABLET ORAL DAILY
Qty: 90 TABLET | Refills: 3 | OUTPATIENT
Start: 2025-05-13

## 2025-05-13 RX ORDER — LEVOTHYROXINE SODIUM 125 UG/1
125 TABLET ORAL EVERY MORNING
Qty: 90 TABLET | Refills: 3 | OUTPATIENT
Start: 2025-05-13

## 2025-05-13 RX ORDER — ALLOPURINOL 100 MG/1
100 TABLET ORAL NIGHTLY
Qty: 90 TABLET | Refills: 3 | OUTPATIENT
Start: 2025-05-13

## 2025-05-13 RX ORDER — POTASSIUM CHLORIDE 750 MG/1
10 CAPSULE, EXTENDED RELEASE ORAL 2 TIMES DAILY
Qty: 180 CAPSULE | Refills: 3 | OUTPATIENT
Start: 2025-05-13

## 2025-05-13 NOTE — TELEPHONE ENCOUNTER
Please review. Refill failed protocol / no protocol.     Potassium - 1 year supply sent on 8/9/2024    Losartan - 1 year supply sent on 10/25/24    Allopurinol - 1 year supply sent on 11/8/2024    Levothyroxine - 1 year supply sent on 11/8/2024    Ketoconazole Shampoo- #120mL with 5 refills sent on 02/26/25     Sent Self-A-r-T message to patient to contact pharmacy.

## 2025-05-13 NOTE — TELEPHONE ENCOUNTER
Endocrine Refill protocol for metformin    Protocol Criteria:  FAILED  Reason: Elevated A1C    If all below requirements are met, send a 90-day supply with 1 refill per provider protocol.     Verify appointment with Endocrinology completed in the last 6 months or scheduled in the next 3 months.  Verify A1C has been completed within the last 6 months and is below 8.5%  Verify last GFR is greater than or equal to 40 in the past 12 months    Last completed office visit:2/19/2025 Veena Emery MD     Last completed telemed visit: Visit date not found    Next scheduled Follow up: No appointment scheduled - WISeKeyhart message sent       Last GFR result:    Lab Results   Component Value Date    EGFRCR 68 02/27/2025     Last A1c result: Last A1C result: 8.7% done 2/19/2025.

## 2025-05-14 RX ORDER — DULAGLUTIDE 1.5 MG/.5ML
1.5 INJECTION, SOLUTION SUBCUTANEOUS WEEKLY
Qty: 6 ML | Refills: 1 | Status: SHIPPED | OUTPATIENT
Start: 2025-05-14 | End: 2025-08-12

## 2025-05-14 RX ORDER — GLIPIZIDE 10 MG/1
10 TABLET ORAL
Qty: 180 TABLET | Refills: 1 | Status: SHIPPED | OUTPATIENT
Start: 2025-05-14

## 2025-05-17 ENCOUNTER — LAB ENCOUNTER (OUTPATIENT)
Dept: LAB | Age: 60
End: 2025-05-17
Attending: NURSE PRACTITIONER
Payer: MEDICAID

## 2025-05-17 DIAGNOSIS — E03.9 HYPOTHYROIDISM, UNSPECIFIED TYPE: ICD-10-CM

## 2025-05-17 DIAGNOSIS — E78.5 DYSLIPIDEMIA: ICD-10-CM

## 2025-05-17 DIAGNOSIS — Z79.4 TYPE 2 DIABETES MELLITUS WITH DIABETIC NEUROPATHY, WITH LONG-TERM CURRENT USE OF INSULIN (HCC): ICD-10-CM

## 2025-05-17 DIAGNOSIS — E11.40 TYPE 2 DIABETES MELLITUS WITH DIABETIC NEUROPATHY, WITH LONG-TERM CURRENT USE OF INSULIN (HCC): ICD-10-CM

## 2025-05-17 LAB
ALBUMIN SERPL-MCNC: 4.9 G/DL (ref 3.2–4.8)
ALBUMIN/GLOB SERPL: 1.9 {RATIO} (ref 1–2)
ALP LIVER SERPL-CCNC: 84 U/L (ref 46–118)
ALT SERPL-CCNC: 36 U/L (ref 10–49)
ANION GAP SERPL CALC-SCNC: 10 MMOL/L (ref 0–18)
AST SERPL-CCNC: 27 U/L (ref ?–34)
BILIRUB SERPL-MCNC: 0.5 MG/DL (ref 0.3–1.2)
BUN BLD-MCNC: 12 MG/DL (ref 9–23)
BUN/CREAT SERPL: 13.5 (ref 10–20)
CALCIUM BLD-MCNC: 9.5 MG/DL (ref 8.7–10.4)
CHLORIDE SERPL-SCNC: 100 MMOL/L (ref 98–112)
CHOLEST SERPL-MCNC: 166 MG/DL (ref ?–200)
CO2 SERPL-SCNC: 27 MMOL/L (ref 21–32)
CREAT BLD-MCNC: 0.89 MG/DL (ref 0.55–1.02)
CREAT UR-SCNC: 93.6 MG/DL
EGFRCR SERPLBLD CKD-EPI 2021: 75 ML/MIN/1.73M2 (ref 60–?)
EST. AVERAGE GLUCOSE BLD GHB EST-MCNC: 212 MG/DL (ref 68–126)
FASTING PATIENT LIPID ANSWER: YES
FASTING STATUS PATIENT QL REPORTED: YES
GLOBULIN PLAS-MCNC: 2.6 G/DL (ref 2–3.5)
GLUCOSE BLD-MCNC: 128 MG/DL (ref 70–99)
HBA1C MFR BLD: 9 % (ref ?–5.7)
HDLC SERPL-MCNC: 44 MG/DL (ref 40–59)
LDLC SERPL CALC-MCNC: 90 MG/DL (ref ?–100)
MICROALBUMIN UR-MCNC: 1.1 MG/DL
MICROALBUMIN/CREAT 24H UR-RTO: 11.8 UG/MG (ref ?–30)
NONHDLC SERPL-MCNC: 122 MG/DL (ref ?–130)
OSMOLALITY SERPL CALC.SUM OF ELEC: 285 MOSM/KG (ref 275–295)
POTASSIUM SERPL-SCNC: 4.2 MMOL/L (ref 3.5–5.1)
PROT SERPL-MCNC: 7.5 G/DL (ref 5.7–8.2)
SODIUM SERPL-SCNC: 137 MMOL/L (ref 136–145)
T4 FREE SERPL-MCNC: 1.3 NG/DL (ref 0.8–1.7)
TRIGL SERPL-MCNC: 185 MG/DL (ref 30–149)
TSI SER-ACNC: 1.02 UIU/ML (ref 0.55–4.78)
VIT D+METAB SERPL-MCNC: 43.3 NG/ML (ref 30–100)
VLDLC SERPL CALC-MCNC: 30 MG/DL (ref 0–30)

## 2025-05-17 PROCEDURE — 36415 COLL VENOUS BLD VENIPUNCTURE: CPT

## 2025-05-17 PROCEDURE — 80053 COMPREHEN METABOLIC PANEL: CPT

## 2025-05-17 PROCEDURE — 82607 VITAMIN B-12: CPT | Performed by: INTERNAL MEDICINE

## 2025-05-17 PROCEDURE — 84443 ASSAY THYROID STIM HORMONE: CPT

## 2025-05-17 PROCEDURE — 82043 UR ALBUMIN QUANTITATIVE: CPT

## 2025-05-17 PROCEDURE — 83540 ASSAY OF IRON: CPT | Performed by: INTERNAL MEDICINE

## 2025-05-17 PROCEDURE — 84466 ASSAY OF TRANSFERRIN: CPT | Performed by: INTERNAL MEDICINE

## 2025-05-17 PROCEDURE — 82728 ASSAY OF FERRITIN: CPT | Performed by: INTERNAL MEDICINE

## 2025-05-17 PROCEDURE — 82570 ASSAY OF URINE CREATININE: CPT

## 2025-05-17 PROCEDURE — 80061 LIPID PANEL: CPT

## 2025-05-17 PROCEDURE — 83036 HEMOGLOBIN GLYCOSYLATED A1C: CPT

## 2025-05-17 PROCEDURE — 84439 ASSAY OF FREE THYROXINE: CPT

## 2025-05-17 PROCEDURE — 83013 H PYLORI (C-13) BREATH: CPT | Performed by: INTERNAL MEDICINE

## 2025-05-17 PROCEDURE — 82306 VITAMIN D 25 HYDROXY: CPT

## 2025-05-19 ENCOUNTER — TELEPHONE (OUTPATIENT)
Dept: ENDOCRINOLOGY CLINIC | Facility: CLINIC | Age: 60
End: 2025-05-19

## 2025-05-19 NOTE — TELEPHONE ENCOUNTER
Hi!    Please let patient know that she is due for follow-up appt to discuss labs. Schedule soonest available. Thank you!

## 2025-05-20 LAB — H PYLORI BREATH TEST: NEGATIVE

## 2025-05-27 ENCOUNTER — TELEPHONE (OUTPATIENT)
Dept: OBGYN CLINIC | Facility: CLINIC | Age: 60
End: 2025-05-27

## 2025-05-28 ENCOUNTER — HOSPITAL ENCOUNTER (OUTPATIENT)
Dept: ULTRASOUND IMAGING | Age: 60
Discharge: HOME | End: 2025-05-28
Attending: INTERNAL MEDICINE
Payer: MEDICAID

## 2025-05-28 DIAGNOSIS — E04.9 GOITER: ICD-10-CM

## 2025-05-28 PROCEDURE — 76536 US EXAM OF HEAD AND NECK: CPT | Performed by: INTERNAL MEDICINE

## 2025-05-30 DIAGNOSIS — E11.40 TYPE 2 DIABETES MELLITUS WITH DIABETIC NEUROPATHY, WITH LONG-TERM CURRENT USE OF INSULIN (HCC): ICD-10-CM

## 2025-05-30 DIAGNOSIS — Z79.4 TYPE 2 DIABETES MELLITUS WITH DIABETIC NEUROPATHY, WITH LONG-TERM CURRENT USE OF INSULIN (HCC): ICD-10-CM

## 2025-05-30 DIAGNOSIS — G47.09 OTHER INSOMNIA: ICD-10-CM

## 2025-05-30 RX ORDER — PROCHLORPERAZINE 25 MG/1
SUPPOSITORY RECTAL
Qty: 9 EACH | Refills: 0 | Status: SHIPPED | OUTPATIENT
Start: 2025-05-30 | End: 2025-08-15

## 2025-05-30 RX ORDER — PROCHLORPERAZINE 25 MG/1
SUPPOSITORY RECTAL
Qty: 1 EACH | Refills: 0 | Status: SHIPPED | OUTPATIENT
Start: 2025-05-30

## 2025-05-30 RX ORDER — DULAGLUTIDE 1.5 MG/.5ML
1.5 INJECTION, SOLUTION SUBCUTANEOUS WEEKLY
Qty: 6 ML | Refills: 1 | Status: SHIPPED | OUTPATIENT
Start: 2025-05-30 | End: 2025-08-28

## 2025-05-30 NOTE — TELEPHONE ENCOUNTER
Endocrine Refill protocol for oral and injectable diabetic medications    Protocol Criteria:  FAILED  Reason: Elevated A1C    If all below requirements are met, send a 90-day supply with 1 refill per provider protocol.    Verify appointment with Endocrinology completed in the last 6 months or scheduled in the next 3 months.  Verify A1C has been completed within the last 6 months and is below 8.5%     Last completed office visit: 2/19/2025 Veena Emery MD   Next scheduled Follow up:   Future Appointments   Date Time Provider Department Center   6/11/2025  2:40 PM Marge Angel MD [email or web address]   6/17/2025  2:15 PM ADO MRI RM1 (1.5T) ADO MRI EM [facility]   9/10/2025  5:40 PM Veena Emery MD [id number]      Last A1c result: Last A1c value was 9% done 5/17/2025.

## 2025-05-30 NOTE — TELEPHONE ENCOUNTER
Requested Prescriptions     Pending Prescriptions Disp Refills    famotidine 20 MG Oral Tab 60 tablet 0     Sig: Take 1 tablet (20 mg total) by mouth 2 (two) times daily.         LOV: 05/06/2025  LR: 05/06/2025    gb

## 2025-05-30 NOTE — TELEPHONE ENCOUNTER
Please review; protocol failed/ has no protocol      Recent fills: 03/03/2025  Last Rx written: 03/03/2025  Last Office Visit: 02/27/2025    Recent Visits  Date Type Provider Dept   02/27/25 Office Visit Marianne Reyes, JORGE L [facility]

## 2025-05-31 RX ORDER — ZOLPIDEM TARTRATE 10 MG/1
10 TABLET ORAL NIGHTLY
Qty: 30 TABLET | Refills: 0 | Status: SHIPPED | OUTPATIENT
Start: 2025-05-31

## 2025-05-31 RX ORDER — FAMOTIDINE 20 MG/1
20 TABLET, FILM COATED ORAL 2 TIMES DAILY
Qty: 60 TABLET | Refills: 0 | Status: SHIPPED | OUTPATIENT
Start: 2025-05-31 | End: 2025-06-30

## 2025-06-04 NOTE — TELEPHONE ENCOUNTER
Currently scheduled for Next Office Visit on 9/10/2025.  Also see patient message encounter of 6/1/2025.

## 2025-06-11 ENCOUNTER — OFFICE VISIT (OUTPATIENT)
Age: 60
End: 2025-06-11
Payer: MEDICAID

## 2025-06-11 VITALS
HEART RATE: 93 BPM | HEIGHT: 64 IN | BODY MASS INDEX: 33.15 KG/M2 | RESPIRATION RATE: 16 BRPM | DIASTOLIC BLOOD PRESSURE: 80 MMHG | WEIGHT: 194.19 LBS | SYSTOLIC BLOOD PRESSURE: 157 MMHG

## 2025-06-11 DIAGNOSIS — G56.03 BILATERAL CARPAL TUNNEL SYNDROME: Primary | ICD-10-CM

## 2025-06-11 DIAGNOSIS — M54.50 CHRONIC BILATERAL LOW BACK PAIN WITHOUT SCIATICA: ICD-10-CM

## 2025-06-11 DIAGNOSIS — G89.29 CHRONIC BILATERAL LOW BACK PAIN WITHOUT SCIATICA: ICD-10-CM

## 2025-06-11 PROCEDURE — 20526 THER INJECTION CARP TUNNEL: CPT | Performed by: INTERNAL MEDICINE

## 2025-06-11 PROCEDURE — 99214 OFFICE O/P EST MOD 30 MIN: CPT | Performed by: INTERNAL MEDICINE

## 2025-06-11 RX ORDER — METHYLPREDNISOLONE ACETATE 80 MG/ML
40 INJECTION, SUSPENSION INTRA-ARTICULAR; INTRALESIONAL; INTRAMUSCULAR; SOFT TISSUE ONCE
Status: COMPLETED | OUTPATIENT
Start: 2025-06-11 | End: 2025-06-11

## 2025-06-11 RX ORDER — NAPROXEN 500 MG/1
500 TABLET ORAL 2 TIMES DAILY WITH MEALS
Qty: 60 TABLET | Refills: 0 | Status: SHIPPED | OUTPATIENT
Start: 2025-06-11

## 2025-06-11 NOTE — PATIENT INSTRUCTIONS
You were seen today for bilateral hand pain due to carpal tunnel  I injected both wrists again  For your diffuse pain and muscle pain and back pain take naproxen once or twice a day for your pain  See the hand surgeon below    Hand surgeons:  Dr. Juan Oh  [address]  [address]  [place], [place] [address] [phone]  Dr. Bouchra Durand  [address] [place] [address]

## 2025-06-11 NOTE — PROGRESS NOTES
Randi Lobo is a 59 year old female.    HPI:     Chief Complaint   Patient presents with    Back Pain    Hand Pain     Bilateral carpal tunnel syndrome       I had the pleasure of seeing Randi Lobo on 6/11/2025 for follow up polyarthralgia's partically b/l hand, from CTS vs possible RA     Current Medications:  Gabapentin 400 mg bid  Naproxen as needed   Previous medication:   mg daily- started 3/2021- only took for 1 week  Leflunomide 20 mg daily- started 7/2022-9/2022, did not feel well on it, caused high blood sugars and blood pressure  Blood work:  Neg MIALDYS, ESR, RF  CCP 10.4--> 12.2, CRP 1.11  US R hand: no synovitis, median nerve slightly swollen  EMG: b/l median neuropathy, R worse then L    Interval History:  This is a 55 yo F with hx of HTN, HLD, DM-2, Hypothyroid, Fatty Liver (normal LFTs), Asthma, Anxiety/Depression presents with polyarthralgias.  She reports that her symptoms started about 6 months ago.  She reports pain in her hands and wrists with difficulty making a fist in the morning.  She also reports diffuse pain and TTP in her elbows, knees and ankles.  She reports swelling but on examination there is no evidence of swelling on exam.  She also reports chronic neck and lower back pain with left-sided radiculopathy symptoms.  She states that the back pain is not new.  She is not able to stand for more than 5 minutes because then she develops lower back pain.  She takes ibuprofen for her pain which helps minimally.  She also reports myalgias.  Denies any rash, psoriasis, enthesitis, dactylitis, uveitis, GI or  symptoms    8/10/2021:  For follow-up of polyarthralgia's mostly the hands  She had bilateral carpal tunnel injections last year, it helped for short while but her symptoms returned  She was also found to have a slightly elevated CCP but ultrasound of the right hand showed no evidence of synovitis.  It did show that the median nerve was slightly swollen.  Continues to report  bilateral hand pain  Also reports pain in her neck, trapezius region, shoulders, deltoids, throughout her muscles  She was given prednisone at one point but did not help.  She was also on Plaquenil but did not help  Reports worsening left shoulder pain, abduction is limited to 90 degrees.  She had the EMGs done her hands, it is not resulted yet    10/19/2021:   Presents for follow-up of left shoulder pain  She was seen about 2 months ago due to left shoulder pain/tendinitis.  She received a cortisone injection and it helped significantly, symptoms have been coming back over the past couple of weeks  Reports her hand pain, numbness and tingling have now resolved.  EMG did show evidence of carpal tunnel, R worse than L  Does not take any pain medications  Also having some pain in the left hip, mostly in the trochanteric bursa region    2/18/2022:   Presents for follow-up of left shoulder pain  Has been having pain for the last 2 mos. reports difficulty raising her left shoulder  No pain in hands or numbness or tingling  No pain in R shoulder  Some pain in both knees   Did PT didn't help     7/6/2022:  Presents for f/u to discuss MRI L shoulder  Last visit L shoulder was injected with cortisone and helped  Having a lot of pain in hands, wrists, L shoulder, R ankle  Taking naproxen twice a day, helps a little  Discussed MRI findings of the left shoulder, showing small to moderate GH joint effusion and synovial thickening compatible with inflammatory synovitis, also a small low-grade partial supraspinatus tendon tear    11/18/2022:   Presents for follow-up of bilateral hand pain with numbness and tingling  During her last visit we discussed her MRI L shoulder  findings and finding of possible inflammatory synovitis.  We started her on leflunomide for rheumatoid arthritis but she stopped it as she had side effects and did not like the way it felt  Now having worsening pain in her hands, numbness and tingling.  EMG did  show carpal tunnel in both hands, right was worse than left  Has minimal pain in her shoulders  She was hospitalized in September for syncope.  Work-up was essentially negative.  She had a Holter monitor and will be seeing cardiology     6/13/2023:   Presents for follow-up of bilateral wrist pain  She was seen back in November and had both carpal tunnels injected with cortisone  Having pain in both hands and wrists, also numbness and tingling in the hands  Pain is now going up the arms  Also has pain in all joints     10/8/2024:   Presents for follow-up of bilateral wrist pain  Having significant pain in both hands, has been soaking hands in tumeric and salt water  Also having numbness and tingling in the hands, left thumb is numb  Pain can be 10/10  When holding her phone she has more numbness  Having some pain in left medial epicondyle and also the shoulders  No rash or psoriasis   She takes the naproxen as needed for pain    6/11/2025:   Presents for follow-up of bilateral wrist pain, Carpal tunnel  Having worsening pain in the hands with n/t and burning sensation going up her arms. Also losing sensation in the hands  Having lower back pain  Hard to make a fist with both hands      HISTORY:  Past Medical History:    Asthma (HCC)    Depression    Diabetes (HCC)    Diabetes type 2, controlled (HCC)    Disorder of thyroid    Diverticulitis    Esophageal reflux    Essential hypertension    Gout    Hepatic steatosis    Hepatomegaly    High blood pressure    High cholesterol    Hyperlipidemia    Thyroid disease      Social Hx Reviewed   Family Hx Reviewed     Medications (Active prior to today's visit):  Current Outpatient Medications   Medication Sig Dispense Refill    zolpidem 10 MG Oral Tab Take 1 tablet (10 mg total) by mouth nightly. 30 tablet 0    famotidine 20 MG Oral Tab Take 1 tablet (20 mg total) by mouth 2 (two) times daily. 60 tablet 0    Continuous Glucose Transmitter (DEXCOM G6 TRANSMITTER) Does not apply  Misc Change sensor every 90 days 1 each 0    Continuous Glucose  (DEXCOM G6 ) Does not apply Device Use to monitor blood sugar level 1 each 0    Continuous Glucose Sensor (DEXCOM G6 SENSOR) Does not apply Misc Change sensor every 10 days 9 each 0    Dulaglutide (TRULICITY) 1.5 MG/0.5ML Subcutaneous Solution Auto-injector Inject 1.5 mg into the skin once a week. 6 mL 1    metFORMIN 500 MG Oral Tab TAKE 2 TABLETS BY MOUTH TWICE DAILY 360 tablet 1    glipiZIDE 10 MG Oral Tab Take 1 tablet (10 mg total) by mouth 2 (two) times daily before meals. 180 tablet 1    diclofenac 1 % External Gel Apply 2 g topically 4 (four) times daily. 1 each 2    gabapentin 400 MG Oral Cap Take 1 capsule (400 mg total) by mouth 2 (two) times daily. 180 capsule 3    metFORMIN 500 MG Oral Tab TAKE 2 TABLETS BY MOUTH TWICE DAILY 360 tablet 3    sertraline 100 MG Oral Tab Take 1 tablet (100 mg total) by mouth daily. 90 tablet 3    methocarbamol 750 MG Oral Tab Take 1 tablet (750 mg total) by mouth 3 (three) times daily. 270 tablet 1    hydroCHLOROthiazide 25 MG Oral Tab Take 1 tablet (25 mg total) by mouth 2 (two) times daily. 180 tablet 3    traZODone 100 MG Oral Tab Take 1 tablet (100 mg total) by mouth nightly. 90 tablet 1    hydrocortisone (PROCTOZONE-HC) 2.5 % External Cream Apply to hemorrhoids for 7 to 10 days 28 g 5    pantoprazole 40 MG Oral Tab EC Take 1 tablet (40 mg total) by mouth every morning before breakfast. 90 tablet 3    Insulin Pen Needle (TRUEPLUS 5-BEVEL PEN NEEDLES) 32G X 4 MM Does not apply Misc Use twice a day 200 each 3    Glucose Blood (ONETOUCH VERIO) In Vitro Strip USE TO CHECK BLOOD SUGAR TWICE DAILY, FASTING AND 2 HOURS AFTER A MEAL 200 strip 1    Lancets (ONETOUCH DELICA PLUS [id number]) Does not apply Misc 1 strip by In Vitro route 2 (two) times daily. FASTING AND 2 HOURS AFTER A MEAL 200 each 3    ketoconazole 2 % External Shampoo Use to  Scalp twice a wekk 120 mL 5    insulin glargine (BASAGLAR  KWIKPEN) 100 UNIT/ML Subcutaneous Solution Pen-injector Inject 40 Units into the skin every morning. ADMINISTER 40 UNITS UNDER THE SKIN TWICE DAILY EVERY DAY 36 mL 2    allopurinol 100 MG Oral Tab Take 1 tablet (100 mg total) by mouth nightly. 90 tablet 3    aspirin 81 MG Oral Tab EC Take 1 tablet (81 mg total) by mouth daily. Pt states 2x a day. 90 tablet 3    carvedilol 12.5 MG Oral Tab Take 1 tablet (12.5 mg total) by mouth 2 (two) times daily with meals. 180 tablet 3    levothyroxine 125 MCG Oral Tab Take 1 tablet (125 mcg total) by mouth every morning. 90 tablet 3    simvastatin 40 MG Oral Tab Take 1 tablet (40 mg total) by mouth nightly. 90 tablet 3    HYDROcodone-acetaminophen (NORCO) 5-325 MG Oral Tab Take 1 tablet by mouth every 8 (eight) hours as needed for Pain. Can cause  constipations (Patient not taking: Reported on 6/11/2025) 21 tablet 0    losartan 100 MG Oral Tab Take 1 tablet (100 mg total) by mouth daily. 90 tablet 3    Potassium Chloride ER 10 MEQ Oral Cap CR Take 1 capsule (10 mEq total) by mouth 2 (two) times daily. 180 capsule 3    cholecalciferol 50 MCG (2000 UT) Oral Tab Take 1 tablet (2,000 Units total) by mouth every morning. (Patient not taking: Reported on 6/11/2025) 90 tablet 3    Blood Glucose Monitoring Suppl (ONETOUCH VERIO) w/Device Does not apply Kit 1 kit As Directed. 1 kit 0    Insulin Pen Needle (TRUEPLUS PEN NEEDLES) 32G X 4 MM Does not apply Misc Use needles to inject insulin daily 100 each 3    Ferrous Sulfate (IRON) 325 (65 Fe) MG Oral Tab Take 1 Dose by mouth daily. (Patient not taking: Reported on 6/11/2025) 30 tablet 0     .cmed  Allergies:  No Known Allergies      ROS:   All other ROS are negative.     PHYSICAL EXAM:   GEN: AAOx3, NAD  HEENT: EOMI, PERRLA, no injection or icterus, oral mucosa moist, no oral lesions. No lymphadenopathy. No facial rash  CVS: RRR, no murmurs rubs or gallops. Equal 2+ distal pulses.   LUNGS: CTAB, no increased work of breathing  ABDOMEN:   soft NT/ND, +BS, no HSM  SKIN: No rashes or skin lesions. No nail findings  MSK:  Cervical spine: FROM  Hands: TTP in MCPs and wrists, limited extension due to pain in her wrists  Elbow: FROM, TTP in b/l elbows  Shoulders: FROM in b/l shoulders   Hip: normal log roll, no lateral hip pain, KIMBERLEY test negative b/l  Knees: FROM, no warmth or effusion present. No pain with ROM.   Ankles: FROM, no pain or swelling or warmth on palpation  Feet: no pain with MTP squeeze, no toe swelling or pain or warmth on palpation with FROM  Spine: TTP in b/l SI joints  NEURO: Cranial nerves II-XII intact grossly. 5/5 strength throughout in both upper and lower extremities, sensation intact.  PSYCH: normal mood       LABS:     Component      Latest Ref Rng & Units 4/28/2020   CK      26 - 192 U/L 68   SED RATE      0 - 30 mm/Hr 16   C-REACTIVE PROTEIN      <0.30 mg/dL 1.11 (H)   URIC ACID      2.6 - 6.0 mg/dL 4.7   MILADYS SCREEN      Negative Negative   Anti Double Strand DNA      <10 <10   RHEUMATOID FACTOR      <15 IU/mL <10   C-Citrullinated Peptide IgG AB      0.0 - 6.9 U/mL 10.4 (H)     Imaging:     EMG 2021:  Impression:  1.  Abnormal study  2.  Electrodiagnostic evidence is consistent with bilateral median neuropathy at the wrist, right worse than left.  The right side is characterized by demyelination and chronic sensory and motor axon loss.  The left side is characterized by demyelination without significant axon loss.  3.  There is no electrodiagnostic evidence of cervical radiculopathy bilaterally       MRI L shoulder 3/2022:  CONCLUSION:   1. Small-moderate glenohumeral joint effusion with synovial thickening and enhancement compatible with a inflammatory synovitis.   2. Small low-grade partial articular surface supraspinatus tendon tear.   3. Mild nonspecific edema in infraspinatus muscle    US R hand 7/2020:  CONCLUSION:   1. Mild synovial thickening throughout the dorsal aspect of the right wrist, which is nonspecific given  the absence of hyperemia to indicate active synovitis.  2. Otherwise no evidence of an inflammatory arthritis involving the right hand or wrist.  3. Triangular fibrocartilage complex is slightly heterogeneous in echotexture, which could indicate underlying chronic degeneration.  4. Median nerve appears slightly swollen at the level of the carpus with questionable mild surrounding hyperemia.  However, there was no discrete deviation of the nerve at the level of the carpal tunnel.  Correlate clinically for symptoms suggestive of   carpal tunnel syndrome.    ASSESSMENT/PLAN:     Bilateral Carpal tunnel  - EMG in the past did show evidence of carpal tunnel, right worse than left  - Both carpal tunnel region was injected with 0.5 cc of lidocaine and 40 mg of Depo-Medrol in sterile fashion.  Patient tolerated procedure well  - Her symptoms have worsened, advised to see hand surgeon    Possible Seropositive rheumatoid arthritis (+ CCP, MRI of the left shoulder showing inflammatory synovitis)  - It was always questionable if she had inflammatory arthritis as she had a low titer CCP and no evidence of synovitis on exam.    - s/p cortisone injections in L shoulder in the past   - no improvement on prednisone   - Continues to have pain in her hands, wrists and ankles  - She was on Plaquenil in the past but did not help  - Was also on Leflunomide but caused s/e  - MRI Right hand normal 7/2023  - She does not want to go on medication right now.      Low back pain  - She will try naproxen once or twice a day  - Refer to physical therapy and physiatry    Pt will follow up in 6 mos    There is a longitudinal care relationship with me, the care plan reflects the ongoing nature of the continuous relationship of care, and the medical record indicates that there is ongoing treatment of a serious/complex medical condition which I am currently managing.  is Applicable.     Marge Angel MD  6/11/2025  2:43 PM

## 2025-06-17 ENCOUNTER — HOSPITAL ENCOUNTER (OUTPATIENT)
Dept: MRI IMAGING | Age: 60
Discharge: HOME | End: 2025-06-17
Attending: NURSE PRACTITIONER
Payer: MEDICAID

## 2025-06-17 DIAGNOSIS — R10.84 GENERALIZED ABDOMINAL PAIN: ICD-10-CM

## 2025-06-17 DIAGNOSIS — R11.0 NAUSEA: ICD-10-CM

## 2025-06-17 DIAGNOSIS — K86.9 PANCREATIC LESION (HCC): ICD-10-CM

## 2025-06-17 PROCEDURE — 74183 MRI ABD W/O CNTR FLWD CNTR: CPT | Performed by: NURSE PRACTITIONER

## 2025-06-17 PROCEDURE — A9575 INJ GADOTERATE MEGLUMI 0.1ML: HCPCS | Performed by: NURSE PRACTITIONER

## 2025-06-17 RX ORDER — GADOTERATE MEGLUMINE 376.9 MG/ML
20 INJECTION INTRAVENOUS
Status: COMPLETED | OUTPATIENT
Start: 2025-06-17 | End: 2025-06-17

## 2025-06-17 RX ADMIN — GADOTERATE MEGLUMINE 18 ML: 376.9 INJECTION INTRAVENOUS at 15:29:00

## 2025-06-26 ENCOUNTER — PATIENT MESSAGE (OUTPATIENT)
Facility: CLINIC | Age: 60
End: 2025-06-26

## 2025-07-01 ENCOUNTER — TELEPHONE (OUTPATIENT)
Facility: CLINIC | Age: 60
End: 2025-07-01

## 2025-07-01 NOTE — TELEPHONE ENCOUNTER
Jono,    Patient/daughter following up on [employer] message requesting results from MRI.

## 2025-07-07 ENCOUNTER — OFFICE VISIT (OUTPATIENT)
Dept: INTERNAL MEDICINE CLINIC | Facility: CLINIC | Age: 60
End: 2025-07-07
Payer: MEDICAID

## 2025-07-07 VITALS
WEIGHT: 193 LBS | HEART RATE: 96 BPM | BODY MASS INDEX: 32.95 KG/M2 | HEIGHT: 64 IN | SYSTOLIC BLOOD PRESSURE: 132 MMHG | DIASTOLIC BLOOD PRESSURE: 86 MMHG

## 2025-07-07 DIAGNOSIS — R07.89 OTHER CHEST PAIN: ICD-10-CM

## 2025-07-07 DIAGNOSIS — R00.2 PALPITATIONS: ICD-10-CM

## 2025-07-07 DIAGNOSIS — M1A.09X0 IDIOPATHIC CHRONIC GOUT OF MULTIPLE SITES WITHOUT TOPHUS: ICD-10-CM

## 2025-07-07 DIAGNOSIS — D50.8 OTHER IRON DEFICIENCY ANEMIA: Primary | ICD-10-CM

## 2025-07-07 DIAGNOSIS — I10 ESSENTIAL HYPERTENSION: ICD-10-CM

## 2025-07-07 PROCEDURE — 99214 OFFICE O/P EST MOD 30 MIN: CPT | Performed by: INTERNAL MEDICINE

## 2025-07-07 NOTE — PROGRESS NOTES
Subjective:     Patient ID: Randi Lobo is a 59 year old female.  Presents for follow-up of multiple medical conditions    HPI  Complains of chest discomfort radiating to her back associated with palpitation, episodes happen frequently.  Also has constant feeling of pulling in the left scapular area.  Reports recurrent vomiting.  Taking omeprazole daily, recent MRI abdomen with MRCP of the abdomen showed  CONCLUSION:   1. Small focus of benign fatty infiltration in pancreatic neck.  No suspicious lesion.  Otherwise normal.    2. Moderate hepatic steatosis.   3. Colonic diverticulosis.  New line small hiatal hernia.   Patient was seen by gastroenterology declined EGD.  She is in the care of endocrinology working on improving  Glycemic control, states that he she is taking long taking insulin insulin prior to meals, she eats 2 times per day.  She is prone to constipation, labs in the past showed deficiency anemia she is not able to tolerate iron supplement due to increasing constipation.  Patient seen in the presence of her daughter who is translating.  Current Medications[1]  Allergies:Allergies[2]    Past Medical History[3]   Past Surgical History[4]   Family History[5]   Social History: Short Social Hx on File[6]     /86 (BP Location: Left arm, Patient Position: Sitting, Cuff Size: adult)   Pulse 96   Ht 5' 4\" (1.626 m)   Wt 193 lb (87.5 kg)   BMI 33.13 kg/m²    Physical Exam  Constitutional:       Appearance: Normal appearance.   HENT:      Head: Normocephalic and atraumatic.   Eyes:      General: No scleral icterus.     Extraocular Movements: Extraocular movements intact.      Pupils: Pupils are equal, round, and reactive to light.   Cardiovascular:      Rate and Rhythm: Normal rate and regular rhythm.      Heart sounds: No murmur heard.     No gallop.   Pulmonary:      Effort: Pulmonary effort is normal. No respiratory distress.      Breath sounds: No wheezing or rhonchi.   Musculoskeletal:          General: Normal range of motion.      Cervical back: Normal range of motion and neck supple.      Right lower leg: No edema.      Left lower leg: No edema.   Lymphadenopathy:      Cervical: No cervical adenopathy.   Skin:     General: Skin is warm.      Coloration: Skin is not jaundiced.   Neurological:      General: No focal deficit present.      Mental Status: She is alert and oriented to person, place, and time. Mental status is at baseline.      Cranial Nerves: No cranial nerve deficit.      Gait: Gait normal.      Deep Tendon Reflexes: Reflexes normal.   Psychiatric:         Mood and Affect: Mood normal.         Behavior: Behavior normal.         Judgment: Judgment normal.         Assessment & Plan:   1. Other iron deficiency anemia CBC and iron studies, transfuse iron if needed   2. Palpitations will order Zio patch for 2 weeks, 2D Doppler echocardiogram and stress, see cardiology as well4;   3. Other chest pain    4.      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     Essential hypertension controlled  6.     Myofascial thoracic syndrome patient declined physical therapy due to lack of transportation advised to look up exercises, but overall    Orders Placed This Encounter   Procedures    CBC With Differential With Platelet       Meds This Visit:  Requested Prescriptions      No prescriptions requested or ordered in this encounter       Imaging & Referrals:  [facility] CARDIOLOGY EXTERNAL  CARD ECHO 2D DOPPLER (CPT=93306)  CARD ZIO EXTENDED MONITOR 8-14 DAYS (CPT=93246/27723)     Follow-up in 3 months       [1]   Current Outpatient Medications   Medication Sig Dispense Refill    naproxen 500 MG Oral Tab Take 1 tablet (500 mg total) by mouth 2 (two) times daily with meals. 60 tablet 0    Continuous Glucose Transmitter (DEXCOM G6 TRANSMITTER) Does not apply Misc Change sensor every 90 days 1 each 0    Continuous Glucose  (DEXCOM  G6 ) Does not apply Device Use to monitor blood sugar level 1 each 0    Continuous Glucose Sensor (DEXCOM G6 SENSOR) Does not apply Misc Change sensor every 10 days 9 each 0    Dulaglutide (TRULICITY) 1.5 MG/0.5ML Subcutaneous Solution Auto-injector Inject 1.5 mg into the skin once a week. 6 mL 1    metFORMIN 500 MG Oral Tab TAKE 2 TABLETS BY MOUTH TWICE DAILY 360 tablet 1    glipiZIDE 10 MG Oral Tab Take 1 tablet (10 mg total) by mouth 2 (two) times daily before meals. 180 tablet 1    diclofenac 1 % External Gel Apply 2 g topically 4 (four) times daily. 1 each 2    gabapentin 400 MG Oral Cap Take 1 capsule (400 mg total) by mouth 2 (two) times daily. 180 capsule 3    metFORMIN 500 MG Oral Tab TAKE 2 TABLETS BY MOUTH TWICE DAILY 360 tablet 3    sertraline 100 MG Oral Tab Take 1 tablet (100 mg total) by mouth daily. 90 tablet 3    methocarbamol 750 MG Oral Tab Take 1 tablet (750 mg total) by mouth 3 (three) times daily. 270 tablet 1    hydroCHLOROthiazide 25 MG Oral Tab Take 1 tablet (25 mg total) by mouth 2 (two) times daily. 180 tablet 3    traZODone 100 MG Oral Tab Take 1 tablet (100 mg total) by mouth nightly. 90 tablet 1    hydrocortisone (PROCTOZONE-HC) 2.5 % External Cream Apply to hemorrhoids for 7 to 10 days 28 g 5    pantoprazole 40 MG Oral Tab EC Take 1 tablet (40 mg total) by mouth every morning before breakfast. 90 tablet 3    Insulin Pen Needle (TRUEPLUS 5-BEVEL PEN NEEDLES) 32G X 4 MM Does not apply Misc Use twice a day 200 each 3    Glucose Blood (ONETOUCH VERIO) In Vitro Strip USE TO CHECK BLOOD SUGAR TWICE DAILY, FASTING AND 2 HOURS AFTER A MEAL 200 strip 1    Lancets (ONETOUCH DELICA PLUS [id number]) Does not apply Misc 1 strip by In Vitro route 2 (two) times daily. FASTING AND 2 HOURS AFTER A MEAL 200 each 3    ketoconazole 2 % External Shampoo Use to  Scalp twice a wekk 120 mL 5    insulin glargine (BASAGLAR KWIKPEN) 100 UNIT/ML Subcutaneous Solution Pen-injector Inject 40 Units into the  skin every morning. ADMINISTER 40 UNITS UNDER THE SKIN TWICE DAILY EVERY DAY 36 mL 2    allopurinol 100 MG Oral Tab Take 1 tablet (100 mg total) by mouth nightly. 90 tablet 3    aspirin 81 MG Oral Tab EC Take 1 tablet (81 mg total) by mouth daily. Pt states 2x a day. 90 tablet 3    carvedilol 12.5 MG Oral Tab Take 1 tablet (12.5 mg total) by mouth 2 (two) times daily with meals. 180 tablet 3    levothyroxine 125 MCG Oral Tab Take 1 tablet (125 mcg total) by mouth every morning. 90 tablet 3    simvastatin 40 MG Oral Tab Take 1 tablet (40 mg total) by mouth nightly. 90 tablet 3    losartan 100 MG Oral Tab Take 1 tablet (100 mg total) by mouth daily. 90 tablet 3    Potassium Chloride ER 10 MEQ Oral Cap CR Take 1 capsule (10 mEq total) by mouth 2 (two) times daily. 180 capsule 3    Blood Glucose Monitoring Suppl (ONETOUCH VERIO) w/Device Does not apply Kit 1 kit As Directed. 1 kit 0    Insulin Pen Needle (TRUEPLUS PEN NEEDLES) 32G X 4 MM Does not apply Misc Use needles to inject insulin daily 100 each 3    zolpidem 10 MG Oral Tab Take 1 tablet (10 mg total) by mouth nightly. (Patient not taking: Reported on 7/7/2025) 30 tablet 0    HYDROcodone-acetaminophen (NORCO) 5-325 MG Oral Tab Take 1 tablet by mouth every 8 (eight) hours as needed for Pain. Can cause  constipations (Patient not taking: Reported on 7/7/2025) 21 tablet 0    cholecalciferol 50 MCG (2000 UT) Oral Tab Take 1 tablet (2,000 Units total) by mouth every morning. (Patient not taking: Reported on 7/7/2025) 90 tablet 3    Ferrous Sulfate (IRON) 325 (65 Fe) MG Oral Tab Take 1 Dose by mouth daily. (Patient not taking: Reported on 7/7/2025) 30 tablet 0   [2] No Known Allergies  [3]   Past Medical History:   Asthma (HCC)    Depression    Diabetes (HCC)    Diabetes type 2, controlled (HCC)    Disorder of thyroid    Diverticulitis    Esophageal reflux    Essential hypertension    Gout    Hepatic steatosis    Hepatomegaly    High blood pressure    High cholesterol     Hyperlipidemia    Thyroid disease   [4]   Past Surgical History:  Procedure Laterality Date    Colonoscopy N/A 3/29/2017    Procedure: COLONOSCOPY;  Surgeon: Lenny Miranda MD;  Location: [facility] ENDOSCOPY    Colonoscopy N/A 1/8/2021    Procedure: COLONOSCOPY;  Surgeon: Arthur Roberts MD;  Location: [facility] ENDO    Colonoscopy N/A 10/10/2024    Procedure: COLONOSCOPY with polypectomy;  Surgeon: Adriana Vega MD;  Location: [facility] ENDOSCOPY   [5]   Family History  Problem Relation Age of Onset    Other (hepatitis c) Father     Hypertension Father     Diabetes Mother     Hypertension Mother     Glaucoma Neg     Macular degeneration Neg    [6]   Social History  Socioeconomic History    Marital status:    Tobacco Use    Smoking status: Never     Passive exposure: Never    Smokeless tobacco: Never   Vaping Use    Vaping status: Never Used   Substance and Sexual Activity    Alcohol use: No    Drug use: No   Social History Narrative    ** Merged History Encounter **

## 2025-07-08 NOTE — TELEPHONE ENCOUNTER
Called and left the daughter Sherrill petersen [employer].  [employer] message sent.    JORGE L Fraire

## 2025-07-15 NOTE — TELEPHONE ENCOUNTER
Requested Prescriptions     Pending Prescriptions Disp Refills    NAPROXEN 500 MG Oral Tab [Pharmacy Med Name: NAPROXEN 500MG TABLETS] 60 tablet 0     Sig: TAKE 1 TABLET(500 MG) BY MOUTH TWICE DAILY WITH MEALS      Future Appointments   Date Time Provider Department Center   8/6/2025  2:30 PM Ronal Durand MD [facility]   9/10/2025  5:40 PM Veena Emery MD [facility]       LOV: 6/11/25  Last Refilled:6/11/25 #60 0RF     ASSESSMENT/PLAN:      Bilateral Carpal tunnel  - EMG in the past did show evidence of carpal tunnel, right worse than left  - Both carpal tunnel region was injected with 0.5 cc of lidocaine and 40 mg of Depo-Medrol in sterile fashion.  Patient tolerated procedure well  - Her symptoms have worsened, advised to see hand surgeon     Possible Seropositive rheumatoid arthritis (+ CCP, MRI of the left shoulder showing inflammatory synovitis)  - It was always questionable if she had inflammatory arthritis as she had a low titer CCP and no evidence of synovitis on exam.    - s/p cortisone injections in L shoulder in the past   - no improvement on prednisone   - Continues to have pain in her hands, wrists and ankles  - She was on Plaquenil in the past but did not help  - Was also on Leflunomide but caused s/e  - MRI Right hand normal 7/2023  - She does not want to go on medication right now.       Low back pain  - She will try naproxen once or twice a day  - Refer to physical therapy and physiatry     Pt will follow up in 6 mos     There is a longitudinal care relationship with me, the care plan reflects the ongoing nature of the continuous relationship of care, and the medical record indicates that there is ongoing treatment of a serious/complex medical condition which I am currently managing.  is Applicable.      Marge Angel MD  6/11/2025  2:43 PM

## 2025-07-16 RX ORDER — NAPROXEN 500 MG/1
500 TABLET ORAL 2 TIMES DAILY WITH MEALS
Qty: 60 TABLET | Refills: 0 | Status: SHIPPED | OUTPATIENT
Start: 2025-07-16

## 2025-08-04 DIAGNOSIS — F32.1 CURRENT MODERATE EPISODE OF MAJOR DEPRESSIVE DISORDER WITHOUT PRIOR EPISODE (HCC): ICD-10-CM

## 2025-08-04 DIAGNOSIS — E11.40 TYPE 2 DIABETES MELLITUS WITH DIABETIC NEUROPATHY, WITH LONG-TERM CURRENT USE OF INSULIN (HCC): ICD-10-CM

## 2025-08-04 DIAGNOSIS — M1A.09X0 IDIOPATHIC CHRONIC GOUT OF MULTIPLE SITES WITHOUT TOPHUS: ICD-10-CM

## 2025-08-04 DIAGNOSIS — I10 ESSENTIAL HYPERTENSION: ICD-10-CM

## 2025-08-04 DIAGNOSIS — Z79.4 TYPE 2 DIABETES MELLITUS WITH DIABETIC NEUROPATHY, WITH LONG-TERM CURRENT USE OF INSULIN (HCC): ICD-10-CM

## 2025-08-04 RX ORDER — METHOCARBAMOL 750 MG/1
750 TABLET, FILM COATED ORAL 3 TIMES DAILY
Qty: 270 TABLET | Refills: 1 | Status: CANCELLED | OUTPATIENT
Start: 2025-08-04

## 2025-08-05 ENCOUNTER — TELEPHONE (OUTPATIENT)
Dept: ORTHOPEDICS CLINIC | Facility: CLINIC | Age: 60
End: 2025-08-05

## 2025-08-05 DIAGNOSIS — M79.642 BILATERAL HAND PAIN: Primary | ICD-10-CM

## 2025-08-05 DIAGNOSIS — M79.641 BILATERAL HAND PAIN: Primary | ICD-10-CM

## 2025-08-05 RX ORDER — HYDROCORTISONE 25 MG/G
CREAM TOPICAL
Qty: 28 G | Refills: 5 | Status: SHIPPED | OUTPATIENT
Start: 2025-08-05

## 2025-08-05 RX ORDER — GLIPIZIDE 10 MG/1
10 TABLET ORAL
Qty: 180 TABLET | Refills: 1 | Status: SHIPPED | OUTPATIENT
Start: 2025-08-05

## 2025-08-19 ENCOUNTER — APPOINTMENT (OUTPATIENT)
Dept: CT IMAGING | Facility: HOSPITAL | Age: 60
End: 2025-08-19
Attending: STUDENT IN AN ORGANIZED HEALTH CARE EDUCATION/TRAINING PROGRAM

## 2025-08-19 ENCOUNTER — HOSPITAL ENCOUNTER (EMERGENCY)
Facility: HOSPITAL | Age: 60
Discharge: HOME | End: 2025-08-19
Attending: STUDENT IN AN ORGANIZED HEALTH CARE EDUCATION/TRAINING PROGRAM

## 2025-08-19 ENCOUNTER — HOSPITAL ENCOUNTER (OUTPATIENT)
Age: 60
Discharge: ANOTHER HOSPITAL | End: 2025-08-19

## 2025-08-19 VITALS
TEMPERATURE: 98 F | SYSTOLIC BLOOD PRESSURE: 155 MMHG | BODY MASS INDEX: 31.99 KG/M2 | HEIGHT: 64 IN | OXYGEN SATURATION: 94 % | DIASTOLIC BLOOD PRESSURE: 89 MMHG | HEART RATE: 78 BPM | WEIGHT: 187.38 LBS | RESPIRATION RATE: 16 BRPM

## 2025-08-19 VITALS
RESPIRATION RATE: 20 BRPM | SYSTOLIC BLOOD PRESSURE: 148 MMHG | OXYGEN SATURATION: 96 % | DIASTOLIC BLOOD PRESSURE: 87 MMHG | TEMPERATURE: 98 F | HEART RATE: 83 BPM

## 2025-08-19 DIAGNOSIS — F32.1 CURRENT MODERATE EPISODE OF MAJOR DEPRESSIVE DISORDER WITHOUT PRIOR EPISODE (HCC): ICD-10-CM

## 2025-08-19 DIAGNOSIS — E87.6 HYPOKALEMIA: ICD-10-CM

## 2025-08-19 DIAGNOSIS — I10 ESSENTIAL HYPERTENSION: ICD-10-CM

## 2025-08-19 DIAGNOSIS — M1A.09X0 IDIOPATHIC CHRONIC GOUT OF MULTIPLE SITES WITHOUT TOPHUS: ICD-10-CM

## 2025-08-19 DIAGNOSIS — Z79.4 TYPE 2 DIABETES MELLITUS WITH DIABETIC NEUROPATHY, WITH LONG-TERM CURRENT USE OF INSULIN (HCC): ICD-10-CM

## 2025-08-19 DIAGNOSIS — R41.82 ALTERED MENTAL STATUS, UNSPECIFIED ALTERED MENTAL STATUS TYPE: Primary | ICD-10-CM

## 2025-08-19 DIAGNOSIS — E11.40 TYPE 2 DIABETES MELLITUS WITH DIABETIC NEUROPATHY, WITH LONG-TERM CURRENT USE OF INSULIN (HCC): ICD-10-CM

## 2025-08-19 DIAGNOSIS — R52 PAIN OF LEFT SIDE OF BODY: ICD-10-CM

## 2025-08-19 DIAGNOSIS — M79.605 LEFT LEG PAIN: Primary | ICD-10-CM

## 2025-08-19 DIAGNOSIS — G47.09 OTHER INSOMNIA: ICD-10-CM

## 2025-08-19 LAB
ANION GAP SERPL CALC-SCNC: 10 MMOL/L (ref 0–18)
BASOPHILS # BLD AUTO: 0.06 X10(3) UL (ref 0–0.2)
BASOPHILS NFR BLD AUTO: 0.5 %
BUN BLD-MCNC: 14 MG/DL (ref 9–23)
BUN/CREAT SERPL: 17.5 (ref 10–20)
CALCIUM BLD-MCNC: 9.9 MG/DL (ref 8.7–10.4)
CHLORIDE SERPL-SCNC: 98 MMOL/L (ref 98–112)
CO2 SERPL-SCNC: 29 MMOL/L (ref 21–32)
CREAT BLD-MCNC: 0.8 MG/DL (ref 0.55–1.02)
DEPRECATED RDW RBC AUTO: 41.2 FL (ref 35.1–46.3)
EGFRCR SERPLBLD CKD-EPI 2021: 85 ML/MIN/1.73M2 (ref 60–?)
EOSINOPHIL # BLD AUTO: 0.34 X10(3) UL (ref 0–0.7)
EOSINOPHIL NFR BLD AUTO: 3.1 %
ERYTHROCYTE [DISTWIDTH] IN BLOOD BY AUTOMATED COUNT: 15.5 % (ref 11–15)
GLUCOSE BLD-MCNC: 95 MG/DL (ref 70–99)
GLUCOSE BLDC GLUCOMTR-MCNC: 140 MG/DL (ref 70–99)
GLUCOSE BLDC GLUCOMTR-MCNC: 95 MG/DL (ref 70–99)
HCT VFR BLD AUTO: 36 % (ref 35–48)
HGB BLD-MCNC: 11.8 G/DL (ref 12–16)
IMM GRANULOCYTES # BLD AUTO: 0.01 X10(3) UL (ref 0–1)
IMM GRANULOCYTES NFR BLD: 0.1 %
LYMPHOCYTES # BLD AUTO: 4.2 X10(3) UL (ref 1–4)
LYMPHOCYTES NFR BLD AUTO: 38.4 %
MCH RBC QN AUTO: 24.2 PG (ref 26–34)
MCHC RBC AUTO-ENTMCNC: 32.8 G/DL (ref 31–37)
MCV RBC AUTO: 73.8 FL (ref 80–100)
MONOCYTES # BLD AUTO: 0.82 X10(3) UL (ref 0.1–1)
MONOCYTES NFR BLD AUTO: 7.5 %
NEUTROPHILS # BLD AUTO: 5.5 X10 (3) UL (ref 1.5–7.7)
NEUTROPHILS # BLD AUTO: 5.5 X10(3) UL (ref 1.5–7.7)
NEUTROPHILS NFR BLD AUTO: 50.4 %
OSMOLALITY SERPL CALC.SUM OF ELEC: 284 MOSM/KG (ref 275–295)
PLATELET # BLD AUTO: 380 10(3)UL (ref 150–450)
POTASSIUM SERPL-SCNC: 3.2 MMOL/L (ref 3.5–5.1)
RBC # BLD AUTO: 4.88 X10(6)UL (ref 3.8–5.3)
SODIUM SERPL-SCNC: 137 MMOL/L (ref 136–145)
TROPONIN I SERPL HS-MCNC: <3 NG/L (ref ?–34)
WBC # BLD AUTO: 10.9 X10(3) UL (ref 4–11)

## 2025-08-19 PROCEDURE — 99285 EMERGENCY DEPT VISIT HI MDM: CPT

## 2025-08-19 PROCEDURE — 74175 CTA ABDOMEN W/CONTRAST: CPT | Performed by: STUDENT IN AN ORGANIZED HEALTH CARE EDUCATION/TRAINING PROGRAM

## 2025-08-19 PROCEDURE — 82962 GLUCOSE BLOOD TEST: CPT

## 2025-08-19 PROCEDURE — 96374 THER/PROPH/DIAG INJ IV PUSH: CPT

## 2025-08-19 PROCEDURE — 85025 COMPLETE CBC W/AUTO DIFF WBC: CPT | Performed by: STUDENT IN AN ORGANIZED HEALTH CARE EDUCATION/TRAINING PROGRAM

## 2025-08-19 PROCEDURE — 80048 BASIC METABOLIC PNL TOTAL CA: CPT | Performed by: STUDENT IN AN ORGANIZED HEALTH CARE EDUCATION/TRAINING PROGRAM

## 2025-08-19 PROCEDURE — 99284 EMERGENCY DEPT VISIT MOD MDM: CPT

## 2025-08-19 PROCEDURE — 84484 ASSAY OF TROPONIN QUANT: CPT | Performed by: STUDENT IN AN ORGANIZED HEALTH CARE EDUCATION/TRAINING PROGRAM

## 2025-08-19 PROCEDURE — 71275 CT ANGIOGRAPHY CHEST: CPT | Performed by: STUDENT IN AN ORGANIZED HEALTH CARE EDUCATION/TRAINING PROGRAM

## 2025-08-19 PROCEDURE — 99213 OFFICE O/P EST LOW 20 MIN: CPT

## 2025-08-19 RX ORDER — CYCLOBENZAPRINE HCL 10 MG
10 TABLET ORAL 3 TIMES DAILY PRN
Qty: 20 TABLET | Refills: 0 | Status: SHIPPED | OUTPATIENT
Start: 2025-08-19 | End: 2025-08-26

## 2025-08-19 RX ORDER — POTASSIUM CHLORIDE 1500 MG/1
40 TABLET, EXTENDED RELEASE ORAL ONCE
Status: COMPLETED | OUTPATIENT
Start: 2025-08-19 | End: 2025-08-19

## 2025-08-19 RX ORDER — DIAZEPAM 10 MG/2ML
2.5 INJECTION, SOLUTION INTRAMUSCULAR; INTRAVENOUS ONCE
Status: COMPLETED | OUTPATIENT
Start: 2025-08-19 | End: 2025-08-19

## 2025-08-20 RX ORDER — HYDROCORTISONE 25 MG/G
CREAM TOPICAL
Qty: 28 G | Refills: 5 | Status: SHIPPED | OUTPATIENT
Start: 2025-08-20

## 2025-08-25 ENCOUNTER — HOSPITAL ENCOUNTER (OUTPATIENT)
Dept: GENERAL RADIOLOGY | Age: 60
Discharge: HOME | End: 2025-08-25
Attending: ORTHOPAEDIC SURGERY

## 2025-08-25 DIAGNOSIS — M79.642 BILATERAL HAND PAIN: ICD-10-CM

## 2025-08-25 DIAGNOSIS — M79.641 BILATERAL HAND PAIN: ICD-10-CM

## 2025-08-25 PROCEDURE — 73130 X-RAY EXAM OF HAND: CPT | Performed by: ORTHOPAEDIC SURGERY

## 2025-08-25 RX ORDER — TRAZODONE HYDROCHLORIDE 100 MG/1
100 TABLET ORAL NIGHTLY
Qty: 90 TABLET | Refills: 1 | OUTPATIENT
Start: 2025-08-25

## 2025-08-25 RX ORDER — SERTRALINE HYDROCHLORIDE 100 MG/1
100 TABLET, FILM COATED ORAL DAILY
Qty: 90 TABLET | Refills: 3 | OUTPATIENT
Start: 2025-08-25

## 2025-08-25 RX ORDER — HYDROCHLOROTHIAZIDE 25 MG/1
25 TABLET ORAL 2 TIMES DAILY
Qty: 180 TABLET | Refills: 3 | OUTPATIENT
Start: 2025-08-25

## 2025-08-25 RX ORDER — ZOLPIDEM TARTRATE 10 MG/1
10 TABLET ORAL NIGHTLY
Qty: 30 TABLET | Refills: 0 | Status: SHIPPED | OUTPATIENT
Start: 2025-08-25

## 2025-08-25 RX ORDER — LOSARTAN POTASSIUM 100 MG/1
100 TABLET ORAL DAILY
Qty: 90 TABLET | Refills: 3 | OUTPATIENT
Start: 2025-08-25

## 2025-08-25 RX ORDER — LEVOTHYROXINE SODIUM 125 UG/1
125 TABLET ORAL EVERY MORNING
Qty: 90 TABLET | Refills: 3 | OUTPATIENT
Start: 2025-08-25

## 2025-08-25 RX ORDER — PANTOPRAZOLE SODIUM 40 MG/1
40 TABLET, DELAYED RELEASE ORAL
Qty: 90 TABLET | Refills: 3 | OUTPATIENT
Start: 2025-08-25

## 2025-08-25 RX ORDER — GABAPENTIN 400 MG/1
400 CAPSULE ORAL 2 TIMES DAILY
Qty: 180 CAPSULE | Refills: 3 | OUTPATIENT
Start: 2025-08-25

## 2025-08-25 RX ORDER — ALLOPURINOL 100 MG/1
100 TABLET ORAL NIGHTLY
Qty: 90 TABLET | Refills: 3 | OUTPATIENT
Start: 2025-08-25

## 2025-08-25 RX ORDER — METHOCARBAMOL 750 MG/1
750 TABLET, FILM COATED ORAL 3 TIMES DAILY
Qty: 270 TABLET | Refills: 1 | OUTPATIENT
Start: 2025-08-25

## 2025-08-25 RX ORDER — SIMVASTATIN 40 MG
40 TABLET ORAL NIGHTLY
Qty: 90 TABLET | Refills: 3 | OUTPATIENT
Start: 2025-08-25

## 2025-08-25 RX ORDER — CARVEDILOL 12.5 MG/1
12.5 TABLET ORAL 2 TIMES DAILY WITH MEALS
Qty: 180 TABLET | Refills: 3 | OUTPATIENT
Start: 2025-08-25

## (undated) DIAGNOSIS — F32.1 CURRENT MODERATE EPISODE OF MAJOR DEPRESSIVE DISORDER WITHOUT PRIOR EPISODE (HCC): ICD-10-CM

## (undated) DIAGNOSIS — Z79.4 TYPE 2 DIABETES MELLITUS WITH HYPERGLYCEMIA, WITH LONG-TERM CURRENT USE OF INSULIN (HCC): ICD-10-CM

## (undated) DIAGNOSIS — E11.65 TYPE 2 DIABETES MELLITUS WITH HYPERGLYCEMIA, WITH LONG-TERM CURRENT USE OF INSULIN (HCC): ICD-10-CM

## (undated) DIAGNOSIS — I10 ESSENTIAL HYPERTENSION: ICD-10-CM

## (undated) DIAGNOSIS — I10 PRIMARY HYPERTENSION: ICD-10-CM

## (undated) DIAGNOSIS — E61.1 IRON DEFICIENCY: ICD-10-CM

## (undated) DIAGNOSIS — Z79.4 TYPE 2 DIABETES MELLITUS WITH DIABETIC NEUROPATHY, WITH LONG-TERM CURRENT USE OF INSULIN (HCC): ICD-10-CM

## (undated) DIAGNOSIS — E11.9 TYPE 2 DIABETES MELLITUS WITHOUT COMPLICATION, WITH LONG-TERM CURRENT USE OF INSULIN (HCC): ICD-10-CM

## (undated) DIAGNOSIS — Z79.4 TYPE 2 DIABETES MELLITUS WITHOUT COMPLICATION, WITH LONG-TERM CURRENT USE OF INSULIN (HCC): ICD-10-CM

## (undated) DIAGNOSIS — E78.5 DYSLIPIDEMIA: ICD-10-CM

## (undated) DIAGNOSIS — E11.40 TYPE 2 DIABETES MELLITUS WITH DIABETIC NEUROPATHY, WITH LONG-TERM CURRENT USE OF INSULIN (HCC): ICD-10-CM

## (undated) DIAGNOSIS — M1A.09X0 IDIOPATHIC CHRONIC GOUT OF MULTIPLE SITES WITHOUT TOPHUS: ICD-10-CM

## (undated) DEVICE — SUCTION CANISTER, 3000CC,SAFELINER: Brand: DEROYAL

## (undated) DEVICE — SOL  .9 1000ML BTL

## (undated) DEVICE — 3 ML SYRINGE LUER-LOCK TIP: Brand: MONOJECT

## (undated) DEVICE — Device: Brand: DEFENDO AIR/WATER/SUCTION AND BIOPSY VALVE

## (undated) DEVICE — LASSO POLYPECTOMY SNARE: Brand: LASSO

## (undated) DEVICE — SNARE CAPTI HEX STIFF MEDIUM

## (undated) DEVICE — 35 ML SYRINGE REGULAR TIP: Brand: MONOJECT

## (undated) DEVICE — V2 SPECIMEN COLLECTION MANIFOLD KIT: Brand: NEPTUNE

## (undated) DEVICE — V2 SPECIMEN COLLECTION TRAY: Brand: NEPTUNE

## (undated) DEVICE — Device: Brand: CUSTOM PROCEDURE KIT

## (undated) DEVICE — GAMMEX® NON-LATEX PI ORTHO SIZE 6.5, STERILE POLYISOPRENE POWDER-FREE SURGICAL GLOVE: Brand: GAMMEX

## (undated) DEVICE — FORCEP RADIAL JAW 4

## (undated) DEVICE — SYRINGE, LUER SLIP, STERILE, 60ML: Brand: MEDLINE

## (undated) DEVICE — STERILE LATEX POWDER-FREE SURGICAL GLOVESWITH NITRILE COATING: Brand: PROTEXIS

## (undated) DEVICE — CO2 CANNULA,SSOFT,ADLT,7O2,4CO2,FEMALE: Brand: MEDLINE

## (undated) DEVICE — HEAD & NECK: Brand: MEDLINE INDUSTRIES, INC.

## (undated) DEVICE — KIT CLEAN ENDOKIT 1.1OZ GOWNX2

## (undated) DEVICE — 6 ML SYRINGE LUER-LOCK TIP: Brand: MONOJECT

## (undated) DEVICE — ENDOSCOPY PACK - LOWER: Brand: MEDLINE INDUSTRIES, INC.

## (undated) DEVICE — KIT ENDO ORCAPOD 160/180/190

## (undated) DEVICE — MEDI-VAC NON-CONDUCTIVE SUCTION TUBING 6MM X 1.8M (6FT.) L: Brand: CARDINAL HEALTH

## (undated) DEVICE — ENDOSCOPY PACK UPPER: Brand: MEDLINE INDUSTRIES, INC.

## (undated) DEVICE — CONMED SCOPE SAVER BITE BLOCK, 20X27 MM: Brand: SCOPE SAVER

## (undated) DEVICE — CAUTERY ENDO L HOOK EZ-CLEAN

## (undated) DEVICE — LINE MNTR ADLT SET O2 INTMD

## (undated) NOTE — LETTER
10/29/20        Randi Lobo  [address]      Dear Stoney Murphy,    [facility] records indicate that you have outstanding lab work and or testing that was ordered for you and has not yet been completed:  Orders Placed This Encounter

## (undated) NOTE — LETTER
July 13, 2023      No Recipients     Patient: Simi White   YOB: 1965   Date of Visit: 7/13/2023       Dear Dr. Raysa Gilmore Recipients: Thank you for referring Simi White to me for evaluation. Here is my assessment and plan of care:    Simi White is a 65year old female. HPI:     HPI    Pt in today for a diabetic eye exam. Pt's daughter-in-law is here to translate for pt. Pt states vision is stable with OTC readers but complains of eye pain, mainly in the right eye but mentioned she only tried doing warm compresses a few times then stopped. Pt has been a diabetic for 5+ years       Pt's diabetes is currently controlled by pills & injectable and insulin   Pt checks BS 1-2x a day    Pt's last blood sugar was 200 yesterday   Last HA1C was 7.6 on 6/13/23  Endocrinologist: Dr. Idalia Coleman     Consult: per Dr. Lisette Benz   Last edited by Willey Babinski, O.T. on 7/13/2023  3:52 PM.        Patient History:  Past Medical History:   Diagnosis Date    Asthma     Depression     Diabetes ([facility] [address]     Diabetes type 2, controlled ([facility] [address]     Disorder of thyroid     Diverticulitis 5/15    Esophageal reflux     Essential hypertension     Gout     Hepatic steatosis     Hepatomegaly     High blood pressure     High cholesterol     Hyperlipidemia     Thyroid disease        Surgical History: Simi White has a past surgical history that includes colonoscopy (N/A, 3/29/2017) (Procedure: COLONOSCOPY;  Surgeon: Mirna Brumfield MD;  Location: [facility]) and colonoscopy (N/A, 1/8/2021) (Procedure: COLONOSCOPY;  Surgeon: Hannah Jimenez MD;  Location: [facility]). Family History   Problem Relation Age of Onset    Cancer Father     Hypertension Father     Diabetes Mother     Cancer Mother     Hypertension Mother     Glaucoma Neg     Macular degeneration Neg        Social History:   Social History     Socioeconomic History    Marital status:     Tobacco Use    Smoking status: Never    Smokeless tobacco: Never   Vaping Use    Vaping Use: Never used   Substance and Sexual Activity    Alcohol use: No    Drug use: No   Social History Narrative    ** Merged History Encounter **            Medications:  Current Outpatient Medications   Medication Sig Dispense Refill    Insulin Pen Needle (TRUEPLUS 5-BEVEL PEN NEEDLES) 32G X 4 MM Does not apply Misc Use twice a day 200 each 3    Continuous Blood Gluc Sensor (DEXCOM G6 SENSOR) Does not apply Misc Change sensor every 10 days 9 each 0    Liraglutide (VICTOZA) 18 MG/3ML Subcutaneous Solution Pen-injector Inject 1.8 mg into the skin every morning. 9 mL 3    glipiZIDE 10 MG Oral Tab Take 1 tablet (10 mg total) by mouth 2 (two) times daily before meals. 60 tablet 0    insulin glargine (BASAGLAR KWIKPEN) 100 UNIT/ML Subcutaneous Solution Pen-injector ADMINISTER 40 UNITS UNDER THE SKIN EVERY DAY 12 mL 0    diclofenac 1 % External Gel Apply 2 g topically 4 (four) times daily. 1 each 0    naproxen 500 MG Oral Tab EC Take 1 tablet p.o. at bedtime as needed for severe pain 30 tablet 1    sertraline 100 MG Oral Tab Take 1 tablet (100 mg total) by mouth daily. 90 tablet 3    metFORMIN 500 MG Oral Tab TAKE 2 TABLETS BY MOUTH TWICE DAILY 360 tablet 3    cholecalciferol 50 MCG (2000 UT) Oral Tab Take 1 tablet (2,000 Units total) by mouth every morning. 90 tablet 3    ketoconazole 2 % External Shampoo Use to  Scalp twice a wekk 120 mL 5    Omeprazole 40 MG Oral Capsule Delayed Release Take 1 capsule (40 mg total) by mouth daily. 90 capsule 3    hydrocortisone (PROCTOZONE-HC) 2.5 % External Cream Apply to hemorrhoids for 7 to 10 days 28 g 5    zolpidem 10 MG Oral Tab Take 1 tablet (10 mg total) by mouth nightly. 30 tablet 1    traZODone 100 MG Oral Tab Take 1 tablet (100 mg total) by mouth nightly. 90 tablet 3    docusate sodium 100 MG Oral Cap Take 1 capsule (100 mg total) by mouth 2 (two) times daily as needed for constipation.  60 capsule 2    carvedilol 12.5 MG Oral Tab Take 1 tablet (12.5 mg total) by mouth daily. 180 tablet 3    simvastatin 40 MG Oral Tab Take 1 tablet (40 mg total) by mouth nightly. 90 tablet 3    methocarbamol 500 MG Oral Tab Take 1 tablet (500 mg total) by mouth 3 (three) times daily. 90 tablet 2    gabapentin 400 MG Oral Cap Take 1 capsule (400 mg total) by mouth 2 (two) times daily. 180 capsule 5    losartan 100 MG Oral Tab Take 1 tablet (100 mg total) by mouth 2 (two) times daily. 180 tablet 3    Potassium Chloride ER 10 MEQ Oral Cap CR TAKE 1 CAPSULE BY MOUTH TWICE DAILY 180 capsule 3    Continuous Blood Gluc Transmit (DEXCOM G6 TRANSMITTER) Does not apply Misc Change sensor every 90 days 1 each 0    Continuous Blood Gluc  (DEXCOM G6 ) Does not apply Device Use to monitor blood sugar level 1 each 0    Blood Glucose Monitoring Suppl (ONETOUCH VERIO) w/Device Does not apply Kit 1 kit As Directed. 1 kit 0    Insulin Pen Needle (TRUEPLUS PEN NEEDLES) 32G X 4 MM Does not apply Misc Use needles to inject insulin daily 100 each 3    Glucose Blood (ONETOUCH VERIO) In Vitro Strip USE TO CHECK BLOOD SUGAR TWICE DAILY, FASTING AND 2 HOURS AFTER A MEAL 200 strip 1    Ferrous Sulfate (IRON) 325 (65 Fe) MG Oral Tab Take 1 Dose by mouth daily. 30 tablet 0    hydroCHLOROthiazide 25 MG Oral Tab TAKE 1 TABLET BY MOUTH TWICE DAILY 180 tablet 1    levothyroxine 125 MCG Oral Tab Take 1 tablet (125 mcg total) by mouth every morning. 90 tablet 1    allopurinol 100 MG Oral Tab Take 1 tablet (100 mg total) by mouth nightly. 90 tablet 1    Lancets (ONETOUCH DELICA PLUS [id number]) Does not apply Misc 1 strip by In Vitro route 2 (two) times daily. FASTING AND 2 HOURS AFTER A MEAL 200 each 3    HYDROcodone-acetaminophen 7.5-325 MG Oral Tab Take 1-2 tablets by mouth every 4 (four) hours as needed for Pain. 15 tablet 0    aspirin 81 MG Oral Tab EC Take 1 tablet (81 mg total) by mouth daily. Pt states 2x a day.          Allergies:  No Known Allergies    ROS:     ROS    Positive for: Endocrine, Eyes  Negative for: Constitutional, Gastrointestinal, Neurological, Skin, Genitourinary, Musculoskeletal, HENT, Cardiovascular, Respiratory, Psychiatric, Allergic/Imm, Heme/Lymph  Last edited by Astrid Duran OT on 7/13/2023  3:19 PM.          PHYSICAL EXAM:     Base Eye Exam       Visual Acuity (Snellen - Linear)         Right Left    Dist sc 20/20 -1 20/20 -2    Near cc 20/20 20/20   NVA checked with +2.25 trial lens              Tonometry (Icare, 3:35 PM)         Right Left    Pressure 19 18              Pupils         Pupils    Right PERRL    Left PERRL              Visual Fields         Left Right     Full Full   Difficult- poor understanding              Extraocular Movement         Right Left     Full, Ortho Full, Ortho              Dilation       Both eyes: 1.0% Mydriacyl and 2.5% Joe Synephrine @ 3:36 PM              Dilation #2       Both eyes: 1.0% Mydriacyl and 2.5% Joe Synephrine @ 3:39 PM                  Slit Lamp and Fundus Exam       Slit Lamp Exam         Right Left    Lids/Lashes Dermatochalasis, Meibomian gland dysfunction, oily lids Dermatochalasis, Meibomian gland dysfunction, costa upper lid- no FB    Conjunctiva/Sclera oily tear film Nasal pinguecula, oily tear film    Cornea Clear Clear    Anterior Chamber Deep and quiet Deep and quiet    Iris Normal Normal    Lens Trace Nuclear sclerosis, Trace Posterior subcapsular cataract Trace Nuclear sclerosis, Trace Posterior subcapsular cataract    Vitreous Clear Clear              Fundus Exam         Right Left    Disc Good rim, Temporal crescent Good rim, Temporal crescent    C/D Ratio 0.4 0.4    Macula Normal- no BDR Normal- no BDR    Vessels Normal Normal    Periphery Normal Normal                  Refraction       Wearing Rx         Sphere Cylinder    Right +2.25 Sphere    Left +2.25 Sphere      Type: OTC reading only              Manifest Refraction    Pt declines refraction, happy with her OTC readers only ASSESSMENT/PLAN:     Diagnoses and Plan:     Age-related nuclear cataract of both eyes  Discussed mild cataracts in both eyes that are not affecting vision and are not surgical at this time. Type 2 diabetes mellitus without retinopathy ([address]  Diabetes type II: no background of retinopathy, no signs of neovascularization noted. Discussed ocular and systemic benefits of blood sugar control. Diagnosis and treatment discussed in detail with patient. Meibomian gland dysfunction (MGD) of both eyes  Patient was instructed to use warm compresses to the eyelids twice a day everyday. Instructions for warm compress use:   Patient should place wash compresses on both eyelids for 5 minutes every morning and every night. After 5 minutes of holding the warm compresses on the eyelids, patient should gently rub the eyelashes and then rinse thoroughly with warm water. Diagnosis discussed with patient. Use preservative free artificial tears (any over the counter brand is okay) up to 4 times per day as needed for dry eye symptoms. No orders of the defined types were placed in this encounter. Meds This Visit:  Requested Prescriptions      No prescriptions requested or ordered in this encounter        Follow up instructions:  Return in about 1 year (around 7/13/2024) for DM Eye Exam.    7/13/2023  Scribed by: Kenya Elena MD        If you have questions, please do not hesitate to call me. I look forward to following Paige Abernathy along with you.     Sincerely,        Kenya Elena MD        CC:   No Recipients    Document electronically generated by: Kenya Elena MD

## (undated) NOTE — LETTER
5/9/2020              Randi Garcia         Dear Stoney Murphy,    I made several attempts to reach you by phone unsuccessfully. I would like to speak to you regarding your recent blood test results. ,  The

## (undated) NOTE — ED AVS SNAPSHOT
[facility] Emergency Department    [address]     [address] [place] [address]    Phone:  [phone]    Fax:  [phone]           Ortiz Dorantes   MRN: [id number]    Department:  [facility] Emergency Department   Date of Visit:  4/16 and Class Registration line at [phone] or find a doctor online by visiting [email or web address].    IF THERE IS ANY CHANGE OR WORSENING OF YOUR CONDITION, CALL YOUR PRIMARY CARE PHYSICIAN AT ONCE OR RETURN IMMEDIATELY TO [address] [place] [address].     If

## (undated) NOTE — LETTER
[address]   Date:   8/5/2021     Name:   Walter Swartz    YOB: 1965   MRN:   [id number]       WHERE IS YOUR PAIN NOW? Chapo the areas on your body where you feel the described sensations.   Use the appropriate sy

## (undated) NOTE — IP AVS SNAPSHOT
[facility]    [address], [place], [place] ~ [phone]                Discharge Summary   3/29/2017    [address] [facility]           Admission Information        Provider Department    3/29/2017 Lauri Aguilera MD E 8.4      Recent Hematology Lab Results (cont.)  (Last 3 results in the past 90 days)    Neutrophil % Lymphocyte % Monocyte % Eosinophil % Basophil % Prelim Neut Abs Final Neut Abs Lymphocyte Abso Monocyte Absolu Eosinophil Abso Basophil Absolu    (01/19/17 returning the survey you will receive in the mail. Thank you! Jalen     Sign up for [employer], your secure online medical record.   [employer] will allow you to access patient instructions from your recent visit,  view other health information, and mor

## (undated) NOTE — ED AVS SNAPSHOT
[facility] Emergency Department    [address]     [address] [place] [address]    Phone:  [phone]    Fax:  [phone]           Kathy Fulling   MRN: [id number]    Department:  [facility] Emergency Department   Date of Visit: and Class Registration line at [phone] or find a doctor online by visiting [email or web address].    IF THERE IS ANY CHANGE OR WORSENING OF YOUR CONDITION, CALL YOUR PRIMARY CARE PHYSICIAN AT ONCE OR RETURN IMMEDIATELY TO [address] [place] [address].     If

## (undated) NOTE — LETTER
4/27/2020              Randi Lobo        [address] [place] [phone]         Dear Calvin Toussaint,    This letter is to inform you that our office has made several attempts to reach you by phone without success.   We were attempting to cont

## (undated) NOTE — LETTER
[facility]  Administration of Anesthesia  1. Flower Johnson, or _________________________________ acting on her behalf, (Patient) (Dependent/Representative) request to receive anesthesia for my pending procedure/operation/treatment.   A 6. OBSTETRIC PATIENTS: Specific risks/consequences of spinal/epidural anesthesia may include itching, low blood pressure, difficulty urinating, slowing of the baby's heart rate and headache.  Rare risks include infections, high spinal block, spinal bleeding ___________________________________________________           _____________________________________________________  Date/Time                                                                                               Responsible person in case of minor

## (undated) NOTE — ED AVS SNAPSHOT
Walter Swartz   MRN: [id number]    Department:  [facility] Emergency Department   Date of Visit:  10/15/2019           Disclosure     Insurance plans vary and the physician(s) referred by the ER may not be covered by your plan.  Please contact CARE PHYSICIAN AT ONCE OR RETURN IMMEDIATELY TO THE EMERGENCY DEPARTMENT. If you have been prescribed any medication(s), please fill your prescription right away and begin taking the medication(s) as directed.   If you believe that any of the medications

## (undated) NOTE — LETTER
July 16, 2024      No Recipients     Patient: Randi Lobo   YOB: 1965   Date of Visit: 7/16/2024       Dear Dr. Mercado Recipients:    Thank you for referring Randi Lobo to me for evaluation. Here is my assessment and plan of care:    Randi Lobo is a 58 year old female.    HPI:     HPI    Pt. Here for a DM eye exam.   C/O noticing blurry vision for about a year now, wearing OTC reading glasses that helps a little.     Pt has been a diabetic for 6 years       Pt's diabetes is currently controlled by pills, insulin and trulicity  Pt BS is monitored regularly.    Pt's last blood sugar was  203 mg/dl on 07/16/25  Last HA1C was 7.8 on 06/05/24  Endocrinologist: Elton         Last edited by Stacia Hernández OT on 7/16/2024  5:04 PM.        Patient History:  Past Medical History:    Asthma (HCC)    Depression    Diabetes (HCC)    Diabetes type 2, controlled (HCC)    Disorder of thyroid    Diverticulitis    Esophageal reflux    Essential hypertension    Gout    Hepatic steatosis    Hepatomegaly    High blood pressure    High cholesterol    Hyperlipidemia    Thyroid disease       Surgical History: Randi Lobo has a past surgical history that includes colonoscopy (N/A, 3/29/2017) (Procedure: COLONOSCOPY;  Surgeon: Lenny Miranda MD;  Location: [facility] ENDOSCOPY) and colonoscopy (N/A, 1/8/2021) (Procedure: COLONOSCOPY;  Surgeon: Arthur Roberts MD;  Location: [facility] ENDO).    Family History   Problem Relation Age of Onset    Cancer Father     Hypertension Father     Diabetes Mother     Cancer Mother     Hypertension Mother     Glaucoma Neg     Macular degeneration Neg        Social History:   Social History     Socioeconomic History    Marital status:    Tobacco Use    Smoking status: Never    Smokeless tobacco: Never   Vaping Use    Vaping status: Never Used   Substance and Sexual Activity    Alcohol use: No    Drug use: No   Social History Narrative    ** Merged History Encounter **             Medications:  Current Outpatient Medications   Medication Sig Dispense Refill    Continuous Glucose  (DEXCOM G6 ) Does not apply Device Use to monitor blood sugar level 1 each 0    Glucose Blood (ONETOUCH VERIO) In Vitro Strip USE TO CHECK BLOOD SUGAR TWICE DAILY, FASTING AND 2 HOURS AFTER A MEAL 200 strip 1    Continuous Glucose Sensor (DEXCOM G6 SENSOR) Does not apply Misc Change sensor every 10 days 9 each 0    insulin glargine (BASAGLAR KWIKPEN) 100 UNIT/ML Subcutaneous Solution Pen-injector Inject 40 Units into the skin every morning. ADMINISTER 40 UNITS UNDER THE SKIN TWICE DAILY EVERY DAY 36 mL 2    hydroCHLOROthiazide 25 MG Oral Tab TAKE 1 TABLET BY MOUTH TWICE DAILY 180 tablet 3    carvedilol 12.5 MG Oral Tab Take 1 tablet (12.5 mg total) by mouth daily. 90 tablet 3    Continuous Glucose Transmitter (DEXCOM G6 TRANSMITTER) Does not apply Misc Change sensor every 90 days 1 each 0    Dulaglutide (TRULICITY) 3 MG/0.5ML Subcutaneous Solution Pen-injector Inject 3 mg into the skin once a week. 6 mL 0    Dulaglutide (TRULICITY) 3 MG/0.5ML Subcutaneous Solution Pen-injector Inject 3 mg into the skin once a week. 6 mL 0    diclofenac 1 % External Gel Apply 2 g topically 4 (four) times daily. 1 each 2    allopurinol 100 MG Oral Tab Take 1 tablet (100 mg total) by mouth nightly. 90 tablet 3    zolpidem 10 MG Oral Tab Take 1 tablet (10 mg total) by mouth nightly. 30 tablet 1    glipiZIDE 10 MG Oral Tab Take 1 tablet (10 mg total) by mouth 2 (two) times daily before meals. 180 tablet 1    metFORMIN 500 MG Oral Tab TAKE 2 TABLETS BY MOUTH TWICE DAILY 360 tablet 1    levothyroxine 125 MCG Oral Tab Take 1 tablet (125 mcg total) by mouth every morning. 90 tablet 3    tiZANidine 4 MG Oral Tab Take 1 tablet (4 mg total) by mouth every 8 (eight) hours as needed. 90 tablet 1    Insulin Pen Needle (TRUEPLUS 5-BEVEL PEN NEEDLES) 32G X 4 MM Does not apply Misc Use twice a day 200 each 3     cholecalciferol 50 MCG (2000 UT) Oral Tab Take 1 tablet (2,000 Units total) by mouth every morning. 90 tablet 3    ketoconazole 2 % External Shampoo Use to  Scalp twice a wekk 120 mL 5    pantoprazole 40 MG Oral Tab EC Take 1 tablet (40 mg total) by mouth every morning before breakfast. 90 tablet 1    gabapentin 400 MG Oral Cap Take 1 capsule (400 mg total) by mouth 2 (two) times daily. 180 capsule 1    hydrocortisone (PROCTOZONE-HC) 2.5 % External Cream Apply to hemorrhoids for 7 to 10 days 28 g 5    losartan 100 MG Oral Tab Take 1 tablet (100 mg total) by mouth daily. 90 tablet 3    simvastatin 40 MG Oral Tab Take 1 tablet (40 mg total) by mouth nightly. 90 tablet 3    sertraline 100 MG Oral Tab Take 1 tablet (100 mg total) by mouth daily. 90 tablet 3    Potassium Chloride ER 10 MEQ Oral Cap CR TAKE 1 CAPSULE BY MOUTH TWICE DAILY 180 capsule 3    traZODone 100 MG Oral Tab Take 1 tablet (100 mg total) by mouth nightly. 90 tablet 3    aspirin 81 MG Oral Tab EC Take 1 tablet (81 mg total) by mouth daily. Pt states 2x a day. 90 tablet 3    naproxen 500 MG Oral Tab EC Take 1 tablet p.o. at bedtime as needed for severe pain 30 tablet 1    Omeprazole 40 MG Oral Capsule Delayed Release Take 1 capsule (40 mg total) by mouth daily. 90 capsule 3    Blood Glucose Monitoring Suppl (ONETOUCH VERIO) w/Device Does not apply Kit 1 kit As Directed. 1 kit 0    Insulin Pen Needle (TRUEPLUS PEN NEEDLES) 32G X 4 MM Does not apply Misc Use needles to inject insulin daily 100 each 3    Ferrous Sulfate (IRON) 325 (65 Fe) MG Oral Tab Take 1 Dose by mouth daily. 30 tablet 0    Lancets (ONETOUCH DELICA PLUS QUERMF50T) Does not apply Misc 1 strip by In Vitro route 2 (two) times daily. FASTING AND 2 HOURS AFTER A MEAL 200 each 3    HYDROcodone-acetaminophen 7.5-325 MG Oral Tab Take 1-2 tablets by mouth every 4 (four) hours as needed for Pain. 15 tablet 0       Allergies:  No Known Allergies    ROS:       PHYSICAL EXAM:     Base Eye Exam        Visual Acuity (Snellen - Linear)         Right Left    Dist sc 20/50 20/30    Dist ph sc 20/30 20/25 -2    Near cc 20/20 20/20   NVA With +2.25 in the phoropter             Tonometry (Applanation, 5:05 PM)         Right Left    Pressure 12 13              Pupils         Pupils    Right PERRL    Left PERRL              Visual Fields         Left Right     Full               Extraocular Movement         Right Left     Full Full              Dilation       Both eyes: 1.0% Mydriacyl and 2.5% Joe Synephrine @ 4:51 PM              Dilation #2       Both eyes: 1.0% Mydriacyl and 2.5% Joe Synephrine @ 5:06 PM                  Slit Lamp and Fundus Exam       Slit Lamp Exam         Right Left    Lids/Lashes Dermatochalasis, Meibomian gland dysfunction, oily lids Dermatochalasis, Meibomian gland dysfunction, costa upper lid- no FB    Conjunctiva/Sclera oily tear film Nasal pinguecula, oily tear film    Cornea Clear Clear    Anterior Chamber Deep and quiet Deep and quiet    Iris Normal Normal    Lens 1+ Nuclear sclerosis, 1+ Posterior subcapsular cataract 1+ Nuclear sclerosis, Trace Posterior subcapsular cataract, Trace Cortical cataract    Vitreous Clear Clear              Fundus Exam         Right Left    Disc Good rim, Temporal crescent Good rim, Temporal crescent    C/D Ratio 0.4 0.4    Macula Normal- no BDR Normal- no BDR    Vessels Normal Normal    Periphery Normal Normal                  Refraction       Wearing Rx         Sphere Cylinder    Right +2.25 Sphere    Left +2.25 Sphere      Type: Forgot OTC reading only              Manifest Refraction (Auto)         Sphere Cylinder Axis    Right -1.00 +1.75 165    Left [place] +0.25 150   Declines refraction, states will see an optometrist for the glasses Rx.                     ASSESSMENT/PLAN:     Diagnoses and Plan:     Type 2 diabetes mellitus without retinopathy (HCC)  Diabetes type II: no background of retinopathy, no signs of neovascularization noted.  Discussed ocular  and systemic benefits of blood sugar control.  Diagnosis and treatment discussed in detail with patient.    Will see patient in 1 year for a diabetic exam    Age-related nuclear cataract of both eyes  Discussed mild cataracts in both eyes that are not affecting vision and are not surgical at this time.      No orders of the defined types were placed in this encounter.      Meds This Visit:  Requested Prescriptions      No prescriptions requested or ordered in this encounter        Follow up instructions:  Return in about 1 year (around 7/16/2025) for Diabetic eye exam.    7/16/2024  Scribed by: Bk Helm MD        If you have questions, please do not hesitate to call me. I look forward to following Randi along with you.    Sincerely,        Bk Helm MD        CC:   No Recipients    Document electronically generated by: Bk Helm MD

## (undated) NOTE — LETTER
[address], [place]  Authorization for Invasive Procedures  1.  I hereby authorize Dr. Lieutenant Aburto** , my physician and whomever may be designated as the doctor's assistant, to perform the following operation and/or procedure:  **COLON 4. Should the need arise during my operation or immediate post-operative period; I also consent to the administration of blood and/or blood products.  Further, I understand that despite careful testing and screening of blood and blood products, I may still 9. Patients having a sterilization procedure: I understand that if the procedure is successful the results will be permanent and it will therefore be impossible for me to inseminate, conceive or bear children.  I also understand that the procedure is intend

## (undated) NOTE — LETTER
4/3/2017          Jay Jay Wright  [address]    Dear Luis Butts,       Here are the  biopsy/pathology findings from your recent Colonoscopy :    a hyperplastic polyp(s) which is a benign non-cancerous growth that was removed

## (undated) NOTE — LETTER
[place] [address], [place], [place]    Authorization for Surgical Operation and Procedure                               I hereby authorize Adriana Vega MD, my physician and his/her assistants (if applicable), which may include medical students, residents, and/or fellows, to perform the following surgical operation/ procedure and administer such anesthesia as may be determined necessary by my physician: Operation/Procedure name (s) COLONOSCOPY on Noorjehan Yamil   2.   I recognize that during the surgical operation/procedure, unforeseen conditions may necessitate additional or different procedures than those listed above.  I, therefore, further authorize and request that the above-named surgeon, assistants, or designees perform such procedures as are, in their judgment, necessary and desirable.    3.   My surgeon/physician has discussed prior to my surgery the potential benefits, risks and side effects of this procedure; the likelihood of achieving goals; and potential problems that might occur during recuperation.  They also discussed reasonable alternatives to the procedure, including risks, benefits, and side effects related to the alternatives and risks related to not receiving this procedure.  I have had all my questions answered and I acknowledge that no guarantee has been made as to the result that may be obtained.    4.   Should the need arise during my operation/procedure, which includes change of level of care prior to discharge, I also consent to the administration of blood and/or blood products.  Further, I understand that despite careful testing and screening of blood or blood products by collecting agencies, I may still be subject to ill effects as a result of receiving a blood transfusion and/or blood products.  The following are some, but not all, of the potential risks that can occur: fever and allergic reactions, hemolytic reactions, transmission of  diseases such as Hepatitis, AIDS and Cytomegalovirus (CMV) and fluid overload.  In the event that I wish to have an autologous transfusion of my own blood, or a directed donor transfusion, I will discuss this with my physician.  Check only if Refusing Blood or Blood Products  I understand refusal of blood or blood products as deemed necessary by my physician may have serious consequences to my condition to include possible death. I hereby assume responsibility for my refusal and release the hospital, its personnel, and my physicians from any responsibility for the consequences of my refusal.    o  Refuse   5.   I authorize the use of any specimen, organs, tissues, body parts or foreign objects that may be removed from my body during the operation/procedure for diagnosis, research or teaching purposes and their subsequent disposal by hospital authorities.  I also authorize the release of specimen test results and/or written reports to my treating physician on the hospital medical staff or other referring or consulting physicians involved in my care, at the discretion of the Pathologist or my treating physician.    6.   I consent to the photographing or videotaping of the operations or procedures to be performed, including appropriate portions of my body for medical, scientific, or educational purposes, provided my identity is not revealed by the pictures or by descriptive texts accompanying them.  If the procedure has been photographed/videotaped, the surgeon will obtain the original picture, image, videotape or CD.  The hospital will not be responsible for storage, release or maintenance of the picture, image, tape or CD.    7.   I consent to the presence of a  or observers in the operating room as deemed necessary by my physician or their designees.    8.   I recognize that in the event my procedure results in extended X-Ray/fluoroscopy time, I may develop a skin reaction.    9. If I have a Do Not  Attempt Resuscitation (DNAR) order in place, that status will be suspended while in the operating room, procedural suite, and during the recovery period unless otherwise explicitly stated by me (or a person authorized to consent on my behalf). The surgeon or my attending physician will determine when the applicable recovery period ends for purposes of reinstating the DNAR order.  10. Patients having a sterilization procedure: I understand that if the procedure is successful the results will be permanent and it will therefore be impossible for me to inseminate, conceive, or bear children.  I also understand that the procedure is intended to result in sterility, although the result has not been guaranteed.   11. I acknowledge that my physician has explained sedation/analgesia administration to me including the risk and benefits I consent to the administration of sedation/analgesia as may be necessary or desirable in the judgment of my physician.    I CERTIFY THAT I HAVE READ AND FULLY UNDERSTAND THE ABOVE CONSENT TO OPERATION and/or OTHER PROCEDURE.     ____________________________________  _________________________________        ______________________________  Signature of Patient    Signature of Responsible Person                Printed Name of Responsible Person                                      ____________________________________  _____________________________                ________________________________  Signature of Witness        Date  Time         Relationship to Patient    STATEMENT OF PHYSICIAN My signature below affirms that prior to the time of the procedure; I have explained to the patient and/or his/her legal representative, the risks and benefits involved in the proposed treatment and any reasonable alternative to the proposed treatment. I have also explained the risks and benefits involved in refusal of the proposed treatment and alternatives to the proposed treatment and have answered the  patient's questions. If I have a significant financial interest in a co-management agreement or a significant financial interest in any product or implant, or other significant relationship used in this procedure/surgery, I have disclosed this and had a discussion with my patient.     _____________________________________________________              _____________________________  (Signature of Physician)                                                                                         (Date)                                   (Time)  Patient Name: Randi Lobo      : 1965      Printed: 10/8/2024     Medical Record #: [id number]                                      Page 1 of 1

## (undated) NOTE — LETTER
Patient Name: Matty Powell  YOB: 1965          MRN :  [id number]  Date:  12/23/2021  Referring Physician:  No ref. provider found    DischargeSummary  Pt has attended 3 visits in Physical Therapy.    Pt no showed/late cancel 2 visits  Rep met    Plan: Discharge from PT    Patient/Family was advised of these findings, precautions, and treatment options and has agreed to actively participate in planning and for this course of care.     Thank you for your referral. If you have any questions, pl

## (undated) NOTE — LETTER
11/15/2024          Randi Lobo    [address] [place] [address]         Dear Randi,    Our records indicate that the tests ordered for you by Adriana Vega MD  have not been done.      Helicobacter Pylori Breath Test, Adult (Order #[id number]) on 10/1/24- Do NOT take any acid-reducing medications for TWO WEEKS before as omeprazole (prilosec), esomeprazole (nexium), pantoprazole (protonix), lansoprazole (prevacid), or famotidine (pepcid).     If you have, in fact, already completed the tests or you do not wish to have the tests done, please contact our office at THE NUMBER LISTED BELOW.  Otherwise, please proceed with the testing.  Enclosed is a duplicate order for your convenience.        Sincerely,    Adriana Vega MD  [address] [id number] [phone]

## (undated) NOTE — ED AVS SNAPSHOT
[facility]    [address] [place] [place] [address]    Phone:  [phone]    Fax:  [phone]           Mu Parent   MRN: [id number]    Department:  [facility]   Date of Visit:  4/16 covered by your plan. Please contact your insurance company to determine coverage and benefits available for follow-up care and referrals.       If you have difficulty scheduling your follow-up appointment as directed, please call our  at ([phone]) If you believe that any of the medications or instructions on this list is different from what your Primary Care doctor has instructed you - please continue to take your medications as instructed by your Primary Care doctor until you can check with your do coverage. Patient [address] is a Federal Navigator program that can help with your Affordable Care Act coverage, as well as all types of Medicaid plans.   To get signed up and covered, please call [phone] and ask to get set up for an insuran

## (undated) NOTE — ED AVS SNAPSHOT
[facility] Emergency Department    [address]     [address] [place] [address]    Phone:  [phone]    Fax:  [phone]           Kip Gonzales   MRN: [id number]    Department:  [facility] Emergency Department   Date of Visit: Quantity:  14 tablet   Commonly known as:  CIPRO   Take 1 tablet (500 mg total) by mouth 2 (two) times daily.        Ondansetron HCl 4 mg tablet   Quantity:  20 tablet   Commonly known as:  ZOFRAN   Take 1 tablet (4 mg total) by mouth every 8 (eight) hours return to your personal doctor) about any new or lasting problems. The primary care or specialist physician may see patients referred from the [facility] Emergency Department. Follow-up care is at the discretion of that Physician.   If you n [facility] [address]  [place] [address]) [phone]                Additional Information       We are concerned for your overall well being:    - If you are a smoker or have smoked in the last 12 months, we encourage you to explore op

## (undated) NOTE — LETTER
[address]   Date:   8/5/2021     Name:   Gonzalo Landis    YOB: 1965   MRN:   [id number]       WHERE IS YOUR PAIN NOW? Chapo the areas on your body where you feel the described sensations.   Use the appropriate sy

## (undated) NOTE — Clinical Note
Dear Dr. Catherine Modi,    Thank you for sending Mone Dennis to see me for electrodiagnostic consultation. I appreciate your confidence in me to care for your patients. Please feel free call me with any questions at [phone] or contact me through [address] [place] [address] Fareed Rodríguez

## (undated) NOTE — LETTER
Stevie Darnell, [address] [facility], [address]       08/12/20        Patient: Kendrick Rodríguez   YOB: 1965   Date of Visit: 8/12/2020       Dear  Dr. Jamison Meade MD,      Thank you for referring Kendrick Rodríguez to my practice.   Com

## (undated) NOTE — LETTER
January 21, 2021    Faviola Villegas MD  [address] [place] [address]     Patient: Conda Mar   YOB: 1965   Date of Visit: 1/21/2021       Dear Dr. Yaniv Jasso MD:    Thank you for referring Conda Mar to me for evaluation.  He • Glaucoma Neg    • Macular degeneration Neg        Social History: Social History    Tobacco Use      Smoking status: Never Smoker      Smokeless tobacco: Never Used    Alcohol use: No    Drug use: No      Medications:  Current Outpatient Medications   Me • insulin glargine (BASAGLAR KWIKPEN) 100 UNIT/ML Subcutaneous Solution Pen-injector Inject 40 Units into the skin every morning. 36 mL 1   • Insulin Pen Needle (PEN NEEDLES) 32G X 4 MM Does not apply Misc 100 each by Does not apply route every morning.  10 Last edited by Gaurav Neely OT on 1/21/2021  4:16 PM. (History)          PHYSICAL EXAM:     Base Eye Exam     Visual Acuity (Snellen - Linear)       Right Left    Dist sc 20/20 -2 20/20 -2    Dist ph sc NI     Near cc 20/20 20/20          Tonometry (Ica Diabetes type II: no background of retinopathy, no signs of neovascularization noted. Discussed ocular and systemic benefits of blood sugar control. Diagnosis and treatment discussed in detail with patient.       Presbyopia of both eyes  Discussed with pa

## (undated) NOTE — LETTER
Keeley Youssef, [address]  [place] [facility],  [address]       10/28/20        Patient: Gonzalo Landis   YOB: 1965   Date of Visit: 10/28/2020       Dear  Dr. Veto Warren MD,      Thank you for referring Gonzalo Landis to my practice.   A

## (undated) NOTE — ED AVS SNAPSHOT
Rachael Yusuf   MRN: [id number]    Department:  [facility] Emergency Department   Date of Visit:  4/3/2019           Disclosure     Insurance plans vary and the physician(s) referred by the ER may not be covered by your plan.  Please contact y CARE PHYSICIAN AT ONCE OR RETURN IMMEDIATELY TO THE EMERGENCY DEPARTMENT. If you have been prescribed any medication(s), please fill your prescription right away and begin taking the medication(s) as directed.   If you believe that any of the medications

## (undated) NOTE — LETTER
Date & Time: 4/30/2021, 3:37 PM  Patient: Sapphire Prom  Encounter Provider(s):    JORGE L Hubbard       To Whom It May Concern:    Sapphire Prom was seen and treated in our department on 4/30/2021.   Okay to travel no symptoms, negative Covid te

## (undated) NOTE — LETTER
[facility]  Administration of Anesthesia  IRandi agree to be cared for by a physician anesthesiologist alone and/or with a nurse anesthetist, who is specially trained to monitor me and give me medicine to put me to sleep or keep me comfortable during my procedure    I understand that my anesthesiologist and/or anesthetist is not an employee or agent of [facility] or [employer]. He or she works for [facility]    As the patient asking for anesthesia services, I agree to:  Allow the anesthesiologist (anesthesia doctor) to give me medicine and do additional procedures as necessary. Some examples are: Starting or using an “IV” to give me medicine, fluids or blood during my procedure, and having a breathing tube placed to help me breathe when I’m asleep (intubation). In the event that my heart stops working properly, I understand that my anesthesiologist will make every effort to sustain my life, unless otherwise directed by [facility] Do Not Resuscitate documents.  Tell my anesthesia doctor before my procedure:  If I am pregnant.  The last time that I ate or drank.  iii. All of the medicines I take (including prescriptions, herbal supplements, and pills I can buy without a prescription (including street drugs/illegal medications). Failure to inform my anesthesiologist about these medicines may increase my risk of anesthetic complications.  iv.If I am allergic to anything or have had a reaction to anesthesia before.  I understand how the anesthesia medicine will help me (benefits).  I understand that with any type of anesthesia medicine there are risks:  The most common risks are: nausea, vomiting, sore throat, muscle soreness, damage to my eyes, mouth, or teeth (from breathing tube placement).  Rare risks include: remembering what happened during my procedure, allergic reactions to medications, injury to my airway, heart, lungs, vision, nerves, or  muscles and in extremely rare instances death.  My doctor has explained to me other choices available to me for my care (alternatives).  Pregnant Patients (“epidural”):  I understand that the risks of having an epidural (medicine given into my back to help control pain during labor), include itching, low blood pressure, difficulty urinating, headache or slowing of the baby’s heart. Very rare risks include infection, bleeding, seizure, irregular heart rhythms and nerve injury.  Regional Anesthesia (“spinal”, “epidural”, & “nerve blocks”):  I understand that rare but potential complications include headache, bleeding, infection, seizure, irregular heart rhythms, and nerve injury.    _____________________________________________________________________________  Patient (or Representative) Signature/Relationship to Patient  Date   Time    _____________________________________________________________________________   Name (if used)    Language/Organization   Time    _____________________________________________________________________________  Nurse Anesthetist Signature     Date   Time  _____________________________________________________________________________  Anesthesiologist Signature     Date   Time  I have discussed the procedure and information above with the patient (or patient’s representative) and answered their questions. The patient or their representative has agreed to have anesthesia services.    _____________________________________________________________________________  Witness        Date   Time  I have verified that the signature is that of the patient or patient’s representative, and that it was signed before the procedure  Patient Name: Randi Lobo     : 1965                 Printed: 10/8/2024 at 2:13 PM    Medical Record #: [id number]                                            Page 1 of 1  ----------ANESTHESIA CONSENT----------

## (undated) NOTE — LETTER
Date & Time: 4/8/2023, 12:17 PM  Patient: Lizet Dejesus  Encounter Provider(s):    Sana Quintero MD       To Whom It May Concern:    Lizet Dejesus was seen and treated in our department the morning of 4/8/2023.      If you have any questions or concerns, please do not hesitate to call.        _____________________________  Physician/[facility] Signature